# Patient Record
Sex: MALE | Race: WHITE | NOT HISPANIC OR LATINO | Employment: OTHER
[De-identification: names, ages, dates, MRNs, and addresses within clinical notes are randomized per-mention and may not be internally consistent; named-entity substitution may affect disease eponyms.]

---

## 2017-01-12 ENCOUNTER — RECORDS - HEALTHEAST (OUTPATIENT)
Dept: ADMINISTRATIVE | Facility: OTHER | Age: 67
End: 2017-01-12

## 2017-06-02 ENCOUNTER — RECORDS - HEALTHEAST (OUTPATIENT)
Dept: LAB | Facility: CLINIC | Age: 67
End: 2017-06-02

## 2017-06-02 LAB
CHOLEST SERPL-MCNC: 220 MG/DL
FASTING STATUS PATIENT QL REPORTED: ABNORMAL
HDLC SERPL-MCNC: 46 MG/DL
LDLC SERPL CALC-MCNC: 116 MG/DL
PSA SERPL-MCNC: 0.8 NG/ML (ref 0–4.5)
TRIGL SERPL-MCNC: 291 MG/DL

## 2017-06-14 ENCOUNTER — RECORDS - HEALTHEAST (OUTPATIENT)
Dept: ADMINISTRATIVE | Facility: OTHER | Age: 67
End: 2017-06-14

## 2018-01-09 ENCOUNTER — RECORDS - HEALTHEAST (OUTPATIENT)
Dept: LAB | Facility: CLINIC | Age: 68
End: 2018-01-09

## 2018-01-09 LAB
ALBUMIN SERPL-MCNC: 3.1 G/DL (ref 3.5–5)
ANION GAP SERPL CALCULATED.3IONS-SCNC: 10 MMOL/L (ref 5–18)
BUN SERPL-MCNC: 19 MG/DL (ref 8–22)
CALCIUM SERPL-MCNC: 8.7 MG/DL (ref 8.5–10.5)
CHLORIDE BLD-SCNC: 101 MMOL/L (ref 98–107)
CO2 SERPL-SCNC: 32 MMOL/L (ref 22–31)
CREAT SERPL-MCNC: 1.25 MG/DL (ref 0.7–1.3)
GFR SERPL CREATININE-BSD FRML MDRD: 58 ML/MIN/1.73M2
GLUCOSE BLD-MCNC: 135 MG/DL (ref 70–125)
PHOSPHATE SERPL-MCNC: 3.1 MG/DL (ref 2.5–4.5)
POTASSIUM BLD-SCNC: 4.1 MMOL/L (ref 3.5–5)
SODIUM SERPL-SCNC: 143 MMOL/L (ref 136–145)

## 2018-09-25 ENCOUNTER — RECORDS - HEALTHEAST (OUTPATIENT)
Dept: LAB | Facility: CLINIC | Age: 68
End: 2018-09-25

## 2018-09-25 LAB
ALBUMIN SERPL-MCNC: 3.9 G/DL (ref 3.5–5)
ALP SERPL-CCNC: 74 U/L (ref 45–120)
ALT SERPL W P-5'-P-CCNC: 10 U/L (ref 0–45)
ANION GAP SERPL CALCULATED.3IONS-SCNC: 9 MMOL/L (ref 5–18)
AST SERPL W P-5'-P-CCNC: 14 U/L (ref 0–40)
BILIRUB SERPL-MCNC: 0.4 MG/DL (ref 0–1)
BUN SERPL-MCNC: 15 MG/DL (ref 8–22)
CALCIUM SERPL-MCNC: 9.5 MG/DL (ref 8.5–10.5)
CHLORIDE BLD-SCNC: 102 MMOL/L (ref 98–107)
CHOLEST SERPL-MCNC: 210 MG/DL
CO2 SERPL-SCNC: 30 MMOL/L (ref 22–31)
CREAT SERPL-MCNC: 1.11 MG/DL (ref 0.7–1.3)
FASTING STATUS PATIENT QL REPORTED: ABNORMAL
GFR SERPL CREATININE-BSD FRML MDRD: >60 ML/MIN/1.73M2
GLUCOSE BLD-MCNC: 113 MG/DL (ref 70–125)
HDLC SERPL-MCNC: 47 MG/DL
LDLC SERPL CALC-MCNC: 140 MG/DL
POTASSIUM BLD-SCNC: 4.8 MMOL/L (ref 3.5–5)
PROT SERPL-MCNC: 7.2 G/DL (ref 6–8)
SODIUM SERPL-SCNC: 141 MMOL/L (ref 136–145)
TRIGL SERPL-MCNC: 115 MG/DL

## 2018-09-26 ENCOUNTER — RECORDS - HEALTHEAST (OUTPATIENT)
Dept: ADMINISTRATIVE | Facility: OTHER | Age: 68
End: 2018-09-26

## 2019-02-19 ENCOUNTER — RECORDS - HEALTHEAST (OUTPATIENT)
Dept: ADMINISTRATIVE | Facility: OTHER | Age: 69
End: 2019-02-19

## 2019-02-19 ENCOUNTER — RECORDS - HEALTHEAST (OUTPATIENT)
Dept: LAB | Facility: CLINIC | Age: 69
End: 2019-02-19

## 2019-02-19 LAB
ALBUMIN SERPL-MCNC: 3.7 G/DL (ref 3.5–5)
ANION GAP SERPL CALCULATED.3IONS-SCNC: 8 MMOL/L (ref 5–18)
BUN SERPL-MCNC: 14 MG/DL (ref 8–22)
CALCIUM SERPL-MCNC: 9.6 MG/DL (ref 8.5–10.5)
CHLORIDE BLD-SCNC: 103 MMOL/L (ref 98–107)
CO2 SERPL-SCNC: 31 MMOL/L (ref 22–31)
CREAT SERPL-MCNC: 1.3 MG/DL (ref 0.7–1.3)
GFR SERPL CREATININE-BSD FRML MDRD: 55 ML/MIN/1.73M2
GLUCOSE BLD-MCNC: 141 MG/DL (ref 70–125)
PHOSPHATE SERPL-MCNC: 3.5 MG/DL (ref 2.5–4.5)
POTASSIUM BLD-SCNC: 5 MMOL/L (ref 3.5–5)
SODIUM SERPL-SCNC: 142 MMOL/L (ref 136–145)

## 2019-09-10 ENCOUNTER — RECORDS - HEALTHEAST (OUTPATIENT)
Dept: LAB | Facility: CLINIC | Age: 69
End: 2019-09-10

## 2019-09-10 ENCOUNTER — RECORDS - HEALTHEAST (OUTPATIENT)
Dept: ADMINISTRATIVE | Facility: OTHER | Age: 69
End: 2019-09-10

## 2019-09-10 LAB
ALBUMIN SERPL-MCNC: 4.1 G/DL (ref 3.5–5)
ALP SERPL-CCNC: 78 U/L (ref 45–120)
ALT SERPL W P-5'-P-CCNC: 13 U/L (ref 0–45)
ANION GAP SERPL CALCULATED.3IONS-SCNC: 8 MMOL/L (ref 5–18)
AST SERPL W P-5'-P-CCNC: 17 U/L (ref 0–40)
BILIRUB SERPL-MCNC: 0.4 MG/DL (ref 0–1)
BUN SERPL-MCNC: 13 MG/DL (ref 8–22)
CALCIUM SERPL-MCNC: 9.5 MG/DL (ref 8.5–10.5)
CHLORIDE BLD-SCNC: 101 MMOL/L (ref 98–107)
CHOLEST SERPL-MCNC: 198 MG/DL
CO2 SERPL-SCNC: 32 MMOL/L (ref 22–31)
CREAT SERPL-MCNC: 1.14 MG/DL (ref 0.7–1.3)
FASTING STATUS PATIENT QL REPORTED: NORMAL
GFR SERPL CREATININE-BSD FRML MDRD: >60 ML/MIN/1.73M2
GLUCOSE BLD-MCNC: 94 MG/DL (ref 70–125)
HDLC SERPL-MCNC: 52 MG/DL
LDLC SERPL CALC-MCNC: 123 MG/DL
POTASSIUM BLD-SCNC: 4.8 MMOL/L (ref 3.5–5)
PROT SERPL-MCNC: 7.3 G/DL (ref 6–8)
SODIUM SERPL-SCNC: 141 MMOL/L (ref 136–145)
TRIGL SERPL-MCNC: 117 MG/DL

## 2020-01-03 ENCOUNTER — RECORDS - HEALTHEAST (OUTPATIENT)
Dept: ADMINISTRATIVE | Facility: OTHER | Age: 70
End: 2020-01-03

## 2020-01-07 ENCOUNTER — RECORDS - HEALTHEAST (OUTPATIENT)
Dept: ADMINISTRATIVE | Facility: OTHER | Age: 70
End: 2020-01-07

## 2020-01-08 ENCOUNTER — RECORDS - HEALTHEAST (OUTPATIENT)
Dept: ADMINISTRATIVE | Facility: OTHER | Age: 70
End: 2020-01-08

## 2020-01-10 ENCOUNTER — RECORDS - HEALTHEAST (OUTPATIENT)
Dept: ADMINISTRATIVE | Facility: OTHER | Age: 70
End: 2020-01-10

## 2020-01-10 ENCOUNTER — OFFICE VISIT - HEALTHEAST (OUTPATIENT)
Dept: PULMONOLOGY | Facility: OTHER | Age: 70
End: 2020-01-10

## 2020-01-10 ENCOUNTER — RECORDS - HEALTHEAST (OUTPATIENT)
Dept: RADIOLOGY | Facility: CLINIC | Age: 70
End: 2020-01-10

## 2020-01-10 DIAGNOSIS — J43.9 PULMONARY EMPHYSEMA, UNSPECIFIED EMPHYSEMA TYPE (H): ICD-10-CM

## 2020-01-10 DIAGNOSIS — J20.9 ACUTE BRONCHITIS, UNSPECIFIED ORGANISM: ICD-10-CM

## 2020-01-10 DIAGNOSIS — J44.1 COPD EXACERBATION (H): ICD-10-CM

## 2020-01-10 DIAGNOSIS — R91.1 LUNG NODULE: ICD-10-CM

## 2020-01-10 DIAGNOSIS — R06.09 DYSPNEA ON EXERTION: ICD-10-CM

## 2020-01-10 RX ORDER — TAMSULOSIN HYDROCHLORIDE 0.4 MG/1
0.4 CAPSULE ORAL DAILY
Status: SHIPPED | COMMUNITY
Start: 2020-01-02

## 2020-01-10 ASSESSMENT — MIFFLIN-ST. JEOR: SCORE: 1564.15

## 2020-01-14 ENCOUNTER — HOSPITAL ENCOUNTER (OUTPATIENT)
Dept: PET IMAGING | Facility: HOSPITAL | Age: 70
Discharge: HOME | End: 2020-01-14
Attending: INTERNAL MEDICINE

## 2020-01-14 DIAGNOSIS — R91.1 LUNG NODULE: ICD-10-CM

## 2020-01-14 LAB — GLUCOSE BLDC GLUCOMTR-MCNC: 152 MG/DL (ref 70–139)

## 2020-01-14 ASSESSMENT — MIFFLIN-ST. JEOR: SCORE: 1570.05

## 2020-01-15 ENCOUNTER — COMMUNICATION - HEALTHEAST (OUTPATIENT)
Dept: MULTI SPECIALTY CLINIC | Facility: CLINIC | Age: 70
End: 2020-01-15

## 2020-01-15 DIAGNOSIS — R91.1 LUNG NODULE: ICD-10-CM

## 2020-01-27 ENCOUNTER — RECORDS - HEALTHEAST (OUTPATIENT)
Dept: ADMINISTRATIVE | Facility: OTHER | Age: 70
End: 2020-01-27

## 2020-01-30 ENCOUNTER — OFFICE VISIT - HEALTHEAST (OUTPATIENT)
Dept: PULMONOLOGY | Facility: OTHER | Age: 70
End: 2020-01-30

## 2020-01-30 ENCOUNTER — RECORDS - HEALTHEAST (OUTPATIENT)
Dept: RADIOLOGY | Facility: CLINIC | Age: 70
End: 2020-01-30

## 2020-01-30 DIAGNOSIS — R91.1 LUNG NODULE: ICD-10-CM

## 2020-01-30 ASSESSMENT — MIFFLIN-ST. JEOR: SCORE: 1583.66

## 2020-02-05 ENCOUNTER — HOSPITAL ENCOUNTER (OUTPATIENT)
Dept: CT IMAGING | Facility: CLINIC | Age: 70
Discharge: HOME | End: 2020-02-05
Attending: THORACIC SURGERY (CARDIOTHORACIC VASCULAR SURGERY) | Admitting: RADIOLOGY

## 2020-02-05 ENCOUNTER — HOSPITAL ENCOUNTER (OUTPATIENT)
Dept: RADIOLOGY | Facility: CLINIC | Age: 70
Discharge: HOME | End: 2020-02-05
Attending: RADIOLOGY

## 2020-02-05 ENCOUNTER — HOSPITAL ENCOUNTER (OUTPATIENT)
Dept: CT IMAGING | Facility: CLINIC | Age: 70
Discharge: HOME | End: 2020-02-05
Attending: THORACIC SURGERY (CARDIOTHORACIC VASCULAR SURGERY)

## 2020-02-05 DIAGNOSIS — R91.1 LUNG NODULE: ICD-10-CM

## 2020-02-05 LAB
GLUCOSE BLDC GLUCOMTR-MCNC: 99 MG/DL (ref 70–139)
HGB BLD-MCNC: 14.1 G/DL (ref 14–18)
INR PPP: 1.04 (ref 0.9–1.1)
PLATELET # BLD AUTO: 197 THOU/UL (ref 140–440)

## 2020-02-05 ASSESSMENT — MIFFLIN-ST. JEOR: SCORE: 1580.54

## 2020-02-06 ENCOUNTER — COMMUNICATION - HEALTHEAST (OUTPATIENT)
Dept: SCHEDULING | Facility: CLINIC | Age: 70
End: 2020-02-06

## 2020-02-06 LAB
CAP COMMENT: ABNORMAL
LAB AP CHARGES (HE HISTORICAL CONVERSION): ABNORMAL
LAB AP INITIAL CYTO EVAL (HE HISTORICAL CONVERSION): ABNORMAL
LAB MED GENERAL PATH INTERP (HE HISTORICAL CONVERSION): ABNORMAL
PATH REPORT.COMMENTS IMP SPEC: ABNORMAL
PATH REPORT.COMMENTS IMP SPEC: ABNORMAL
PATH REPORT.FINAL DX SPEC: ABNORMAL
PATH REPORT.MICROSCOPIC SPEC OTHER STN: ABNORMAL
PATH REPORT.RELEVANT HX SPEC: ABNORMAL
SPECIMEN DESCRIPTION: ABNORMAL

## 2020-02-12 ENCOUNTER — COMMUNICATION - HEALTHEAST (OUTPATIENT)
Dept: PULMONOLOGY | Facility: OTHER | Age: 70
End: 2020-02-12

## 2020-02-13 ENCOUNTER — COMMUNICATION - HEALTHEAST (OUTPATIENT)
Dept: ONCOLOGY | Facility: CLINIC | Age: 70
End: 2020-02-13

## 2020-02-13 ENCOUNTER — AMBULATORY - HEALTHEAST (OUTPATIENT)
Dept: RADIATION ONCOLOGY | Facility: HOSPITAL | Age: 70
End: 2020-02-13

## 2020-02-13 DIAGNOSIS — C34.90 SMALL CELL LUNG CANCER (H): ICD-10-CM

## 2020-02-14 ENCOUNTER — NURSE TRIAGE (OUTPATIENT)
Dept: NURSING | Facility: CLINIC | Age: 70
End: 2020-02-14

## 2020-02-14 NOTE — TELEPHONE ENCOUNTER
Martin calls and says that he has been coughing up dark red blood for 1 week and 3 days. Pt. Says that he had a biopsy done and has been coughing this blood up since having the biopsy. Pt. Denies difficulty breathing. Pt. Says that he does have lung cancer.     Reason for Disposition    [1] Known COPD or other severe lung disease (i.e., bronchiectasis, cystic fibrosis, lung surgery) AND [2] worsening symptoms (i.e., increased sputum purulence or amount, increased breathing difficulty    Additional Information    Negative: Severe difficulty breathing (e.g., struggling for each breath, speaks in single words)    Negative: Bluish (or gray) lips or face now    Negative: [1] Difficulty breathing AND [2] exposure to flames, smoke, or fumes    Negative: [1] Stridor AND [2] difficulty breathing    Negative: Sounds like a life-threatening emergency to the triager    Negative: [1] Previous asthma attacks AND [2] this feels like asthma attack    Negative: Dry (non-productive) cough (i.e., no sputum or minimal clear sputum)    Negative: Chest pain  (Exception: MILD central chest pain, present only when coughing)    Negative: Difficulty breathing    Negative: Patient sounds very sick or weak to the triager    Negative: [1] Coughed up blood AND [2] > 1 tablespoon (15 ml) (Exception: blood-tinged sputum)    Negative: Fever > 103 F (39.4 C)    Negative: [1] Fever > 101 F (38.3 C) AND [2] age > 60    Negative: [1] Fever > 100.0 F (37.8 C) AND [2] bedridden (e.g., nursing home patient, CVA, chronic illness, recovering from surgery)    Negative: [1] Fever > 100.0 F (37.8 C) AND [2] diabetes mellitus or weak immune system (e.g., HIV positive, cancer chemo, splenectomy, chronic steroids)    Negative: Wheezing is present    Negative: SEVERE coughing spells (e.g., whooping sound after coughing, vomiting after coughing)    Negative: [1] Continuous (nonstop) coughing interferes with work or school AND [2] no improvement using cough  treatment per Care Advice    Negative: Coughing up tiffani-colored (reddish-brown) sputum    Negative: Fever present > 3 days (72 hours)    Negative: [1] Fever returns after gone for over 24 hours AND [2] symptoms worse or not improved    Negative: [1] Using nasal washes and pain medicine > 24 hours AND [2] sinus pain (around cheekbone or eye) persists    Negative: Earache    Protocols used: COUGH - ACUTE [id number]

## 2020-02-15 ENCOUNTER — COMMUNICATION - HEALTHEAST (OUTPATIENT)
Dept: SCHEDULING | Facility: CLINIC | Age: 70
End: 2020-02-15

## 2020-02-20 ENCOUNTER — HOSPITAL ENCOUNTER (OUTPATIENT)
Dept: MRI IMAGING | Facility: CLINIC | Age: 70
Discharge: HOME | End: 2020-02-20
Attending: RADIOLOGY

## 2020-02-20 DIAGNOSIS — C34.90 SMALL CELL LUNG CANCER (H): ICD-10-CM

## 2020-02-24 ENCOUNTER — AMBULATORY - HEALTHEAST (OUTPATIENT)
Dept: RADIATION ONCOLOGY | Facility: HOSPITAL | Age: 70
End: 2020-02-24

## 2020-02-24 ENCOUNTER — OFFICE VISIT - HEALTHEAST (OUTPATIENT)
Dept: RADIATION ONCOLOGY | Facility: HOSPITAL | Age: 70
End: 2020-02-24

## 2020-02-24 ENCOUNTER — AMBULATORY - HEALTHEAST (OUTPATIENT)
Dept: ONCOLOGY | Facility: HOSPITAL | Age: 70
End: 2020-02-24

## 2020-02-24 DIAGNOSIS — C34.90 SMALL CELL LUNG CANCER (H): ICD-10-CM

## 2020-02-24 RX ORDER — NICOTINE 21 MG/24HR
PATCH, TRANSDERMAL 24 HOURS TRANSDERMAL
Status: SHIPPED | COMMUNITY
Start: 2020-01-31 | End: 2021-08-05

## 2020-02-27 ENCOUNTER — AMBULATORY - HEALTHEAST (OUTPATIENT)
Dept: RADIATION ONCOLOGY | Facility: HOSPITAL | Age: 70
End: 2020-02-27

## 2020-03-02 ENCOUNTER — AMBULATORY - HEALTHEAST (OUTPATIENT)
Dept: RADIATION ONCOLOGY | Facility: HOSPITAL | Age: 70
End: 2020-03-02

## 2020-03-02 ENCOUNTER — OFFICE VISIT - HEALTHEAST (OUTPATIENT)
Dept: RADIATION ONCOLOGY | Facility: HOSPITAL | Age: 70
End: 2020-03-02

## 2020-03-02 DIAGNOSIS — C34.90 SMALL CELL LUNG CANCER (H): ICD-10-CM

## 2020-03-04 ENCOUNTER — AMBULATORY - HEALTHEAST (OUTPATIENT)
Dept: RADIATION ONCOLOGY | Facility: HOSPITAL | Age: 70
End: 2020-03-04

## 2020-03-06 ENCOUNTER — AMBULATORY - HEALTHEAST (OUTPATIENT)
Dept: RADIATION ONCOLOGY | Facility: HOSPITAL | Age: 70
End: 2020-03-06

## 2020-03-09 ENCOUNTER — AMBULATORY - HEALTHEAST (OUTPATIENT)
Dept: RADIATION ONCOLOGY | Facility: HOSPITAL | Age: 70
End: 2020-03-09

## 2020-03-12 ENCOUNTER — COMMUNICATION - HEALTHEAST (OUTPATIENT)
Dept: ONCOLOGY | Facility: CLINIC | Age: 70
End: 2020-03-12

## 2020-03-12 ENCOUNTER — AMBULATORY - HEALTHEAST (OUTPATIENT)
Dept: RADIATION ONCOLOGY | Facility: HOSPITAL | Age: 70
End: 2020-03-12

## 2020-03-12 DIAGNOSIS — C34.90 SMALL CELL LUNG CANCER (H): ICD-10-CM

## 2020-03-19 ENCOUNTER — COMMUNICATION - HEALTHEAST (OUTPATIENT)
Dept: RADIATION ONCOLOGY | Facility: HOSPITAL | Age: 70
End: 2020-03-19

## 2020-04-13 ENCOUNTER — RECORDS - HEALTHEAST (OUTPATIENT)
Dept: LAB | Facility: CLINIC | Age: 70
End: 2020-04-13

## 2020-04-13 ENCOUNTER — RECORDS - HEALTHEAST (OUTPATIENT)
Dept: ADMINISTRATIVE | Facility: OTHER | Age: 70
End: 2020-04-13

## 2020-04-13 LAB
ALBUMIN SERPL-MCNC: 3.9 G/DL (ref 3.5–5)
ANION GAP SERPL CALCULATED.3IONS-SCNC: 9 MMOL/L (ref 5–18)
BUN SERPL-MCNC: 13 MG/DL (ref 8–22)
CALCIUM SERPL-MCNC: 9 MG/DL (ref 8.5–10.5)
CHLORIDE BLD-SCNC: 102 MMOL/L (ref 98–107)
CO2 SERPL-SCNC: 30 MMOL/L (ref 22–31)
CREAT SERPL-MCNC: 1.22 MG/DL (ref 0.7–1.3)
CREAT UR-MCNC: 210.9 MG/DL
GFR SERPL CREATININE-BSD FRML MDRD: 59 ML/MIN/1.73M2
GLUCOSE BLD-MCNC: 201 MG/DL (ref 70–125)
MICROALBUMIN UR-MCNC: 3.14 MG/DL (ref 0–1.99)
MICROALBUMIN/CREAT UR: 14.9 MG/G
PHOSPHATE SERPL-MCNC: 2.3 MG/DL (ref 2.5–4.5)
POTASSIUM BLD-SCNC: 4.6 MMOL/L (ref 3.5–5)
SODIUM SERPL-SCNC: 141 MMOL/L (ref 136–145)

## 2020-05-11 ENCOUNTER — HOSPITAL ENCOUNTER (OUTPATIENT)
Dept: CT IMAGING | Facility: HOSPITAL | Age: 70
Setting detail: RADIATION/ONCOLOGY SERIES
Discharge: STILL ADMITTED OR ADMINISTRATIVE | End: 2020-05-11
Attending: RADIOLOGY

## 2020-05-11 DIAGNOSIS — C34.90 SMALL CELL LUNG CANCER (H): ICD-10-CM

## 2020-05-14 ENCOUNTER — OFFICE VISIT - HEALTHEAST (OUTPATIENT)
Dept: RADIATION ONCOLOGY | Facility: HOSPITAL | Age: 70
End: 2020-05-14

## 2020-05-14 DIAGNOSIS — C34.90 SMALL CELL LUNG CANCER (H): ICD-10-CM

## 2020-05-26 ENCOUNTER — RECORDS - HEALTHEAST (OUTPATIENT)
Dept: LAB | Facility: CLINIC | Age: 70
End: 2020-05-26

## 2020-05-26 ENCOUNTER — RECORDS - HEALTHEAST (OUTPATIENT)
Dept: ADMINISTRATIVE | Facility: OTHER | Age: 70
End: 2020-05-26

## 2020-05-26 LAB
ANION GAP SERPL CALCULATED.3IONS-SCNC: 9 MMOL/L (ref 5–18)
BUN SERPL-MCNC: 13 MG/DL (ref 8–22)
CALCIUM SERPL-MCNC: 9.3 MG/DL (ref 8.5–10.5)
CHLORIDE BLD-SCNC: 104 MMOL/L (ref 98–107)
CO2 SERPL-SCNC: 30 MMOL/L (ref 22–31)
CREAT SERPL-MCNC: 1.26 MG/DL (ref 0.7–1.3)
ERYTHROCYTE [DISTWIDTH] IN BLOOD BY AUTOMATED COUNT: 12.8 % (ref 11–14.5)
GFR SERPL CREATININE-BSD FRML MDRD: 57 ML/MIN/1.73M2
GLUCOSE BLD-MCNC: 113 MG/DL (ref 70–125)
HCT VFR BLD AUTO: 46.5 % (ref 40–54)
HGB BLD-MCNC: 14.8 G/DL (ref 14–18)
MCH RBC QN AUTO: 31.6 PG (ref 27–34)
MCHC RBC AUTO-ENTMCNC: 31.8 G/DL (ref 32–36)
MCV RBC AUTO: 99 FL (ref 80–100)
PLATELET # BLD AUTO: 173 THOU/UL (ref 140–440)
PMV BLD AUTO: 10.6 FL (ref 8.5–12.5)
POTASSIUM BLD-SCNC: 4.4 MMOL/L (ref 3.5–5)
RBC # BLD AUTO: 4.69 MILL/UL (ref 4.4–6.2)
SODIUM SERPL-SCNC: 143 MMOL/L (ref 136–145)
WBC: 7.9 THOU/UL (ref 4–11)

## 2020-09-02 ENCOUNTER — COMMUNICATION - HEALTHEAST (OUTPATIENT)
Dept: ADMINISTRATIVE | Facility: HOSPITAL | Age: 70
End: 2020-09-02

## 2020-09-17 ENCOUNTER — HOSPITAL ENCOUNTER (OUTPATIENT)
Dept: CT IMAGING | Facility: HOSPITAL | Age: 70
Setting detail: RADIATION/ONCOLOGY SERIES
Discharge: STILL ADMITTED OR ADMINISTRATIVE | End: 2020-09-17
Attending: RADIOLOGY

## 2020-09-17 DIAGNOSIS — C34.90 SMALL CELL LUNG CANCER (H): ICD-10-CM

## 2020-09-24 ENCOUNTER — OFFICE VISIT - HEALTHEAST (OUTPATIENT)
Dept: RADIATION ONCOLOGY | Facility: HOSPITAL | Age: 70
End: 2020-09-24

## 2020-09-24 DIAGNOSIS — C34.90 SMALL CELL LUNG CANCER (H): ICD-10-CM

## 2020-10-05 ENCOUNTER — RECORDS - HEALTHEAST (OUTPATIENT)
Dept: LAB | Facility: CLINIC | Age: 70
End: 2020-10-05

## 2020-10-05 LAB
ALBUMIN SERPL-MCNC: 4 G/DL (ref 3.5–5)
ALP SERPL-CCNC: 87 U/L (ref 45–120)
ALT SERPL W P-5'-P-CCNC: 16 U/L (ref 0–45)
ANION GAP SERPL CALCULATED.3IONS-SCNC: 8 MMOL/L (ref 5–18)
AST SERPL W P-5'-P-CCNC: 17 U/L (ref 0–40)
BILIRUB SERPL-MCNC: 0.3 MG/DL (ref 0–1)
BUN SERPL-MCNC: 14 MG/DL (ref 8–22)
CALCIUM SERPL-MCNC: 9.2 MG/DL (ref 8.5–10.5)
CHLORIDE BLD-SCNC: 102 MMOL/L (ref 98–107)
CHOLEST SERPL-MCNC: 193 MG/DL
CO2 SERPL-SCNC: 33 MMOL/L (ref 22–31)
CREAT SERPL-MCNC: 1.23 MG/DL (ref 0.7–1.3)
FASTING STATUS PATIENT QL REPORTED: ABNORMAL
GFR SERPL CREATININE-BSD FRML MDRD: 59 ML/MIN/1.73M2
GLUCOSE BLD-MCNC: 129 MG/DL (ref 70–125)
HDLC SERPL-MCNC: 51 MG/DL
LDLC SERPL CALC-MCNC: 103 MG/DL
POTASSIUM BLD-SCNC: 4.6 MMOL/L (ref 3.5–5)
PROT SERPL-MCNC: 7.4 G/DL (ref 6–8)
SODIUM SERPL-SCNC: 143 MMOL/L (ref 136–145)
TRIGL SERPL-MCNC: 197 MG/DL

## 2020-12-09 ENCOUNTER — RECORDS - HEALTHEAST (OUTPATIENT)
Dept: LAB | Facility: CLINIC | Age: 70
End: 2020-12-09

## 2020-12-09 LAB — TSH SERPL DL<=0.005 MIU/L-ACNC: 0.76 UIU/ML (ref 0.3–5)

## 2021-02-23 ENCOUNTER — HOSPITAL ENCOUNTER (OUTPATIENT)
Dept: CT IMAGING | Facility: HOSPITAL | Age: 71
Discharge: HOME | End: 2021-02-23
Attending: RADIOLOGY

## 2021-02-23 DIAGNOSIS — C34.90 SMALL CELL LUNG CANCER (H): ICD-10-CM

## 2021-02-25 ENCOUNTER — OFFICE VISIT - HEALTHEAST (OUTPATIENT)
Dept: RADIATION ONCOLOGY | Facility: HOSPITAL | Age: 71
End: 2021-02-25

## 2021-02-25 DIAGNOSIS — C34.90 SMALL CELL LUNG CANCER (H): ICD-10-CM

## 2021-02-25 RX ORDER — ZOLPIDEM TARTRATE 6.25 MG/1
6.25 TABLET, FILM COATED, EXTENDED RELEASE ORAL
Status: SHIPPED | COMMUNITY
Start: 2021-01-25 | End: 2021-08-05

## 2021-04-16 ENCOUNTER — RECORDS - HEALTHEAST (OUTPATIENT)
Dept: LAB | Facility: CLINIC | Age: 71
End: 2021-04-16

## 2021-04-16 LAB
CREAT UR-MCNC: 231.5 MG/DL
MICROALBUMIN UR-MCNC: 6.16 MG/DL (ref 0–1.99)
MICROALBUMIN/CREAT UR: 26.6 MG/G

## 2021-04-17 LAB
BACTERIA SPEC CULT: NO GROWTH
PSA SERPL-MCNC: 1.7 NG/ML (ref 0–4.5)

## 2021-05-27 ENCOUNTER — RECORDS - HEALTHEAST (OUTPATIENT)
Dept: ADMINISTRATIVE | Facility: CLINIC | Age: 71
End: 2021-05-27

## 2021-05-28 ENCOUNTER — RECORDS - HEALTHEAST (OUTPATIENT)
Dept: ADMINISTRATIVE | Facility: CLINIC | Age: 71
End: 2021-05-28

## 2021-05-30 ENCOUNTER — RECORDS - HEALTHEAST (OUTPATIENT)
Dept: ADMINISTRATIVE | Facility: CLINIC | Age: 71
End: 2021-05-30

## 2021-05-31 ENCOUNTER — RECORDS - HEALTHEAST (OUTPATIENT)
Dept: ADMINISTRATIVE | Facility: CLINIC | Age: 71
End: 2021-05-31

## 2021-06-04 VITALS
RESPIRATION RATE: 24 BRPM | WEIGHT: 187.7 LBS | OXYGEN SATURATION: 93 % | HEART RATE: 92 BPM | BODY MASS INDEX: 30.17 KG/M2 | SYSTOLIC BLOOD PRESSURE: 132 MMHG | HEIGHT: 66 IN | DIASTOLIC BLOOD PRESSURE: 78 MMHG

## 2021-06-04 VITALS
BODY MASS INDEX: 30.86 KG/M2 | HEART RATE: 91 BPM | SYSTOLIC BLOOD PRESSURE: 122 MMHG | DIASTOLIC BLOOD PRESSURE: 70 MMHG | WEIGHT: 192 LBS | OXYGEN SATURATION: 92 % | RESPIRATION RATE: 12 BRPM | HEIGHT: 66 IN

## 2021-06-04 VITALS
BODY MASS INDEX: 32.44 KG/M2 | WEIGHT: 201 LBS | HEART RATE: 84 BPM | DIASTOLIC BLOOD PRESSURE: 84 MMHG | SYSTOLIC BLOOD PRESSURE: 146 MMHG | OXYGEN SATURATION: 93 % | TEMPERATURE: 98.1 F

## 2021-06-04 VITALS
TEMPERATURE: 98.3 F | OXYGEN SATURATION: 93 % | WEIGHT: 202.5 LBS | SYSTOLIC BLOOD PRESSURE: 131 MMHG | HEART RATE: 88 BPM | DIASTOLIC BLOOD PRESSURE: 78 MMHG | BODY MASS INDEX: 32.68 KG/M2

## 2021-06-04 VITALS — HEIGHT: 66 IN | WEIGHT: 191.31 LBS | BODY MASS INDEX: 30.75 KG/M2

## 2021-06-04 VITALS — BODY MASS INDEX: 30.37 KG/M2 | HEIGHT: 66 IN | WEIGHT: 189 LBS

## 2021-06-04 VITALS
WEIGHT: 198.5 LBS | DIASTOLIC BLOOD PRESSURE: 94 MMHG | BODY MASS INDEX: 32.04 KG/M2 | SYSTOLIC BLOOD PRESSURE: 143 MMHG | OXYGEN SATURATION: 93 % | HEART RATE: 81 BPM

## 2021-06-05 ENCOUNTER — HEALTH MAINTENANCE LETTER (OUTPATIENT)
Age: 71
End: 2021-06-05

## 2021-06-05 VITALS
DIASTOLIC BLOOD PRESSURE: 98 MMHG | BODY MASS INDEX: 33.09 KG/M2 | HEART RATE: 90 BPM | OXYGEN SATURATION: 93 % | WEIGHT: 205 LBS | SYSTOLIC BLOOD PRESSURE: 147 MMHG

## 2021-06-05 VITALS
BODY MASS INDEX: 32.15 KG/M2 | WEIGHT: 199.2 LBS | HEART RATE: 92 BPM | OXYGEN SATURATION: 93 % | DIASTOLIC BLOOD PRESSURE: 90 MMHG | SYSTOLIC BLOOD PRESSURE: 171 MMHG

## 2021-06-05 NOTE — TELEPHONE ENCOUNTER
Pulmonary Note    Patient was informed about PET CT scan results.     PET CT scan 1/14/2020  IMPRESSION:   Findings suspicious for left lower lobe primary lung neoplasm without metastasis.    PLAN    1. Referral to thoracic surgery with PFTs results    Connor Brito  Pulmonary / Critical Care  1/15/2020   3:13 PM

## 2021-06-05 NOTE — OR NURSING
No pnemothorax per dr chatman, some blood noted in lungs and pt to be expected coughing up some blood, reported results to pt and pt wife

## 2021-06-05 NOTE — PRE-PROCEDURE
Procedure Name: lung biopsy  Date/Time: 2/5/2020 10:14 AM    Verbal consent obtained?: Yes  Written consent obtained?: Yes  Risks and benefits: Risks, benefits and alternatives were discussed  Discharge plan: Appropriate discharge home plan in place for patients who are going home after procedure   Consent given by: patient  Expected level of sedation: moderate  ASA Class: Class 2- mild systemic disease, no acute problems, no functional limitations  Mallampati: Grade 2- soft palate, base of uvula, tonsillar pillars, and portion of posterior pharyngeal wall visible  Patient states understanding of procedure being performed: Yes  Patient's understanding of procedure matches consent: Yes  Procedure consent matches procedure scheduled: Yes  Appropriately NPO: yes  Lungs: lungs clear with good breath sounds bilaterally  Heart: normal heart sounds and rate  History & Physical reviewed: History and physical reviewed and no updates needed  Statement of review: I have reviewed the lab findings, diagnostic data, medications, and the plan for sedation

## 2021-06-05 NOTE — PATIENT INSTRUCTIONS - HE
1. Prednisone 40 mg daily for 5 days  2. Azithromycin 1 tab daily for 5 days  3. Continue albuterol nebs every 4 hours as needed  4. PET CT Scan   5. PFTs   6. Smoking cessation counseling  7. Follow up in 6 weeks

## 2021-06-05 NOTE — PROCEDURES
VASCULAR AND INTERVENTIONAL RADIOLOGY PROCEDURE NOTE    Patient Name: Martin Tovar  Medical Record Number: [id number]  YOB: 1950    Date/Time: 2/5/2020 11:23 AM    Procedure: Left lung nodule biopsy/fiducial     Diagnosis: Malignancy    Medications: Versed 2mg IV and Fentanyl 100mcg IV    Contrast: None    Fluoroscopy Time: 0.2 min    EBL: None    Complications: None    Specimens Sent: Yes samples collected by path    Findings: Successful left lung nodule biopsy and fiducial placement, expected hemoptysis given hemorrhage on post scan, keep left side down, CXR in 2 hours, OK to eat now    Plan: Post procedure monitoring    Deb Rebolledo MD, VI  Vascular and Interventional Radiology  Vascular Medicine  Internal Medicine  Pager: [phone]  Clinic: [phone]

## 2021-06-05 NOTE — PROGRESS NOTES
PULMONARY OUTPATIENT CONSULT NOTE    Assessment:   1. COPD exacerbation  Systemic steroids.  PFTs to stage disease. Bronchodilators as needed  2. Acute bronchitis  Zpack   3. 13 mm LLL nodule  Comparing chest CT scans 1/7/2020 and 9/26/2018, LLL nodule has grown in size.   PET CT scan.   4. Left perihilar radiation fibrosis  5. Hx small cell lung cancer 2012  S/p chemo and radiation therapy.   6. Tobacco user  1 ppd for 50 years. Smoking cessation counseling      Plan:   1. Prednisone 40 mg daily for 5 days  2. Azithromycin 1 tab daily for 5 days  3. Continue albuterol nebs every 4 hours as needed  4. PET CT scan   5. PFTs   6. Smoking cessation counseling  7. Follow up in 6 weeks    Thank you for this consultation.     Connor Brito  Pulmonary / Critical Care  1/10/2020    CC:      Chief Complaint   Patient presents with     Consult       HPI:       Martin Tovar is an 69 y.o. male who presents for evaluation of lung nodule.   Patient has history of COPD, small cell lung cancer diagnosed on 2012 s/p radiation and chemotherapy, s/p prophylactic brain radiation, HTN, DM, tobacco user.   Patient was seen by PCP on 1/3/2020, reported increase productive cough, shortness of breath, treated for COPD exacerbation. An annual screening chest CT scan was done on 1/7/2020, study showed a 13 mm LLL nodule, which has grown when compared a CT scan done on 9/26/2018.     Patient finished systemic steroids. Reports persistent shortness of breath,chest tightness, wheezes, mild productive cough of greenish sputum. Denies fever, chills or night sweats. Albuterol nebs daily.   Denies chest pain. No PND, orthopnea or swelling of LEs. No postnasal drip or runny nose.   No acid reflux symptoms.   No sleeping problems.   Tobacco user 1 to 2 ppd for 50 years.   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     Past Medical History:   Diagnosis Date     Avascular necrosis of bones of both hips (H)      Lung Cancer  (Small cell)  S/p radiation and chemotherapy 2012    Left perihilar fibrosis , post radiation      Cholecystitis      Chronic renal failure      COPD, mild (H)      Diabetes mellitus (H)     states resolved after weight loss     Disc degeneration, lumbar      DJD (degenerative joint disease) of knee     left     Elevated PSA      LBP (low back pain)      Smoking hx      Medications:     Prior to Admission medications    Medication Sig Start Date End Date Taking? Authorizing Provider   albuterol (PROVENTIL HFA;VENTOLIN HFA) 90 mcg/actuation inhaler Inhale 2 puffs 4 (four) times a day as needed for wheezing.          Yes PROVIDER, HISTORICAL   albuterol (PROVENTIL) 2.5 mg /3 mL (0.083 %) nebulizer solution Take 3 mL by nebulization every 4 (four) hours as needed. 1/9/18  Yes PROVIDER, HISTORICAL   HYDROcodone-acetaminophen (NORCO) 7.5-325 mg per tablet Take 1 tablet by mouth every 6 (six) hours as needed. 1/6/16  Yes Sundar Meraz MD   morphine (MS CONTIN) 15 MG 12 hr tablet Take 15 mg by mouth every 8 (eight) hours.        1/28/16  Yes PROVIDER, HISTORICAL   tamsulosin (FLOMAX) 0.4 mg cap Take 0.4 mg by mouth daily. 1/2/20  Yes PROVIDER, HISTORICAL   azithromycin (ZITHROMAX) 250 MG tablet Take 1 tablet (250 mg total) by mouth daily for 5 days. Take 500 mg (2 x 250 mg tablets) on day 1 followed by 250 mg (1 tablet) on days 2-5. 1/10/20 1/15/20  Connor Peraza MD   predniSONE (DELTASONE) 20 MG tablet Take 40 mg by mouth daily. 1/10/20   Connor Peraza MD     Social History     Socioeconomic History     Marital status:      Spouse name: Not on file     Number of children: Not on file     Years of education: Not on file     Highest education level: Not on file   Occupational History     Not on file   Social Needs     Financial resource strain: Not on file     Food insecurity:     Worry: Not on file     Inability: Not on file     Transportation needs:     Medical: Not on file      "Non-medical: Not on file   Tobacco Use     Smoking status: Current Every Day Smoker     Packs/day: 0.50     Smokeless tobacco: Never Used   Substance and Sexual Activity     Alcohol use: No     Comment: rarely     Drug use: No     Sexual activity: Not on file   Lifestyle     Physical activity:     Days per week: Not on file     Minutes per session: Not on file     Stress: Not on file   Relationships     Social connections:     Talks on phone: Not on file     Gets together: Not on file     Attends Alevism service: Not on file     Active member of club or organization: Not on file     Attends meetings of clubs or organizations: Not on file     Relationship status: Not on file     Intimate partner violence:     Fear of current or ex partner: Not on file     Emotionally abused: Not on file     Physically abused: Not on file     Forced sexual activity: Not on file   Other Topics Concern     Not on file   Social History Narrative     Not on file     FAMILY Hx  - Father with throat cancer  - Mother with uterine cancer    Review of Systems  A 12 point comprehensive review of systems was negative except as noted.      Objective:       Vitals:    01/10/20 1256   BP: 132/78   Pulse: 92   Resp: 24   SpO2: 93%   Weight: 187 lb 11.2 oz (85.1 kg)   Height: 5' 6\" (1.676 m)     Gen: awake, alert, no distress  HEENT: pink conjunctiva, moist mucosa, Mallampati II/IV  Neck: no thyromegaly, masses or JVD  Lungs: wheezes both HT  CV: regular, no murmurs or gallops appreciated  Abdomen: soft, NT, BS wnl  Ext: no edema  Skin: no rash  Neuro: CN II-XII intact, non focal     Diagnostic tests:     Chest CT scan 1/7/2020  Comparison: Chest CT scan 9/26/2018  Increase size of LLL nodule, measures 13 mm, stable left perihilar opacity consistent with radiation fibrosis. Mild centrilobular emphysema. No pathologic lymphadenopathy.         "

## 2021-06-05 NOTE — TELEPHONE ENCOUNTER
Martin is calling and states that he had a biopsy done on lung yesterday.  Today Martin is coughing up some blood.  Denies fever and pain.  Denies troubles breathing.  Sputum today is light pink.  Martin denies large amounts of blood.  Martin was told this will happen after a biopsy.  FNA advised to monitor and if amount of blood increases to phone back and Martin agreed.      Reason for Disposition    Cough    Protocols used: COUGH - ACUTE [id number]

## 2021-06-05 NOTE — H&P
H&P documented within 30 days. I have performed and assessment and examined the patient, as necessary, to update the patient's current status that may have changed since the prior History and Physical.       Deb Rebolledo MD, [facility]  Vascular and Interventional Radiology  Vascular Medicine  Internal Medicine  Pager: [phone]  Clinic: [phone]

## 2021-06-06 NOTE — PROCEDURES
Procedures  SBRT Special Treatment Procedure Note  Date of Service: 2/24/2020    Diagnosis:  History of limited stage small cell lung cancer diagnosed initially in 2012.  The patient is status post chemoradiation therapy.  He was found to have a new diagnosis of limited stage small cell lung cancer, second primary/recurrence, clinical stage T1 N0 M0.     Medical Indication:  To escalate the radiotherapy dose to a curative dose (5000 cGy) using stereotactic body radiotherapy technique and to spare surrounding lung and normal tissues from excessive toxicity.  SBRT planning was completed today to prepare for lung cancer treatment.  SBRT planning is chosen to avoid the critical structures, which cannot be done adequately with conventional planning due to the dose constraints of the normal surrounding critical organs.  In addition, the treatment will be high dose per fraction with complete course to be done in 5 sessions. The patient previously had CT scan acquisition for planning and special treatment aids used include.  The patient was simulated in a supine position. After the contouring of the GTV, ITV (MIPS 90%) and MIPS Averages, a clinical tumor volume (CTV), expanded volume of planned treatment volume (PTV) was carried out on the treatment planning system.  Critical structures outlined included both right and left lung, brachial plexus, spinal cord, esophagus, and airway. Extra efforts were required to immobilize the patient using the custom designed stereotactic frame as well as planning.  Extra efforts during the planning process included contouring of critical structures, GTV, ITV, CTV, PTV and evaluating the DVH and coverage of the PTV.    The patient is going to have SBRT technique for his lung cancer. I have explained to the patient this is a very complicated process which will require an extensive amount of time for planning, delivering and monitoring the patient. The patient understands well and wished to  proceed.       Mary Snowden MD, PhD  Department of Radiation Oncology   [facility]  Tel: [phone]  Page: [phone]    [facility]  [address] [facility] [place] [address]     [address]   [place] [address]

## 2021-06-06 NOTE — TELEPHONE ENCOUNTER
Pt calling that he spoke with a triage RN yesterday, pt had a bx on 2/5/20 of lung. Six to eight teaspoons a day, dark red blood.  No shortness of breath.  Pt waiting for a call back from his clinic; pt states if he does not hear he will go to [facility].  Annalisa Gunderson RN, MA  [facility] RN Triage Nurse Advisor      Reason for Disposition    [1] Coughed up blood AND [2] > 1 tablespoon (15 ml) (Exception: blood-tinged sputum)    Protocols used: COUGHING UP BLOOD-A-AH

## 2021-06-06 NOTE — PROCEDURES
Procedures    Clinical Treatment Planning Note     The complex radiotherapy planning will be completed for his lung cancer. The patient had a planning CT earlier today for planning. The treatment aids were used for planning, including headrest and SBRT Board to help keep the same position during the daily radiation therapy. The therapy planning is necessary to reduce radiotherapy dose to the normal critical organs which are not possible with simple treatment. In addition, dose to the target and the critical structures requires three-dimensional analysis of the isodose distribution. The planning will be done to reduce dose to lungs, liver, esophagus, heart, great vessels, bronchial trees, nerves, spinal cord, bone and soft tissue.    I will contour the clinical tumor volume  CTV , with expanded volume of planning treatment volume  PTV  on the treatment planning system. The critical structures will be outlined, including lungs, liver, esophagus, heart, great vessels, bronchial trees, nerves, spinal cord, bone and soft tissue.    Treatment planning will be done on the computer treatment planning system. The multiple fields will be used to achieve optimal coverage of the target volume. Dose distribution to the above critical structures will be reviewed. Isodose distribution along with the X, Y, Z plan will also be reviewed. Custom blocking will be used to shield normal structures. The beam s eye views will be reviewed and the digital reconstructed image will be reviewed on the planning software. The patient will receive 5000 cGy in 5 treatments targeted to the lung tumors using 6 MV photons with SBRT/IGRT.    SPECIAL  MANAGEMENT: The patient is going to have SBRT technique for his lung cancer. I have explained to the patient this is a very complicated process which will require an extensive amount of time for planning, delivering and monitoring the patient. The patient understands well and wished to proceed      Mary  MD Sp, PhD  Department of Radiation Oncology   [facility]  Tel: [phone]  Page: [phone]    [facility]  [address]  [place] [address]     [address] [place] [address]

## 2021-06-06 NOTE — PROGRESS NOTES
RADIATION ONCOLOGY WEEKLY TREATMENT VISIT NOTE      Assessment / Impression       1. Small cell lung cancer (H)       Tolerating radiation therapy well.  All questions and concerns addressed.    Plan:     Continue radiation treatment as prescribed.    Subjective:      HPI: Martin Tovar is a 69 y.o. male with    1. Small cell lung cancer (H)         The following portions of the patient's history were reviewed and updated as appropriate: allergies, current medications, past family history, past medical history, past social history, past surgical history and problem list.    Assessment                  Body Site: Thoracic Site: lung  Stereotactic Radiosurgery: Yes  Stereotactic Radiosurgery date: 20  Today's Dose: 1000  Total Dose for Thoracic: 5000  Today's Fraction/Total Fraction Thoracic:   Voice Chances/Stridor/Larynx: 1: Mild or intermittent hoarseness  Pharynx and Esphogaus: 0: No change over baseline  Constipation: 0: None  Diarrhea W/O Colostomy: 0: None  Cough: 0: Absent  Hemoptysis: 0: None  Mucus Quantity: 0: None  Dyspnea: 2: Dyspnea on exertion                                              Sexuality Alteration                 Emotional Alteration Copin: Effective  Comfort Alteration KPS: 90% Can perform normal activity, minor signs of disease  Fatigue (ONS scale) : 4: Moderate Fatigue  Pain Location: back - chronic  Pain Intensity. Rate degree of pain ranging from 0 (no pain) to 10 (severe pain) : 5  Pain Description: Ache - Muscular type ache  Pain Intervention: 3: Opiods  Effectiveness of pain intervention: 2: Pain relieved 50%   Nutrition Alteration Anorexia: 0: None  Nausea: 0: None  Vomitin: None  Dyspepsia and/or Heartburn: 0: None  Pharynx and Esphogaus: 0: No change over baseline  Skin Alteration Skin Sensation: 0: No problem  Skin Reaction: 0: None  AUA Assessment                                  Accompanied by       Objective:     Exam: Examination reviewed no  significant changes.    Vitals:    03/02/20 1514   BP: 146/84   Pulse: 84   Temp: 98.1  F (36.7  C)   TempSrc: Oral   SpO2: 93%   Weight: 201 lb (91.2 kg)       Wt Readings from Last 8 Encounters:   03/02/20 201 lb (91.2 kg)   02/24/20 198 lb 8 oz (90 kg)   02/16/20 190 lb (86.2 kg)   02/15/20 190 lb (86.2 kg)   02/05/20 191 lb 5 oz (86.8 kg)   01/30/20 192 lb (87.1 kg)   01/14/20 189 lb (85.7 kg)   01/10/20 187 lb 11.2 oz (85.1 kg)       General: Alert and oriented, in no acute distress  Martin has no Erythema.  Aria chart and setup information reviewed    Mary Snowden MD

## 2021-06-06 NOTE — PROGRESS NOTES
SBRT/SRS Treatment Summary    Patient: Martin Tovar   MRN: [id number]  : 1950  Care Provider: Mary Snowden    Date of Service: 2020        Inocente Hale MD  [address] [place] [address] [address]  [place] [address]            Dear Dr. Hale:     Your patient Mr. Martin Tovar completed his radiation therapy on 2020. As you know Mr. Tovar is a 69 y.o. male with a history of limited stage small cell lung cancer diagnosed initially in .  The patient is status post chemoradiation therapy.  He was found to have a new diagnosis of limited stage small cell lung cancer, second primary/recurrence, clinical stage T1 N0 M0.  His case has been discussed at thoracic tumor conference and consensus recommendation is to consider SBRT.  He received radiation therapy in our clinic with a total dose of 5000 cGy in 5 treatments given from 3/2/120-3/12/2020.  He tolerated radiation therapy very well with minimal side effect.  Patient is scheduled to return to radiation oncology in 2 to 3 months for routine post therapy office follow-up and restaging CT chest.    Again, thank you very much for the referral and allowing me to participate in the care of this patient.  If you have any questions about this patient, please do not hesitate to call.    Sincerely,      Mary Snowden MD, PhD  Department of Radiation Oncology   [facility]  Tel: [phone]  Page: [phone]    [facility]  [address] [place] [address]     [address]  [place] [address]    CC:  Patient Care Team:  Kvng Ramirez MD as PCP - General (Family Medicine)  Mary Snowden MD as Physician (Radiation Oncology)  Elaine oBlton RN as Oncology Nurse Navigator (Hematology and Oncology)  Inocente Hale MD (Cardiovascular and Thoracic Surgery)

## 2021-06-06 NOTE — PROGRESS NOTES
I met with Bhaskar following his simulation today.  He was given a radiation tx schedule and he's happy to have these appointments arranged.  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  Bhaskar has my direct number and I invited calls anytime questions or needs arise.  I encouraged him to leave a message on my confidential VM if I'm away from my desk.  Bhaskar has no questions or needs today.  I plan to connect with him during the course of his tx.

## 2021-06-06 NOTE — TELEPHONE ENCOUNTER
Martin is calling for his Bx. Results. Message sent to Dr. Hale . Called Martin back and informed that a Md will call him today.

## 2021-06-06 NOTE — PROCEDURES
Procedures  SIMULATION NOTE:   DIAGNOSIS: History of limited stage small cell lung cancer diagnosed initially in 2012.  The patient is status post chemoradiation therapy.  He was found to have a new diagnosis of limited stage small cell lung cancer, second primary/recurrence, clinical stage T1 N0 M0.  His case has been discussed at thoracic tumor conference and consensus recommendation is to consider SBRT.    INDICATION: After discussion with patient about various treatment options, the patient elected to proceed with definitive radiation therapy using SBRT technique for his lung cancer. The patient is here for simulation.     CONSENT: The possible risks and side effects of radiation therapy have been discussed with the patient in detail and at a great length. The patient's questions are answered to patient's satisfaction. Written consent was obtained.     SIMULATION: Patient is in supine position with SBRT frame and VacLok to help keep the same position during the radiation therapy. Tentative isocenter is set in the center of thoracic region. We will acquire 4D scan to help us better locate target and design radiation therapy field. We are also going to use the respiratory gating technique to help us to better locate the movement of the tumor.     BLOCKS: Custom blocks will be drawn to minimize radiation to normal tissues and to protect normal organs including, but not limited to, spinal cord, brachial plexus, lungs, bronchial tree, esophagus, liver, heart/large vessels, spleen, stomach, small bowel, diaphragm, bone and soft tissue.     DOSAGE: I plan to give him radiation therapy at 1000 cGy each fraction to a total of 5000 cGy in 5 fractions over 2 weeks. I will consider using SBRT/IGRT technique to help us to better locate the target and to protect normal tissue.     Mary Snowden MD, PhD  Department of Radiation Oncology   [facility]  Tel: [phone]  Page: [phone]    [facility]  [address]  [place] [address]     [facility]  [address] [facility]   [place] [address]

## 2021-06-06 NOTE — TELEPHONE ENCOUNTER
Bhaskar completes SBRT today and he is asking  when he'll know the tx worked.  I explained there will still be changes occurring after the completion of his tx.  Typically Dr. Snowden likes to see his patients back about 2-3 months afterwards.  A CT will be scheduled just prior to his appt.  I told Bhaskar typically scans are not done earlier than that time frame unless he reports new symptoms and Dr. Snowden feels imaging is needed for further evaluation.  Bhaskar was disappointed to hear that he won't see results sooner than this.  His wife was scheduled for a hip replacement and canceled it because he was going through radiation tx.  She didn't want to reschedule until after he got his results.  Now that he knows it will be two months before his scan, he'll probably tell his wife to go ahead with her surgery.  I connected with Dr. Snowden today and the CT scan has been ordered.  I let Bhaskar know he can schedule his CT and f/u appt today if he wishes so he and his wife can plan accordingly.    I asked Bhaskar how tx has been going and he said it's going fine but he is feeling more tired.  I reassured him fatigue is common and that it will gradually lesson, typically over the course of about 4-6 weeks.  I also relayed he will be meeting with a nurse after his final tx to go over discharge instructions.  Bhaskar was appreciative of the information and he has no further questions or needs today.

## 2021-06-06 NOTE — PROGRESS NOTES
Pt here for their final radiation treatment. DC instructions given verbally and in writing, pt verbalized their understanding. Encouraged pt to make 2 month f/u apt on their way out today.

## 2021-06-06 NOTE — TELEPHONE ENCOUNTER
Referral to radiation oncology for SBRT to LLL small cell lung cancer received from Dr. Inocente Hale.  I called Martin, introduced myself and relayed the purpose of my call.  I relayed the recommendations for brain MRI and consult with Dr. Snowden following today's interdisciplinary thoracic conference.  Martin would like to schedule these appts as soon as possible.  We did a conference call with centralized scheduling and he has been scheduled for a brain MRI on 2/20 as his insurance requires 5 business days for authorization.  A consult with Dr. Snowden has been scheduled on Monday, Feb 24th 2020 at 1:00 pm, 12:30 pm RN appt.  Possible sim to follow.    Martin had a CT Chest at [facility] on 1/4/19, CT Chest at [facility] on 1/7/20, and PET/CT at [facility] on 1/14/20.  He had biopsy and fiducial placement on 2/5/20 at [facility] as ordered by Dr. Hale.  Martin had concurrent chemo/radiation for small cell lung cancer at [facility] in 2012.  He also had PCI.  He was under the care of Dr. Brody, medical oncologist, and he cannot recall the radiation oncologist who treated him.  I explained we will need to request records from [facility] prior to his appt with Dr. Snowden.  Martin will come to the radiation clinic at [facility] tomorrow at a 10 am to sign the MUMTAZ.  I told Martin I would have the MUMTAZ ready for him as well as the new patient packet, including the MRI appt reminder, appt letter for his consult with Dr. Snowden, the new patient intake, medication list, MD bio card and my card.  I shared what he can expect with his appt with Dr. Snowden.  Martin understands he may have a planning CT scan completed after his consult if he chooses to proceed with SBRT.  I provided my direct number and I invited calls for questions or needs.

## 2021-06-06 NOTE — CONSULTS
Consults   [facility] Radiation Oncology Consult Note     Patient: Martin Tovar  MRN: [id number]  Date of Service: 02/24/2020          Inocente Hale MD  [address] [address]       Dear Dr. Hale:    Thank you very much for referring this patient for consideration of radiotherapy. As you know Mr. Tovar is a 69 y.o. male with a history of limited stage small cell lung cancer diagnosed initially in 2012.  The patient is status post chemoradiation therapy.  He was found to have a new diagnosis of limited stage small cell lung cancer, second primary/recurrence, clinical stage T1 N0 M0.  His case has been discussed at thoracic tumor conference and consensus recommendation is to consider SBRT.    HISTORY OF PRESENT ILLNESS:   Mr. Tovar is a 69 y.o. male who is a retired  from [employer].  Patient was diagnosed with limited stage small cell lung cancer initially in 2012, stage T2 N0 M0.  He received concurrent chemoradiation therapy with cisplatin and etoposide and radiation therapy to a total dose of 4500 cGy in 30 treatments at 150 cGy twice daily.  His treatment was given at [facility].  Patient had a complete response and also received PCI with a total dose of 2500 cGy in 10 treatments.  Patient has been followed closely with no evidence of cancer recurrence.  He will presented with recent finding of left lower lobe nodule by routine screening exam for which he was seeking further evaluation.  PET CT scan on 1/14/2020 showed a 1.3 x 1.2 cm nodule with increased SUV max 3.3 consistent with malignancy.  There is no radiographic evidence of anel and systemic metastasis.  CT-guided needle biopsy on 2/5/2020 confirmed diagnosis of small cell carcinoma.  He had a staging MRI brain which also showed no evidence of brain metastasis.  Given the prior history of radiation therapy and location of his current cancer, the patient might require pneumonectomy.  His case has been discussed  at thoracic tumor conference and that the consensus recommendation is to consider SBRT.  He had a fiducial markers placement on 2/5/2020 and complicated with pulmonary bleeding and hemoptysis.  He is hemoptysis stopped 3 days ago and patient is here for evaluation and discussion of possible SBRT.    CHEMOTHERAPY HISTORY: Concurrent Chemotherapy: No    RADIATION THERAPY HISTORY: Prior Radiation: Yes    IMPLANTED CARDIAC DEVICE: none     Current Outpatient Medications   Medication Sig Dispense Refill     albuterol (PROVENTIL HFA;VENTOLIN HFA) 90 mcg/actuation inhaler Inhale 2 puffs 4 (four) times a day as needed for wheezing.              albuterol (PROVENTIL) 2.5 mg /3 mL (0.083 %) nebulizer solution Take 3 mL by nebulization every 4 (four) hours as needed.  1     HYDROcodone-acetaminophen (NORCO) 7.5-325 mg per tablet Take 1 tablet by mouth every 6 (six) hours as needed. 30 tablet 0     morphine (MS CONTIN) 15 MG 12 hr tablet Take 15 mg by mouth every 8 (eight) hours.              nicotine (NICODERM CQ) 21 mg/24 hr 1 PATCH       nicotine polacrilex (NICORETTE) 4 MG gum 1 GUM       tamsulosin (FLOMAX) 0.4 mg cap Take 0.4 mg by mouth daily.       No current facility-administered medications for this visit.      Past Medical History:   Diagnosis Date     Asthma      Avascular necrosis of bones of both hips (H)      Cancer (H)     Lung-radiation, chemo     Cholecystitis      Chronic renal failure      COPD, mild (H)      Diabetes mellitus (H)     states resolved after weight loss     Disc degeneration, lumbar      DJD (degenerative joint disease) of knee     left     Elevated PSA      LBP (low back pain)      Lung cancer (H)      Smoking hx      Past Surgical History:   Procedure Laterality Date     CERVICAL FUSION       COLOVESICULAR FISTULA REPAIR  2013     CT BIOPSY LUNG  2/5/2020     ERCP N/A 12/22/2015    Procedure: ENDOSCOPIC RETROGRADE CHOLANGIOPANCREATOGRAPHY;  Surgeon: Tang Elliott MD;  Location: [facility] Main OR;  Service:      HEMORRHOID SURGERY       INGUINAL HERNIA REPAIR       KNEE ARTHROSCOPY       LAPAROSCOPIC CHOLECYSTECTOMY N/A 12/16/2015    Procedure: CHOLECYSTECTOMY LAPAROSCOPIC;  Surgeon: Eugene Ro MD;  Location: [facility] Main OR;  Service:      LUMBAR FUSION  2006    & reconstruction     PICC  1/3/2016          TOTAL KNEE ARTHROPLASTY Left      Ciprofloxacin and Septra [sulfamethoxazole-trimethoprim]  History reviewed. No pertinent family history.  Social History     Socioeconomic History     Marital status:      Spouse name: Not on file     Number of children: Not on file     Years of education: Not on file     Highest education level: Not on file   Occupational History     Not on file   Social Needs     Financial resource strain: Not on file     Food insecurity:     Worry: Not on file     Inability: Not on file     Transportation needs:     Medical: Not on file     Non-medical: Not on file   Tobacco Use     Smoking status: Former Smoker     Packs/day: 0.50     Smokeless tobacco: Never Used   Substance and Sexual Activity     Alcohol use: Yes     Comment: 1-2/MONTH     Drug use: No     Sexual activity: Not on file   Lifestyle     Physical activity:     Days per week: Not on file     Minutes per session: Not on file     Stress: Not on file   Relationships     Social connections:     Talks on phone: Not on file     Gets together: Not on file     Attends Mos[employer] service: Not on file     Active member of club or organization: Not on file     Attends meetings of clubs or organizations: Not on file     Relationship status: Not on file     Intimate partner violence:     Fear of current or ex partner: Not on file     Emotionally abused: Not on file     Physically abused: Not on file     Forced sexual activity: Not on file   Other Topics Concern     Not on file   Social History Narrative     Not on file        Review of Systems:      General  General (WDL): Exceptions to WDL  Fatigue:  Yes - Recent (Less than 3 months)  Weight Loss: Yes - Recent (Greater than 3 months)  EENT  ENT (WDL): Exceptions to WDL  Tinnitus: Yes - Recent (Less than 3 months)  Respiratory   Respiratory (WDL): Exceptions to WDL  Dyspnea: Yes - Recent (Less than 3 months)  hemoptysis: Yes - Recent (Less than 3 months)  Cough: Yes - Recent (Less than 3 months)  Cardiovascular  Cardiovascular (WDL): All cardiovascular elements are within defined limits  Endocrine  Endocrine (WDL): Exceptions to WDL  Excessive Urination: Yes - Recent (Less than 3 months)  Gastrointestinal  Gastrointestinal (WDL): All gastrointestinal elements are within defined limits  Musculoskeletal  Musculoskeletal (WDL): Exceptions to WDL  Joint pain: Yes - Chronic (Greater than 3 months)  Back Pain: Yes - Chronic (Greater than 3 months)  Pain interfering with walking: Yes - Chronic (Greater than 3 months)  Integumentary                  Neurological  Neurological (WDL): Exceptions to WDL  Difficulty with memory: Yes - Chronic (Greater than 3 months)  Dominant Hand: Right  Psychological/Emotional   Psychological/Emotional (WDL): All psychological/emotional elements are within defined limits  Hematological/Lymphatic  Hematological/Lymphatic (WDL): All hematological/lymphatic elements are within defined limits  Dermatologic  Dermatologic (WDL): All dermatological elements are within defined limits  Genitourinary/Reproductive  Genitourinary/Reproductive (WDL): Exceptions to WDL  Urinary Frequency: Yes - Recent (Less than 3 months)  Urination more than 2 times a night: Yes - Recent (Less than 3 months)  Urinary Urgency: Yes - Recent (Less than 3 months)  Difficulty Initiating Urine Stream: Yes - Recent (Less than 3 months)  Sensation of incomplete emptying of bladder: Yes - Recent (Less than 3 months)  Reproductive     Pain              Currently in Pain: Yes  Pain Score (Initial OR Reassessment): 8  Pain Frequency: Constant/continuous  Location: lower back &  right leg  Pain Intervention(s): Home medication  Response to Interventions: 4-5/10  Accompanied by       Imaging: Reviewed    Pathology: Reviewed    ECOG Peformance Status  ECOG Performance Status: 1  Distress Assessment Score: 7    Objective:     PHYSICAL EXAMINATION:    BP (!) 143/94   Pulse 81   Wt 198 lb 8 oz (90 kg)   SpO2 93%   BMI 32.04 kg/m      Gen: Alert, in NAD  Eyes: PERRL, EOMI, sclera anicteric  HENT     Head: NC/AT     Ears: No external auricular lesions     Nose/sinus: No rhinorrhea or epistaxis     Oropharynx: MMM, no visible oral lesions  Neck: Supple, full ROM, no LAD  Pulm: No wheezing, stridor or respiratory distress  CV: Well-perfused, no cyanosis, no pedal edema  Abdominal: BS+, soft, nontender, nondistended, no hepatomegaly  Back: No step-offs or pain to palpation along the thoracolumbar spine  Rectal: Deferred  : Deferred  Musculoskeletal: Normal muscle bulk and tone  Skin: Normal color and turgor  Neurologic: A/Ox3, CN II-XII intact, normal gait and station  Psychiatric: Appropriate mood and affect    Intent of Therapy: Curative  Side effects that may occur during or within weeks after Radiation Therapy      Fatigue and general weakness     Darkening, irritation, itchiness, redness, dryness and peeling of the skin of the chest    Loss of chest and armpit hair    Painful swallowing limiting solid and liquid intake and causing dehydration    Nausea, vomiting and decrease in appetite    Side effects that may occur months or years after Radiation Therapy       Development of another tumor or cancer    Lung inflammation or fibrosis causing cough, fever, and shortness of breath     Fracture of ribs    Permanent narrowing or obstruction of the esophagus    Fluid compressing the lung (pleural effusion)    Coronary artery blockage causing angina pain or a heart attack    Thickening, telangiectasias (development of spider like blood vessels in the skin) and ulceration of the skin of the  chest    Poor healing after a trauma or surgery in the irradiated areas    Nerve damage resulting in loss of strength or sensation    The risks, benefits and alternatives to radiation therapy were outlined with the patient. All questions were answered and a consent was signed.     Impression     History of limited stage small cell lung cancer diagnosed initially in 2012.  The patient is status post chemoradiation therapy.  He was found to have a new diagnosis of limited stage small cell lung cancer, second primary/recurrence, clinical stage T1 N0 M0.  His case has been discussed at thoracic tumor conference and consensus recommendation is to consider SBRT.    Assessment & Plan:     I have personally reviewed his upcoming medical record today.  I have also reviewed his most recent radiology study including PET CT scan.  This is 69-year-old gentleman with a prior history of limited stage small cell lung cancer diagnosed 8 years ago.  The patient status post concurrent chemoradiation therapy there is no evidence of recurrence.  He presented with a recent history of left lower lobe nodule with biopsies confirmed small cell lung cancer.  Staging work-up indicating no evidence of lymph node and systemic metastasis.  The possible treatment options including surgery, systemic therapy, and radiation therapy has been discussed with the patient in detail and at the great lengths.  The possible risks and side effects of radiation therapy has also been explained to the patient.  Questions are answered to patient satisfaction.  His case has been discussed at thoracic tumor conference and the consensus recommendation is to consider SBRT.  I do believe the patient is a good candidate for SBRT given the prior history of chemoradiation therapy to the same region.  Patient is also informed a potential increased risk of radiation-induced normal tissue damage given the prior history of radiation therapy.  The pros and the cons of  different options has also been discussed with the patient.  After long discussion, the patient elected proceed with SBRT as his treatment choice for his lung cancer being aware of potential risks and side effect involved.  He is scheduled to return to radiation oncology later on today for simulation.  I plan to give him radiation therapy toward a total dose of 5000 cGy in 5 treatments using SBRT.    Again, thank you very much for the referral and allowing me to participate in the care of this patient.  If you have any questions or concerns about this consultation, please do not hesitate to call.  I spent approximately 45 minutes today with the patient and 80% time was used for counseling.      Sincerely,          Mary Snowden MD, PhD  Department of Radiation Oncology   [facility]  Tel: [phone]  Page: [phone]    [facility]  [address] [place] [address]     [address] [place] [address]    CC:  Patient Care Team:  Kvng Ramirez MD as PCP - General (Family Medicine)  Mary Snowden MD as Physician (Radiation Oncology)  Elaine Bolton RN as Oncology Nurse Navigator (Hematology and Oncology)  Inocente Hale MD (Cardiovascular and Thoracic Surgery)

## 2021-06-07 NOTE — TELEPHONE ENCOUNTER
Called pt for routine post-radiation call. He's doing well, just some mild fatigue. No other concerns or changes. I confirmed his f/u apt in May and encouraged him to call us if needed.

## 2021-06-08 NOTE — PROGRESS NOTES
[facility] Radiation Oncology Follow Up     Patient: Martin Tovar  MRN: [id number]  Date of Service: 05/14/2020       DISEASE TREATED:  History of limited stage small cell lung cancer diagnosed initially in 2012.  The patient is status post chemoradiation therapy.  He was found to have a new diagnosis of limited stage small cell lung cancer, second primary/recurrence, clinical stage T1 N0 M0.  His case has been discussed at thoracic tumor conference and consensus recommendation is to consider SBRT.      TYPE OF RADIATION THERAPY ADMINISTERED:  5000 cGy in 5 treatments given from 3/2/120-3/12/2020.     INTERVAL SINCE COMPLETION OF RADIATION THERAPY: 2 months.      SUBJECTIVE:  Mr. Tovar is a 69 y.o. male who is a retired  from [employer].  Patient was diagnosed with limited stage small cell lung cancer initially in 2012, stage T2 N0 M0.  He received concurrent chemoradiation therapy with cisplatin and etoposide and radiation therapy to a total dose of 4500 cGy in 30 treatments at 150 cGy twice daily.  His treatment was given at [facility].  Patient had a complete response and also received PCI with a total dose of 2500 cGy in 10 treatments.  Patient has been followed closely with no evidence of cancer recurrence.  He will presented with recent finding of left lower lobe nodule by routine screening exam for which he was seeking further evaluation.  PET CT scan on 1/14/2020 showed a 1.3 x 1.2 cm nodule with increased SUV max 3.3 consistent with malignancy.  There is no radiographic evidence of anel and systemic metastasis.  CT-guided needle biopsy on 2/5/2020 confirmed diagnosis of small cell carcinoma.  He had a staging MRI brain which also showed no evidence of brain metastasis.  Given the prior history of radiation therapy and location of his current cancer, the patient might require pneumonectomy.  His case has been discussed at thoracic tumor conference and that the consensus recommendation is to  consider SBRT. His case has been discussed at thoracic tumor conference and consensus recommendation is to consider SBRT.  He received radiation therapy in our clinic with a total dose of 5000 cGy in 5 treatments given from 3/2/120-3/12/2020.  He tolerated radiation therapy very well with minimal side effect.    Patient has been doing well since completion of the radiation therapy.  He denies any chest pain discomfort at the time of evaluation.  He is here for routine postradiation therapy office follow-up.    Medications were reviewed and are up to date on EPIC.    The following portions of the patient's history were reviewed and updated as appropriate: allergies, current medications, past family history, past medical history, past social history, past surgical history and problem list.    Review of Systems:      General  Constitutional (WDL): Exceptions to WDL  Fatigue: Fatigue relieved by rest  Hot flashes/Night Sweats: Mild symptoms, no intervention needed(occasionally)  EENT  Eye Disorder (WDL): All eye disorder elements are within defined limits(reading glasses)  Ear Disorder (WDL): All ear disorder elements are within defined limits  Respiratory   Respiratory (WDL): Exceptions to WDL  Cough: Mild symptoms, nonprescription intervention indicated(white phlegm)  Dyspnea: Shortness of breath with moderate exertion  Cardiovascular  Cardiovascular (WDL): All cardiovascular elements are within defined limits  Endocrine     Gastrointestinal  Gastrointestinal (WDL): All gastrointestinal elements are within defined limits  Musculoskeletal  Musculoskeletal and Connetive Tissue Disorders (WDL): Exceptions to WDL  Arthralgia: Mild pain(chronic back)  Bone Pain: Mild pain(back)  Integumentary               Integumentary (WDL): All integumentary elements are within defined limits  Neurological  Neurosensory (WDL): Exceptions to WDL  Dizziness: Mild unsteadiness or sensation of movement(quick position  changes)  Psychological/Emotional   Patient Coping: Open/discussion  Hematological/Lymphatic  Lymph (WDL): All lymph disorder elements are within defined limits  Dermatologic     Genitourinary/Reproductive  Genitourinary (WDL): Exceptions to WDL  Urinary Frequency: Present  Urinary Retention: Urinary, suprapubic or intermittent catheter placement not indicated, able to void with some residual  Reproductive     Pain              Currently in Pain: Yes  Pain Score (Initial OR Reassessment): 6  Pain Frequency: Constant/continuous  Location: low back  Pain Intervention(s): Home medication  Response to Interventions: some  Accompanied by  Accompanied by: Alone    Objective:      PHYSICAL EXAMINATION:    /78   Pulse 88   Temp 98.3  F (36.8  C) (Oral)   Wt 202 lb 8 oz (91.9 kg)   SpO2 93%   BMI 32.68 kg/m      Gen: Alert, in NAD  Eyes: PERRL, EOMI, sclera anicteric  HENT     Head: NC/AT     Ears: No external auricular lesions     Nose/sinus: No rhinorrhea or epistaxis     Oropharynx: MMM, no visible oral lesions  Neck: Supple, full ROM, no LAD  Pulm: No wheezing, stridor or respiratory distress  CV: Well-perfused, no cyanosis, no pedal edema  Abdominal: BS+, soft, nontender, nondistended, no hepatomegaly  Back: No step-offs or pain to palpation along the thoracolumbar spine  Rectal: Deferred  : Deferred  Musculoskeletal: Normal muscle bulk and tone  Skin: Normal color and turgor  Neurologic: A/Ox3, CN II-XII intact, normal gait and station  Psychiatric: Appropriate mood and affect     Imaging: Imaging results 30 days: Ct Chest Without Contrast    Result Date: 5/11/2020  EXAM: CT CHEST WO CONTRAST LOCATION: [facility] DATE/TIME: 5/11/2020 2:32 PM INDICATION:  Small cell lung cancer post SBRT in 2/2020. Follow up. COMPARISON: 02/15/2020. TECHNIQUE: CT chest without IV contrast. Multiplanar reformats were obtained. Dose reduction techniques were used. CONTRAST: None. FINDINGS: LUNGS AND PLEURA: A 35 mm x  11 mm opacity with a fiducial marker in the left lower lobe was previously 45 mm x 17 mm (series 3 image 73). Left paramediastinal soft tissue and architectural distortion is again seen. Previously described nodular and reticular interstitial opacities in the left lower lobe have resolved. A calcified left lower lobe nodule is unchanged. A tiny 3 mm opacity in the central aspect of the right upper lobe is newly identified and could be infectious or inflammatory in nature (series 3 image 56). There is centrilobular emphysema. There is mild diffuse bronchial wall thickening. MEDIASTINUM/AXILLAE: No lymphadenopathy. No thoracic aortic aneurysm. UPPER ABDOMEN: Cholecystectomy. Mild bile duct dilatation is likely related to prior cholecystectomy. MUSCULOSKELETAL: Cervical fusion hardware is present.     1.  The left lower lobe opacity is smaller in size. 2.  Left lower lobe opacities have resolved and may have been hemorrhage, infection, or pneumonitis. 3.  A tiny 3 mm right upper lobe opacity may be infectious or inflammatory in nature. Recommend attention on follow-up imaging. 4.  Diffuse bronchial wall thickening can be seen with bronchitis. 5.  Emphysema.        Impression     The patient is a 69-year-old gentleman with a prior history of limited stage small cell lung cancer diagnosed initially in 2012.  Was find to have a new limited stage small cell lung cancer involving the left lower lobe with clinical stage T1 N0 M0.  Patient received definitive SBRT and completed 2 months ago.  He has been doing well with no clinical evidence of recurrence.    Assessment & Plan:     1.  I have personally reviewed his restaging CT scan today.  There is no radiographic evidence of cancer recurrence.  The patient is therefore scheduled to return to radiation oncology in 4 months for another office follow-up and restaging CT chest.  The patient also had a mild symptoms of congestion and he is recommended to contact his primary  physician for evaluation and management recommendation.    2.  Continue follow-up with Dr. Hale, thoracic surgeon and Dr. Brito, pulmonology as planned.      Face to face time  15 minutes with > 75% spent on consultation, education and coordination of care.      Mary Snowden MD, PhD  Department of Radiation Oncology   [facility]  Tel: [phone]  Page: [phone]    [address] [address]     [address] [place] [address]    CC:  Patient Care Team:  Kvng Ramirez MD as PCP - General (Family Medicine)  Mary Snowden MD as Physician (Radiation Oncology)  Elaine Bolton RN as Oncology Nurse Navigator (Hematology and Oncology)  Inocente Hale MD (Cardiovascular and Thoracic Surgery)

## 2021-06-08 NOTE — PROGRESS NOTES
Patient here ambulatory for follow up s/p radiation for his lung cancer.  Patient had a CT scan done recently and is here for results.  Patient feels his energy is improving.  Seen by Dr. Snowden.  Plan RTC for follow up as directed by physician.

## 2021-06-11 NOTE — PROGRESS NOTES
[facility] Radiation Oncology Follow Up     Patient: Martin Tovar  MRN: [id number]  Date of Service: 09/24/2020       DISEASE TREATED:  History of limited stage small cell left lung cancer diagnosed initially in 2012.  The patient is status post chemoradiation therapy.  He was found to have a new diagnosis of limited stage small cell lung cancer, second primary/recurrence, clinical stage T1 N0 M0.  His case has been discussed at thoracic tumor conference and consensus recommendation is to consider SBRT.      TYPE OF RADIATION THERAPY ADMINISTERED:  5000 cGy in 5 treatments given from 3/2/120-3/12/2020.     INTERVAL SINCE COMPLETION OF RADIATION THERAPY: 6 months.      SUBJECTIVE:  Mr. Tovar is a 70 y.o. male who is a retired  from [employer].  Patient was diagnosed with limited stage small cell left lung cancer initially in 2012, stage T2 N0 M0.  He received concurrent chemoradiation therapy with cisplatin and etoposide and radiation therapy to a total dose of 4500 cGy in 30 treatments at 150 cGy twice daily.  His treatment was given at [facility].  Patient had a complete response and also received PCI with a total dose of 2500 cGy in 10 treatments.  Patient has been followed closely with no evidence of cancer recurrence.  He will presented with recent finding of left lower lobe nodule by routine screening exam for which he was seeking further evaluation.  PET CT scan on 1/14/2020 showed a 1.3 x 1.2 cm nodule with increased SUV max 3.3 consistent with malignancy.  There is no radiographic evidence of anel and systemic metastasis.  CT-guided needle biopsy on 2/5/2020 confirmed diagnosis of small cell carcinoma.  He had a staging MRI brain which also showed no evidence of brain metastasis.  Given the prior history of radiation therapy and location of his current cancer, the patient might require pneumonectomy.  His case has been discussed at thoracic tumor conference and that the consensus recommendation  is to consider SBRT. His case has been discussed at thoracic tumor conference and consensus recommendation is to consider SBRT.  He received radiation therapy in our clinic with a total dose of 5000 cGy in 5 treatments given from 3/2/120-3/12/2020.  He tolerated radiation therapy very well with minimal side effect.     Patient has been doing well since completion of the radiation therapy.  He denies any chest pain discomfort at the time of evaluation.  He is here for routine postradiation therapy office follow-up.    Medications were reviewed and are up to date on EPIC.    The following portions of the patient's history were reviewed and updated as appropriate: allergies, current medications, past family history, past medical history, past social history, past surgical history and problem list.    Review of Systems:      General  Constitutional (WDL): Exceptions to WDL  Fatigue: Fatigue relieved by rest  Hot flashes/Night Sweats: Mild symptoms, no intervention needed(occasionally)  EENT  Eye Disorder (WDL): All eye disorder elements are within defined limits  Ear Disorder (WDL): All ear disorder elements are within defined limits  Respiratory   Respiratory (WDL): Exceptions to WDL  Cough: Mild symptoms, nonprescription intervention indicated  Dyspnea: Shortness of breath with moderate exertion  Cardiovascular  Cardiovascular (WDL): Exceptions to WDL(BP elevated today, he'll check at home tonight)  Endocrine     Gastrointestinal  Gastrointestinal (WDL): All gastrointestinal elements are within defined limits  Musculoskeletal  Musculoskeletal and Connetive Tissue Disorders (WDL): Exceptions to WDL  Arthralgia: Mild pain(back pain, chronic)  Integumentary               Integumentary (WDL): All integumentary elements are within defined limits  Neurological  Neurosensory (WDL): All neurosensory elements are within defined limits  Psychological/Emotional   Patient Coping:  Accepting;Open/discussion  Hematological/Lymphatic  Lymph (WDL): All lymph disorder elements are within defined limits  Dermatologic     Genitourinary/Reproductive  Urinary Frequency: Present  Reproductive     Pain              Currently in Pain: Yes  Pain Score (Initial OR Reassessment): 5  Pain Frequency: Constant/continuous  Location: back  Pain Intervention(s): Home medication  Response to Interventions: meds help some  Accompanied by  Accompanied by: Alone    Objective:     PHYSICAL EXAMINATION:    /90   Pulse 92   Wt 199 lb 3.2 oz (90.4 kg) Comment: Pt will check BP at home tonight  SpO2 93%   BMI 32.15 kg/m      Gen: Alert, in NAD  Eyes: PERRL, EOMI, sclera anicteric  HENT     Head: NC/AT     Ears: No external auricular lesions     Nose/sinus: No rhinorrhea or epistaxis     Oropharynx: MMM, no visible oral lesions  Neck: Supple, full ROM, no LAD  Pulm: No wheezing, stridor or respiratory distress  CV: Well-perfused, no cyanosis, no pedal edema  Abdominal: BS+, soft, nontender, nondistended, no hepatomegaly  Back: No step-offs or pain to palpation along the thoracolumbar spine  Rectal: Deferred  : Deferred  Musculoskeletal: Normal muscle bulk and tone  Skin: Normal color and turgor  Neurologic: A/Ox3, CN II-XII intact, normal gait and station  Psychiatric: Appropriate mood and affect     Imaging: Imaging results 30 days: Ct Chest Without Contrast    Result Date: 9/17/2020  EXAM: CT CHEST WO CONTRAST LOCATION: [facility] DATE/TIME: 9/17/2020 12:34 PM INDICATION: lung cancer post SBRT in 3/2020 follow up COMPARISON: CT of the chest 05/11/2020 2/15/2020 and 1/10/2017 TECHNIQUE: CT chest without IV contrast. Multiplanar reformats were obtained. Dose reduction techniques were used. CONTRAST: None. FINDINGS: LUNGS AND PLEURA: [place] solid lesion in the posterior segment left lower lobe marked with fiducial is considerably decreased size from 02/15/2020 and slightly smaller compared to 05/11/2020  measuring 6 x 29 mm. Unchanged linear bands and architectural distortion in the medial left upper lobe and superior segment left lower lobe consistent with radiation induced lung fibrosis. No new airspace opacities. A 3 mm perihilar right upper lobe nodule described 05/11/2020 is now groundglass attenuation (series  4, image 121), formerly solid suggesting inflammation. Unchanged fairly diffuse emphysema. Benign calcified nodule in the posterior medial left lower lobe. There is a small amount of airway debris within multiple peripheral airways in both lungs. Central airways are symmetrically thickened. No pleural fluid or nodularity. MEDIASTINUM: Cardiac chambers are normal in size. No pericardial effusion. Normal caliber thoracic aorta. No enlarged mediastinal or hilar lymph nodes. Decompressed esophagus. UPPER ABDOMEN: Prior cholecystectomy. MUSCULOSKELETAL: Lower cervical ACDF. Diffuse thoracic spine degenerative osteophytes but no focal compression deformities. No aggressive or destructive bone lesions.     1.  Decreased size of a oblong solid lesion in the posterior left lower lobe consistent with treatment response. Surrounding groundglass attenuation in the left lower lobe has resolved consistent with lung inflammation. 2.  No new or enlarging lung nodules. 3.  Unchanged emphysema. 4.  Central airway wall thickening and scattered debris within peripheral airways.        Impression     The patient is a 70-year-old gentleman with a prior history of limited stage small cell lung cancer diagnosed initially in 2012.  He was find to have a new limited stage small cell lung cancer involving the left lower lobe with clinical stage T1 N0 M0.  Patient received definitive SBRT and completed 2 months ago.  He has been doing well with no clinical evidence of recurrence.    Assessment & Plan:     1.  I have personally reviewed his restaging CT scan today.  There is no radiographic evidence of cancer recurrence.  The  patient is therefore scheduled to return to radiation oncology in 5 months for another office follow-up and restaging CT chest.       2.  Continue follow-up with Dr. Hale, thoracic surgeon and Dr. Brito, pulmonology as planned.        Face to face time  25 minutes with > 75% spent on consultation, education and coordination of care.           Mary Snowden MD, PhD  Department of Radiation Oncology   [facility]  Tel: [phone]  Page: [phone]    [facility]  [address] [place] [address]    CC:  Patient Care Team:  Kvng Ramirez MD as PCP - General (Family Medicine)  Mary Snowden MD as Physician (Radiation Oncology)  Elaine Bolton, RN as Oncology Nurse Navigator (Hematology and Oncology)  Inocente Hale MD (Cardiovascular and Thoracic Surgery)

## 2021-06-11 NOTE — TELEPHONE ENCOUNTER
Called and left voicemail for Bhaskar to reschedule his visit with Dr. Snowden on 9/17/20. Dr Snowden will be out of the office on that day. Requested Bhaskar call TL @ [phone]    Maureen Parra    [facility] -- Radiation Oncology

## 2021-06-11 NOTE — PROGRESS NOTES
"Pt here ambulatory by himself for f/u with Dr. Snowden, Feeling okay, \"Same as always\" per pt. CT scan done 9/17 and in chart.   "

## 2021-06-15 NOTE — PROGRESS NOTES
Pt here ambulatory by himself for f/u with Dr. Snowden, feeling about the same, some shortness of breath and cough. Chronic back pain unchanged, worsening hand arthritis. No new concerns. See flowsheet for full assmt. F/u Per Dr. Snowden's orders.

## 2021-06-15 NOTE — PROGRESS NOTES
[facility] Radiation Oncology Follow Up     Patient: Martin Tovar  MRN: [id number]  Date of Service: 02/25/2021       DISEASE TREATED:  History of limited stage small cell left lung cancer diagnosed initially in 2012.  The patient is status post chemoradiation therapy.  He was found to have a new diagnosis of limited stage small cell lung cancer, second primary/recurrence, clinical stage T1 N0 M0.  His case has been discussed at thoracic tumor conference and consensus recommendation is to consider SBRT.      TYPE OF RADIATION THERAPY ADMINISTERED:  5000 cGy in 5 treatments given from 3/2/120-3/12/2020.     INTERVAL SINCE COMPLETION OF RADIATION THERAPY: 11 months.      SUBJECTIVE:  Mr. Tovar is a 70 y.o. male who is a retired  from [employer].  Patient was diagnosed with limited stage small cell left lung cancer initially in 2012, stage T2 N0 M0.  He received concurrent chemoradiation therapy with cisplatin and etoposide and radiation therapy to a total dose of 4500 cGy in 30 treatments at 150 cGy twice daily.  His treatment was given at [facility].  Patient had a complete response and also received PCI with a total dose of 2500 cGy in 10 treatments.  Patient has been followed closely with no evidence of cancer recurrence.  He will presented with recent finding of left lower lobe nodule by routine screening exam for which he was seeking further evaluation.  PET CT scan on 1/14/2020 showed a 1.3 x 1.2 cm nodule with increased SUV max 3.3 consistent with malignancy.  There is no radiographic evidence of anel and systemic metastasis.  CT-guided needle biopsy on 2/5/2020 confirmed diagnosis of small cell carcinoma.  He had a staging MRI brain which also showed no evidence of brain metastasis.  Given the prior history of radiation therapy and location of his current cancer, the patient might require pneumonectomy.  His case has been discussed at thoracic tumor conference and that the consensus recommendation  is to consider SBRT. His case has been discussed at thoracic tumor conference and consensus recommendation is to consider SBRT.  He received radiation therapy in our clinic with a total dose of 5000 cGy in 5 treatments given from 3/2/120-3/12/2020.  He tolerated radiation therapy very well with minimal side effect.     Patient has been doing well since completion of the radiation therapy.  He denies any chest pain discomfort at the time of evaluation.  He is here for routine postradiation therapy office follow-up.     Medications were reviewed and are up to date on EPIC.    The following portions of the patient's history were reviewed and updated as appropriate: allergies, current medications, past family history, past medical history, past social history, past surgical history and problem list.    Review of Systems:      General  Constitutional (WDL): Exceptions to WDL  Fatigue: Fatigue relieved by rest  Hot flashes/Night Sweats: Mild symptoms, no intervention needed(occasional night sweats)  EENT  Eye Disorder (WDL): All eye disorder elements are within defined limits  Ear Disorder (WDL): All ear disorder elements are within defined limits  Respiratory   Respiratory (WDL): Exceptions to WDL  Cough: Mild symptoms, nonprescription intervention indicated  Dyspnea: Shortness of breath with moderate exertion  Cardiovascular  Cardiovascular (WDL): All cardiovascular elements are within defined limits  Endocrine     Gastrointestinal  Gastrointestinal (WDL): All gastrointestinal elements are within defined limits  Musculoskeletal  Musculoskeletal and Connetive Tissue Disorders (WDL): Exceptions to WDL  Arthralgia: Mild pain  Bone Pain: Mild pain  Integumentary               Integumentary (WDL): All integumentary elements are within defined limits  Neurological  Neurosensory (WDL): Exceptions to WDL  Peripheral Sensory Neuropathy: Asymptomatic, loss of deep tendon reflexes or paresthesia(d/t back problems,  occasional)  Psychological/Emotional   Patient Coping: Accepting;Open/discussion  Hematological/Lymphatic  Lymph (WDL): All lymph disorder elements are within defined limits  Dermatologic     Genitourinary/Reproductive  Genitourinary (WDL): Exceptions to WDL  Urinary Frequency: Present  Urinary Retention: Urinary, suprapubic or intermittent catheter placement not indicated, able to void with some residual  Reproductive     Pain              Currently in Pain: Yes  Pain Frequency: Constant/continuous  Location: back, arthritis in hands  Pain Intervention(s): Home medication  Accompanied by  Accompanied by: Alone    Objective:     PHYSICAL EXAMINATION:    BP (!) 147/98   Pulse 90   Wt 205 lb (93 kg)   SpO2 93%   BMI 33.09 kg/m      Gen: Alert, in NAD  Neck: Supple, full ROM, no LAD  Pulm: No wheezing, stridor or respiratory distress  CV: Well-perfused, no cyanosis, no pedal edema  Abdominal: BS+, soft, nontender, nondistended, no hepatomegaly  Back: No step-offs or pain to palpation along the thoracolumbar spine  Rectal: Deferred  : Deferred  Musculoskeletal: Normal muscle bulk and tone  Skin: Normal color and turgor  Neurologic: A/Ox3, CN II-XII intact, normal gait and station  Psychiatric: Appropriate mood and affect     Imaging: Imaging results 30 days: Ct Chest Without Contrast    Result Date: 2/23/2021  EXAM: CT CHEST WO CONTRAST LOCATION: [facility] DATE/TIME: 2/23/2021 1:56 PM INDICATION: Lung cancer post SBRT in 3/2020 follow up, left lower lobe neoplasm. COMPARISON: 09/17/2020. TECHNIQUE: CT chest without IV contrast. Multiplanar reformats were obtained. Dose reduction techniques were used. CONTRAST: None. FINDINGS: LUNGS AND PLEURA: Stable left perihilar posttreatment change. Small elongated nodule in the left lower lobe along the fiducial marker shows no significant change in size 5 x 20 mm (series 4, image 211). Stable emphysema. No pleural effusion. MEDIASTINUM/AXILLAE: No  adenopathy. CORONARY ARTERY CALCIFICATION: Mild. UPPER ABDOMEN: Hepatic steatosis. Cholecystectomy. MUSCULOSKELETAL: No focal bony lesion. Surgical change cervical spine.     1.  Stable exam. No evidence of new or enlarging disease.        Impression     The patient is a 70-year-old gentleman with a prior history of limited stage small cell lung cancer diagnosed initially in 2012.  He was find to have a new limited stage small cell lung cancer involving the left lower lobe with clinical stage T1 N0 M0.  Patient received definitive SBRT and completed 11 months ago.  He has been doing well with no clinical evidence of recurrence.    Assessment & Plan:     1.  I have personally reviewed his restaging CT scan today and compare with previous CT chest and radiation therapy record.  There is no radiographic evidence of cancer recurrence.  The patient is therefore scheduled to return to radiation oncology in 6 months for another office follow-up and restaging CT chest.       2.  Continue follow-up with Dr. Hale, thoracic surgeon and Dr. Brito, pulmonology as planned.        Face to face time  25 minutes with > 75% spent on consultation, education and coordination of care.        Mary Snowden MD, PhD  Department of Radiation Oncology   [facility]  Tel: [phone]  Page: [phone]    [facility]  [address] [place] [address]     [address] [place] [address]    CC:  Patient Care Team:  Kvng Ramirez MD as PCP - General (Family Medicine)  Mary Snowden MD as Physician (Radiation Oncology)  Elaine Bolton RN as Oncology Nurse Navigator (Hematology and Oncology)  Inocente Hale MD (Cardiovascular and Thoracic Surgery)  Mary Snowden MD as Assigned Cancer Care Provider  Inocente Hale MD as Assigned Heart and Vascular Provider

## 2021-06-16 PROBLEM — J18.9 PNEUMONIA: Status: ACTIVE | Noted: 2019-01-04

## 2021-06-16 PROBLEM — C34.90 SMALL CELL LUNG CANCER (H): Status: ACTIVE | Noted: 2020-02-13

## 2021-06-16 PROBLEM — N40.0 BENIGN PROSTATIC HYPERPLASIA WITHOUT LOWER URINARY TRACT SYMPTOMS: Status: ACTIVE | Noted: 2019-01-05

## 2021-06-16 PROBLEM — R04.2 HEMOPTYSIS: Status: ACTIVE | Noted: 2020-02-16

## 2021-06-18 NOTE — PATIENT INSTRUCTIONS - HE
Patient Instructions by Margo Khan RN at 2/24/2020 12:30 PM     Author: Margo Khan RN Service: -- Author Type: Registered Nurse    Filed: 2/24/2020  1:18 PM Encounter Date: 2/24/2020 Status: Signed    : Margo Khan RN (Registered Nurse)       Patient Education     Stereotactic Body Radiotherapy (SBRT) for Cancer    Stereotactic body radiotherapy (SBRT) is a form of radiation therapy that is used to treat cancer. Your healthcare provider may suggest SBRT if your cancer cannot be treated with surgery or other methods. SBRT may be used to help cure or control the growth of your cancer. Or, it may be used to help reduce pain and other symptoms caused by your cancer. This sheet tells you more about SBRT and what to expect. If you have more questions about the treatment, talk with your healthcare provider.  How SBRT Works  SBRT uses strong  X-rays to destroy cancer cells. Imaging studies are used to get very clear pictures of the tumor. Then a machine called a linear accelerator (linac) is aimed at the tumor. It sends high doses of X-rays into the tumor. This can help kill cancer cells or slow their growth. It can also shrink the size of the tumor. Each radiation dose is precisely aimed so that normal tissue around the tumor receives little or no radiation. This helps reduce the risk of side effects.  Preparing for Your Treatment  SBRT is performed by a radiation therapy team. This team often includes a radiation healthcare provider, nurse, radiation therapist, physicist, and dosimetrist. Before your treatment begins, youll have one or more visits with your team to plan and prepare for your treatment. This may involve having other tests and procedures done. Prior to your first treatment session:    You may need to have fiducial markers (also called seeds) placed in or near the tumor site. The markers are tiny pieces of gold metal. They show up clearly on imaging studies and are used later to help  guide your radiation treatment. Your healthcare provider will explain this procedure to you in more detail if it is needed.    You may have scans or other imaging tests done. These are used to map out the exact sites in your body that will be treated.    You may have things made that will help hold your body in the same position for each treatment session. These can include molds, masks, rests, and cushions.  Having SBRT Treatments  With SBRT, one to five doses are given over the course of one to two weeks. You and your team will discuss the exact schedule for your treatment in advance. Each treatment session takes about 60 to 90 minutes. Heres what to expect during each session:    You change into a patient gown. The radiation therapist positions you on the treatment table. If positioning devices were made, they are used at this time.    The therapist leaves the room and turns on the machine from outside. He or she watches you on a TV monitor. You and the therapist can speak through an intercom.    X-ray or other scan images are used to make sure that the beams from the machine are positioned and lined up correctly with your body. The beams are then directed at the tumor. You will hear the machine, but you wont feel anything.    You can go home shortly after the treatment is done. Your healthcare provider or nurse will let you know if and when you need to return for your next session.  Possible Side Effects of SBRT  With any form of radiation therapy, healthy cells and tissue around the tumor can also be affected by the treatment. This can lead to side effects, such as fatigue and skin changes. Most side effects go away soon after treatment ends. But some side effects do not occur until months or even years after the treatment. The type of side effects you have and how severe they are will depend on the amount of radiation you get and the part of your body being treated. Your healthcare provider can tell you more  about what side effects to expect and how to manage them. If needed, your healthcare provider may give you medicines to treat some side effects. Your healthcare team can also teach you ways to help cope with the side effects.  Call your healthcare provider if you have any of the following:    Fever of 100.4  F (38  C) or higher, or as directed by your healthcare provider    Shortness of breath or trouble breathing    Tiredness that doesnt go away between treatments    Pain that doesnt go away, especially if its in the same place    A new or unusual lump, bump, or swelling    Dizziness or lightheadedness    Unusual rashes, bruises, or bleeding    Uncontrolled nausea and vomiting    Diarrhea that doesnt improve over time    Skin breakdown or severe pain due to skin irritation    Any new or concerning symptom  Follow-Up  Youll have one or more follow-up visits with your healthcare provider. These allow your healthcare provider to check your health and the progress of your treatment. If more tests or treatments are needed, your healthcare provider will discuss these with you.  Date Last Reviewed: 6/1/2018 2000-2019 [employer]. [address] [place] [address], [place] [address]. All rights reserved. This information is not intended as a substitute for professional medical care. Always follow your healthcare professional's instructions.

## 2021-06-20 NOTE — LETTER
Letter by Inocente Hale MD at      Author: Inocente Hale MD Service: -- Author Type: --    Filed:  Encounter Date: 1/30/2020 Status: (Other)         Kvng Ramirez MD  [address] [facility] [address]                                  January 30, 2020    Patient: Martin Tovar   MR Number: [id number]   YOB: 1950   Date of Visit: 1/30/2020     Dear Dr. Ashley MD:    Thank you for referring Martin Tovar to me for evaluation. Below are the relevant portions of my assessment and plan of care.    If you have questions, please do not hesitate to call me. I look forward to following Martin along with you.    Sincerely,        Inocente Hale MD            MD Geoffrey Alicea Rafael S, MD  1/30/2020 12:03 PM  Sign when Signing Visit  THORACIC SURGERY OFFICE NEW PATIENT VISIT      Dear Dr. Ramirez,    I saw Mr. Tovar at Dr. Dangelo request in consultation for the evaluation and treatment of a growing LEFT lower lobe nodule in the setting of previously treated small cell lung cancer of the left lung (2012).     HPI  Mr. Martin Tovar is a 69 y.o. year-old male who has a slowly growing nodule in the left lower lobe very suspicious for malignancy. It is in the same lung that was previously treated with chemo-radiotherapy for small cell lung cancer (2013). He recently had a COPD exacerbation treated as outpatient with prednisone and antibiotics, and he is still recovering. He continues to have a productive cough. He states he can walk long distances and can climb stairs.      Tests     CT chest 12/2012: initial small cell lung cancer presentation, tumor mass very close to PA in the fissure. No mediastinal lymphadenopathy.    CT 1/7/2020: interval growth of nodule in S9-10, 1.3 cm, perihilar fibrotic changes. Resolution of inflammatory changes of the right lung.      PET (1/14/2020): SUV 3.3    PFT (): pending (he is currently getting over a respiratory  tract infection)    PMH    Past Medical History:   Diagnosis Date   ? Asthma    ? Avascular necrosis of bones of both hips (H)    ? Cancer (H)     Lung-radiation, chemo   ? Cholecystitis    ? Chronic renal failure    ? COPD, mild (H)    ? Diabetes mellitus (H)     states resolved after weight loss   ? Disc degeneration, lumbar    ? DJD (degenerative joint disease) of knee     left   ? Elevated PSA    ? LBP (low back pain)    ? Smoking hx       Past Surgical History:   Procedure Laterality Date   ? CERVICAL FUSION     ? COLOVESICULAR FISTULA REPAIR     ? ERCP N/A 2015    Procedure: ENDOSCOPIC RETROGRADE CHOLANGIOPANCREATOGRAPHY;  Surgeon: Tang Elliott MD;  Location: [facility];  Service:    ? HEMORRHOID SURGERY     ? INGUINAL HERNIA REPAIR     ? KNEE ARTHROSCOPY     ? LAPAROSCOPIC CHOLECYSTECTOMY N/A 2015    Procedure: CHOLECYSTECTOMY LAPAROSCOPIC;  Surgeon: Eugene Ro MD;  Location: [facility];  Service:    ? LUMBAR FUSION  2006    & reconstruction   ? PICC  1/3/2016        ? TOTAL KNEE ARTHROPLASTY Left          ETOH rare  TOB 50 pack year, quit 2 weeks ago.    Physical examination  BMI 31  Noncontributory, he has a productive cough.    From a personal perspective, he is retired from [employer] and lives with his wife. His son is currently living with them.      IMPRESSION   1. Lung nodule        69 y.o. year-old male with a growing LEFT lower lobe nodule in the setting of previously treated small cell lung cancer of the left lung ().     PLAN  I spent a total of 45 minutes with Mr. Tovar, more than 50% of which were spent in counseling, coordination of care, and face-to-face time. I reviewed the plan as follows:    Surgical resection: this is technically extremely challenging and given the previous radiation of the hilum. In order to determine whether surgery is an option we need to do the followin) PFT when he has recovered from his current respiratory tract  infection  2) CT-guided biopsy and fiducial placement  3) Return to clinic with results  4) Discuss his case at Tumor Board the same day as the clinic visit    On the next visit we will decide on next steps. If surgery is still a consideration, then we will obtain a quantitative perfusion scan. I believe if the LEFT lung perfusion is low, the safest surgery might be a pneumonectomy.  An alternative might be to do stereotactic XRT and save a pneumonectomy for recurrence if necessary.    They had all their questions answered and were in agreement with the plan.  I appreciate the opportunity to participate in the care of your patient and will keep you updated.    Sincerely,

## 2021-06-20 NOTE — LETTER
Letter by Elaine Bolton RN at      Author: Elaine Bolton RN Service: -- Author Type: --    Filed:  Encounter Date: 2/13/2020 Status: (Other)       Dear Martin Tovar    Thank you for choosing [facility] for your care.  We are committed to providing you with the highest quality and compassionate healthcare services.  The following information pertains to your first appointment with our clinic.    Date/Time of appointment: Monday, February 24th 2020 at 12:30 pm.    Note: Please arrive by 12:30 pm.  This allows time to complete forms, possible labs and nursing assessment.     Name of your Physician: Mary Snowden MD    What to bring to your appointment:    Completed Patient History/Initial Nursing Assessment and Medication/Allergy List (these forms were sent to you).    Any paperwork or films from your physician that we have asked you to bring.    Your current insurance card(s).    Parking:    Please refer to the map included to direct you.  The [facility] is located at the [place] end of [facility] in [place], [place].      After turning onto [address] from [facility], take a right turn at the first stop sign.  We have designated parking on the left, identified as parking for Cancer Care patients (Lot D).     The Code to Enter Lot D is: 0201. This code changes monthly and will always coincide with the current month followed by 01. For example August will be 0801.  The month will continue to change but the 01 will remain constant.  If lot D is full please use Parking Lot A, directly across the street.    Please enter the Cancer Care Center on the north end of the hospitals.  You will see a sign on the building.         For Radiation Oncology appointments, please go straight through the double doors and check in.     Also please note appointments can last 1.5-2 hours.      We hope these instructions are helpful to you.  If you have any questions or concerns, please call us  at [phone].  It is our pleasure to assist you.    Warm Regards,  Elaine MARS Gerrits  Nurse Navigator  [phone]

## 2021-06-28 NOTE — PROGRESS NOTES
Progress Notes by Inocente Hale MD at 1/30/2020 11:00 AM     Author: Inocente Hale MD Service: -- Author Type: Physician    Filed: 1/30/2020 12:04 PM Encounter Date: 1/30/2020 Status: Signed    : Inocente Hale MD (Physician)       THORACIC SURGERY OFFICE NEW PATIENT VISIT      Dear Dr. Ramirez,    I saw Mr. Tovar at Dr. Dangelo request in consultation for the evaluation and treatment of a growing LEFT lower lobe nodule in the setting of previously treated small cell lung cancer of the left lung (2012).     HPI  Mr. Martin Tovar is a 69 y.o. year-old male who has a slowly growing nodule in the left lower lobe very suspicious for malignancy. It is in the same lung that was previously treated with chemo-radiotherapy for small cell lung cancer (2013). He recently had a COPD exacerbation treated as outpatient with prednisone and antibiotics, and he is still recovering. He continues to have a productive cough. He states he can walk long distances and can climb stairs.      Tests     CT chest 12/2012: initial small cell lung cancer presentation, tumor mass very close to PA in the fissure. No mediastinal lymphadenopathy.    CT 1/7/2020: interval growth of nodule in S9-10, 1.3 cm, perihilar fibrotic changes. Resolution of inflammatory changes of the right lung.      PET (1/14/2020): SUV 3.3    PFT (): pending (he is currently getting over a respiratory tract infection)    PMH    Past Medical History:   Diagnosis Date   ? Asthma    ? Avascular necrosis of bones of both hips (H)    ? Cancer (H)     Lung-radiation, chemo   ? Cholecystitis    ? Chronic renal failure    ? COPD, mild (H)    ? Diabetes mellitus (H)     states resolved after weight loss   ? Disc degeneration, lumbar    ? DJD (degenerative joint disease) of knee     left   ? Elevated PSA    ? LBP (low back pain)    ? Smoking hx       Past Surgical History:   Procedure Laterality Date   ? CERVICAL FUSION     ? COLOVESICULAR FISTULA REPAIR   2013   ? ERCP N/A 2015    Procedure: ENDOSCOPIC RETROGRADE CHOLANGIOPANCREATOGRAPHY;  Surgeon: Tang Elliott MD;  Location: [facility];  Service:    ? HEMORRHOID SURGERY     ? INGUINAL HERNIA REPAIR     ? KNEE ARTHROSCOPY     ? LAPAROSCOPIC CHOLECYSTECTOMY N/A 2015    Procedure: CHOLECYSTECTOMY LAPAROSCOPIC;  Surgeon: Eugene Ro MD;  Location: [facility];  Service:    ? LUMBAR FUSION  2006    & reconstruction   ? PICC  1/3/2016        ? TOTAL KNEE ARTHROPLASTY Left          ETOH rare  TOB 50 pack year, quit 2 weeks ago.    Physical examination  BMI 31  Noncontributory, he has a productive cough.    From a personal perspective, he is retired from [employer] and lives with his wife. His son is currently living with them.      IMPRESSION   1. Lung nodule        69 y.o. year-old male with a growing LEFT lower lobe nodule in the setting of previously treated small cell lung cancer of the left lung ().     PLAN  I spent a total of 45 minutes with Mr. Tovar, more than 50% of which were spent in counseling, coordination of care, and face-to-face time. I reviewed the plan as follows:    Surgical resection: this is technically extremely challenging and given the previous radiation of the hilum. In order to determine whether surgery is an option we need to do the followin) PFT when he has recovered from his current respiratory tract infection  2) CT-guided biopsy and fiducial placement  3) Return to clinic with results  4) Discuss his case at Tumor Board the same day as the clinic visit    On the next visit we will decide on next steps. If surgery is still a consideration, then we will obtain a quantitative perfusion scan. I believe if the LEFT lung perfusion is low, the safest surgery might be a pneumonectomy.  An alternative might be to do stereotactic XRT and save a pneumonectomy for recurrence if necessary.    They had all their questions answered and were in agreement with  the plan.  I appreciate the opportunity to participate in the care of your patient and will keep you updated.    Sincerely,

## 2021-08-02 ENCOUNTER — LAB REQUISITION (OUTPATIENT)
Dept: LAB | Facility: CLINIC | Age: 71
End: 2021-08-02
Payer: MEDICARE

## 2021-08-02 DIAGNOSIS — E11.9 TYPE 2 DIABETES MELLITUS WITHOUT COMPLICATIONS (H): ICD-10-CM

## 2021-08-02 DIAGNOSIS — E78.5 HYPERLIPIDEMIA, UNSPECIFIED: ICD-10-CM

## 2021-08-02 PROCEDURE — 80053 COMPREHEN METABOLIC PANEL: CPT | Mod: ORL | Performed by: PHYSICIAN ASSISTANT

## 2021-08-02 PROCEDURE — 80061 LIPID PANEL: CPT | Mod: ORL | Performed by: PHYSICIAN ASSISTANT

## 2021-08-03 LAB
ALBUMIN SERPL-MCNC: 4 G/DL (ref 3.5–5)
ALP SERPL-CCNC: 69 U/L (ref 45–120)
ALT SERPL W P-5'-P-CCNC: 11 U/L (ref 0–45)
ANION GAP SERPL CALCULATED.3IONS-SCNC: 11 MMOL/L (ref 5–18)
AST SERPL W P-5'-P-CCNC: 13 U/L (ref 0–40)
BILIRUB SERPL-MCNC: 0.4 MG/DL (ref 0–1)
BUN SERPL-MCNC: 15 MG/DL (ref 8–22)
CALCIUM SERPL-MCNC: 9.6 MG/DL (ref 8.5–10.5)
CHLORIDE BLD-SCNC: 103 MMOL/L (ref 98–107)
CHOLEST SERPL-MCNC: 178 MG/DL
CO2 SERPL-SCNC: 29 MMOL/L (ref 22–31)
CREAT SERPL-MCNC: 1.08 MG/DL (ref 0.7–1.3)
FASTING STATUS PATIENT QL REPORTED: NORMAL
GFR SERPL CREATININE-BSD FRML MDRD: 70 ML/MIN/1.73M2
GLUCOSE BLD-MCNC: 106 MG/DL (ref 70–125)
HDLC SERPL-MCNC: 47 MG/DL
LDLC SERPL CALC-MCNC: 105 MG/DL
POTASSIUM BLD-SCNC: 4.8 MMOL/L (ref 3.5–5)
PROT SERPL-MCNC: 7.2 G/DL (ref 6–8)
SODIUM SERPL-SCNC: 143 MMOL/L (ref 136–145)
TRIGL SERPL-MCNC: 129 MG/DL

## 2021-08-04 ENCOUNTER — APPOINTMENT (OUTPATIENT)
Dept: CT IMAGING | Facility: HOSPITAL | Age: 71
DRG: 282 | End: 2021-08-04
Attending: EMERGENCY MEDICINE
Payer: MEDICARE

## 2021-08-04 ENCOUNTER — APPOINTMENT (OUTPATIENT)
Dept: RADIOLOGY | Facility: HOSPITAL | Age: 71
DRG: 282 | End: 2021-08-04
Attending: EMERGENCY MEDICINE
Payer: MEDICARE

## 2021-08-04 ENCOUNTER — HOSPITAL ENCOUNTER (INPATIENT)
Facility: HOSPITAL | Age: 71
LOS: 2 days | Discharge: HOME | DRG: 282 | End: 2021-08-06
Attending: EMERGENCY MEDICINE | Admitting: HOSPITALIST
Payer: MEDICARE

## 2021-08-04 DIAGNOSIS — I21.4 NSTEMI (NON-ST ELEVATED MYOCARDIAL INFARCTION) (H): ICD-10-CM

## 2021-08-04 DIAGNOSIS — R79.89 ELEVATED TROPONIN: ICD-10-CM

## 2021-08-04 LAB
ANION GAP SERPL CALCULATED.3IONS-SCNC: 9 MMOL/L (ref 5–18)
ATRIAL RATE - MUSE: 63 BPM
BASOPHILS # BLD AUTO: 0 10E3/UL (ref 0–0.2)
BASOPHILS NFR BLD AUTO: 1 %
BNP SERPL-MCNC: 65 PG/ML (ref 0–65)
BUN SERPL-MCNC: 15 MG/DL (ref 8–22)
CALCIUM SERPL-MCNC: 9.1 MG/DL (ref 8.5–10.5)
CHLORIDE BLD-SCNC: 106 MMOL/L (ref 98–107)
CO2 SERPL-SCNC: 29 MMOL/L (ref 22–31)
CREAT SERPL-MCNC: 1.14 MG/DL (ref 0.7–1.3)
D DIMER PPP FEU-MCNC: 0.71 UG/ML FEU (ref 0–0.5)
DIASTOLIC BLOOD PRESSURE - MUSE: NORMAL MMHG
EOSINOPHIL # BLD AUTO: 0.2 10E3/UL (ref 0–0.7)
EOSINOPHIL NFR BLD AUTO: 2 %
ERYTHROCYTE [DISTWIDTH] IN BLOOD BY AUTOMATED COUNT: 12.9 % (ref 10–15)
GFR SERPL CREATININE-BSD FRML MDRD: 65 ML/MIN/1.73M2
GLUCOSE BLD-MCNC: 127 MG/DL (ref 70–125)
HCT VFR BLD AUTO: 46.6 % (ref 40–53)
HGB BLD-MCNC: 15.3 G/DL (ref 13.3–17.7)
IMM GRANULOCYTES # BLD: 0 10E3/UL
IMM GRANULOCYTES NFR BLD: 0 %
INTERPRETATION ECG - MUSE: NORMAL
LYMPHOCYTES # BLD AUTO: 1.4 10E3/UL (ref 0.8–5.3)
LYMPHOCYTES NFR BLD AUTO: 18 %
MCH RBC QN AUTO: 30.9 PG (ref 26.5–33)
MCHC RBC AUTO-ENTMCNC: 32.8 G/DL (ref 31.5–36.5)
MCV RBC AUTO: 94 FL (ref 78–100)
MONOCYTES # BLD AUTO: 1 10E3/UL (ref 0–1.3)
MONOCYTES NFR BLD AUTO: 13 %
NEUTROPHILS # BLD AUTO: 5.1 10E3/UL (ref 1.6–8.3)
NEUTROPHILS NFR BLD AUTO: 66 %
NRBC # BLD AUTO: 0 10E3/UL
NRBC BLD AUTO-RTO: 0 /100
P AXIS - MUSE: 67 DEGREES
PLATELET # BLD AUTO: 187 10E3/UL (ref 150–450)
POTASSIUM BLD-SCNC: 4.3 MMOL/L (ref 3.5–5)
PR INTERVAL - MUSE: 164 MS
QRS DURATION - MUSE: 98 MS
QT - MUSE: 408 MS
QTC - MUSE: 417 MS
R AXIS - MUSE: -78 DEGREES
RBC # BLD AUTO: 4.95 10E6/UL (ref 4.4–5.9)
SARS-COV-2 RNA RESP QL NAA+PROBE: NEGATIVE
SODIUM SERPL-SCNC: 144 MMOL/L (ref 136–145)
SYSTOLIC BLOOD PRESSURE - MUSE: NORMAL MMHG
T AXIS - MUSE: 64 DEGREES
TROPONIN I SERPL-MCNC: 0.05 NG/ML (ref 0–0.29)
TROPONIN I SERPL-MCNC: 1.12 NG/ML (ref 0–0.29)
VENTRICULAR RATE- MUSE: 63 BPM
WBC # BLD AUTO: 7.6 10E3/UL (ref 4–11)

## 2021-08-04 PROCEDURE — 96365 THER/PROPH/DIAG IV INF INIT: CPT

## 2021-08-04 PROCEDURE — 93005 ELECTROCARDIOGRAM TRACING: CPT | Performed by: EMERGENCY MEDICINE

## 2021-08-04 PROCEDURE — 93005 ELECTROCARDIOGRAM TRACING: CPT | Performed by: STUDENT IN AN ORGANIZED HEALTH CARE EDUCATION/TRAINING PROGRAM

## 2021-08-04 PROCEDURE — 99285 EMERGENCY DEPT VISIT HI MDM: CPT | Mod: 25

## 2021-08-04 PROCEDURE — 85379 FIBRIN DEGRADATION QUANT: CPT | Performed by: EMERGENCY MEDICINE

## 2021-08-04 PROCEDURE — C9803 HOPD COVID-19 SPEC COLLECT: HCPCS

## 2021-08-04 PROCEDURE — 250N000011 HC RX IP 250 OP 636: Performed by: STUDENT IN AN ORGANIZED HEALTH CARE EDUCATION/TRAINING PROGRAM

## 2021-08-04 PROCEDURE — 84484 ASSAY OF TROPONIN QUANT: CPT | Performed by: EMERGENCY MEDICINE

## 2021-08-04 PROCEDURE — 36415 COLL VENOUS BLD VENIPUNCTURE: CPT | Performed by: STUDENT IN AN ORGANIZED HEALTH CARE EDUCATION/TRAINING PROGRAM

## 2021-08-04 PROCEDURE — 85025 COMPLETE CBC W/AUTO DIFF WBC: CPT | Performed by: EMERGENCY MEDICINE

## 2021-08-04 PROCEDURE — 36415 COLL VENOUS BLD VENIPUNCTURE: CPT | Performed by: EMERGENCY MEDICINE

## 2021-08-04 PROCEDURE — 250N000013 HC RX MED GY IP 250 OP 250 PS 637: Performed by: EMERGENCY MEDICINE

## 2021-08-04 PROCEDURE — 96366 THER/PROPH/DIAG IV INF ADDON: CPT

## 2021-08-04 PROCEDURE — 84484 ASSAY OF TROPONIN QUANT: CPT | Performed by: STUDENT IN AN ORGANIZED HEALTH CARE EDUCATION/TRAINING PROGRAM

## 2021-08-04 PROCEDURE — 250N000013 HC RX MED GY IP 250 OP 250 PS 637: Performed by: STUDENT IN AN ORGANIZED HEALTH CARE EDUCATION/TRAINING PROGRAM

## 2021-08-04 PROCEDURE — 250N000011 HC RX IP 250 OP 636: Performed by: EMERGENCY MEDICINE

## 2021-08-04 PROCEDURE — 210N000001 HC R&B IMCU HEART CARE

## 2021-08-04 PROCEDURE — 80048 BASIC METABOLIC PNL TOTAL CA: CPT | Performed by: EMERGENCY MEDICINE

## 2021-08-04 PROCEDURE — 71275 CT ANGIOGRAPHY CHEST: CPT

## 2021-08-04 PROCEDURE — 87635 SARS-COV-2 COVID-19 AMP PRB: CPT | Performed by: EMERGENCY MEDICINE

## 2021-08-04 PROCEDURE — 71045 X-RAY EXAM CHEST 1 VIEW: CPT

## 2021-08-04 PROCEDURE — 96375 TX/PRO/DX INJ NEW DRUG ADDON: CPT

## 2021-08-04 PROCEDURE — 83880 ASSAY OF NATRIURETIC PEPTIDE: CPT | Performed by: EMERGENCY MEDICINE

## 2021-08-04 RX ORDER — MORPHINE SULFATE 15 MG/1
15 TABLET, FILM COATED, EXTENDED RELEASE ORAL EVERY 12 HOURS SCHEDULED
Status: DISCONTINUED | OUTPATIENT
Start: 2021-08-04 | End: 2021-08-04

## 2021-08-04 RX ORDER — HYDROCODONE BITARTRATE AND ACETAMINOPHEN 7.5; 325 MG/1; MG/1
1 TABLET ORAL EVERY 6 HOURS PRN
Status: DISCONTINUED | OUTPATIENT
Start: 2021-08-04 | End: 2021-08-06 | Stop reason: HOSPADM

## 2021-08-04 RX ORDER — ALBUTEROL SULFATE 90 UG/1
2 AEROSOL, METERED RESPIRATORY (INHALATION) 4 TIMES DAILY PRN
Status: DISCONTINUED | OUTPATIENT
Start: 2021-08-04 | End: 2021-08-06 | Stop reason: HOSPADM

## 2021-08-04 RX ORDER — ALBUTEROL SULFATE 90 UG/1
6 AEROSOL, METERED RESPIRATORY (INHALATION) ONCE
Status: COMPLETED | OUTPATIENT
Start: 2021-08-04 | End: 2021-08-04

## 2021-08-04 RX ORDER — MORPHINE SULFATE 15 MG/1
15 TABLET, FILM COATED, EXTENDED RELEASE ORAL EVERY 12 HOURS SCHEDULED
Status: DISCONTINUED | OUTPATIENT
Start: 2021-08-05 | End: 2021-08-05

## 2021-08-04 RX ORDER — TAMSULOSIN HYDROCHLORIDE 0.4 MG/1
0.4 CAPSULE ORAL DAILY
Status: DISCONTINUED | OUTPATIENT
Start: 2021-08-05 | End: 2021-08-06 | Stop reason: HOSPADM

## 2021-08-04 RX ORDER — ALBUTEROL SULFATE 0.83 MG/ML
2.5 SOLUTION RESPIRATORY (INHALATION) EVERY 4 HOURS PRN
Status: DISCONTINUED | OUTPATIENT
Start: 2021-08-04 | End: 2021-08-06 | Stop reason: HOSPADM

## 2021-08-04 RX ORDER — ASPIRIN 81 MG/1
324 TABLET, CHEWABLE ORAL ONCE
Status: COMPLETED | OUTPATIENT
Start: 2021-08-04 | End: 2021-08-04

## 2021-08-04 RX ORDER — HEPARIN SODIUM 10000 [USP'U]/100ML
0-5000 INJECTION, SOLUTION INTRAVENOUS CONTINUOUS
Status: DISCONTINUED | OUTPATIENT
Start: 2021-08-04 | End: 2021-08-05 | Stop reason: ALTCHOICE

## 2021-08-04 RX ORDER — NICOTINE 21 MG/24HR
1 PATCH, TRANSDERMAL 24 HOURS TRANSDERMAL DAILY
Status: DISCONTINUED | OUTPATIENT
Start: 2021-08-05 | End: 2021-08-06 | Stop reason: HOSPADM

## 2021-08-04 RX ORDER — HYDROCODONE BITARTRATE AND ACETAMINOPHEN 7.5; 325 MG/1; MG/1
1 TABLET ORAL EVERY 6 HOURS PRN
Status: DISCONTINUED | OUTPATIENT
Start: 2021-08-04 | End: 2021-08-04

## 2021-08-04 RX ORDER — METHYLPREDNISOLONE SODIUM SUCCINATE 125 MG/2ML
125 INJECTION, POWDER, LYOPHILIZED, FOR SOLUTION INTRAMUSCULAR; INTRAVENOUS ONCE
Status: COMPLETED | OUTPATIENT
Start: 2021-08-04 | End: 2021-08-04

## 2021-08-04 RX ORDER — IOPAMIDOL 755 MG/ML
100 INJECTION, SOLUTION INTRAVASCULAR ONCE
Status: COMPLETED | OUTPATIENT
Start: 2021-08-04 | End: 2021-08-04

## 2021-08-04 RX ADMIN — HYDROCODONE BITARTRATE AND ACETAMINOPHEN 1 TABLET: 7.5; 325 TABLET ORAL at 21:48

## 2021-08-04 RX ADMIN — IOPAMIDOL 100 ML: 755 INJECTION, SOLUTION INTRAVENOUS at 17:35

## 2021-08-04 RX ADMIN — METHYLPREDNISOLONE SODIUM SUCCINATE 125 MG: 125 INJECTION, POWDER, FOR SOLUTION INTRAMUSCULAR; INTRAVENOUS at 16:46

## 2021-08-04 RX ADMIN — ALBUTEROL SULFATE 6 PUFF: 90 AEROSOL, METERED RESPIRATORY (INHALATION) at 16:43

## 2021-08-04 RX ADMIN — MORPHINE SULFATE 15 MG: 15 TABLET, EXTENDED RELEASE ORAL at 21:49

## 2021-08-04 RX ADMIN — HEPARIN SODIUM 1000 UNITS/HR: 10000 INJECTION, SOLUTION INTRAVENOUS at 21:21

## 2021-08-04 RX ADMIN — ASPIRIN 324 MG: 81 TABLET, CHEWABLE ORAL at 16:41

## 2021-08-04 ASSESSMENT — MIFFLIN-ST. JEOR: SCORE: 1542.37

## 2021-08-04 NOTE — ED PROVIDER NOTES
EMERGENCY DEPARTMENT SIGN OUT NOTE        ED COURSE AND MEDICAL DECISION MAKING  Patient was signed out to me by Dr. Raven Becker at 5:00 PM.  8:59 PM Checked on the patient and updated on results.  9:08 PM Spoke with Dr. Ramirez, hospitalist.  11:27 PM Spoke with blessing Steinist. No cardiac tele beds available. Will board patient in ED over night.    In brief, Martin Tovar is a 71 year old male who initially presented for evaluation of some shortness of breath and central chest tightness.  Initial EKG and troponin are nonischemic.  D-dimer was borderline elevated, thus CTA chest is pending.  We will also need delta troponin given his medical history.  Was given albuterol HFA and Solu-Medrol for possible COPD flare as a cause of his symptoms.    At time of sign out, disposition was pending CTA, delta troponin and further observation in the ED    CTA returned, no pulmonary embolism shown.  However, delta troponin returned elevated at 1.12 (up from 0.05 about 3 hours prior)    Discussed findings at bedside with patient, discussed that this likely represents a small heart attack or NSTEMI.  On auscultation still has some trace wheezing, though states he is actually feeling improved.  Still has some mild chest discomfort, though still does not use the word pain to describe his symptoms.  Patient started on heparin drip.  No cardiac telemetry beds available anywhere in the city, will board in the ED overnight continue heparin drip.      Critical Care     Performed by: Regis Ford MD  Authorized by: Regis Ford MD                   Total critical care time: 30 minutes  Critical care was necessary to treat or prevent imminent or life-threatening deterioration of the following conditions: NSTEMI  Critical care was time spent personally by me on the following activities: development of treatment plan with patient or surrogate, discussions with consultants, examination of patient, evaluation of  patient's response to treatment, obtaining history from patient or surrogate, ordering and performing treatments and interventions, ordering and review of laboratory studies, ordering and review of radiographic studies, re-evaluation of patient's condition and monitoring for potential decompensation.  Critical care time was exclusive of separately billable procedures and treating other patients.      FINAL IMPRESSION    1. NSTEMI (non-ST elevated myocardial infarction) (H)    2. Elevated troponin        RADIOLOGY    CT Chest Pulmonary Embolism w Contrast   Final Result   IMPRESSION:   1.  No evidence for pulmonary emboli.   2.  Stable post radiation treatment changes left hilum.   3.  No acute pulmonary disease.      XR Chest Port 1 View   Final Result   IMPRESSION:       Cardiac silhouette is normal in size. Mediastinal borders are well-defined.      Metallic fiducial projects in the left lung just inferior to the left hilum. Surrounding architectural distortion/vertical band of opacity consistent with radiation fibrosis, unchanged. No new nodular or airspace opacities are present. The diaphragm    remains flattened consistent with air trapping. No pleural fluid or pneumothorax.                 Regis Ford MD  08/05/21 0105

## 2021-08-04 NOTE — ED PROVIDER NOTES
EMERGENCY DEPARTMENT ENCOUNTER      NAME: Martin Tovar  AGE: 71 year old male  YOB: 1950  MRN: [id number]  EVALUATION DATE & TIME: 8/4/2021  3:41 PM    PCP: Kvng Ramirez    ED PROVIDER: Raven Becker MD    Chief Complaint   Patient presents with     Shortness of Breath     Chest Pain         FINAL IMPRESSION:  1. SOB (shortness of breath)          ED COURSE & MEDICAL DECISION MAKING:    Pertinent Labs & Imaging studies reviewed. (See chart for details)  71 year old male with history of COPD, lung cancer in remission, HLD, DM who presents to the Emergency Department for evaluation of shortness of breath starting this afternoon.  Wheezing on exam but patient believes this is his baseline.  Differential includes viral syndrome, COPD exacerbation, pneumothorax, ACS, symptomatic anemia, arrhythmia, PE, pneumonia, COVID-19.    Patient placed on monitor, IV established and blood obtained.  Twelve-lead EKG shows sinus rhythm with left axis deviation and pulmonary disease pattern unchanged from previous.  Patient given aspirin, 6 puffs of albuterol MDI and 125 mg Solu-Medrol.  Portable chest x-ray postop changes in the left lung unchanged from baseline.  CBC, BMP,, D-dimer obtained.  D-dimer minimally elevated 0.71.  BNP, troponin, Covid swab, and CT a PE study ordered and pending at time of dictation.  Patient signed out to Dr. Ford awaiting results of same.     3:46 PM I met with the patient for the initial interview and physical examination. Discussed plan for treatment and workup in the ED.    4:22 PM Check on bloodwork  ED Course as of Aug 04 1701   Wed Aug 04, 2021   1700 CTA PE study ordered     D-Dimer Quantitative(!): 0.71         At the conclusion of the encounter I discussed the results of all of the tests and the disposition. The questions were answered. The patient or family acknowledged understanding and was agreeable with the care plan.    PPE: Provider wore gloves, N95 mask, eye  protection, surgical cap, and paper mask.       MEDICATIONS GIVEN IN THE EMERGENCY:  Medications   methylPREDNISolone sodium succinate (solu-MEDROL) injection 125 mg (125 mg Intravenous Given 21 164)   albuterol (PROAIR HFA/PROVENTIL HFA/VENTOLIN HFA) 108 (90 Base) MCG/ACT inhaler 6 puff (6 puffs Inhalation Given 21 1643)   aspirin (ASA) chewable tablet 324 mg (324 mg Oral Given 21)       NEW PRESCRIPTIONS STARTED AT TODAY'S ER VISIT  New Prescriptions    No medications on file          =================================================================    HPI    Patient information was obtained from: the patient    Use of Intrepreter: N/A      Martin Tovar is a 71 year old male with pertinent medical history of COPD, chronic renal failure, small cell lung cancer, s/p laparoscopic cholecystectomy , and s/p lumbar fusion  who presents for evaluation of shortness of breath and chest pain.    The patient states that he felt fine this morning until about four hours ago, when he had a sudden onset of shortness of breath. He describes his chest as being tight, but vehemently denies chest pain. Prior to coming to the ED, he took several puffs of his albuterol inhaler which has helped a little. The patient is still wheezing, but he states that this is his baseline.     The patient has no personal history of heart disease, blood clots or bleeding disorders, but his father  due to heart disease.    REVIEW OF SYSTEMS  Constitutional:  Denies fever, chills, weight loss or weakness  HENT:  Denies sore throat, ear pain, congestion  Respiratory: Positive for cough, shortness of breath, and wheezing  Cardiovascular:  No CP, palpitations Positive for chest tightness  GI:  Denies abdominal pain, nausea, vomiting, diarrhea  Musculoskeletal:  Denies any new muscle/joint pain, swelling or loss of function.  All other systems negative unless noted in HPI.      PAST MEDICAL HISTORY:  Past Medical History:    Diagnosis Date     Asthma      Avascular necrosis of bones of both hips (H)      Cancer (H)     Lung-radiation, chemo     Cholecystitis      Chronic renal failure      COPD, mild (H)      Diabetes mellitus (H)     states resolved after weight loss     Disc degeneration, lumbar      DJD (degenerative joint disease) of knee     left     Elevated PSA      LBP (low back pain)      Lung cancer (H)      Smoking hx        PAST SURGICAL HISTORY:  Past Surgical History:   Procedure Laterality Date     ARTHROSCOPY KNEE       CERVICAL FUSION       CT BIOPSY LUNG  2/5/2020     ENDOSCOPIC RETROGRADE CHOLANGIOPANCREATOGRAM N/A 12/22/2015    Procedure: ENDOSCOPIC RETROGRADE CHOLANGIOPANCREATOGRAPHY;  Surgeon: Tang Elliott MD;  Location: [facility] OR;  Service:      HEMORRHOID SURGERY       INGUINAL HERNIA REPAIR       IR LUMBAR EPIDURAL STEROID INJECTION  4/29/2003     IR LUMBAR EPIDURAL STEROID INJECTION  6/27/2007     LAPAROSCOPIC CHOLECYSTECTOMY N/A 12/16/2015    Procedure: CHOLECYSTECTOMY LAPAROSCOPIC;  Surgeon: Eugene Ro MD;  Location: [facility] OR;  Service:      LUMBAR FUSION  2006    & reconstruction     OTHER SURGICAL HISTORY  2013    COLOVESICULAR FISTULA REPAIR     PICC  1/3/2016          RELEASE TRIGGER FINGER  07/2020     TOTAL KNEE ARTHROPLASTY Left        CURRENT MEDICATIONS:    Prior to Admission Medications   Prescriptions Last Dose Informant Patient Reported? Taking?   HYDROcodone-acetaminophen (NORCO) 7.5-325 mg per tablet   No No   Sig: [HYDROCODONE-ACETAMINOPHEN (NORCO) 7.5-325 MG PER TABLET] Take 1 tablet by mouth every 6 (six) hours as needed.   albuterol (PROVENTIL HFA;VENTOLIN HFA) 90 mcg/actuation inhaler   Yes No   Sig: [ALBUTEROL (PROVENTIL HFA;VENTOLIN HFA) 90 MCG/ACTUATION INHALER] Inhale 2 puffs 4 (four) times a day as needed for wheezing.          albuterol (PROVENTIL) 2.5 mg /3 mL (0.083 %) nebulizer solution   Yes No   Sig: [ALBUTEROL (PROVENTIL) 2.5 MG /3 ML (0.083  "%) NEBULIZER SOLUTION] Take 3 mL by nebulization every 4 (four) hours as needed.   ipratropium (ATROVENT) 0.02 % nebulizer solution   Yes No   Sig: [IPRATROPIUM (ATROVENT) 0.02 % NEBULIZER SOLUTION] USE 1 VIAL VIA NEBULIZER FOUR TIMES DAILY   morphine (MS CONTIN) 15 MG 12 hr tablet   Yes No   Sig: [MORPHINE (MS CONTIN) 15 MG 12 HR TABLET] Take 15 mg by mouth every 8 (eight) hours. Just takes as needed   nicotine (NICODERM CQ) 21 mg/24 hr   Yes No   Sig: [NICOTINE (NICODERM CQ) 21 MG/24 HR] 1 PATCH   tamsulosin (FLOMAX) 0.4 mg cap   Yes No   Sig: [TAMSULOSIN (FLOMAX) 0.4 MG CAP] Take 0.4 mg by mouth daily.   zolpidem (AMBIEN CR) 6.25 MG CR tablet   Yes No   Sig: [ZOLPIDEM (AMBIEN CR) 6.25 MG CR TABLET] Take 6.25 mg by mouth at bedtime as needed.      Facility-Administered Medications: None       ALLERGIES:  Allergies   Allergen Reactions     Ciprofloxacin Swelling and Rash     Throat swelling     Septra [Sulfamethoxazole W/Trimethoprim] Rash     swelling       FAMILY HISTORY:  No family history on file.    SOCIAL HISTORY:  Social History     Tobacco Use     Smoking status: Current Every Day Smoker     Packs/day: 0.50     Smokeless tobacco: Never Used     Tobacco comment: 1/4 ppd as of 2/25/21   Substance Use Topics     Alcohol use: Not Currently     Drug use: No        VITALS:  Patient Vitals for the past 24 hrs:   BP Temp Temp src Pulse Resp SpO2 Height Weight   08/04/21 1700 (!) 181/102 -- -- 63 -- 95 % -- --   08/04/21 1645 -- -- -- 70 -- 96 % -- --   08/04/21 1630 -- -- -- 60 -- 95 % -- --   08/04/21 1615 (!) 184/90 -- -- 61 -- 95 % -- --   08/04/21 1454 (!) 175/100 97.8  F (36.6  C) Temporal 68 18 95 % 1.676 m (5' 6\") 83.5 kg (184 lb)       PHYSICAL EXAM    General Appearance: Well-appearing, well-nourished, no acute distress Voice raspy   Head:  Normocephalic  Eyes: conjunctiva/corneas clear, EOM's intact  ENT:   membranes are moist without pallor  Neck:  Supple, symmetrical, trachea midline, no " adenopathy  Chest:  No tenderness or deformity, no crepitus  Cardio:  Regular rate and rhythm, no murmur/gallop/rub, 2+ pulses symmetric in all extremities  Pulm:  No respiratory distress, expiratory wheezing in all lung fields  Back:   No CVA tenderness, normal ROM  Abdomen:  Soft, non-tender, non distended,no rebound or guarding.  Extremities:  Extremities normal, atraumatic, no cyanosis, full ROM and motor tone intact, bilateral pulses intact upper and lower No peripheral edema  Skin:  Skin warm, dry, no rashes  Neuro:  Alert and oriented ×3, moving all extremities, no gross sensory defects     RADIOLOGY/LABS:  Reviewed all pertinent imaging. Please see official radiology report. All pertinent labs reviewed and interpreted.    Results for orders placed or performed during the hospital encounter of 08/04/21   XR Chest Port 1 View    Impression    IMPRESSION:     Cardiac silhouette is normal in size. Mediastinal borders are well-defined.    Metallic fiducial projects in the left lung just inferior to the left hilum. Surrounding architectural distortion/vertical band of opacity consistent with radiation fibrosis, unchanged. No new nodular or airspace opacities are present. The diaphragm   remains flattened consistent with air trapping. No pleural fluid or pneumothorax.       Basic metabolic panel   Result Value Ref Range    Sodium 144 136 - 145 mmol/L    Potassium 4.3 3.5 - 5.0 mmol/L    Chloride 106 98 - 107 mmol/L    Carbon Dioxide (CO2) 29 22 - 31 mmol/L    Anion Gap 9 5 - 18 mmol/L    Urea Nitrogen 15 8 - 22 mg/dL    Creatinine 1.14 0.70 - 1.30 mg/dL    Calcium 9.1 8.5 - 10.5 mg/dL    Glucose 127 (H) 70 - 125 mg/dL    GFR Estimate 65 >60 mL/min/1.73m2   D dimer quantitative   Result Value Ref Range    D-Dimer Quantitative 0.71 (H) 0.00 - 0.50 ug/mL FEU   CBC with platelets and differential   Result Value Ref Range    WBC Count 7.6 4.0 - 11.0 10e3/uL    RBC Count 4.95 4.40 - 5.90 10e6/uL    Hemoglobin 15.3 13.3 -  17.7 g/dL    Hematocrit 46.6 40.0 - 53.0 %    MCV 94 78 - 100 fL    MCH 30.9 26.5 - 33.0 pg    MCHC 32.8 31.5 - 36.5 g/dL    RDW 12.9 10.0 - 15.0 %    Platelet Count 187 150 - 450 10e3/uL    % Neutrophils 66 %    % Lymphocytes 18 %    % Monocytes 13 %    % Eosinophils 2 %    % Basophils 1 %    % Immature Granulocytes 0 %    NRBCs per 100 WBC 0 <1 /100    Absolute Neutrophils 5.1 1.6 - 8.3 10e3/uL    Absolute Lymphocytes 1.4 0.8 - 5.3 10e3/uL    Absolute Monocytes 1.0 0.0 - 1.3 10e3/uL    Absolute Eosinophils 0.2 0.0 - 0.7 10e3/uL    Absolute Basophils 0.0 0.0 - 0.2 10e3/uL    Absolute Immature Granulocytes 0.0 <=0.0 10e3/uL    Absolute NRBCs 0.0 10e3/uL   ECG 12-LEAD WITH MUSE (LHE)   Result Value Ref Range    Systolic Blood Pressure  mmHg    Diastolic Blood Pressure  mmHg    Ventricular Rate 63 BPM    Atrial Rate 63 BPM    PA Interval 164 ms    QRS Duration 98 ms     ms    QTc 417 ms    P Axis 67 degrees    R AXIS -78 degrees    T Axis 64 degrees    Interpretation ECG        Poor data quality, interpretation may be adversely affected  Sinus rhythm  Left axis deviation  Pulmonary disease pattern  Abnormal ECG  When compared with ECG of 16-JAN-2016 15:57,  No significant change was found  Confirmed by SEE ED PROVIDER NOTE FOR, ECG INTERPRETATION ([id number]),  RUDOLPH CEJA ([id number]) on 8/4/2021 3:26:35 PM         EKG:  Performed at: 1506    Impression: sinus rhythm, left axis deviation, pulmonary disease pattern, abnormal ECG    Rate: 63 bpm  Rhythm: sinus rhythm  Axis: 67     -78     64  PA Interval: 164 ms  QRS Interval: 98 ms  QTc Interval: 417 ms  Comparison: No significant change found from ECG performed on 1/16/2016  I have independently reviewed and interpreted the EKG(s) documented above.    The creation of this record is based on the scribe s observations of the work being performed by Raven Becker MD and the provider s statements to them. It was created on his behalf by Hollie Marin, a trained  medical scribe. This document has been checked and approved by the attending provider.    Raven Becker MD  Emergency Medicine  [facility] EMERGENCY DEPARTMENT  [address] [place] [address] [id number] [phone]  Dept: [phone]     Raven Becker MD  08/04/21 7423

## 2021-08-04 NOTE — ED TRIAGE NOTES
"Patient reports increased shortness of breath and tightness in chest(\"not pain\") that started 1.5 hours ago.  Took 4 puffs rescue inhaler enroute to hospital, cigarette smoker.  "

## 2021-08-05 LAB
HBA1C MFR BLD: 6.1 %
TROPONIN I SERPL-MCNC: 10.73 NG/ML (ref 0–0.29)
UFH PPP CHRO-ACNC: 0.32 IU/ML
UFH PPP CHRO-ACNC: 0.35 IU/ML

## 2021-08-05 PROCEDURE — B2111ZZ FLUOROSCOPY OF MULTIPLE CORONARY ARTERIES USING LOW OSMOLAR CONTRAST: ICD-10-PCS | Performed by: INTERNAL MEDICINE

## 2021-08-05 PROCEDURE — C1760 CLOSURE DEV, VASC: HCPCS | Performed by: INTERNAL MEDICINE

## 2021-08-05 PROCEDURE — 84484 ASSAY OF TROPONIN QUANT: CPT | Performed by: STUDENT IN AN ORGANIZED HEALTH CARE EDUCATION/TRAINING PROGRAM

## 2021-08-05 PROCEDURE — 85520 HEPARIN ASSAY: CPT | Performed by: HOSPITALIST

## 2021-08-05 PROCEDURE — B2151ZZ FLUOROSCOPY OF LEFT HEART USING LOW OSMOLAR CONTRAST: ICD-10-PCS | Performed by: INTERNAL MEDICINE

## 2021-08-05 PROCEDURE — 93460 R&L HRT ART/VENTRICLE ANGIO: CPT | Performed by: INTERNAL MEDICINE

## 2021-08-05 PROCEDURE — 250N000011 HC RX IP 250 OP 636: Performed by: STUDENT IN AN ORGANIZED HEALTH CARE EDUCATION/TRAINING PROGRAM

## 2021-08-05 PROCEDURE — 99152 MOD SED SAME PHYS/QHP 5/>YRS: CPT | Performed by: INTERNAL MEDICINE

## 2021-08-05 PROCEDURE — 83036 HEMOGLOBIN GLYCOSYLATED A1C: CPT | Performed by: HOSPITALIST

## 2021-08-05 PROCEDURE — 96361 HYDRATE IV INFUSION ADD-ON: CPT

## 2021-08-05 PROCEDURE — 36415 COLL VENOUS BLD VENIPUNCTURE: CPT | Performed by: HOSPITALIST

## 2021-08-05 PROCEDURE — 250N000009 HC RX 250: Performed by: STUDENT IN AN ORGANIZED HEALTH CARE EDUCATION/TRAINING PROGRAM

## 2021-08-05 PROCEDURE — 36415 COLL VENOUS BLD VENIPUNCTURE: CPT | Performed by: STUDENT IN AN ORGANIZED HEALTH CARE EDUCATION/TRAINING PROGRAM

## 2021-08-05 PROCEDURE — 250N000011 HC RX IP 250 OP 636: Performed by: HOSPITALIST

## 2021-08-05 PROCEDURE — 96375 TX/PRO/DX INJ NEW DRUG ADDON: CPT

## 2021-08-05 PROCEDURE — 94640 AIRWAY INHALATION TREATMENT: CPT

## 2021-08-05 PROCEDURE — 94640 AIRWAY INHALATION TREATMENT: CPT | Mod: 76

## 2021-08-05 PROCEDURE — 99223 1ST HOSP IP/OBS HIGH 75: CPT | Mod: AI | Performed by: HOSPITALIST

## 2021-08-05 PROCEDURE — 250N000013 HC RX MED GY IP 250 OP 250 PS 637: Performed by: HOSPITALIST

## 2021-08-05 PROCEDURE — 258N000003 HC RX IP 258 OP 636: Performed by: HOSPITALIST

## 2021-08-05 PROCEDURE — 93458 L HRT ARTERY/VENTRICLE ANGIO: CPT | Performed by: INTERNAL MEDICINE

## 2021-08-05 PROCEDURE — 999N000157 HC STATISTIC RCP TIME EA 10 MIN

## 2021-08-05 PROCEDURE — 94664 DEMO&/EVAL PT USE INHALER: CPT

## 2021-08-05 PROCEDURE — 250N000011 HC RX IP 250 OP 636: Performed by: INTERNAL MEDICINE

## 2021-08-05 PROCEDURE — 250N000009 HC RX 250: Performed by: INTERNAL MEDICINE

## 2021-08-05 PROCEDURE — 272N000001 HC OR GENERAL SUPPLY STERILE: Performed by: INTERNAL MEDICINE

## 2021-08-05 PROCEDURE — 96374 THER/PROPH/DIAG INJ IV PUSH: CPT

## 2021-08-05 PROCEDURE — 999N000156 HC STATISTIC RCP CONSULT EA 30 MIN

## 2021-08-05 PROCEDURE — 93005 ELECTROCARDIOGRAM TRACING: CPT | Performed by: STUDENT IN AN ORGANIZED HEALTH CARE EDUCATION/TRAINING PROGRAM

## 2021-08-05 PROCEDURE — 255N000002 HC RX 255 OP 636: Performed by: INTERNAL MEDICINE

## 2021-08-05 PROCEDURE — 250N000009 HC RX 250: Performed by: HOSPITALIST

## 2021-08-05 PROCEDURE — 93458 L HRT ARTERY/VENTRICLE ANGIO: CPT | Mod: 26 | Performed by: INTERNAL MEDICINE

## 2021-08-05 PROCEDURE — 210N000001 HC R&B IMCU HEART CARE

## 2021-08-05 PROCEDURE — 85520 HEPARIN ASSAY: CPT | Performed by: STUDENT IN AN ORGANIZED HEALTH CARE EDUCATION/TRAINING PROGRAM

## 2021-08-05 PROCEDURE — 250N000013 HC RX MED GY IP 250 OP 250 PS 637: Performed by: STUDENT IN AN ORGANIZED HEALTH CARE EDUCATION/TRAINING PROGRAM

## 2021-08-05 PROCEDURE — 99223 1ST HOSP IP/OBS HIGH 75: CPT | Performed by: INTERNAL MEDICINE

## 2021-08-05 RX ORDER — LISINOPRIL 5 MG/1
10 TABLET ORAL DAILY
Status: DISCONTINUED | OUTPATIENT
Start: 2021-08-05 | End: 2021-08-06 | Stop reason: HOSPADM

## 2021-08-05 RX ORDER — GUAIFENESIN 600 MG/1
600 TABLET, EXTENDED RELEASE ORAL 2 TIMES DAILY
Status: DISCONTINUED | OUTPATIENT
Start: 2021-08-05 | End: 2021-08-06 | Stop reason: HOSPADM

## 2021-08-05 RX ORDER — ACETAMINOPHEN 325 MG/1
650 TABLET ORAL EVERY 6 HOURS PRN
Status: DISCONTINUED | OUTPATIENT
Start: 2021-08-05 | End: 2021-08-06 | Stop reason: HOSPADM

## 2021-08-05 RX ORDER — PROCHLORPERAZINE 25 MG
12.5 SUPPOSITORY, RECTAL RECTAL EVERY 12 HOURS PRN
Status: DISCONTINUED | OUTPATIENT
Start: 2021-08-05 | End: 2021-08-06 | Stop reason: HOSPADM

## 2021-08-05 RX ORDER — MORPHINE SULFATE 4 MG/ML
4 INJECTION, SOLUTION INTRAMUSCULAR; INTRAVENOUS
Status: DISCONTINUED | OUTPATIENT
Start: 2021-08-05 | End: 2021-08-05

## 2021-08-05 RX ORDER — MORPHINE SULFATE 15 MG/1
15 TABLET, FILM COATED, EXTENDED RELEASE ORAL EVERY 12 HOURS SCHEDULED
Status: DISCONTINUED | OUTPATIENT
Start: 2021-08-05 | End: 2021-08-06 | Stop reason: HOSPADM

## 2021-08-05 RX ORDER — FENTANYL CITRATE 50 UG/ML
INJECTION, SOLUTION INTRAMUSCULAR; INTRAVENOUS
Status: DISCONTINUED | OUTPATIENT
Start: 2021-08-05 | End: 2021-08-05 | Stop reason: HOSPADM

## 2021-08-05 RX ORDER — IODIXANOL 320 MG/ML
INJECTION, SOLUTION INTRAVASCULAR
Status: DISCONTINUED | OUTPATIENT
Start: 2021-08-05 | End: 2021-08-05 | Stop reason: HOSPADM

## 2021-08-05 RX ORDER — ATORVASTATIN CALCIUM 40 MG/1
40 TABLET, FILM COATED ORAL DAILY
Status: DISCONTINUED | OUTPATIENT
Start: 2021-08-05 | End: 2021-08-06 | Stop reason: HOSPADM

## 2021-08-05 RX ORDER — ASPIRIN 81 MG/1
81 TABLET, CHEWABLE ORAL DAILY
Status: DISCONTINUED | OUTPATIENT
Start: 2021-08-06 | End: 2021-08-06 | Stop reason: HOSPADM

## 2021-08-05 RX ORDER — NALOXONE HYDROCHLORIDE 0.4 MG/ML
0.4 INJECTION, SOLUTION INTRAMUSCULAR; INTRAVENOUS; SUBCUTANEOUS
Status: DISCONTINUED | OUTPATIENT
Start: 2021-08-05 | End: 2021-08-06 | Stop reason: HOSPADM

## 2021-08-05 RX ORDER — ATROPINE SULFATE 0.1 MG/ML
0.5 INJECTION INTRAVENOUS ONCE
Status: COMPLETED | OUTPATIENT
Start: 2021-08-05 | End: 2021-08-05

## 2021-08-05 RX ORDER — AMOXICILLIN 250 MG
1 CAPSULE ORAL 2 TIMES DAILY PRN
Status: DISCONTINUED | OUTPATIENT
Start: 2021-08-05 | End: 2021-08-06 | Stop reason: HOSPADM

## 2021-08-05 RX ORDER — ONDANSETRON 2 MG/ML
4 INJECTION INTRAMUSCULAR; INTRAVENOUS EVERY 6 HOURS PRN
Status: DISCONTINUED | OUTPATIENT
Start: 2021-08-05 | End: 2021-08-06 | Stop reason: HOSPADM

## 2021-08-05 RX ORDER — NALOXONE HYDROCHLORIDE 0.4 MG/ML
0.2 INJECTION, SOLUTION INTRAMUSCULAR; INTRAVENOUS; SUBCUTANEOUS
Status: DISCONTINUED | OUTPATIENT
Start: 2021-08-05 | End: 2021-08-06 | Stop reason: HOSPADM

## 2021-08-05 RX ORDER — IPRATROPIUM BROMIDE AND ALBUTEROL SULFATE 2.5; .5 MG/3ML; MG/3ML
3 SOLUTION RESPIRATORY (INHALATION)
Status: DISCONTINUED | OUTPATIENT
Start: 2021-08-05 | End: 2021-08-06 | Stop reason: HOSPADM

## 2021-08-05 RX ORDER — ONDANSETRON 4 MG/1
4 TABLET, ORALLY DISINTEGRATING ORAL EVERY 6 HOURS PRN
Status: DISCONTINUED | OUTPATIENT
Start: 2021-08-05 | End: 2021-08-06 | Stop reason: HOSPADM

## 2021-08-05 RX ORDER — PROCHLORPERAZINE MALEATE 5 MG
5 TABLET ORAL EVERY 6 HOURS PRN
Status: DISCONTINUED | OUTPATIENT
Start: 2021-08-05 | End: 2021-08-06 | Stop reason: HOSPADM

## 2021-08-05 RX ORDER — ACETAMINOPHEN 650 MG/1
650 SUPPOSITORY RECTAL EVERY 6 HOURS PRN
Status: DISCONTINUED | OUTPATIENT
Start: 2021-08-05 | End: 2021-08-06 | Stop reason: HOSPADM

## 2021-08-05 RX ORDER — NITROGLYCERIN 0.4 MG/1
0.4 TABLET SUBLINGUAL EVERY 5 MIN PRN
Status: DISCONTINUED | OUTPATIENT
Start: 2021-08-05 | End: 2021-08-06 | Stop reason: HOSPADM

## 2021-08-05 RX ORDER — AMOXICILLIN 250 MG
2 CAPSULE ORAL 2 TIMES DAILY PRN
Status: DISCONTINUED | OUTPATIENT
Start: 2021-08-05 | End: 2021-08-06 | Stop reason: HOSPADM

## 2021-08-05 RX ORDER — SODIUM CHLORIDE 9 MG/ML
INJECTION, SOLUTION INTRAVENOUS CONTINUOUS
Status: DISCONTINUED | OUTPATIENT
Start: 2021-08-05 | End: 2021-08-06 | Stop reason: ALTCHOICE

## 2021-08-05 RX ORDER — MORPHINE SULFATE 2 MG/ML
1 INJECTION, SOLUTION INTRAMUSCULAR; INTRAVENOUS EVERY 4 HOURS PRN
Status: DISCONTINUED | OUTPATIENT
Start: 2021-08-05 | End: 2021-08-06 | Stop reason: ALTCHOICE

## 2021-08-05 RX ORDER — OXYBUTYNIN CHLORIDE 10 MG/1
10 TABLET, EXTENDED RELEASE ORAL DAILY
COMMUNITY
End: 2021-08-19

## 2021-08-05 RX ORDER — IPRATROPIUM BROMIDE AND ALBUTEROL SULFATE 2.5; .5 MG/3ML; MG/3ML
3 SOLUTION RESPIRATORY (INHALATION)
Status: DISCONTINUED | OUTPATIENT
Start: 2021-08-05 | End: 2021-08-05

## 2021-08-05 RX ORDER — LIDOCAINE 40 MG/G
CREAM TOPICAL
Status: DISCONTINUED | OUTPATIENT
Start: 2021-08-05 | End: 2021-08-06 | Stop reason: HOSPADM

## 2021-08-05 RX ADMIN — IPRATROPIUM BROMIDE AND ALBUTEROL SULFATE 3 ML: .5; 3 SOLUTION RESPIRATORY (INHALATION) at 17:35

## 2021-08-05 RX ADMIN — IPRATROPIUM BROMIDE AND ALBUTEROL SULFATE 3 ML: .5; 3 SOLUTION RESPIRATORY (INHALATION) at 12:23

## 2021-08-05 RX ADMIN — ATROPINE SULFATE 0.5 MG: 0.1 INJECTION PARENTERAL at 02:05

## 2021-08-05 RX ADMIN — GUAIFENESIN 600 MG: 600 TABLET, EXTENDED RELEASE ORAL at 20:17

## 2021-08-05 RX ADMIN — HYDROCODONE BITARTRATE AND ACETAMINOPHEN 1 TABLET: 7.5; 325 TABLET ORAL at 20:17

## 2021-08-05 RX ADMIN — IPRATROPIUM BROMIDE 0.5 MG: 0.5 SOLUTION RESPIRATORY (INHALATION) at 00:57

## 2021-08-05 RX ADMIN — HYDROCODONE BITARTRATE AND ACETAMINOPHEN 1 TABLET: 7.5; 325 TABLET ORAL at 06:29

## 2021-08-05 RX ADMIN — MORPHINE SULFATE 15 MG: 15 TABLET, EXTENDED RELEASE ORAL at 06:29

## 2021-08-05 RX ADMIN — HYDROCODONE BITARTRATE AND ACETAMINOPHEN 1 TABLET: 7.5; 325 TABLET ORAL at 14:07

## 2021-08-05 RX ADMIN — SODIUM CHLORIDE: 9 INJECTION, SOLUTION INTRAVENOUS at 10:38

## 2021-08-05 RX ADMIN — MORPHINE SULFATE 15 MG: 15 TABLET, EXTENDED RELEASE ORAL at 21:02

## 2021-08-05 RX ADMIN — LISINOPRIL 10 MG: 5 TABLET ORAL at 10:06

## 2021-08-05 RX ADMIN — TAMSULOSIN HYDROCHLORIDE 0.4 MG: 0.4 CAPSULE ORAL at 10:27

## 2021-08-05 RX ADMIN — ALBUTEROL SULFATE 2.5 MG: 2.5 SOLUTION RESPIRATORY (INHALATION) at 00:57

## 2021-08-05 RX ADMIN — ATORVASTATIN CALCIUM 40 MG: 40 TABLET, FILM COATED ORAL at 10:08

## 2021-08-05 RX ADMIN — MORPHINE SULFATE 4 MG: 4 INJECTION, SOLUTION INTRAMUSCULAR; INTRAVENOUS at 10:29

## 2021-08-05 RX ADMIN — NITROGLYCERIN 15 MG: 20 OINTMENT TOPICAL at 03:24

## 2021-08-05 RX ADMIN — IPRATROPIUM BROMIDE AND ALBUTEROL SULFATE 3 ML: .5; 3 SOLUTION RESPIRATORY (INHALATION) at 08:55

## 2021-08-05 NOTE — PROGRESS NOTES
Patient admitted after midnight with NSTEMI. Cardiology paged awaiting for team to see, likely needs PCI awaiting decision. On IV heparin drip.   Patient boarding in ED

## 2021-08-05 NOTE — PHARMACY-ADMISSION MEDICATION HISTORY
Pharmacy Note - Admission Medication History     ______________________________________________________________________    Prior To Admission (PTA) med list completed and updated in EMR.       PTA Med List   Medication Sig Note Last Dose     albuterol (PROVENTIL HFA;VENTOLIN HFA) 90 mcg/actuation inhaler [ALBUTEROL (PROVENTIL HFA;VENTOLIN HFA) 90 MCG/ACTUATION INHALER] Inhale 2 puffs 4 (four) times a day as needed for wheezing.        8/5/2021: Patient reports frequent use 8/4/2021     benzocaine-menthol (CEPACOL) 15-3.6 MG lozenge Place 1 lozenge inside cheek every hour as needed (cough or sore throat)  Past Week     diclofenac (VOLTAREN) 1 % topical gel Apply topically 4 times daily as needed for moderate pain (hand joints)  Past Week     HYDROcodone-acetaminophen (NORCO) 7.5-325 mg per tablet [HYDROCODONE-ACETAMINOPHEN (NORCO) 7.5-325 MG PER TABLET] Take 1 tablet by mouth every 6 (six) hours as needed. 8/5/2021: Typically takes 3x/day with MS Contin 8/5/2021 at in ED     ipratropium (ATROVENT) 0.02 % nebulizer solution Take 0.5 mg by nebulization every 8 hours   8/4/2021     morphine (MS CONTIN) 15 MG 12 hr tablet Take 15 mg by mouth every 6 hours  8/5/2021: Patient taking PRN. Typically takes 3x/day with MS Contin 8/5/2021 at in ED     oxybutynin ER (DITROPAN-XL) 10 MG 24 hr tablet Take 10 mg by mouth daily  8/4/2021 at am     tamsulosin (FLOMAX) 0.4 mg cap [TAMSULOSIN (FLOMAX) 0.4 MG CAP] Take 0.4 mg by mouth daily.  8/4/2021 at am       Information source(s): Patient and CareEverywhere/SureScripts  Method of interview communication: in-person    Summary of Changes to PTA Med List  New: oxybutynin XL  Discontinued: nicotine patch  Changed: none    Patient was asked about OTC/herbal products specifically.  PTA med list reflects this.    In the past week, patient estimated taking medication this percent of the time:  greater than 90%.    Allergies were reviewed, assessed, and updated with the patient.       Patient did not bring any medications to the hospital and can't retrieve from home. No multi-dose medications are available for use during hospital stay.     The information provided in this note is only as accurate as the sources available at the time of the update(s).    Thank you for the opportunity to participate in the care of this patient.    Kelly Victoria [facility]  8/5/2021 7:41 AM

## 2021-08-05 NOTE — PRE-PROCEDURE
GENERAL PRE-PROCEDURE:   Procedure:  Coronary angiogram with possible PCI  Date/Time:  8/5/2021 10:24 AM    Written consent obtained?: Yes    Risks and benefits: Risks, benefits and alternatives were discussed    Consent given by:  Patient  Patient states understanding of procedure being performed: Yes    Patient's understanding of procedure matches consent: Yes    Procedure consent matches procedure scheduled: Yes    Expected level of sedation:  Moderate  Appropriately NPO:  Yes  Mallampati  :  Grade 2- soft palate, base of uvula, tonsillar pillars, and portion of posterior pharyngeal wall visible  Lungs:  Lungs clear with good breath sounds bilaterally  Heart:  Normal heart sounds and rate  History & Physical reviewed:  History and physical reviewed and no updates needed  Statement of review:  I have reviewed the lab findings, diagnostic data, medications, and the plan for sedation

## 2021-08-05 NOTE — ED NOTES
Dr. Ramirez called. He wants a STAT EKG for bradycardia and pt's complaint of shortness of breath. Pt then can have a neb.

## 2021-08-05 NOTE — PROGRESS NOTES
Pt currently on RA, resting comfortably.  Breath sounds are diminished with expiratory wheezes. Pt currently on QID nebulizer. Pt states he takes his nebulizer at home BID and as needed throughout the day.  Will leave treatment QID and reassess if needed.

## 2021-08-05 NOTE — CONSULTS
CARDIOLOGY CONSULT NOTE         Assessment:   1.  Elevated troponin-most likely non-ST segment elevation MI involving posterior circulation since electrically silent.  Will check echocardiogram, agree with aspirin and heparin, arrange for coronary angiography and possible intervention today.  2. NSTEMI (non-ST elevated myocardial infarction) (H)-as above coronary angiography today and possible intervention.     Plan:   1.  Echocardiogram.  2.  Arrange for coronary angiography and possible intervention today.  3.  Aspirin and heparin.  4.  Can follow with me as primary cardiologist.     Current History:   [facility] [place] has been requested by Dr. Adelita Noel to evaluate Martin Tovar  who is a 71 year old year old white male for chest discomfort and positive troponin.  Patient was in his usual state of health helping his family move and been physically active over the last 2 days.  While at a  the day of presentation, he developed a tightness in his chest with associated shortness of breath without any radiation diaphoresis or dizziness.  It did not go away and he had his son accompany him while he drove himself to the emergency department.  While in the emergency department he continued of chest discomfort and troponin is now 10 and cardiology consultation was requested.  He has had no prior chest discomfort or shortness of breath, syncope or dizziness.    Past Medical History:     Past Medical History:   Diagnosis Date     Asthma      Avascular necrosis of bones of both hips (H)      Cancer (H)-non-small cell lung CA     Lung-radiation, chemo     Cholecystitis-status post biliary stents      Chronic renal failure      COPD, mild (H)      Diabetes mellitus (H)     states resolved after weight loss     Disc degeneration, lumbar      DJD (degenerative joint disease) of knee     left     Elevated PSA      Past history is negative for tuberculosis, myocardial infarction,  rheumatic fever,  hypertension, cerebrovascular accident, chronic kidney disease, peptic ulcer disease, chronic obstructive pulmonary disease, or thyroid disorder  and no lipid disorder.     Past Surgical History:     Past Surgical History:   Procedure Laterality Date     ARTHROSCOPY KNEE       CERVICAL FUSION       CT BIOPSY LUNG  2/5/2020     ENDOSCOPIC RETROGRADE CHOLANGIOPANCREATOGRAM N/A 12/22/2015    Procedure: ENDOSCOPIC RETROGRADE CHOLANGIOPANCREATOGRAPHY;  Surgeon: Tang Elliott MD;  Location: [facility];  Service:      HEMORRHOID SURGERY       INGUINAL HERNIA REPAIR       IR LUMBAR EPIDURAL STEROID INJECTION  4/29/2003     IR LUMBAR EPIDURAL STEROID INJECTION  6/27/2007     LAPAROSCOPIC CHOLECYSTECTOMY N/A 12/16/2015    Procedure: CHOLECYSTECTOMY LAPAROSCOPIC;  Surgeon: Eugene Ro MD;  Location: [facility];  Service:      LUMBAR FUSION  2006    & reconstruction     OTHER SURGICAL HISTORY  2013    COLOVESICULAR FISTULA REPAIR     PICC  1/3/2016          RELEASE TRIGGER FINGER  07/2020     TOTAL KNEE ARTHROPLASTY Left      Family History:   Reviewed, and positive for MI in his father at the age of 39.    Social History:   Reviewed, and he  reports that he has been smoking. He has been smoking about 0.50 packs per day, up to a pack a day for about 60 years. He has never used smokeless tobacco. He reports previous alcohol use. He reports that he does not use drugs.  He is retired from a desk job for mechanical design, lives at home independently with his wife, and primary physician is RAFAEL Ruiz.    Meds:       [START ON 8/6/2021] aspirin  81 mg Oral Daily     atorvastatin  40 mg Oral Daily     guaiFENesin  600 mg Oral BID     ipratropium - albuterol 0.5 mg/2.5 mg/3 mL  3 mL Nebulization 4x daily     lisinopril  10 mg Oral Daily     morphine  15 mg Oral Q12H LUCIUS     nicotine  1 patch Transdermal Daily     nicotine   Transdermal Q8H     nitroGLYcerin  1 inch Transdermal Q6H     tamsulosin  0.4  "mg Oral Daily     Allergies:   Ciprofloxacin and Septra [sulfamethoxazole w/trimethoprim]    Review of Systems:   A 12 point comprehensive review of systems was  as follows:   General: Positive for fatigue, negative weight loss or weight gain  Constitutional: negative for anorexia, chills, fevers, malaise, night sweats   Eyes: negative for cataracts, color blindness, contacts/glasses, glaucoma, icterus, irritation, redness and visual disturbance  Ears, nose, mouth, throat, and face: negative for ear drainage, earaches, epistaxis, facial trauma, hearing loss, hoarseness, nasal congestion, snoring, sore mouth, sore throat, tinnitus and voice change  Respiratory: Positive dyspnea on exertion, negative hemoptysis, sputum production, pleurisy, stridor, wheezing, PND, orthopnea  Cardiovascular: Positive for chest pain, chest pressure/discomfort, negative claudication, dyspnea, exertional chest pressure/discomfort, fatigue, irregular heart beat, lower extremity edema, near-syncope,  palpitations,  syncope   Gastrointestinal: negative for abdominal pain, change in bowel haibits, constipation, diarrhea, dyspepsia, dysphagia, jaundice, melena, nausea, odynophagia, reflux symptoms and vomiting  Genitourinary: negative for decreased stream  Hematologic/lymphatic: negative for bleeding  Musculoskeletal: negative for arthralgias, back pain, bone pain, muscle weakness, myalgias, neck pain and stiff joints  Neurological: negative for syncope, presyncope, coordination problems, dizziness, gait problems, headaches, memory problems, paresthesia, seizures, speech problems, tremors, vertigo and weakness    Objective:     Physical Exam:  BP (!) 160/82   Pulse 53   Temp 97.8  F (36.6  C)   Resp 18   Ht 1.676 m (5' 6\")   Wt 83.5 kg (184 lb)   SpO2 98%   BMI 29.70 kg/m       Head:    Normocephalic, without obvious abnormality, atraumatic   Eyes:    PERRL, conjunctiva/corneas clear, EOM's intact,           Nose:   Nares normal, septum " midline, mucosa normal, no drainage  or sinus tenderness   Throat:   Lips, mucosa, and tongue normal; teeth and gums normal   Neck:   Supple, symmetrical, trachea midline, no adenopathy;        thyroid:  No enlargement/tenderness/nodules; no carotid    bruit or JVD   Back:     Symmetric, no curvature, ROM normal, no CVA tenderness   Lungs:     Clear to auscultation bilaterally, respirations unlabored   Chest wall:    No tenderness or deformity   Heart:    Regular rate and rhythm, S1 and S2 normal, no murmur, rub   or gallop   Abdomen:     Soft, non-tender, bowel sounds active all four quadrants,     no masses, no organomegaly   Extremities:   Extremities normal, atraumatic, no cyanosis or edema   Pulses:   2+ and symmetric all extremities   Skin:   Skin color, texture, turgor normal, no rashes or lesions   Neurologic:   CNII-XII intact. Normal strength, sensation and reflexes       throughout     Cardiographics and Imaging  Radiology Results: Chest x-ray shows   Cardiac silhouette is normal in size. Mediastinal borders are well-defined.   Metallic fiducial projects in the left lung just inferior to the left hilum. Surrounding architectural distortion/vertical band of opacity consistent with radiation fibrosis, unchanged. No new nodular or airspace opacities are present. The diaphragm remains flattened consistent with air trapping. No pleural fluid or pneumothorax.    EKG Results: personally reviewed sinus rhythm, nonspecific ST-T wave changes.    Lab Review   Pertinent Labs  Lab Results: personally reviewed       Lab Results   Component Value Date    TROPONINI 10.73 (HH) 08/05/2021       Lab Results   Component Value Date    BUN 15 08/04/2021     08/04/2021    CO2 29 08/04/2021       Lab Results   Component Value Date    WBC 7.6 08/04/2021    HGB 15.3 08/04/2021    HCT 46.6 08/04/2021    MCV 94 08/04/2021     08/04/2021       Lab Results   Component Value Date    BNP 65 08/04/2021

## 2021-08-05 NOTE — ED NOTES
Noted pt's HR goes to 60's-90's while awake being stimulated with speaking or standing to urinate--MD aware

## 2021-08-05 NOTE — H&P
[facility]    History and Physical - Hospitalist Service       Date of Admission:  8/4/2021    Assessment & Plan      Martin Tovar is a 71 year old male admitted on 8/4/2021. He has history of small cell lung cancer status post chemo and radiation in 2016, COPD, tobacco abuse, chronic pain, BPH, anxiety, type 2 diabetes, essential hypertension, dyslipidemia.  He came to the emergency department for evaluation of chest tightness and shortness of breath.    1.  Non-STEMI  Continue IV heparin drip  Aspirin, statins  Nitroglycerin as needed  No beta-blocker secondary to bradycardia  Cardiology consult    2.  Sinus bradycardia  Noted with heart rate into the 30s  Atropine as needed    3.  COPD  Duo nebs  Mucinex    4.  Essential hypertension  No PTA medications  Start lisinopril 10 mg daily    5.  Chronic back pain  Continue MS Contin and as needed Norco    6.  BPH  On tamsulosin    7.  Type 2 diabetes mellitus, without long-term insulin  Currently not on medications  Check hemoglobin A1c         Diet: NPO for Medical/Clinical Reasons Except for: Meds    DVT Prophylaxis: Pneumatic Compression Devices  Escalera Catheter: Not present  Central Lines: None  Code Status:  Full code    Clinically Significant Risk Factors Present on Admission                   Disposition Plan   Expected discharge: 2 days recommended to prior living arrangement once ACS work-up completed.     The patient's care was discussed with the Patient.    Josesito Ramirez MD  [facility]  Securely message with the Vocera Web Console (learn more here)  Text page via [employer] Paging/Directory      ______________________________________________________________________    Chief Complaint   Shortness of breath, chest tightness    History is obtained from the patient, electronic health record and emergency department physician    History of Present Illness   Martin Tovar is a 71 year old male who came to the  emergency department for evaluation of shortness of breath and chest tightness.  He felt a little lightheaded yesterday while working in the garage.  He has noted increased whitish phlegm production and has a chronic cough.  Prior to ED evaluation he experienced increasing shortness of breath and a central chest tightness.  He denies pain radiation, diaphoresis or nausea.  He smokes about a pack of cigarettes a day.  He was recently trying CBD Gummies for his chronic back pain but did not feel any benefit.  His father had a heart attack in his 30s.  He felt some improvement after treated with IV Solu-Medrol and albuterol inhalers in the ED.  However, work-up show elevated D-dimer and a CTA was done.  Serial troponin came back possibly consistent with non-STEMI so heparin drip was started.  While boarding in the ED he was noted bradycardic into the 30s with associated shortness of breath.  He received albuterol nebulizer and is feeling slightly better.    Review of Systems    The 10 point Review of Systems is negative other than noted in the HPI or here.     Past Medical History    I have reviewed this patient's medical history and updated it with pertinent information if needed.   Past Medical History:   Diagnosis Date     Asthma      Avascular necrosis of bones of both hips (H)      Cancer (H)     Lung-radiation, chemo     Cholecystitis      Chronic renal failure      COPD, mild (H)      Diabetes mellitus (H)     states resolved after weight loss     Disc degeneration, lumbar      DJD (degenerative joint disease) of knee     left     Elevated PSA      LBP (low back pain)      Lung cancer (H)      Smoking hx        Past Surgical History   I have reviewed this patient's surgical history and updated it with pertinent information if needed.  Past Surgical History:   Procedure Laterality Date     ARTHROSCOPY KNEE       CERVICAL FUSION       CT BIOPSY LUNG  2/5/2020     ENDOSCOPIC RETROGRADE CHOLANGIOPANCREATOGRAM N/A  12/22/2015    Procedure: ENDOSCOPIC RETROGRADE CHOLANGIOPANCREATOGRAPHY;  Surgeon: Tang Elliott MD;  Location: [facility] OR;  Service:      HEMORRHOID SURGERY       INGUINAL HERNIA REPAIR       IR LUMBAR EPIDURAL STEROID INJECTION  4/29/2003     IR LUMBAR EPIDURAL STEROID INJECTION  6/27/2007     LAPAROSCOPIC CHOLECYSTECTOMY N/A 12/16/2015    Procedure: CHOLECYSTECTOMY LAPAROSCOPIC;  Surgeon: Eugene Ro MD;  Location: [facility] OR;  Service:      LUMBAR FUSION  2006    & reconstruction     OTHER SURGICAL HISTORY  2013    COLOVESICULAR FISTULA REPAIR     PICC  1/3/2016          RELEASE TRIGGER FINGER  07/2020     TOTAL KNEE ARTHROPLASTY Left        Social History   I have reviewed this patient's social history and updated it with pertinent information if needed.  Social History     Tobacco Use     Smoking status: Current Every Day Smoker     Packs/day: 0.50     Smokeless tobacco: Never Used     Tobacco comment: 1/4 ppd as of 2/25/21   Substance Use Topics     Alcohol use: Not Currently     Drug use: No       Family History     Father had a heart attack in his 30s    Prior to Admission Medications   Prior to Admission Medications   Prescriptions Last Dose Informant Patient Reported? Taking?   HYDROcodone-acetaminophen (NORCO) 7.5-325 mg per tablet   No No   Sig: [HYDROCODONE-ACETAMINOPHEN (NORCO) 7.5-325 MG PER TABLET] Take 1 tablet by mouth every 6 (six) hours as needed.   albuterol (PROVENTIL HFA;VENTOLIN HFA) 90 mcg/actuation inhaler   Yes No   Sig: [ALBUTEROL (PROVENTIL HFA;VENTOLIN HFA) 90 MCG/ACTUATION INHALER] Inhale 2 puffs 4 (four) times a day as needed for wheezing.          albuterol (PROVENTIL) 2.5 mg /3 mL (0.083 %) nebulizer solution   Yes No   Sig: [ALBUTEROL (PROVENTIL) 2.5 MG /3 ML (0.083 %) NEBULIZER SOLUTION] Take 3 mL by nebulization every 4 (four) hours as needed.   ipratropium (ATROVENT) 0.02 % nebulizer solution   Yes No   Sig: [IPRATROPIUM (ATROVENT) 0.02 % NEBULIZER  SOLUTION] USE 1 VIAL VIA NEBULIZER FOUR TIMES DAILY   morphine (MS CONTIN) 15 MG 12 hr tablet   Yes No   Sig: [MORPHINE (MS CONTIN) 15 MG 12 HR TABLET] Take 15 mg by mouth every 8 (eight) hours. Just takes as needed   nicotine (NICODERM CQ) 21 mg/24 hr   Yes No   Sig: [NICOTINE (NICODERM CQ) 21 MG/24 HR] 1 PATCH   tamsulosin (FLOMAX) 0.4 mg cap   Yes No   Sig: [TAMSULOSIN (FLOMAX) 0.4 MG CAP] Take 0.4 mg by mouth daily.   zolpidem (AMBIEN CR) 6.25 MG CR tablet   Yes No   Sig: [ZOLPIDEM (AMBIEN CR) 6.25 MG CR TABLET] Take 6.25 mg by mouth at bedtime as needed.      Facility-Administered Medications: None     Allergies   Allergies   Allergen Reactions     Ciprofloxacin Swelling and Rash     Throat swelling     Septra [Sulfamethoxazole W/Trimethoprim] Rash     swelling       Physical Exam   Vital Signs: Temp: 97.8  F (36.6  C) Temp src: Temporal BP: (!) 152/77 Pulse: (!) 43   Resp: 22 SpO2: 93 % O2 Device: None (Room air)    Weight: 184 lbs 0 oz    General: In no acute distress  HEENT: Normocephalic, traumatic, moist mucosa  Neck: Nontender, no JVD, no carotid bruits  Heart: Regular rate and rhythm  Lungs: Bilateral air entry  Abdomen: Soft, nontender  Extremities: Nontender, no edema  Skin: Warm, dry  Neuro: Nonfocal  Psychiatric: Normal affect    Data   Data reviewed today: I reviewed all medications, new labs and imaging results over the last 24 hours. I personally reviewed the EKG tracing showing Sinus rhythm at 63 bpm, low voltage and nonspecific inferior ST waves are new when compared to 2016..    Recent Labs   Lab 08/04/21  1630 08/02/21  1647   WBC 7.6  --    HGB 15.3  --    MCV 94  --      --     143   POTASSIUM 4.3 4.8   CHLORIDE 106 103   CO2 29 29   BUN 15 15   CR 1.14 1.08   ANIONGAP 9 11   LEANNA 9.1 9.6   * 106   ALBUMIN  --  4.0   PROTTOTAL  --  7.2   BILITOTAL  --  0.4   ALKPHOS  --  69   ALT  --  11   AST  --  13     Recent Results (from the past 24 hour(s))   XR Chest Port 1 View     Narrative    EXAM: XR CHEST PORT 1 VIEW  LOCATION: [facility]  DATE/TIME: 8/4/2021 3:48 PM    INDICATION: Shortness of breath  COMPARISON: CT of the chest without contrast 02/23/2021      Impression    IMPRESSION:     Cardiac silhouette is normal in size. Mediastinal borders are well-defined.    Metallic fiducial projects in the left lung just inferior to the left hilum. Surrounding architectural distortion/vertical band of opacity consistent with radiation fibrosis, unchanged. No new nodular or airspace opacities are present. The diaphragm   remains flattened consistent with air trapping. No pleural fluid or pneumothorax.       CT Chest Pulmonary Embolism w Contrast    Narrative    EXAM: CT CHEST PULMONARY EMBOLISM W CONTRAST  LOCATION: [facility]  DATE/TIME: 8/4/2021 5:30 PM    INDICATION: PE suspected, low/intermediate prob, positive D-dimer. History of left lung cancer and radiation treatment.  COMPARISON: CT chest exam 02/23/2021  TECHNIQUE: CT chest pulmonary angiogram during arterial phase injection of IV contrast. Multiplanar reformats and MIP reconstructions were performed. Dose reduction techniques were used.   CONTRAST: isovue 370 100ml    FINDINGS:  ANGIOGRAM CHEST: Pulmonary arteries are normal caliber and negative for pulmonary emboli. Thoracic aorta is negative for dissection. No CT evidence of right heart strain.    LUNGS AND PLEURA: Moderate changes of centrilobular emphysema. Dependent atelectasis both lower lobes. Fibrosis/post radiation treatment changes left perihilar region are unchanged. Fiducial marker left lower lobe adjacent to an 8 mm nodule as seen   previously. No new nodules or masses. No acute infiltrates or pleural effusions.    MEDIASTINUM/AXILLAE: Normal.    CORONARY ARTERY CALCIFICATION: Minimal    UPPER ABDOMEN: Cholecystectomy. Small bilateral renal cysts.    MUSCULOSKELETAL: Degenerative hypertrophic changes thoracolumbar spine.  Atherosclerotic disease abdominal aorta.      Impression    IMPRESSION:  1.  No evidence for pulmonary emboli.  2.  Stable post radiation treatment changes left hilum.  3.  No acute pulmonary disease.

## 2021-08-05 NOTE — TREATMENT PLAN
08/05/21 1738   RCAT Assessment   Reason for Assessment COPD   Pulmonary Status 3   Surgical Status 0   Chest X-ray 1   Respiratory Pattern 0   Mental Status 0   Breath Sounds 3   Cough Effectiveness 0   Level of Activity 1   O2 Required for SpO2>=92% 0   Acuity Level (points) 8   Acuity Level  4   Re-eval Interval Guideline Every 3 days   Re-evaluation Date 08/08/21     RCAT Treatment Plan    Patient Score: 8  Patient Acuity: 4    Clinical Indication for Therapy: home regimen    Therapy Ordered: Duoneb BID per home regimen.    Assessment Summary: COPD, BS coarse, SaO2 92%, strong non-productive cough.  Home regimen twice a day.    Progress note  Duoneb x 1, HR 51-65, RR 16 - 16, SaO2 92% - 97%    Malaika Hurt, RT  8/5/2021

## 2021-08-05 NOTE — PLAN OF CARE
Problem: Chest Pain  Goal: Resolution of Chest Pain Symptoms  Outcome: Improving     Problem: Dysrhythmia (Acute Coronary Syndrome)  Goal: Normalized Cardiac Rhythm  Outcome: Improving     Problem: Adult Inpatient Plan of Care  Goal: Plan of Care Review  Outcome: Improving     Tele- Sinus Bradycardia, HR in the 50's. BP- 130-150's. SpO2 92-94% Room Air. Right femoral angio site appears dry intact, no bleeding/hematoma noted. CMS intact. PRN Norco for c/o back pain 8/10 with decrease of pain.

## 2021-08-06 ENCOUNTER — APPOINTMENT (OUTPATIENT)
Dept: CARDIOLOGY | Facility: HOSPITAL | Age: 71
DRG: 282 | End: 2021-08-06
Attending: INTERNAL MEDICINE
Payer: MEDICARE

## 2021-08-06 ENCOUNTER — APPOINTMENT (OUTPATIENT)
Dept: OCCUPATIONAL THERAPY | Facility: HOSPITAL | Age: 71
DRG: 282 | End: 2021-08-06
Attending: HOSPITALIST
Payer: MEDICARE

## 2021-08-06 VITALS
WEIGHT: 184.3 LBS | RESPIRATION RATE: 16 BRPM | BODY MASS INDEX: 29.62 KG/M2 | DIASTOLIC BLOOD PRESSURE: 75 MMHG | HEIGHT: 66 IN | OXYGEN SATURATION: 94 % | HEART RATE: 65 BPM | SYSTOLIC BLOOD PRESSURE: 128 MMHG | TEMPERATURE: 99 F

## 2021-08-06 LAB — LVEF ECHO: NORMAL

## 2021-08-06 PROCEDURE — 94664 DEMO&/EVAL PT USE INHALER: CPT

## 2021-08-06 PROCEDURE — 97535 SELF CARE MNGMENT TRAINING: CPT | Mod: GO

## 2021-08-06 PROCEDURE — 250N000013 HC RX MED GY IP 250 OP 250 PS 637: Performed by: HOSPITALIST

## 2021-08-06 PROCEDURE — 97110 THERAPEUTIC EXERCISES: CPT | Mod: GO

## 2021-08-06 PROCEDURE — 99239 HOSP IP/OBS DSCHRG MGMT >30: CPT | Performed by: INTERNAL MEDICINE

## 2021-08-06 PROCEDURE — 93306 TTE W/DOPPLER COMPLETE: CPT | Mod: 26 | Performed by: INTERNAL MEDICINE

## 2021-08-06 PROCEDURE — 97165 OT EVAL LOW COMPLEX 30 MIN: CPT | Mod: GO

## 2021-08-06 PROCEDURE — 250N000009 HC RX 250: Performed by: EMERGENCY MEDICINE

## 2021-08-06 PROCEDURE — 99233 SBSQ HOSP IP/OBS HIGH 50: CPT | Performed by: INTERNAL MEDICINE

## 2021-08-06 PROCEDURE — 250N000013 HC RX MED GY IP 250 OP 250 PS 637: Performed by: STUDENT IN AN ORGANIZED HEALTH CARE EDUCATION/TRAINING PROGRAM

## 2021-08-06 PROCEDURE — 93306 TTE W/DOPPLER COMPLETE: CPT

## 2021-08-06 RX ORDER — ASPIRIN 81 MG/1
81 TABLET, CHEWABLE ORAL DAILY
Qty: 30 TABLET | Refills: 0 | Status: SHIPPED | OUTPATIENT
Start: 2021-08-07 | End: 2021-09-06

## 2021-08-06 RX ORDER — ATORVASTATIN CALCIUM 40 MG/1
40 TABLET, FILM COATED ORAL DAILY
Qty: 30 TABLET | Refills: 0 | Status: SHIPPED | OUTPATIENT
Start: 2021-08-06 | End: 2021-08-19

## 2021-08-06 RX ORDER — LISINOPRIL 10 MG/1
10 TABLET ORAL DAILY
Qty: 30 TABLET | Refills: 0 | Status: SHIPPED | OUTPATIENT
Start: 2021-08-07 | End: 2021-08-19

## 2021-08-06 RX ADMIN — IPRATROPIUM BROMIDE AND ALBUTEROL SULFATE 3 ML: .5; 3 SOLUTION RESPIRATORY (INHALATION) at 07:50

## 2021-08-06 RX ADMIN — LISINOPRIL 10 MG: 5 TABLET ORAL at 09:03

## 2021-08-06 RX ADMIN — GUAIFENESIN 600 MG: 600 TABLET, EXTENDED RELEASE ORAL at 10:56

## 2021-08-06 RX ADMIN — MORPHINE SULFATE 15 MG: 15 TABLET, EXTENDED RELEASE ORAL at 09:03

## 2021-08-06 RX ADMIN — ATORVASTATIN CALCIUM 40 MG: 40 TABLET, FILM COATED ORAL at 10:57

## 2021-08-06 RX ADMIN — HYDROCODONE BITARTRATE AND ACETAMINOPHEN 1 TABLET: 7.5; 325 TABLET ORAL at 09:07

## 2021-08-06 RX ADMIN — ASPIRIN 81 MG: 81 TABLET, CHEWABLE ORAL at 09:04

## 2021-08-06 RX ADMIN — TAMSULOSIN HYDROCHLORIDE 0.4 MG: 0.4 CAPSULE ORAL at 10:57

## 2021-08-06 ASSESSMENT — MIFFLIN-ST. JEOR: SCORE: 1543.73

## 2021-08-06 NOTE — DISCHARGE SUMMARY
[facility] MEDICINE  DISCHARGE SUMMARY     Primary Care Physician: April Franco  Admission Date: 8/4/2021   Discharge Provider: Andra Lewis MD Discharge Date: 8/6/2021   Diet:   Active Diet and Nourishment Order   Procedures     Diet       Code Status: Prior   Activity: DCACTIVITY: Activity as tolerated        Condition at Discharge: Stable     REASON FOR PRESENTATION(See Admission Note for Details)   Chest Pain     PRINCIPAL & ACTIVE DISCHARGE DIAGNOSES     Active Problems:    Chronic pain    COPD (chronic obstructive pulmonary disease) (H)    Benign prostatic hyperplasia without lower urinary tract symptoms    Diabetes mellitus without complication (H)    Essential hypertension    Hyperlipidemia    Elevated troponin    NSTEMI (non-ST elevated myocardial infarction) (H)      PENDING LABS     Unresulted Labs Ordered in the Past 30 Days of this Admission     No orders found from 7/5/2021 to 8/5/2021.            PROCEDURES ( this hospitalization only)      Procedure(s):  CV CORONARY ANGIOGRAM  Left Ventriculogram    RECOMMENDATIONS TO OUTPATIENT PROVIDER FOR F/U VISIT     Follow-up Appointments     Follow-up and recommended labs and tests       Follow up with primary care provider, April Franco, within 7 days to   evaluate medication change, to evaluate treatment change, and for hospital   follow- up.      Follow up with primary care provider, April Franco, within Cardiology per   their recommendations. They will contact you to make appointments , for   hospital follow- up.                 DISPOSITION     Home    SUMMARY OF HOSPITAL COURSE:      Martin Tovar is a 71 year old male admitted on 8/4/2021. He has history of small cell lung cancer status post chemo and radiation in 2016, COPD, tobacco abuse, chronic pain, BPH, anxiety, type 2 diabetes, essential hypertension, dyslipidemia.  He came to the emergency department for evaluation of chest tightness and shortness  of breath.     1.  Non-STEMI  - IV heparin drip stopped after angiogram   - Aspirin, statin, lisinopril   - No beta-blocker secondary to bradycardia  - Cardiology consult Follow-up interventional nurse practitioners as well as myself 1 to 2 weeks in 2 to 3 months respectively     2.  Sinus bradycardia  Noted with heart rate into the 30s  - no interventions      3.  COPD not in exacerbation   Duo nebs  Mucinex     4.  Essential hypertension  No PTA medications  Start lisinopril 10 mg daily     5.  Chronic back pain  Continue MS Contin and as needed Norco     6.  BPH  On tamsulosin     7.  Type 2 diabetes mellitus, without long-term insulin  Currently not on medications  - diet manage    Discharge Medications with Med changes:     Discharge Medication List as of 8/6/2021 12:55 PM      START taking these medications    Details   aspirin (ASA) 81 MG chewable tablet Take 1 tablet (81 mg) by mouth daily, Disp-30 tablet, R-0, E-Prescribe      atorvastatin (LIPITOR) 40 MG tablet Take 1 tablet (40 mg) by mouth daily, Disp-30 tablet, R-0, E-Prescribe      lisinopril (ZESTRIL) 10 MG tablet Take 1 tablet (10 mg) by mouth daily, Disp-30 tablet, R-0, E-Prescribe         CONTINUE these medications which have NOT CHANGED    Details   albuterol (PROVENTIL HFA;VENTOLIN HFA) 90 mcg/actuation inhaler [ALBUTEROL (PROVENTIL HFA;VENTOLIN HFA) 90 MCG/ACTUATION INHALER] Inhale 2 puffs 4 (four) times a day as needed for wheezing.       , Historical      benzocaine-menthol (CEPACOL) 15-3.6 MG lozenge Place 1 lozenge inside cheek every hour as needed (cough or sore throat), Historical      diclofenac (VOLTAREN) 1 % topical gel Apply topically 4 times daily as needed for moderate pain (hand joints), Historical      HYDROcodone-acetaminophen (NORCO) 7.5-325 mg per tablet [HYDROCODONE-ACETAMINOPHEN (NORCO) 7.5-325 MG PER TABLET] Take 1 tablet by mouth every 6 (six) hours as needed., Disp-30 tablet, R-0, Print      ipratropium (ATROVENT) 0.02 %  nebulizer solution Take 0.5 mg by nebulization every 8 hours , Historical      morphine (MS CONTIN) 15 MG 12 hr tablet Take 15 mg by mouth every 6 hours , Historical      oxybutynin ER (DITROPAN-XL) 10 MG 24 hr tablet Take 10 mg by mouth daily, Historical      tamsulosin (FLOMAX) 0.4 mg cap [TAMSULOSIN (FLOMAX) 0.4 MG CAP] Take 0.4 mg by mouth daily., Historical         STOP taking these medications       albuterol (PROVENTIL) 2.5 mg /3 mL (0.083 %) nebulizer solution Comments:   Reason for Stopping:         nicotine (NICODERM CQ) 21 mg/24 hr Comments:   Reason for Stopping:                     Rationale for medication changes:      See above        Consults       PHARMACY IP CONSULT  PHARMACY IP CONSULT  CARDIOLOGY IP CONSULT  CARDIAC REHAB IP CONSULT  SMOKING CESSATION PROGRAM IP CONSULT    Immunizations given this encounter     Most Recent Immunizations   Administered Date(s) Administered     COVID-19,PF,Moderna 03/26/2021     Influenza (High Dose) 3 valent vaccine 01/07/2019     Influenza (IIV3) PF 11/13/2013     Influenza Vaccine IM > 6 months Valent IIV4 12/24/2015     Influenza Vaccine, 6+MO IM (QUADRIVALENT W/PRESERVATIVES) 09/11/2017     Pneumo Conj 13-V (2010&after) 02/16/2015     Pneumococcal 23 valent 09/25/2018     TD (ADULT, 7+) 10/17/2017     Tdap (Adacel,Boostrix) 07/17/2009           Anticoagulation Information      Recent INR results: No results for input(s): INR in the last 168 hours.  Warfarin doses (if applicable) or name of other anticoagulant: none      SIGNIFICANT IMAGING FINDINGS     Results for orders placed or performed during the hospital encounter of 08/04/21   XR Chest Port 1 View    Impression    IMPRESSION:     Cardiac silhouette is normal in size. Mediastinal borders are well-defined.    Metallic fiducial projects in the left lung just inferior to the left hilum. Surrounding architectural distortion/vertical band of opacity consistent with radiation fibrosis, unchanged. No new  nodular or airspace opacities are present. The diaphragm   remains flattened consistent with air trapping. No pleural fluid or pneumothorax.       CT Chest Pulmonary Embolism w Contrast    Impression    IMPRESSION:  1.  No evidence for pulmonary emboli.  2.  Stable post radiation treatment changes left hilum.  3.  No acute pulmonary disease.   Echocardiogram Complete   Result Value Ref Range    LVEF  55-60%        SIGNIFICANT LABORATORY FINDINGS     Most Recent 3 CBC's:Recent Labs   Lab Test 08/04/21  1630 05/26/20  1623 02/16/20  0935   WBC 7.6 7.9 7.9   HGB 15.3 14.8 12.7*   MCV 94 99 93    173 176     Most Recent 3 BMP's:Recent Labs   Lab Test 08/04/21  1630 08/02/21  1647 10/05/20  1649    143 143   POTASSIUM 4.3 4.8 4.6   CHLORIDE 106 103 102   CO2 29 29 33*   BUN 15 15 14   CR 1.14 1.08 1.23   ANIONGAP 9 11 8   LEANNA 9.1 9.6 9.2   * 106 129*     Most Recent 3 Troponin's:No lab results found.        Discharge Orders        Reason for your hospital stay    Chest Pain     Follow-up and recommended labs and tests     Follow up with primary care provider, April Franco, within 7 days to evaluate medication change, to evaluate treatment change, and for hospital follow- up.      Follow up with primary care provider, April Franco, within Cardiology per their recommendations. They will contact you to make appointments , for hospital follow- up.     Activity    Your activity upon discharge: activity as tolerated     Diet    Follow this diet upon discharge: Orders Placed This Encounter      Combination Diet Regular Diet Adult       Examination   Physical Exam   Temp:  [99  F (37.2  C)] 99  F (37.2  C)  Pulse:  [65] 65  Resp:  [16] 16  BP: (128)/(75) 128/75  SpO2:  [94 %] 94 %  Wt Readings from Last 1 Encounters:   08/06/21 83.6 kg (184 lb 4.8 oz)       General Appearance: NAD pleasant comfortable   Respiratory: CTA no W/R/R  Cardiovascular: RRRR  GI: ND+BS NT         Please see EMR for more detailed  significant labs, imaging, consultant notes etc.    I, Andra Lewis MD, personally saw the patient today and spent greater than 30 minutes discharging this patient.    Andra Lewis MD  [facility]    CC:April Franco

## 2021-08-06 NOTE — PROGRESS NOTES
CARDIOLOGY PROGRESS NOTE      Assessment/Plan:  1.  Elevated troponin-most likely non-ST segment elevation MI involving posterior circulation since electrically silent and obtuse marginal artery disease.  Stable, no postinfarct angina, sent home on prevention.  2. NSTEMI (non-ST elevated myocardial infarction) (H)-as above.  3.  Coronary artery disease-angiography yesterday showed normal left main, ostial LAD 35% stenosis, mid LAD 35% stenosis with proximal circumflex 25% stenosis, and 10% right coronary artery disease.  Medical therapy.  4.  Chronic pain-stable at this point time.  5.  COPD (chronic obstructive pulmonary disease) (H)-stable and no recurrence.  6.  Benign prostatic hyperplasia without lower urinary tract symptoms--no significant symptoms.  7.  Diabetes mellitus without complication (H)-so noted with hemoglobin A1c of 6.1.  8.  Essential hypertension-under good control with current medications.  9.  Hyperlipidemia- with a cholesterol 178.  I agree with atorvastatin and recheck lipids in about 2 to 3 months.  10.  Non-small cell lung cancer-defer to primary and hematology/oncology.    Plan:  1.  No further inpatient cardiac work-up, will discharge patient home.    Discharge Plannin.  Home on aspirin, atorvastatin, lisinopril.  2.  Follow-up interventional nurse practitioners as well as myself 1 to 2 weeks in 2 to 3 months respectively.     LOS: 2 days     Subjective:  Interval History:    71-year-old white gentleman being seen on third day of hospitalization.  Currently doing well without any chest discomfort, palpitations, shortness of breath, PND, orthopnea or peripheral edema.  Doing well with cardiac rehab today.    Medications    aspirin  81 mg Oral Daily     atorvastatin  40 mg Oral Daily     guaiFENesin  600 mg Oral BID     ipratropium - albuterol 0.5 mg/2.5 mg/3 mL  3 mL Nebulization 2 times daily     lisinopril  10 mg Oral Daily     morphine  15 mg Oral Q12H [facility]     nicotine   "1 patch Transdermal Daily     nicotine   Transdermal Q8H     sodium chloride (PF)  3 mL Intracatheter Q8H     tamsulosin  0.4 mg Oral Daily     Objective:   Vital signs in last 24 hours:  Temp:  [98  F (36.7  C)-99  F (37.2  C)] 99  F (37.2  C)  Pulse:  [50-93] 65  Resp:  [16-18] 16  BP: (125-153)/(60-79) 128/75  SpO2:  [92 %-96 %] 94 %      Physical Exam:  /75 (BP Location: Left arm)   Pulse 65   Temp 99  F (37.2  C) (Oral)   Resp 16   Ht 1.676 m (5' 6\")   Wt 83.6 kg (184 lb 4.8 oz)   SpO2 94%   BMI 29.75 kg/m      General Appearance:    Alert, cooperative, no distress, appears stated age   Head:    Normocephalic, without obvious abnormality, atraumatic   Throat:   Lips, mucosa, and tongue normal; teeth and gums normal   Neck:   Supple, symmetrical, trachea midline, no adenopathy;        thyroid:  No enlargement/tenderness/nodules; no carotid    bruit or JVD   Back:     Symmetric, no curvature, ROM normal, no CVA tenderness   Lungs:     Clear to auscultation bilaterally, respirations unlabored   Chest wall:    No tenderness or deformity   Heart:    Regular rate and rhythm, S1 and S2 normal, no murmur, rub   or gallop   Abdomen:     Soft, non-tender, bowel sounds active all four quadrants,     no masses, no organomegaly   Extremities:   Normal, atraumatic, no cyanosis or edema   Pulses:   2+ and symmetric all extremities   Skin:   Skin color, texture, turgor normal, no rashes or lesions     Cardiographics:      ECG: Personally reviewed by myself shows sinus rhythm, leftward axis, low voltage, no acute changes.    Echocardiogram:   Normal-sized left ventricle, normal ejection fraction 55 to 60%.  No wall motion normalities  Normal right size and function ventricle.  No significant valvular abnormality.      Lab Results:   Recent Labs   Lab 08/04/21  1630   WBC 7.6   HGB 15.3   HCT 46.6        Recent Labs   Lab 08/04/21  1630      CO2 29   BUN 15   .       Lab Results   Component Value Date "    TROPONINI 10.73 () 08/05/2021

## 2021-08-06 NOTE — PROGRESS NOTES
Occupational Therapy      08/06/21 0830   Quick Adds   Type of Visit Initial Occupational Therapy Evaluation   Living Environment   People in home spouse;child(nestor), adult   Current Living Arrangements house   Home Accessibility stairs to enter home   Number of Stairs, Main Entrance 2  (split entry)   Stair Railings, Main Entrance railings on both sides of stairs   Living Environment Comments Patient independent with ADLs, walks independently.   Self-Care   Activity/Exercise/Self-Care Comment Walk in shower   Disability/Function   Walking or Climbing Stairs Difficulty no   Dressing/Bathing Difficulty no   Toileting issues no   Doing Errands Independently Difficulty (such as shopping) no   General Information   Onset of Illness/Injury or Date of Surgery 08/04/21   Referring Physician Josesito Ramirez MD   Patient/Family Therapy Goal Statement (OT) To return home    Existing Precautions/Restrictions no known precautions/restrictions   General Observations and Info Pt presents supine, no complaints.    Cognitive Status Examination   Orientation Status orientation to person, place and time   Pain Assessment   Patient Currently in Pain No   Integumentary/Edema   Integumentary/Edema   (Does not report pain. )   Bed Mobility   Bed Mobility supine-sit   Supine-Sit [place] (Bed Mobility) independent   Transfers   Transfers No deficits identified   Balance   Balance Assessment no deficits were identified   Activities of Daily Living   BADL Assessment lower body dressing  (PLOF pt recieved assist d/t back pain - OT provided IRMA ARaghu )   Clinical Impression   Criteria for Skilled Therapeutic Interventions Met (OT) yes   OT Diagnosis Decreased endurance for ADL    OT Problem List-Impairments impacting ADL problems related to;activity tolerance impaired   Assessment of Occupational Performance 1-3 Performance Deficits   Identified Performance Deficits Decreased endurance for ADL    Planned Therapy Interventions (OT) ADL  retraining;IADL retraining;balance training;ROM;strengthening;risk factor education;progressive activity/exercise;home program guidelines   Clinical Decision Making Complexity (OT) low complexity   Therapy Frequency (OT) 1x eval and treat   Predicted Duration of Therapy 1 day   Risk & Benefits of therapy have been explained evaluation/treatment results reviewed   OT Discharge Planning    OT Discharge Recommendation (DC Rec) Home with assist   Total Evaluation Time (Minutes)   Total Evaluation Time (Minutes) 10

## 2021-08-06 NOTE — PLAN OF CARE
Problem: Chest Pain  Goal: Resolution of Chest Pain Symptoms  Outcome: Improving     Problem: Adjustment to Illness (Acute Coronary Syndrome)  Goal: Optimal Adaptation to Illness  Outcome: Improving     Problem: Dysrhythmia (Acute Coronary Syndrome)  Goal: Normalized Cardiac Rhythm  Outcome: Improving     Problem: Hemodynamic Instability (Acute Coronary Syndrome)  Goal: Effective Cardiac Pump Function  Outcome: Adequate for Discharge    Pt is for discharge. Denies chest pain. Pt is independent in room & is ready for discharge. AVS explained.

## 2021-08-06 NOTE — PLAN OF CARE
Occupational Therapy Discharge Summary    Reason for therapy discharge:    Discharged to home.    Progress towards therapy goal(s). See goals on Care Plan in [facility] electronic health record for goal details.  Goals met    Therapy recommendation(s):    No further therapy is recommended.

## 2021-08-09 ENCOUNTER — PATIENT OUTREACH (OUTPATIENT)
Dept: CARE COORDINATION | Facility: CLINIC | Age: 71
End: 2021-08-09

## 2021-08-09 ENCOUNTER — TELEPHONE (OUTPATIENT)
Dept: CARDIOLOGY | Facility: CLINIC | Age: 71
End: 2021-08-09

## 2021-08-09 DIAGNOSIS — I21.4 NSTEMI (NON-ST ELEVATED MYOCARDIAL INFARCTION) (H): Primary | ICD-10-CM

## 2021-08-09 DIAGNOSIS — Z71.89 OTHER SPECIFIED COUNSELING: ICD-10-CM

## 2021-08-09 NOTE — PROGRESS NOTES
Clinic Care Coordination Contact    Pt was hospitalized due NSTEMI. RN at [facility] has followed up with pt and scheduled follow up with PCP.   Pt is out of scope for insurance. SW CC will not outreach.     BUDDY Ortiz for BUDDY Herbert/JM Care Coordination Supervisor  [facility] - Care Coordination   8/9/2021 4:45 PM  [phone]

## 2021-08-09 NOTE — TELEPHONE ENCOUNTER
----- Message from Maicol Cervantes MD sent at 2021 12:39 PM CDT -----  Patient being discharged today, can you arrange follow-up with structural nurse practitioner in 2 weeks, me 2 to 4 months, but definitely needs cardiac rehab phase 2 as outpatient.LF      Per 2021 Progress note with [facility]:    Discharge Plannin.  Home on aspirin, atorvastatin, lisinopril.  2.  Follow-up interventional nurse practitioners as well as myself 1 to 2 weeks in 2 to 3 months respectively.      LOS: 2 days      Subjective:  Interval History:    71-year-old white gentleman being seen on third day of hospitalization.  Currently doing well without any chest discomfort, palpitations, shortness of breath, PND, orthopnea or peripheral edema.  Doing well with cardiac rehab today.         Msg sent to schedulers to please arrange for appts and then order placed for cardiac rehab. [place] [place]

## 2021-08-12 LAB
ATRIAL RATE - MUSE: 74 BPM
DIASTOLIC BLOOD PRESSURE - MUSE: NORMAL MMHG
INTERPRETATION ECG - MUSE: NORMAL
P AXIS - MUSE: 63 DEGREES
PR INTERVAL - MUSE: 174 MS
QRS DURATION - MUSE: 96 MS
QT - MUSE: 390 MS
QTC - MUSE: 432 MS
R AXIS - MUSE: -84 DEGREES
SYSTOLIC BLOOD PRESSURE - MUSE: NORMAL MMHG
T AXIS - MUSE: 88 DEGREES
VENTRICULAR RATE- MUSE: 74 BPM

## 2021-08-18 PROBLEM — R04.2 HEMOPTYSIS: Status: RESOLVED | Noted: 2020-02-16 | Resolved: 2021-08-18

## 2021-08-18 PROBLEM — I25.10 MILD CORONARY ARTERY DISEASE: Status: ACTIVE | Noted: 2021-08-18

## 2021-08-18 PROBLEM — J18.9 PNEUMONIA: Status: RESOLVED | Noted: 2019-01-04 | Resolved: 2021-08-18

## 2021-08-18 NOTE — PROGRESS NOTES
Assessment/Recommendations   Assessment:    1. Coronary artery disease: Recent hospitalization with chest tightness associated with shortness of breath.  Elevated troponin  with most likely non-STEMI.  Coronary angiogram showed mild nonobstructive coronary artery disease except noted mild wall motion abnormality consistent with mild or resolving acute stress cardiomyopathy.    Patient denies chest pain or chest tightness.  Cardiac rehab has been scheduled on 9/7/2021    Reviewed most recent BMP, Hgb, platelet- stable.    2.  Dyslipidemia with LDL goal <70/Obesity with a BMI of: Martin Tovar is on high intensity statin therapy with statin 40 mg daily. Most recent LDL is 105.  Most recent AST/ALT are stable.  Tolerating well.    3.  Orthostatic hypotension: His blood pressure is 100/60 in sitting position and 88/60 in standing position. Currently on lisinopril 10 mg daily started in hospital.  Patient has been experiencing lightheadedness and gait imbalance since he was started on lisinopril.    4.  Diabetes: Most recent A1C is 6.1 on 8/5/2021.      5.  History of small cell lung cancer: Patient follows up with hematology/oncology    6.  Tobacco abuse: He continues to smoke.  He signed up for smoking cessation program with [facility].  He had side effect to Chantix.  He has tried Wellbutrin in the past and willing to retry it again.  We discussed about the correlation between tobacco abuse, cardiovascular disease, and lung disease including cancer.    Plan/Recommendation:  1.  Continue aspirin 81 mg daily.  Monitor for bleeding    2.Risk factor modification and lifestyle management topics were discussed including managing comorbidities, weight loss, heart healthy diet, exercise, smoking cessation and stress reduction.  Cardiac rehab as scheduled    3. We discussed a diet low in saturated fat, weight loss, and exercise along with medication for better control of cholesterol.  Highly encouraged  to participate in nutrition class in cardiac rehab.  Fasting lipid check in 2 and half weeks with CMP level. Instruction given to fast for 8 to 12 hours prior to lab work.  Patient prefers walk-in to the main lab.  He will have it done during his cardiac rehab appointment.    4.  Decrease lisinopril from 10 mg to 5 mg daily.  Instructed to call if no improvement in his lightheadedness or dizziness of blood pressure continues to stay less than 100/60.    5.We discussed the importance of tightly controlled blood sugars to minimize risk of worsening coronary artery disease. Defer to PCP for diabetes managment.    Follow up with Dr. Cervantes on 10/18/21     History of Present Illness/Subjective    Mr. Martin Tovar is a 71 year old male with a past medical history of COPD, chronic kidney disease, small cell lung cancer, hypertension, hyperlipidemia, and recent diagnosis of mild nonobstructive coronary artery disease who is seen at [facility] for post coronary angiogram follow up.    Patient was recently hospitalized with chest tightness with elevated troponin likely secondary to non-STEMI.  Coronary angiogram showed mild nonobstructive coronary artery disease.  Coronary angiogram also showed mild wall motion abnormality with findings consistent with mild or resolving acute stress cardiomyopathy.  Echocardiogram showed preserved LVEF with no significant valve abnormalities noted.     Today, Bhaskar reports no further chest pain or tightness since recent hospitalization. He further denies fatigue, lightheadedness, shortness of breath, dyspnea on exertion, orthopnea, PND, palpitations, chest pain, abdominal fullness/bloating and lower extremity edema.  He has some chronic aches and pain for which he has been taking morphine and Norco.  He has chronic cramps in his arms and legs.  He states he had his magnesium level checked at primary's office recently and was normal.  Lab results not  "available for review.  He does not report any bleeding complications.  He plans to participate in cardiac rehab.    Coronary Angiogram done on 8/5/21- reviewed:  Conclusion    Left ventricular filling pressures are normal.    Prox Cx to Mid Cx lesion is 25% stenosed.    Ost LAD to Prox LAD lesion is 35% stenosed.    Mid LAD lesion is 35% stenosed.    Mild wall motion changes, mild hypokinesis of mid inferior wall, slight minimal suggestion hypokinesis of mid anterior wall. Findings can be consistent with mild or resolving acute stress cardiomyopathy         Recommendations General Recommendations:  - Patient given specific instructions regarding care of arteriotomy site, activity restrictions, signs and symptoms of cardiac or vascular complications and to seek immediate medical evaluation should they occur.   - Arterial sheath removed from the femoral artery with closure device.   - Femoral angiogram identifies arterial sheath placement is suitable for closure device.     Abnormal Study:   - No significant obtrusive coronary lesions were identified.   - Segmented wall motion abnormality is consistent with Takotsubo stress cardiomyopathy.     ECHO done on 8/5/21-Reviewed:   Interpretation Summary     The left ventricle is normal in size with normal left ventricular wall  thickness.  Left ventricular function is normal.The ejection fraction is 55-60%.  No regional wall motion abnormalities noted.  Normal right ventricle size and systolic function.  With limited visualization, no significant valve disease is identified.   Compared to the prior study of 1/5/19, there has been no significant change.     Physical Examination Review of Systems   /60 (BP Location: Left arm, Patient Position: Sitting, Cuff Size: Adult Large)   Pulse 94   Resp 18   Ht 1.676 m (5' 6\")   Wt 83 kg (183 lb)   BMI 29.54 kg/m    Body mass index is 29.54 kg/m .  Wt Readings from Last 3 Encounters:   08/19/21 83 kg (183 lb)   08/06/21 83.6 " kg (184 lb 4.8 oz)   02/25/21 93 kg (205 lb)     General Appearance:   no distress, normal body habitus   ENT/Mouth: membranes moist, no oral lesions or bleeding gums.      EYES:  no scleral icterus, normal conjunctivae   Neck: no carotid bruits or thyromegaly   Chest/Lungs:   lungs are clear to auscultation, no rales or wheezing, equal chest wall expansion except diminished in bilateral bases   Cardiovascular:    Heart rate regular. Normal first and second heart sounds with no murmurs, rubs, or gallops; the carotid, radial and posterior tibial pulses are intact, Jugular venous pressure flat, no edema bilaterally    Abdomen:  no organomegaly, masses, bruits, or tenderness; bowel sounds are present   Extremities  Puncture Site: no cyanosis or clubbing  Right femoral site is intact.  Radial pulses and Pedal pulses intact and symmetrical.  CMS intact.   Skin: no xanthelasma, warm.    Neurologic: normal  bilateral, no tremors     Psychiatric: alert and oriented x3, calm                 Negative unless noted in HPI                                   Medical History  Surgical History Family History Social History   Past Medical History:   Diagnosis Date     Asthma      Avascular necrosis of bones of both hips (H)      Cancer (H)     Lung-radiation, chemo     Cholecystitis      Chronic renal failure      COPD, mild (H)      Diabetes mellitus (H)     states resolved after weight loss     Disc degeneration, lumbar      DJD (degenerative joint disease) of knee     left     Elevated PSA      Hemoptysis 2/16/2020     LBP (low back pain)      Lung cancer (H)      Pneumonia 1/4/2019     Smoking hx     Past Surgical History:   Procedure Laterality Date     ARTHROSCOPY KNEE       CERVICAL FUSION       CT BIOPSY LUNG  2/5/2020     CV CORONARY ANGIOGRAM N/A 8/5/2021    Procedure: CV CORONARY ANGIOGRAM;  Surgeon: Fabio Espinosa MD;  Location: [facility] CATH LAB CV     CV LEFT VENTRICULOGRAM N/A 8/5/2021    Procedure: Left  Ventriculogram;  Surgeon: Fabio Espinosa MD;  Location: [facility] CATH LAB CV     ENDOSCOPIC RETROGRADE CHOLANGIOPANCREATOGRAM N/A 12/22/2015    Procedure: ENDOSCOPIC RETROGRADE CHOLANGIOPANCREATOGRAPHY;  Surgeon: Tang Elliott MD;  Location: [facility];  Service:      HEMORRHOID SURGERY       INGUINAL HERNIA REPAIR       IR LUMBAR EPIDURAL STEROID INJECTION  4/29/2003     IR LUMBAR EPIDURAL STEROID INJECTION  6/27/2007     LAPAROSCOPIC CHOLECYSTECTOMY N/A 12/16/2015    Procedure: CHOLECYSTECTOMY LAPAROSCOPIC;  Surgeon: Eugene Ro MD;  Location: [facility];  Service:      LUMBAR FUSION  2006    & reconstruction     OTHER SURGICAL HISTORY  2013    COLOVESICULAR FISTULA REPAIR     PICC  1/3/2016          RELEASE TRIGGER FINGER  07/2020     TOTAL KNEE ARTHROPLASTY Left     No family history on file. Social History     Socioeconomic History     Marital status:      Spouse name: Not on file     Number of children: Not on file     Years of education: Not on file     Highest education level: Not on file   Occupational History     Not on file   Tobacco Use     Smoking status: Current Every Day Smoker     Packs/day: 0.50     Smokeless tobacco: Never Used     Tobacco comment: 1/4 ppd as of 2/25/21   Substance and Sexual Activity     Alcohol use: Not Currently     Drug use: No     Sexual activity: Not on file   Other Topics Concern     Not on file   Social History Narrative     Not on file     Social Determinants of Health     Financial Resource Strain:      Difficulty of Paying Living Expenses:    Food Insecurity:      Worried About Running Out of Food in the Last Year:      Ran Out of Food in the Last Year:    Transportation Needs:      Lack of Transportation (Medical):      Lack of Transportation (Non-Medical):    Physical Activity:      Days of Exercise per Week:      Minutes of Exercise per Session:    Stress:      Feeling of Stress :    Social Connections:      Frequency of  Communication with Friends and Family:      Frequency of Social Gatherings with Friends and Family:      Attends Muslim Services:      Active Member of Clubs or Organizations:      Attends Club or Organization Meetings:      Marital Status:    Intimate Partner Violence:      Fear of Current or Ex-Partner:      Emotionally Abused:      Physically Abused:      Sexually Abused:           Medications  Allergies   Current Outpatient Medications   Medication Sig Dispense Refill     albuterol (PROVENTIL HFA;VENTOLIN HFA) 90 mcg/actuation inhaler [ALBUTEROL (PROVENTIL HFA;VENTOLIN HFA) 90 MCG/ACTUATION INHALER] Inhale 2 puffs 4 (four) times a day as needed for wheezing.              aspirin (ASA) 81 MG chewable tablet Take 1 tablet (81 mg) by mouth daily 30 tablet 0     atorvastatin (LIPITOR) 40 MG tablet Take 1 tablet (40 mg) by mouth daily 90 tablet 3     diclofenac (VOLTAREN) 1 % topical gel Apply topically 4 times daily as needed for moderate pain (hand joints)       HYDROcodone-acetaminophen (NORCO) 7.5-325 mg per tablet [HYDROCODONE-ACETAMINOPHEN (NORCO) 7.5-325 MG PER TABLET] Take 1 tablet by mouth every 6 (six) hours as needed. 30 tablet 0     ipratropium (ATROVENT) 0.02 % nebulizer solution Take 0.5 mg by nebulization every 8 hours        lisinopril (ZESTRIL) 10 MG tablet Take 0.5 tablets (5 mg) by mouth daily 30 tablet 0     morphine (MS CONTIN) 15 MG 12 hr tablet Take 15 mg by mouth every 6 hours        tamsulosin (FLOMAX) 0.4 mg cap [TAMSULOSIN (FLOMAX) 0.4 MG CAP] Take 0.4 mg by mouth daily.      Allergies   Allergen Reactions     Ciprofloxacin Swelling and Rash     Throat swelling     Septra [Sulfamethoxazole W/Trimethoprim] Rash     swelling         Lab Results    Chemistry/lipid CBC Cardiac Enzymes/BNP/TSH/INR   Lab Results   Component Value Date    CHOL 178 08/02/2021    HDL 47 08/02/2021    TRIG 129 08/02/2021    BUN 15 08/04/2021     08/04/2021    CO2 29 08/04/2021    Lab Results   Component  Value Date    WBC 7.6 08/04/2021    HGB 15.3 08/04/2021    HCT 46.6 08/04/2021    MCV 94 08/04/2021     08/04/2021    Lab Results   Component Value Date    TROPONINI 10.73 (HH) 08/05/2021    BNP 65 08/04/2021    TSH 0.76 12/09/2020    INR 1.04 02/05/2020      40  minutes spent on the date of encounter doing chart review, review of outside records, review of test results, interpretation with above tests, patient visit and documentation.        This note has been dictated using voice recognition software. Any grammatical, typographical, or context distortions are unintentional and inherent to the software

## 2021-08-19 ENCOUNTER — OFFICE VISIT (OUTPATIENT)
Dept: CARDIOLOGY | Facility: CLINIC | Age: 71
End: 2021-08-19
Payer: MEDICARE

## 2021-08-19 VITALS
RESPIRATION RATE: 18 BRPM | SYSTOLIC BLOOD PRESSURE: 100 MMHG | HEIGHT: 66 IN | WEIGHT: 183 LBS | HEART RATE: 94 BPM | DIASTOLIC BLOOD PRESSURE: 60 MMHG | BODY MASS INDEX: 29.41 KG/M2

## 2021-08-19 DIAGNOSIS — R79.89 ELEVATED TROPONIN: ICD-10-CM

## 2021-08-19 DIAGNOSIS — I95.1 ORTHOSTATIC HYPOTENSION: ICD-10-CM

## 2021-08-19 DIAGNOSIS — E78.00 PURE HYPERCHOLESTEROLEMIA: ICD-10-CM

## 2021-08-19 DIAGNOSIS — I25.10 MILD CORONARY ARTERY DISEASE: Primary | ICD-10-CM

## 2021-08-19 DIAGNOSIS — C34.90 SMALL CELL LUNG CANCER (H): Primary | ICD-10-CM

## 2021-08-19 DIAGNOSIS — I21.4 NSTEMI (NON-ST ELEVATED MYOCARDIAL INFARCTION) (H): ICD-10-CM

## 2021-08-19 DIAGNOSIS — E11.9 DIABETES MELLITUS WITHOUT COMPLICATION (H): ICD-10-CM

## 2021-08-19 PROCEDURE — 99215 OFFICE O/P EST HI 40 MIN: CPT | Performed by: NURSE PRACTITIONER

## 2021-08-19 RX ORDER — LISINOPRIL 10 MG/1
5 TABLET ORAL DAILY
Qty: 30 TABLET | Refills: 0
Start: 2021-08-19 | End: 2021-08-20

## 2021-08-19 RX ORDER — ATORVASTATIN CALCIUM 40 MG/1
40 TABLET, FILM COATED ORAL DAILY
Qty: 90 TABLET | Refills: 3 | Status: SHIPPED | OUTPATIENT
Start: 2021-08-19 | End: 2022-08-22

## 2021-08-19 ASSESSMENT — MIFFLIN-ST. JEOR: SCORE: 1537.83

## 2021-08-19 NOTE — LETTER
8/19/2021    RAFAEL Funk  [facility] [address]  [facility] [address]    RE: Martin Tovar       Dear Colleague,    I had the pleasure of seeing Martin Tovar in the [facility].            Assessment/Recommendations   Assessment:    1. Coronary artery disease: Recent hospitalization with chest tightness associated with shortness of breath.  Elevated troponin  with most likely non-STEMI.  Coronary angiogram showed mild nonobstructive coronary artery disease except noted mild wall motion abnormality consistent with mild or resolving acute stress cardiomyopathy.    Patient denies chest pain or chest tightness.  Cardiac rehab has been scheduled on 9/7/2021    Reviewed most recent BMP, Hgb, platelet- stable.    2.  Dyslipidemia with LDL goal <70/Obesity with a BMI of: Martin MELLO alie is on high intensity statin therapy with statin 40 mg daily. Most recent LDL is 105.  Most recent AST/ALT are stable.  Tolerating well.    3.  Orthostatic hypotension: His blood pressure is 100/60 in sitting position and 88/60 in standing position. Currently on lisinopril 10 mg daily started in hospital.  Patient has been experiencing lightheadedness and gait imbalance since he was started on lisinopril.    4.  Diabetes: Most recent A1C is 6.1 on 8/5/2021.      5.  History of small cell lung cancer: Patient follows up with hematology/oncology    6.  Tobacco abuse: He continues to smoke.  He signed up for smoking cessation program with [facility].  He had side effect to Chantix.  He has tried Wellbutrin in the past and willing to retry it again.  We discussed about the correlation between tobacco abuse, cardiovascular disease, and lung disease including cancer.    Plan/Recommendation:  1.  Continue aspirin 81 mg daily.  Monitor for bleeding    2.Risk factor modification and lifestyle management topics were discussed including managing  comorbidities, weight loss, heart healthy diet, exercise, smoking cessation and stress reduction.  Cardiac rehab as scheduled    3. We discussed a diet low in saturated fat, weight loss, and exercise along with medication for better control of cholesterol.  Highly encouraged to participate in nutrition class in cardiac rehab.  Fasting lipid check in 2 and half weeks with CMP level. Instruction given to fast for 8 to 12 hours prior to lab work.  Patient prefers walk-in to the main lab.  He will have it done during his cardiac rehab appointment.    4.  Decrease lisinopril from 10 mg to 5 mg daily.  Instructed to call if no improvement in his lightheadedness or dizziness of blood pressure continues to stay less than 100/60.    5.We discussed the importance of tightly controlled blood sugars to minimize risk of worsening coronary artery disease. Defer to PCP for diabetes managment.    Follow up with Dr. Cervantes on 10/18/21     History of Present Illness/Subjective    Mr. Martin Tovar is a 71 year old male with a past medical history of COPD, chronic kidney disease, small cell lung cancer, hypertension, hyperlipidemia, and recent diagnosis of mild nonobstructive coronary artery disease who is seen at [facility] for post coronary angiogram follow up.    Patient was recently hospitalized with chest tightness with elevated troponin likely secondary to non-STEMI.  Coronary angiogram showed mild nonobstructive coronary artery disease.  Coronary angiogram also showed mild wall motion abnormality with findings consistent with mild or resolving acute stress cardiomyopathy.  Echocardiogram showed preserved LVEF with no significant valve abnormalities noted.     Today, Bhaskar reports no further chest pain or tightness since recent hospitalization. He further denies fatigue, lightheadedness, shortness of breath, dyspnea on exertion, orthopnea, PND, palpitations, chest pain, abdominal  fullness/bloating and lower extremity edema.  He has some chronic aches and pain for which he has been taking morphine and Norco.  He has chronic cramps in his arms and legs.  He states he had his magnesium level checked at primary's office recently and was normal.  Lab results not available for review.  He does not report any bleeding complications.  He plans to participate in cardiac rehab.    Coronary Angiogram done on 8/5/21- reviewed:  Conclusion    Left ventricular filling pressures are normal.    Prox Cx to Mid Cx lesion is 25% stenosed.    Ost LAD to Prox LAD lesion is 35% stenosed.    Mid LAD lesion is 35% stenosed.    Mild wall motion changes, mild hypokinesis of mid inferior wall, slight minimal suggestion hypokinesis of mid anterior wall. Findings can be consistent with mild or resolving acute stress cardiomyopathy         Recommendations General Recommendations:  - Patient given specific instructions regarding care of arteriotomy site, activity restrictions, signs and symptoms of cardiac or vascular complications and to seek immediate medical evaluation should they occur.   - Arterial sheath removed from the femoral artery with closure device.   - Femoral angiogram identifies arterial sheath placement is suitable for closure device.     Abnormal Study:   - No significant obtrusive coronary lesions were identified.   - Segmented wall motion abnormality is consistent with Takotsubo stress cardiomyopathy.     ECHO done on 8/5/21-Reviewed:   Interpretation Summary     The left ventricle is normal in size with normal left ventricular wall  thickness.  Left ventricular function is normal.The ejection fraction is 55-60%.  No regional wall motion abnormalities noted.  Normal right ventricle size and systolic function.  With limited visualization, no significant valve disease is identified.   Compared to the prior study of 1/5/19, there has been no significant change.     Physical Examination Review of Systems  "  /60 (BP Location: Left arm, Patient Position: Sitting, Cuff Size: Adult Large)   Pulse 94   Resp 18   Ht 1.676 m (5' 6\")   Wt 83 kg (183 lb)   BMI 29.54 kg/m    Body mass index is 29.54 kg/m .  Wt Readings from Last 3 Encounters:   08/19/21 83 kg (183 lb)   08/06/21 83.6 kg (184 lb 4.8 oz)   02/25/21 93 kg (205 lb)     General Appearance:   no distress, normal body habitus   ENT/Mouth: membranes moist, no oral lesions or bleeding gums.      EYES:  no scleral icterus, normal conjunctivae   Neck: no carotid bruits or thyromegaly   Chest/Lungs:   lungs are clear to auscultation, no rales or wheezing, equal chest wall expansion except diminished in bilateral bases   Cardiovascular:    Heart rate regular. Normal first and second heart sounds with no murmurs, rubs, or gallops; the carotid, radial and posterior tibial pulses are intact, Jugular venous pressure flat, no edema bilaterally    Abdomen:  no organomegaly, masses, bruits, or tenderness; bowel sounds are present   Extremities  Puncture Site: no cyanosis or clubbing  Right femoral site is intact.  Radial pulses and Pedal pulses intact and symmetrical.  CMS intact.   Skin: no xanthelasma, warm.    Neurologic: normal  bilateral, no tremors     Psychiatric: alert and oriented x3, calm                 Negative unless noted in HPI                                   Medical History  Surgical History Family History Social History   Past Medical History:   Diagnosis Date     Asthma      Avascular necrosis of bones of both hips (H)      Cancer (H)     Lung-radiation, chemo     Cholecystitis      Chronic renal failure      COPD, mild (H)      Diabetes mellitus (H)     states resolved after weight loss     Disc degeneration, lumbar      DJD (degenerative joint disease) of knee     left     Elevated PSA      Hemoptysis 2/16/2020     LBP (low back pain)      Lung cancer (H)      Pneumonia 1/4/2019     Smoking hx     Past Surgical History:   Procedure Laterality " Date     ARTHROSCOPY KNEE       CERVICAL FUSION       CT BIOPSY LUNG  2/5/2020     CV CORONARY ANGIOGRAM N/A 8/5/2021    Procedure: CV CORONARY ANGIOGRAM;  Surgeon: aFbio Espinosa MD;  Location: [facility] CV     CV LEFT VENTRICULOGRAM N/A 8/5/2021    Procedure: Left Ventriculogram;  Surgeon: Fabio Espinosa MD;  Location: [facility] CV     ENDOSCOPIC RETROGRADE CHOLANGIOPANCREATOGRAM N/A 12/22/2015    Procedure: ENDOSCOPIC RETROGRADE CHOLANGIOPANCREATOGRAPHY;  Surgeon: Tang Elliott MD;  Location: [facility] Main OR;  Service:      HEMORRHOID SURGERY       INGUINAL HERNIA REPAIR       IR LUMBAR EPIDURAL STEROID INJECTION  4/29/2003     IR LUMBAR EPIDURAL STEROID INJECTION  6/27/2007     LAPAROSCOPIC CHOLECYSTECTOMY N/A 12/16/2015    Procedure: CHOLECYSTECTOMY LAPAROSCOPIC;  Surgeon: Eugene Ro MD;  Location: [facility];  Service:      LUMBAR FUSION  2006    & reconstruction     OTHER SURGICAL HISTORY  2013    COLOVESICULAR FISTULA REPAIR     PICC  1/3/2016          RELEASE TRIGGER FINGER  07/2020     TOTAL KNEE ARTHROPLASTY Left     No family history on file. Social History     Socioeconomic History     Marital status:      Spouse name: Not on file     Number of children: Not on file     Years of education: Not on file     Highest education level: Not on file   Occupational History     Not on file   Tobacco Use     Smoking status: Current Every Day Smoker     Packs/day: 0.50     Smokeless tobacco: Never Used     Tobacco comment: 1/4 ppd as of 2/25/21   Substance and Sexual Activity     Alcohol use: Not Currently     Drug use: No     Sexual activity: Not on file   Other Topics Concern     Not on file   Social History Narrative     Not on file     Social Determinants of Health     Financial Resource Strain:      Difficulty of Paying Living Expenses:    Food Insecurity:      Worried About Running Out of Food in the Last Year:      Ran Out of Food in the Last Year:     Transportation Needs:      Lack of Transportation (Medical):      Lack of Transportation (Non-Medical):    Physical Activity:      Days of Exercise per Week:      Minutes of Exercise per Session:    Stress:      Feeling of Stress :    Social Connections:      Frequency of Communication with Friends and Family:      Frequency of Social Gatherings with Friends and Family:      Attends Hinduism Services:      Active Member of Clubs or Organizations:      Attends Club or Organization Meetings:      Marital Status:    Intimate Partner Violence:      Fear of Current or Ex-Partner:      Emotionally Abused:      Physically Abused:      Sexually Abused:           Medications  Allergies   Current Outpatient Medications   Medication Sig Dispense Refill     albuterol (PROVENTIL HFA;VENTOLIN HFA) 90 mcg/actuation inhaler [ALBUTEROL (PROVENTIL HFA;VENTOLIN HFA) 90 MCG/ACTUATION INHALER] Inhale 2 puffs 4 (four) times a day as needed for wheezing.              aspirin (ASA) 81 MG chewable tablet Take 1 tablet (81 mg) by mouth daily 30 tablet 0     atorvastatin (LIPITOR) 40 MG tablet Take 1 tablet (40 mg) by mouth daily 90 tablet 3     diclofenac (VOLTAREN) 1 % topical gel Apply topically 4 times daily as needed for moderate pain (hand joints)       HYDROcodone-acetaminophen (NORCO) 7.5-325 mg per tablet [HYDROCODONE-ACETAMINOPHEN (NORCO) 7.5-325 MG PER TABLET] Take 1 tablet by mouth every 6 (six) hours as needed. 30 tablet 0     ipratropium (ATROVENT) 0.02 % nebulizer solution Take 0.5 mg by nebulization every 8 hours        lisinopril (ZESTRIL) 10 MG tablet Take 0.5 tablets (5 mg) by mouth daily 30 tablet 0     morphine (MS CONTIN) 15 MG 12 hr tablet Take 15 mg by mouth every 6 hours        tamsulosin (FLOMAX) 0.4 mg cap [TAMSULOSIN (FLOMAX) 0.4 MG CAP] Take 0.4 mg by mouth daily.      Allergies   Allergen Reactions     Ciprofloxacin Swelling and Rash     Throat swelling     Septra [Sulfamethoxazole W/Trimethoprim] Rash      swelling         Lab Results    Chemistry/lipid CBC Cardiac Enzymes/BNP/TSH/INR   Lab Results   Component Value Date    CHOL 178 08/02/2021    HDL 47 08/02/2021    TRIG 129 08/02/2021    BUN 15 08/04/2021     08/04/2021    CO2 29 08/04/2021    Lab Results   Component Value Date    WBC 7.6 08/04/2021    HGB 15.3 08/04/2021    HCT 46.6 08/04/2021    MCV 94 08/04/2021     08/04/2021    Lab Results   Component Value Date    TROPONINI 10.73 (HH) 08/05/2021    BNP 65 08/04/2021    TSH 0.76 12/09/2020    INR 1.04 02/05/2020      40  minutes spent on the date of encounter doing chart review, review of outside records, review of test results, interpretation with above tests, patient visit and documentation.        This note has been dictated using voice recognition software. Any grammatical, typographical, or context distortions are unintentional and inherent to the software          Thank you for allowing me to participate in the care of your patient.      Sincerely,     DAVID Loera [facility]  cc:   No referring provider defined for this encounter.

## 2021-08-19 NOTE — PATIENT INSTRUCTIONS
Martin Tovar,    It was a pleasure to see you today at the [facility].     My recommendations after this visit include:    - Decrease Lisinopril from 10 mg to 5 mg daily.    - Please call if you don't feel better with your lightheaded or dizziness with BP staying <100/60.    - Please seek medical attention if you develop recurrent chest tightness/pressure/pain or shortness of breath or similar symptoms you experienced prior to recent cardiac event    - Please get your lipid level test and (CMP- kidney and liver function test) in around September 6th or 2 and half weeks.  Make sure to fast for 8-12 hours prior to lab work. Okay to have plain water, black coffee or tea without creamer and sugar    - Cardiac rehab as scheduled on 9/7    - Follow up with Dr. Cervantes as scheduled on 10/18    - Please call [facility] [place] on [phone], if you have any questions or concerns    Kristan Recinos CNP    Medication     o Take all your medications as prescribed  o Do not stop any medications without talking with a healthcare provider    Exercise      o Physical activity is important for overall health  o Set a goal of 150 minutes of exercise each week  o For example, 30 minutes of exercise 5 days each week.    o These 30 minutes can be broken into shorter periods of 15 minutes twice daily or 10 minutes three times daily  o Start any exercise program slowly and work towards the goal of 150 minutes each week  o For example, you may start with 10 minutes and plan to add a few minutes each week as you get stronger   o Examples of exercise include walking, swimming, or biking  o Remember to stretch and stay hydrated with exercise    Diet     o A heart healthy diet includes:  o A variety of fruits and vegetables  o Whole grains  o Low-fat dairy (fat-free, 1% fat, and low-fat)  o Lean meats and poultry without skin   o Fish (eat fish 2 times each week)  o Nuts  o Limit saturated fat to about 13 grams each day  (based on a 2000 calorie diet)  o Limit red meat  o Limit sugars (sweets and sugary beverages)  o Limit your portion sizes  o Do not add salt to your food when cooking or at the table  o Limit alcohol intake (no more than 1 drink each day for women or 2 drinks each day for men)    Weight Loss     o Work on losing weight with diet and exercise  o You BMI (body mass index) should be between 18.5-24.9  o This is a calculation of your weight and height  o Please ask your healthcare provider for your BMI    Manage Other Chronic Health Conditions     o Control cholesterol  o Eat a diet low in saturated fat  o Exercise   o Take a statin medication as prescribed  o Manage blood pressure  o Eat a diet low in sodium  o Exercise  o Reduce stress  o Lose weight   o Take blood pressure medications as prescribed  o Control blood sugars if diabetic  o Monitor sugars and carbohydrates in your diet  o Lose weight   o Take diabetes medications as prescribed  o Follow-up with your primary care provider to make sure your blood sugars are well controlled    Stress Reduction     o Find time each day to relax  o Reading, listening to music, yoga, meditation, exercise, spending time with friends and family, volunteering   o Get 6-8 hours of sleep each night    Smoking Cessation     o Smoking causes numerous health problems including coronary artery disease  o It is never too late to quit  o Set realistic goals for quitting  o Decrease the number of cigarettes used each week  o Use nicotine gum or patches to help you quit    Information from the American Heart Association.  Please visit their website at www.heart.org    Patient Education     Eating Heart-Healthy Foods  Eating has a big impact on your heart health. In fact, eating healthier can improve several of your heart risks at once. For instance, it helps you manage weight, cholesterol, and blood pressure. Here are ideas to help you make heart-healthy changes without giving up all the  foods and flavors you love.   Getting started    Talk with your healthcare provider about eating plans, such as the DASH or Mediterranean diet. You may also be referred to a dietitian.    Change a few things at a time. Give yourself time to get used to a few eating changes before adding more.    Work to create a tasty, healthy eating plan that you can stick to for the rest of your life.    Goals for healthy eating  Below are some tips to improve your eating habits:     Limit saturated fats and trans fats. Saturated fats raise your levels of cholesterol, so keep these fats to a minimum. They are found in foods such as fatty meats, whole milk, cheese, and palm and coconut oils. Avoid trans fats because they lower good cholesterol as well as raise bad cholesterol. Trans fats are most often found in processed foods, such as pastries, cookies, pies, muffins, fried foods, stick margarines, and shortening.    Reduce how much sodium (salt) you have. Eating too much salt may increase your blood pressure. Limit your sodium intake to 2,300 milligrams (mg) per day (the amount in 1 teaspoon of salt), or less if your healthcare provider recommends it. Dining out less often and eating fewer processed foods are two great ways to decrease the amount of salt you consume. At home, flavor your foods with other spices and herbs instead of salt.    Managing calories. A calorie is a unit of energy. Your body burns calories for fuel, but if you eat more calories than your body burns, the extras are stored as fat. Your healthcare provider can help you create a diet plan to manage your calories. This will likely include eating healthier foods and getting regular exercise. To help you track your progress, keep a diary to record what you eat and how often you exercise.  Choose the right foods  Aim to make these foods staples of your diet. If you have diabetes, you may have different recommendations than what is listed here:     Fruits and  vegetables provide plenty of nutrients without a lot of calories. At meals, fill half your plate with these foods. Choose between fresh, frozen, canned, or dried without added sauces, salt, or sugars. Split the other half of your plate between whole grains and lean protein.    Whole grains are high in fiber and rich in vitamins and nutrients. Good choices include whole wheat bread, pasta, oats, and brown rice. Make at least half of your grains whole grains.    Lean proteins give you nutrition with less fat. Good choices include fish, skinless chicken and turkey, and beans. Draining the fat from cooked ground meat is another way to reduce the amount of fat you eat.    Low-fat and nonfat dairy provide nutrients without a lot of fat. Try low-fat or nonfat milk, cheese, or yogurt.    Healthy fats can be good for you in small amounts. These are unsaturated fats, such as olive oil, nuts, and fish. Try to have at least 2 servings per week of fatty fish, such as salmon, sardines, mackerel, rainbow trout, and albacore tuna. These contain omega-3 fatty acids, which are good for your heart. Flaxseed and walnuts are other sources of heart-healthy fats.  More on heart-healthy eating  Read food labels  Healthy eating starts at the grocery store. Be sure to pay attention to food labels on packaged foods. Look for products that are high in fiber and protein, and low in saturated fat, added sugars, and sodium. Avoid products that contain trans fat. And pay close attention to serving size. For instance, if you plan to eat two servings, double all the numbers on the label.   Prepare food right  A key part of healthy cooking is cutting down on added fat, sugar and salt. Look on the internet for lower-fat, lower-sodium recipes without a lot of added sugars. Also try these tips:     Remove fat from meat and skin from poultry before cooking.    Skim fat from the surface of soups and sauces.    Broil, roast, boil, bake, steam, grill, or  microwave food without added fats.    Choose ingredients that spice up your food without adding calories, fat, sugar, or sodium. Try these items: horseradish, hot sauce, lemon, mustard, nonfat salad dressings, and vinegar. Small amounts of olive oil-based vinaigrettes are OK, too. For salt-free herbs and spices, try basil, cilantro, cinnamon, cumin, paprika, pepper, and rosemary.  [employer] last reviewed this educational content on 7/1/2020 2000-2021 [employer]. All rights reserved. This information is not intended as a substitute for professional medical care. Always follow your healthcare professional's instructions.

## 2021-08-20 ENCOUNTER — TELEPHONE (OUTPATIENT)
Dept: CARDIOLOGY | Facility: CLINIC | Age: 71
End: 2021-08-20

## 2021-08-20 DIAGNOSIS — I21.4 NSTEMI (NON-ST ELEVATED MYOCARDIAL INFARCTION) (H): ICD-10-CM

## 2021-08-20 RX ORDER — LISINOPRIL 5 MG/1
5 TABLET ORAL DAILY
Qty: 90 TABLET | Refills: 3 | Status: SHIPPED | OUTPATIENT
Start: 2021-08-20 | End: 2021-08-23

## 2021-08-20 NOTE — TELEPHONE ENCOUNTER
----- Message from Nurakeegan LESLIE Sandy sent at 8/20/2021 11:53 AM CDT -----  Regarding: RX  Patient has already contacted their pharmacy. The medication or refill issue is below:    Primary Cardiologist: chris  Medication: Lisinopril  Issue / Concern: pt is stating he is supposed to take half the pill, but its so tinny and its hard to cut it in half. Pt is wondering if there could be a prescription for 5 mg instead of 10 mg. Please advise    [place] Pharm[place]  Best Phone Number for Patient: [phone]    Additional Info:       Per 8/19/2021 visit with CY:    4.  Decrease lisinopril from 10 mg to 5 mg daily.  Instructed to call if no improvement in his lightheadedness or dizziness of blood pressure continues to stay less than 100/60.      Called patient back to address concerns and he states that the Lisinopril 10 mg tablet is too tiny to cut in half. 5 mg tablets sent to [facility]'s off [address]- 90 day supply. -[facility]

## 2021-08-23 ENCOUNTER — TELEPHONE (OUTPATIENT)
Dept: CARDIOLOGY | Facility: CLINIC | Age: 71
End: 2021-08-23

## 2021-08-23 DIAGNOSIS — I21.4 NSTEMI (NON-ST ELEVATED MYOCARDIAL INFARCTION) (H): ICD-10-CM

## 2021-08-23 RX ORDER — LISINOPRIL 2.5 MG/1
2.5 TABLET ORAL DAILY
Qty: 30 TABLET | Refills: 1
Start: 2021-08-23 | End: 2023-06-12

## 2021-08-23 NOTE — TELEPHONE ENCOUNTER
----- Message from Renee Benton sent at 8/23/2021  8:24 AM CDT -----  Regarding: MITCH  General phone call:    Caller: PETRA  Primary cardiologist: MITCH  Detailed reason for call: hE IS STILL FEELING LIGHTHEADED AFTER CHANGING HIS MED   Best phone number: [phone]  Best time to contact: ANY  Ok to leave a detailed message? YES  Device? NO    Additional Info:     Called Petra to address his concerns. His Lisinopril was decreased to 5 mg last week. He still reports significant lightheadedness when changing position. His blood pressure was stable this morning at 119/60 but he did not get orthostatic blood pressures. He says that they recently moved so he is trying to help unpack and get organized and feeling this way is pretty debilitating. Will update CY. -[facility] Mitch,  Petra still feels pretty lightheaded with position change despite lowering lisinopril to 5 mg. Recommendations?  Thanks,  Mal

## 2021-08-23 NOTE — TELEPHONE ENCOUNTER
Kristan Recinos, Rupali Aguirre CNP, RN  Caller: Unspecified (Today, 10:00 AM)  Hi Reinier,   I reviewed his BP with recent hospitalization and oncology office.   Looks like his BP tends to run on high side when he was not on Lisinopril.   I would recommend continuing on 2.5 mg daily for now and follow up with PCP in 1-2 weeks for further BP management.   Thank you!   Kristan         Called patient and relayed this information. He verbalized understanding and will try to split the tablet in half. He was instructed to follow up with PMD to have BP check in 1-2 weeks. He verbalized understanding and will call writer back with any questions or concerns. -[facility]

## 2021-08-23 NOTE — TELEPHONE ENCOUNTER
==View-only below this line===  ----- Message -----  From: Kristan Recinos APRN CNP  Sent: 8/23/2021  10:52 AM CDT  To: SOUMYA Delgado,  Please have him decrease Lisinopril to 2.5 mg daily. He might need new prescription.  Make sure to take Lisinopril in Am and tamsulosin in PM (if he is still taking it) to avoid potential drug interaction. If he takes tamsulosin in AM, then take Lisinopril at bed time.  Slow position change  Increase fluid intake to 64 ounces per day  Call us back if no improvement.  Thank you!  CY        ==Called patient back to update on CY recommendations. He verbalized understanding but just picked up the 5 mg tablets and already the 10 mg tablets were impossible to split. He does not want to split the 5 mg tablets. He is frustrated with even having to take it and wonders if he needs to. He is frustrated with his ability to assist with unpacking process. Will Di[place] back with CY as the patient wonders if he can hold it. He reports that he was taking it in the AM and Flomax in the evening. He will drink plenty of water. -geneva Rushing,  See above. Bhaskar just picked up all the 5 mg tablets from last week. He feels he will be unable to split it. He wonders if he can hold it. Thoughts?  Reinier

## 2021-08-24 ENCOUNTER — HOSPITAL ENCOUNTER (OUTPATIENT)
Dept: CT IMAGING | Facility: HOSPITAL | Age: 71
Discharge: HOME | End: 2021-08-24
Attending: RADIOLOGY | Admitting: RADIOLOGY
Payer: MEDICARE

## 2021-08-24 DIAGNOSIS — C34.90 SMALL CELL LUNG CANCER (H): ICD-10-CM

## 2021-08-24 PROCEDURE — 71250 CT THORAX DX C-: CPT | Mod: MG

## 2021-08-26 ENCOUNTER — OFFICE VISIT (OUTPATIENT)
Dept: RADIATION ONCOLOGY | Facility: HOSPITAL | Age: 71
End: 2021-08-26
Attending: RADIOLOGY
Payer: MEDICARE

## 2021-08-26 VITALS
HEART RATE: 74 BPM | DIASTOLIC BLOOD PRESSURE: 74 MMHG | OXYGEN SATURATION: 95 % | WEIGHT: 184 LBS | BODY MASS INDEX: 29.7 KG/M2 | RESPIRATION RATE: 16 BRPM | SYSTOLIC BLOOD PRESSURE: 117 MMHG

## 2021-08-26 DIAGNOSIS — C34.90 SMALL CELL LUNG CANCER (H): Primary | ICD-10-CM

## 2021-08-26 PROCEDURE — G0463 HOSPITAL OUTPT CLINIC VISIT: HCPCS

## 2021-08-26 PROCEDURE — 99214 OFFICE O/P EST MOD 30 MIN: CPT | Performed by: RADIOLOGY

## 2021-08-26 RX ORDER — OXYBUTYNIN CHLORIDE 10 MG/1
10 TABLET, EXTENDED RELEASE ORAL DAILY
COMMUNITY
Start: 2021-08-19

## 2021-08-26 NOTE — PROGRESS NOTES
Pt ambulatory to radiation clinic for follow up. CT done 8/24. Further recommendations and orders per provider.

## 2021-08-26 NOTE — PROGRESS NOTES
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  The patient is status post chemoradiation therapy.  He was found to have a new diagnosis of limited stage small cell lung cancer, second primary/recurrence, clinical stage T1 N0 M0.  His case has been discussed at thoracic tumor conference and consensus recommendation is to consider SBRT.      TYPE OF RADIATION THERAPY ADMINISTERED:  5000 cGy in 5 treatments given from 3/2/120-3/12/2020.     INTERVAL SINCE COMPLETION OF RADIATION THERAPY: 1 year and 5 months.      SUBJECTIVE:  Mr. Tovar is a 71 y.o. male who is a retired  from [employer].  Patient was diagnosed with limited stage small cell left lung cancer initially in 2012, stage T2 N0 M0.  He received concurrent chemoradiation therapy with cisplatin and etoposide and radiation therapy to a total dose of 4500 cGy in 30 treatments at 150 cGy twice daily.  His treatment was given at [facility].  Patient had a complete response and also received PCI with a total dose of 2500 cGy in 10 treatments.  Patient has been followed closely with no evidence of cancer recurrence.  He will presented with recent finding of left lower lobe nodule by routine screening exam for which he was seeking further evaluation.  PET CT scan on 1/14/2020 showed a 1.3 x 1.2 cm nodule with increased SUV max 3.3 consistent with malignancy.  There is no radiographic evidence of anel and systemic metastasis.  CT-guided needle biopsy on 2/5/2020 confirmed diagnosis of small cell carcinoma.  He had a staging MRI brain which also showed no evidence of brain metastasis.  Given the prior history of radiation therapy and location of his current cancer, the patient might require pneumonectomy.  His case has been discussed at thoracic tumor conference and that the  consensus recommendation is to consider SBRT. His case has been discussed at thoracic tumor conference and consensus recommendation is to consider SBRT.  He received radiation therapy in our clinic with a total dose of 5000 cGy in 5 treatments given from 3/2/120-3/12/2020.  He tolerated radiation therapy very well with minimal side effect.     Patient has been doing well since completion of the radiation therapy.  He denies any chest pain discomfort at the time of evaluation.  He is here for routine postradiation therapy office follow-up.    Medications were reviewed and are up to date on EPIC.    The following portions of the patient's history were reviewed and updated as appropriate: allergies, current medications, past family history, past medical history, past social history, past surgical history and problem list.    Review of Systems:      General  Constitutional  Constitutional (WDL): Exceptions to WDL  Fatigue: Fatigue relieved by rest  EENT  Eye Disorders  Eye Disorder (WDL): Assessment not pertinent to visit  Ear Disorders  Ear Disorder (WDL): Assessment not pertinent to visit  Respiratory  Respiratory  Respiratory (WDL): Exceptions to WDL  Cough: Mild symptoms OR nonprescription intervention indicated  Dyspnea: Shortness of breath with moderate exertion  Cardiovascular  Cardiovascular  Cardiovascular (WDL): All cardiovascular elements are within defined limits  Gastrointestinal  Gastrointestinal  Gastrointestinal (WDL): All gastrointestinal elements are within defined limits  Musculoskeletal  Musculoskeletal and Connective Tissue Disorders  Musculoskeletal & Connective (WDL): All musculoskeletal & connective elements are within defined limits  Integumentary  Integumentary  Integumentary (WDL): All integumentary elements are within defined limits  Neurological  Neurosensory  Neurosensory (WDL): All neurosensory elements are within defined limits  Genitourinary/Reproductive  Genitourinary  Genitourinary  (WDL): Exceptions to WDL  Urinary Frequency: Present  Lymphatic  Lymph System Disorders  Lymph (WDL): All lymph elements are within defined limits  Pain  Pain Score: No Pain (0)  AUA Assessment                                                              Accompanied by  Accompanied By: self only    Objective:     PHYSICAL EXAMINATION:    /74 (BP Location: Left arm, Patient Position: Sitting)   Pulse 74   Resp 16   Wt 83.5 kg (184 lb)   SpO2 95%   BMI 29.70 kg/m      Gen: Alert, in NAD  Eyes: PERRL, EOMI, sclera anicteric  Neck: Supple, full ROM, no LAD  Pulm: No wheezing, stridor or respiratory distress  CV: Well-perfused, no cyanosis, no pedal edema  Abdominal: BS+, soft, nontender, nondistended, no hepatomegaly  Back: No step-offs or pain to palpation along the thoracolumbar spine  Rectal: Deferred  : Deferred  Musculoskeletal: Normal muscle bulk and tone  Skin: Normal color and turgor  Neurologic: A/Ox3, CN II-XII intact, normal gait and station  Psychiatric: Appropriate mood and affect     Imaging: Imaging results 30 days:     CT Chest w/o Contrast    Result Date: 8/24/2021  EXAM: CT CHEST W/O CONTRAST LOCATION: [facility] DATE/TIME: 8/24/2021 12:47 PM INDICATION: Lung cancer, post SBRT in 2/2020 follow up. COMPARISON: 08/04/2021. Others. TECHNIQUE: CT chest without IV contrast. Multiplanar reformats were obtained. Dose reduction techniques were used. CONTRAST: None. FINDINGS: LUNGS AND PLEURA: Fiducial marker left lower lobe. Stable left perihilar post treatment change with moderate architectural distortion. Stable emphysema. MEDIASTINUM/AXILLAE: No adenopathy. No pericardial effusion. CORONARY ARTERY CALCIFICATION: Small amount. UPPER ABDOMEN: Cholecystectomy with tiny focus of pneumobilia. MUSCULOSKELETAL: ACDF. No focal bony lesion.     IMPRESSION: 1.  Stable post treatment changes. 2.  No evidence of new or metastatic disease.        Impression     The patient is a  71-year-old gentleman with a prior history of limited stage small cell lung cancer diagnosed initially in 2012.  He was find to have a new limited stage small cell lung cancer involving the left lower lobe with clinical stage T1 N0 M0.  Patient received definitive SBRT and completed 1 year and 5 months ago.  He has been doing well with no clinical evidence of recurrence.    Assessment & Plan:     1.  I have personally reviewed his restaging CT scan today and compare with previous CT chest and radiation therapy record.  There is no radiographic evidence of cancer recurrence.  The patient have not restaging work-up for his lung cancer.  We will schedule the patient to have CT abdomen pelvics and MRI brain in the next few weeks with a follow-up 1 week later.  I will also plan to have repeat CT chest in 6 months.     2.  Continue follow-up with Dr. Hale, thoracic surgeon and Dr. Brito, pulmonology as planned.        Face to face time  30 minutes with > 75% spent on consultation, education and coordination of care.        Mary Snowden MD, PhD  Department of Radiation Oncology   [facility]  Tel: [phone]  Page: [phone]    [facility]  [address] [place] [address]     [place] [id number] [facility]   [place] [address]    CC:  Patient Care Team:  April Franco PA as PCP - General  Mary Snowden MD as MD (Hematology & Oncology)  Mary Snowden MD as Assigned Cancer Care Provider  Inocente Hale MD as Assigned Heart and Vascular Provider

## 2021-08-26 NOTE — LETTER
8/26/2021         RE: Martin Tovar  [address]  [facility] [address]        Dear Colleague,    Thank you for referring your patient, Martin Tovar, to the [facility] RADIATION ONCOLOGY [place]. Please see a copy of my visit note below.    Pt ambulatory to radiation clinic for follow up. CT done 8/24. Further recommendations and orders per provider.    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  The patient is status post chemoradiation therapy.  He was found to have a new diagnosis of limited stage small cell lung cancer, second primary/recurrence, clinical stage T1 N0 M0.  His case has been discussed at thoracic tumor conference and consensus recommendation is to consider SBRT.      TYPE OF RADIATION THERAPY ADMINISTERED:  5000 cGy in 5 treatments given from 3/2/120-3/12/2020.     INTERVAL SINCE COMPLETION OF RADIATION THERAPY: 1 year and 5 months.      SUBJECTIVE:  Mr. Tovar is a 71 y.o. male who is a retired  from [employer].  Patient was diagnosed with limited stage small cell left lung cancer initially in 2012, stage T2 N0 M0.  He received concurrent chemoradiation therapy with cisplatin and etoposide and radiation therapy to a total dose of 4500 cGy in 30 treatments at 150 cGy twice daily.  His treatment was given at [facility].  Patient had a complete response and also received PCI with a total dose of 2500 cGy in 10 treatments.  Patient has been followed closely with no evidence of cancer recurrence.  He will presented with recent finding of left lower lobe nodule by routine screening exam for which he was seeking further evaluation.  PET CT scan on 1/14/2020 showed a 1.3 x 1.2 cm nodule with increased SUV max 3.3 consistent with malignancy.  There is no radiographic evidence of anel and  systemic metastasis.  CT-guided needle biopsy on 2/5/2020 confirmed diagnosis of small cell carcinoma.  He had a staging MRI brain which also showed no evidence of brain metastasis.  Given the prior history of radiation therapy and location of his current cancer, the patient might require pneumonectomy.  His case has been discussed at thoracic tumor conference and that the consensus recommendation is to consider SBRT. His case has been discussed at thoracic tumor conference and consensus recommendation is to consider SBRT.  He received radiation therapy in our clinic with a total dose of 5000 cGy in 5 treatments given from 3/2/120-3/12/2020.  He tolerated radiation therapy very well with minimal side effect.     Patient has been doing well since completion of the radiation therapy.  He denies any chest pain discomfort at the time of evaluation.  He is here for routine postradiation therapy office follow-up.    Medications were reviewed and are up to date on EPIC.    The following portions of the patient's history were reviewed and updated as appropriate: allergies, current medications, past family history, past medical history, past social history, past surgical history and problem list.    Review of Systems:      General  Constitutional  Constitutional (WDL): Exceptions to WDL  Fatigue: Fatigue relieved by rest  EENT  Eye Disorders  Eye Disorder (WDL): Assessment not pertinent to visit  Ear Disorders  Ear Disorder (WDL): Assessment not pertinent to visit  Respiratory  Respiratory  Respiratory (WDL): Exceptions to WDL  Cough: Mild symptoms OR nonprescription intervention indicated  Dyspnea: Shortness of breath with moderate exertion  Cardiovascular  Cardiovascular  Cardiovascular (WDL): All cardiovascular elements are within defined limits  Gastrointestinal  Gastrointestinal  Gastrointestinal (WDL): All gastrointestinal elements are within defined limits  Musculoskeletal  Musculoskeletal and Connective Tissue  Disorders  Musculoskeletal & Connective (WDL): All musculoskeletal & connective elements are within defined limits  Integumentary  Integumentary  Integumentary (WDL): All integumentary elements are within defined limits  Neurological  Neurosensory  Neurosensory (WDL): All neurosensory elements are within defined limits  Genitourinary/Reproductive  Genitourinary  Genitourinary (WDL): Exceptions to WDL  Urinary Frequency: Present  Lymphatic  Lymph System Disorders  Lymph (WDL): All lymph elements are within defined limits  Pain  Pain Score: No Pain (0)  AUA Assessment                                                              Accompanied by  Accompanied By: self only    Objective:     PHYSICAL EXAMINATION:    /74 (BP Location: Left arm, Patient Position: Sitting)   Pulse 74   Resp 16   Wt 83.5 kg (184 lb)   SpO2 95%   BMI 29.70 kg/m      Gen: Alert, in NAD  Eyes: PERRL, EOMI, sclera anicteric  Neck: Supple, full ROM, no LAD  Pulm: No wheezing, stridor or respiratory distress  CV: Well-perfused, no cyanosis, no pedal edema  Abdominal: BS+, soft, nontender, nondistended, no hepatomegaly  Back: No step-offs or pain to palpation along the thoracolumbar spine  Rectal: Deferred  : Deferred  Musculoskeletal: Normal muscle bulk and tone  Skin: Normal color and turgor  Neurologic: A/Ox3, CN II-XII intact, normal gait and station  Psychiatric: Appropriate mood and affect     Imaging: Imaging results 30 days:     CT Chest w/o Contrast    Result Date: 8/24/2021  EXAM: CT CHEST W/O CONTRAST LOCATION: [facility] DATE/TIME: 8/24/2021 12:47 PM INDICATION: Lung cancer, post SBRT in 2/2020 follow up. COMPARISON: 08/04/2021. Others. TECHNIQUE: CT chest without IV contrast. Multiplanar reformats were obtained. Dose reduction techniques were used. CONTRAST: None. FINDINGS: LUNGS AND PLEURA: Fiducial marker left lower lobe. Stable left perihilar post treatment change with moderate architectural  distortion. Stable emphysema. MEDIASTINUM/AXILLAE: No adenopathy. No pericardial effusion. CORONARY ARTERY CALCIFICATION: Small amount. UPPER ABDOMEN: Cholecystectomy with tiny focus of pneumobilia. MUSCULOSKELETAL: ACDF. No focal bony lesion.     IMPRESSION: 1.  Stable post treatment changes. 2.  No evidence of new or metastatic disease.        Impression     The patient is a 71-year-old gentleman with a prior history of limited stage small cell lung cancer diagnosed initially in 2012.  He was find to have a new limited stage small cell lung cancer involving the left lower lobe with clinical stage T1 N0 M0.  Patient received definitive SBRT and completed 1 year and 5 months ago.  He has been doing well with no clinical evidence of recurrence.    Assessment & Plan:     1.  I have personally reviewed his restaging CT scan today and compare with previous CT chest and radiation therapy record.  There is no radiographic evidence of cancer recurrence.  The patient have not restaging work-up for his lung cancer.  We will schedule the patient to have CT abdomen pelvics and MRI brain in the next few weeks with a follow-up 1 week later.  I will also plan to have repeat CT chest in 6 months.     2.  Continue follow-up with Dr. Hale, thoracic surgeon and Dr. Brito, pulmonology as planned.        Face to face time  30 minutes with > 75% spent on consultation, education and coordination of care.        Mary Snowden MD, PhD  Department of Radiation Oncology   [facility]  Tel: [phone]  Page: [phone]    [facility]  [address] [place] [address]     [facility]   [address] [facility]   [place] [address]    CC:  Patient Care Team:  April Franco PA as PCP - General  Mary Snowden MD as MD (Hematology & Oncology)  Mray Snowden MD as Assigned Cancer Care Provider  Inocente Hale MD as Assigned Heart and Vascular Provider        Again, thank you for allowing me to participate in the care  of your patient.        Sincerely,        Mary Snowden MD

## 2021-09-07 ENCOUNTER — HOSPITAL ENCOUNTER (OUTPATIENT)
Dept: CARDIAC REHAB | Facility: HOSPITAL | Age: 71
End: 2021-09-07
Attending: INTERNAL MEDICINE
Payer: MEDICARE

## 2021-09-07 DIAGNOSIS — I21.4 NSTEMI (NON-ST ELEVATED MYOCARDIAL INFARCTION) (H): ICD-10-CM

## 2021-09-07 PROCEDURE — 93798 PHYS/QHP OP CAR RHAB W/ECG: CPT

## 2021-09-07 PROCEDURE — 93797 PHYS/QHP OP CAR RHAB WO ECG: CPT | Mod: XU

## 2021-09-08 ENCOUNTER — TELEPHONE (OUTPATIENT)
Dept: CARDIOLOGY | Facility: CLINIC | Age: 71
End: 2021-09-08

## 2021-09-08 NOTE — TELEPHONE ENCOUNTER
----- Message from Imani Boyce RN sent at 9/8/2021 11:03 AM CDT -----    ----- Message -----  From: Oriana Tobin  Sent: 9/8/2021  10:56 AM CDT  To: Mary Ann Lira RN    General phone call:  PATIENT HAS HIGH BP MEDICATION, LISINOPRIL AND GOING DOWN IN DOSAGE, AND NOW STILL EXPERIENCING DIZZINESS.  HE WANTS TO LOOK INTO CHANGING MEDICATIONS  PLEASE LEANNA    Caller: PATIENT  Primary cardiologist: MITCH LOPEZ  Detailed reason for call: SEE ABOVE  New or active symptoms? YES, SEE ABOVE  Best phone number: [phone]  Best time to contact: ANY TIME  Ok to leave a detailedmessage? YES  Device? NO    Additional Info:         Called patient back to address his concerns. He reports that despite decreasing Lisinopril to 2.5 mg once a day, he still experiences significant lightheadedness with position change. He takes this in the morning and then takes his Flomax in the evening. He finds it difficult to do tasks around the house due to this. He reports that his blood pressure is usually between 116-120 systolic. He wonders if he can be on a different medication that may not cause this undesirable side effect. Will route to ; he was updated that she is out of the office today but will address upon her return. -geneva Rushing,  See above. Bhaskar still feeling lightheaded with any position change despite lowering Lisinopril to 2.5 mg daily. He confirmed he takes this in the AM and Flomax in the PM. He wonders about a different medication he could take in place of this. Recommendations?  Thanks,  Mal

## 2021-09-09 ENCOUNTER — LAB (OUTPATIENT)
Dept: LAB | Facility: HOSPITAL | Age: 71
End: 2021-09-09
Payer: MEDICARE

## 2021-09-09 DIAGNOSIS — I21.4 NSTEMI (NON-ST ELEVATED MYOCARDIAL INFARCTION) (H): ICD-10-CM

## 2021-09-09 LAB
ALBUMIN SERPL-MCNC: 3.5 G/DL (ref 3.5–5)
ALP SERPL-CCNC: 77 U/L (ref 45–120)
ALT SERPL W P-5'-P-CCNC: <9 U/L (ref 0–45)
ANION GAP SERPL CALCULATED.3IONS-SCNC: 9 MMOL/L (ref 5–18)
AST SERPL W P-5'-P-CCNC: 12 U/L (ref 0–40)
BILIRUB SERPL-MCNC: 0.6 MG/DL (ref 0–1)
BUN SERPL-MCNC: 14 MG/DL (ref 8–22)
CALCIUM SERPL-MCNC: 9.2 MG/DL (ref 8.5–10.5)
CHLORIDE BLD-SCNC: 106 MMOL/L (ref 98–107)
CHOLEST SERPL-MCNC: 106 MG/DL
CO2 SERPL-SCNC: 29 MMOL/L (ref 22–31)
CREAT SERPL-MCNC: 1.08 MG/DL (ref 0.7–1.3)
FASTING STATUS PATIENT QL REPORTED: YES
GFR SERPL CREATININE-BSD FRML MDRD: 70 ML/MIN/1.73M2
GLUCOSE BLD-MCNC: 157 MG/DL (ref 70–125)
HDLC SERPL-MCNC: 38 MG/DL
LDLC SERPL CALC-MCNC: 44 MG/DL
POTASSIUM BLD-SCNC: 4.3 MMOL/L (ref 3.5–5)
PROT SERPL-MCNC: 6.9 G/DL (ref 6–8)
SODIUM SERPL-SCNC: 144 MMOL/L (ref 136–145)
TRIGL SERPL-MCNC: 119 MG/DL

## 2021-09-09 PROCEDURE — 80061 LIPID PANEL: CPT

## 2021-09-09 PROCEDURE — 36415 COLL VENOUS BLD VENIPUNCTURE: CPT

## 2021-09-09 PROCEDURE — 80053 COMPREHEN METABOLIC PANEL: CPT

## 2021-09-10 NOTE — TELEPHONE ENCOUNTER
From: Kristan Recinos APRN CNP  Sent: 9/10/2021   8:33 AM CDT  To: Rupali Acevedo RN, Maicol Cervantes MD    He was discharged on 10 mg daily. BP was in 100's systolic in sitting and 80's standing when I saw him. I cut it down to 5 mg and then to 2.5 mg daily and still have dizziness. He is not on diuretic.    Mal,  Have him hold Lisinopril a week and see if dizziness resolves.Monitor BP and call us if BP persistently stays >135/90.     Thank you!  CY      Called Bhaskar and updated on response from CY. He verbalized understanding and will hold Lisinopril to see if his symptoms improve.  We will touch base next Friday for status update. -[facility]

## 2021-09-14 ENCOUNTER — TELEPHONE (OUTPATIENT)
Dept: CARDIOLOGY | Facility: CLINIC | Age: 71
End: 2021-09-14

## 2021-09-14 NOTE — TELEPHONE ENCOUNTER
----- Message from Danae Sandy sent at 9/14/2021 10:58 AM CDT -----  Regarding: chris pt  General phone call:    Caller: alex  Primary cardiologist: chris  Detailed reason for call: pt wanted to let chris know that for pt blood work (lipid) pt was not informed pt has to fast. Pt got the lab work done but the results might not be accurate due to not fasting.    Best phone number: [phone]  Best time to contact: any  Ok to leave a detailedmessage? yes  Device? no    Additional Info:       Called patient and reassured him that lipids looked good and we look the most at LDL cholesterol, which would not be affected by him eating prior to the panel. He verbalized understanding and had no further questions or concerns. -[facility]

## 2021-09-15 ENCOUNTER — HOSPITAL ENCOUNTER (OUTPATIENT)
Dept: CARDIAC REHAB | Facility: HOSPITAL | Age: 71
End: 2021-09-15
Attending: INTERNAL MEDICINE
Payer: MEDICARE

## 2021-09-15 PROCEDURE — 93798 PHYS/QHP OP CAR RHAB W/ECG: CPT

## 2021-09-17 ENCOUNTER — HOSPITAL ENCOUNTER (OUTPATIENT)
Dept: CARDIAC REHAB | Facility: HOSPITAL | Age: 71
End: 2021-09-17
Attending: INTERNAL MEDICINE
Payer: MEDICARE

## 2021-09-17 PROCEDURE — 93798 PHYS/QHP OP CAR RHAB W/ECG: CPT

## 2021-09-20 ENCOUNTER — HOSPITAL ENCOUNTER (OUTPATIENT)
Dept: CARDIAC REHAB | Facility: HOSPITAL | Age: 71
End: 2021-09-20
Attending: INTERNAL MEDICINE
Payer: MEDICARE

## 2021-09-20 PROCEDURE — 93798 PHYS/QHP OP CAR RHAB W/ECG: CPT

## 2021-09-21 ENCOUNTER — HOSPITAL ENCOUNTER (OUTPATIENT)
Dept: MRI IMAGING | Facility: HOSPITAL | Age: 71
End: 2021-09-21
Attending: RADIOLOGY
Payer: MEDICARE

## 2021-09-21 ENCOUNTER — HOSPITAL ENCOUNTER (OUTPATIENT)
Dept: CT IMAGING | Facility: HOSPITAL | Age: 71
End: 2021-09-21
Attending: RADIOLOGY
Payer: MEDICARE

## 2021-09-21 DIAGNOSIS — C34.90 SMALL CELL LUNG CANCER (H): ICD-10-CM

## 2021-09-21 PROCEDURE — 250N000011 HC RX IP 250 OP 636: Performed by: RADIOLOGY

## 2021-09-21 PROCEDURE — 70553 MRI BRAIN STEM W/O & W/DYE: CPT | Mod: ME

## 2021-09-21 PROCEDURE — 74177 CT ABD & PELVIS W/CONTRAST: CPT | Mod: MG

## 2021-09-21 PROCEDURE — A9585 GADOBUTROL INJECTION: HCPCS | Performed by: RADIOLOGY

## 2021-09-21 PROCEDURE — 255N000002 HC RX 255 OP 636: Performed by: RADIOLOGY

## 2021-09-21 RX ORDER — IOPAMIDOL 755 MG/ML
100 INJECTION, SOLUTION INTRAVASCULAR ONCE
Status: COMPLETED | OUTPATIENT
Start: 2021-09-21 | End: 2021-09-21

## 2021-09-21 RX ORDER — GADOBUTROL 604.72 MG/ML
8 INJECTION INTRAVENOUS ONCE
Status: COMPLETED | OUTPATIENT
Start: 2021-09-21 | End: 2021-09-21

## 2021-09-21 RX ADMIN — GADOBUTROL 8 ML: 604.72 INJECTION INTRAVENOUS at 12:29

## 2021-09-21 RX ADMIN — IOPAMIDOL 100 ML: 755 INJECTION, SOLUTION INTRAVENOUS at 11:46

## 2021-09-22 ENCOUNTER — HOSPITAL ENCOUNTER (OUTPATIENT)
Dept: CARDIAC REHAB | Facility: HOSPITAL | Age: 71
End: 2021-09-22
Attending: INTERNAL MEDICINE
Payer: MEDICARE

## 2021-09-22 PROCEDURE — 93798 PHYS/QHP OP CAR RHAB W/ECG: CPT

## 2021-09-23 ENCOUNTER — LAB REQUISITION (OUTPATIENT)
Dept: LAB | Facility: CLINIC | Age: 71
End: 2021-09-23
Payer: MEDICARE

## 2021-09-23 DIAGNOSIS — E78.5 HYPERLIPIDEMIA, UNSPECIFIED: ICD-10-CM

## 2021-09-23 DIAGNOSIS — I25.2 OLD MYOCARDIAL INFARCTION: ICD-10-CM

## 2021-09-23 LAB
ALBUMIN SERPL-MCNC: 3.8 G/DL (ref 3.5–5)
ALP SERPL-CCNC: 96 U/L (ref 45–120)
ALT SERPL W P-5'-P-CCNC: 19 U/L (ref 0–45)
ANION GAP SERPL CALCULATED.3IONS-SCNC: 11 MMOL/L (ref 5–18)
AST SERPL W P-5'-P-CCNC: 8 U/L (ref 0–40)
BILIRUB SERPL-MCNC: 0.4 MG/DL (ref 0–1)
BUN SERPL-MCNC: 15 MG/DL (ref 8–22)
CALCIUM SERPL-MCNC: 9 MG/DL (ref 8.5–10.5)
CHLORIDE BLD-SCNC: 105 MMOL/L (ref 98–107)
CHOLEST SERPL-MCNC: 105 MG/DL
CO2 SERPL-SCNC: 26 MMOL/L (ref 22–31)
CREAT SERPL-MCNC: 0.99 MG/DL (ref 0.7–1.3)
GFR SERPL CREATININE-BSD FRML MDRD: 77 ML/MIN/1.73M2
GLUCOSE BLD-MCNC: 113 MG/DL (ref 70–125)
HDLC SERPL-MCNC: 39 MG/DL
LDLC SERPL CALC-MCNC: 37 MG/DL
POTASSIUM BLD-SCNC: 4.7 MMOL/L (ref 3.5–5)
PROT SERPL-MCNC: 6.6 G/DL (ref 6–8)
SODIUM SERPL-SCNC: 142 MMOL/L (ref 136–145)
TRIGL SERPL-MCNC: 145 MG/DL

## 2021-09-23 PROCEDURE — 80053 COMPREHEN METABOLIC PANEL: CPT | Mod: ORL | Performed by: PHYSICIAN ASSISTANT

## 2021-09-23 PROCEDURE — 36415 COLL VENOUS BLD VENIPUNCTURE: CPT | Mod: ORL | Performed by: PHYSICIAN ASSISTANT

## 2021-09-23 PROCEDURE — 80061 LIPID PANEL: CPT | Mod: ORL | Performed by: PHYSICIAN ASSISTANT

## 2021-09-24 ENCOUNTER — OFFICE VISIT (OUTPATIENT)
Dept: RADIATION ONCOLOGY | Facility: HOSPITAL | Age: 71
End: 2021-09-24
Attending: RADIOLOGY
Payer: MEDICARE

## 2021-09-24 VITALS
TEMPERATURE: 98.3 F | RESPIRATION RATE: 16 BRPM | BODY MASS INDEX: 28.73 KG/M2 | DIASTOLIC BLOOD PRESSURE: 82 MMHG | OXYGEN SATURATION: 97 % | HEART RATE: 76 BPM | SYSTOLIC BLOOD PRESSURE: 157 MMHG | WEIGHT: 178 LBS

## 2021-09-24 DIAGNOSIS — C34.90 SMALL CELL CARCINOMA OF LUNG (H): Primary | ICD-10-CM

## 2021-09-24 PROCEDURE — 99213 OFFICE O/P EST LOW 20 MIN: CPT | Performed by: RADIOLOGY

## 2021-09-24 PROCEDURE — G0463 HOSPITAL OUTPT CLINIC VISIT: HCPCS

## 2021-09-24 ASSESSMENT — PAIN SCALES - GENERAL: PAINLEVEL: NO PAIN (0)

## 2021-09-24 NOTE — LETTER
9/24/2021         RE: Martin Tovar  [address]  [facility] [address]        Dear Colleague,    Thank you for referring your patient, Martin Tovar, to the [facility] RADIATION ONCOLOGY [place]. Please see a copy of my visit note below.    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  The patient is status post chemoradiation therapy.  He was found to have a new diagnosis of limited stage small cell lung cancer, second primary/recurrence, clinical stage T1 N0 M0.  His case has been discussed at thoracic tumor conference and consensus recommendation is to consider SBRT.      TYPE OF RADIATION THERAPY ADMINISTERED:  5000 cGy in 5 treatments given from 3/2/120-3/12/2020.     INTERVAL SINCE COMPLETION OF RADIATION THERAPY: 1 year and 6 months.      SUBJECTIVE:  Mr. Tovar is a 71 y.o. male who is a retired  from [employer].  Patient was diagnosed with limited stage small cell left lung cancer initially in 2012, stage T2 N0 M0.  He received concurrent chemoradiation therapy with cisplatin and etoposide and radiation therapy to a total dose of 4500 cGy in 30 treatments at 150 cGy twice daily.  His treatment was given at [facility].  Patient had a complete response and also received PCI with a total dose of 2500 cGy in 10 treatments.  Patient has been followed closely with no evidence of cancer recurrence.  He will presented with recent finding of left lower lobe nodule by routine screening exam for which he was seeking further evaluation.  PET CT scan on 1/14/2020 showed a 1.3 x 1.2 cm nodule with increased SUV max 3.3 consistent with malignancy.  There is no radiographic evidence of anel and systemic metastasis.  CT-guided needle biopsy on 2/5/2020 confirmed diagnosis of small cell carcinoma.  He had a  staging MRI brain which also showed no evidence of brain metastasis.  Given the prior history of radiation therapy and location of his current cancer, the patient might require pneumonectomy.  His case has been discussed at thoracic tumor conference and that the consensus recommendation is to consider SBRT. His case has been discussed at thoracic tumor conference and consensus recommendation is to consider SBRT.  He received radiation therapy in our clinic with a total dose of 5000 cGy in 5 treatments given from 3/2/120-3/12/2020.  He tolerated radiation therapy very well with minimal side effect.     Patient has been doing well since completion of the radiation therapy.  He denies any chest pain discomfort at the time of evaluation.  He is here for routine postradiation therapy office follow-up.    Medications were reviewed and are up to date on EPIC.    The following portions of the patient's history were reviewed and updated as appropriate: allergies, current medications, past family history, past medical history, past social history, past surgical history and problem list.    Review of Systems:      General  Constitutional  Constitutional (WDL): Exceptions to WDL  Fatigue: Absent or within normal limits  Fever: Absent or within normal limits  Chills: Absent or within normal limits  Weight Gain: Absent or within normal limits  Weight Loss: Absent or within normal limits  Hot Flashes: Absent or within normal limits  EENT  Eye Disorders  Eye Disorder (WDL): All eye disorder elements are within defined limits  Ear Disorders  Ear Disorder (WDL): All ear disorder elements are within defined limits  Respiratory  Respiratory  Respiratory (WDL): Exceptions to WDL  Cough: Absent or within normal limits  Dyspnea: Shortness of breath with moderate exertion  Cardiovascular  Cardiovascular  Cardiovascular (WDL): Exceptions to WDL (MI in Aug)  Palpitations: Absent or within normal limits  Edema: No  Phlebitis: Absent or within  normal limits  Superficial Thrombophlebitis: Absent or within normal limits  Chest Pain - Cardiac: Absent or within normal limits  Gastrointestinal  Gastrointestinal  Gastrointestinal (WDL): Exceptions to WDL  Anorexia: Loss of appetite without alteration in eating habits  Nausea: Absent or within normal limits  Vomiting: Absent or within normal limits  Dehydration: Absent or within normal limits  Dysgeusia: Altered taste but no change in diet  Dysphagia: Symptomatic, able to eat regular diet  Mucositis Oral: Absent or within normal limits  Esophagitis: Absent or within normal limits  Constipation: Absent or within normal limits  Diarrhea: Absent or within normal limits  Pharyngitis: Absent or within normal limits  Dry Mouth: Absent or within normal limits  Musculoskeletal  Musculoskeletal and Connective Tissue Disorders  Musculoskeletal & Connective (WDL): Exceptions to WDL  Arthralgia: Mild pain (neck and low back)  Bone Pain: Absent or within normal limits  Generalized Muscle Weakness: Absent or within normal limits  Myalgia: Absent or within normal limits  Integumentary  Integumentary  Integumentary (WDL): All integumentary elements are within defined limits  Neurological  Neurosensory  Neurosensory (WDL): Exceptions to WDL  Peripheral Motor Neuropathy: Absent or within normal limits  Ataxia: Asymptomatic OR clinical or diagnostic observations only OR intervention not indicated  Peripheral Sensory Neuropathy: Absent or within normal limits  Confusion: Absent or within normal limits  Dizziness: Mild unsteadiness or sensation of movement (orthostatic with b/p meds)  Syncope: Absent or within normal limits  Dysesthesia: Absent or within normal limits  Genitourinary/Reproductive  Genitourinary  Genitourinary (WDL): Exceptions to WDL  Urinary Frequency: Present  Urinary Retention: Urinary, suprapubic or intermittent catheter placement not indicated OR able to void with some residual (hesitency)  Urinary Tract Pain:  Absent or within normal limits  Lymphatic  Lymph System Disorders  Lymph (WDL): All lymph elements are within defined limits  Pain  Pain Score: No Pain (0)  AUA Assessment                                                              Accompanied by  Accompanied By: self only    Objective:     PHYSICAL EXAMINATION:    BP (!) 157/82   Pulse 76   Temp 98.3  F (36.8  C) (Oral)   Resp 16   Wt 80.7 kg (178 lb)   SpO2 97%   BMI 28.73 kg/m      Gen: Alert, in NAD  Eyes: PERRL, EOMI, sclera anicteric  Pulm: No wheezing, stridor or respiratory distress  CV: Well-perfused, no cyanosis, no pedal edema  Abdominal: BS+, soft, nontender, nondistended, no hepatomegaly  Back: No step-offs or pain to palpation along the thoracolumbar spine  Rectal: Deferred  : Deferred  Musculoskeletal: Normal muscle bulk and tone  Skin: Normal color and turgor  Neurologic: A/Ox3, CN II-XII intact, normal gait and station  Psychiatric: Appropriate mood and affect     Imaging: Imaging results 6 weeks:MR Brain w/o & w Contrast    Result Date: 9/21/2021  EXAM: MR BRAIN W/O and W CONTRAST LOCATION: [facility] DATE/TIME: 9/21/2021 12:02 PM INDICATION: Small cell lung cancer, staging COMPARISON: Head MRI 2/20/2020 CONTRAST: 8ml gadavist TECHNIQUE: Multiplanar multisequence head MRI without and with intravenous contrast including dynamic susceptibility contrast perfusion imaging. FINDINGS: INTRACRANIAL CONTENTS: No acute or subacute infarct. No mass, acute hemorrhage, or extra-axial fluid collections. No focal elevation of relative cerebral blood volume on perfusion imaging. Patchy nonspecific T2/FLAIR hyperintensities within the cerebral white matter most consistent with mild to moderate chronic microvascular ischemic change. Mild generalized cerebral atrophy. No hydrocephalus. Mild cerebellar atrophy. SELLA: No abnormality accounting for technique. OSSEOUS STRUCTURES/SOFT TISSUES: Normal marrow signal. The major  intracranial vascular flow voids are maintained. ORBITS: No abnormality accounting for technique. SINUSES/MASTOIDS: No paranasal sinus mucosal disease. No middle ear or mastoid effusion.     IMPRESSION: 1.  No evidence of metastatic disease. 2.  Mild to moderate presumed chronic small vessel ischemic change with mild generalized volume loss. 3.  No interval change.    CT Abdomen Pelvis w Contrast    Result Date: 9/21/2021  EXAM: CT ABDOMEN PELVIS W CONTRAST LOCATION: [facility] DATE/TIME: 9/21/2021 11:27 AM INDICATION:  Small cell lung cancer (H). COMPARISON: 08/24/2021 CT chest, 01/14/2020 PET/CT and CTs AP 2/1/2016 and 1/18/2016. TECHNIQUE: CT scan of the abdomen and pelvis was performed following injection of IV contrast. Multiplanar reformats were obtained. Dose reduction techniques were used. CONTRAST: Isovue 370, 100 mL. FINDINGS: LOWER CHEST: Visualized lungs are clear. No pleural effusion. Heart size normal with no pericardial effusion. HEPATOBILIARY: Mild diffuse hepatic steatosis. Liver is otherwise normal. Stable mild to moderate bile duct dilatation with no radiodense stone or mass consistent with reservoir effect from prior cholecystectomy. PANCREAS: Normal. SPLEEN: Spleen size normal. ADRENAL GLANDS: Minimal nodularity of the adrenal gland stable since 2016 consistent with minimal benign nodular adrenal hyperplasia. KIDNEY/BLADDER: Small benign cyst in each kidney. No follow-up necessary. Kidneys, ureters and bladder are otherwise normal. BOWEL: Sigmoid colon anastomosis. Normal appendix. Colonic diverticulosis. Bowel is otherwise normal with no obstruction or inflammatory change. LYMPH NODES: No lymphadenopathy. VASCULATURE: Moderate calcified atheromatous plaque throughout the normal caliber abdominal aorta, iliofemoral arteries and at the origin of the renal and mesenteric arteries. PELVIC ORGANS: Prostate enlargement. Pelvis otherwise unremarkable. MUSCULOSKELETAL:  Spondylotic change lumbar spine and chronic ankylosis of the sacroiliac joints with bilateral danny and pedicle screw fusion of L5-S1. Visualized bones otherwise unremarkable. No bone lesions.     IMPRESSION: 1.  No evidence of metastatic disease in the abdomen or pelvis. 2.  Incidental findings as detailed above.     CT Chest w/o Contrast    Result Date: 8/24/2021  EXAM: CT CHEST W/O CONTRAST LOCATION: [facility] DATE/TIME: 8/24/2021 12:47 PM INDICATION: Lung cancer, post SBRT in 2/2020 follow up. COMPARISON: 08/04/2021. Others. TECHNIQUE: CT chest without IV contrast. Multiplanar reformats were obtained. Dose reduction techniques were used. CONTRAST: None. FINDINGS: LUNGS AND PLEURA: Fiducial marker left lower lobe. Stable left perihilar post treatment change with moderate architectural distortion. Stable emphysema. MEDIASTINUM/AXILLAE: No adenopathy. No pericardial effusion. CORONARY ARTERY CALCIFICATION: Small amount. UPPER ABDOMEN: Cholecystectomy with tiny focus of pneumobilia. MUSCULOSKELETAL: ACDF. No focal bony lesion.     IMPRESSION: 1.  Stable post treatment changes. 2.  No evidence of new or metastatic disease.        Impression     The patient is a 71-year-old gentleman with a prior history of limited stage small cell lung cancer diagnosed initially in 2012.  He was find to have a new limited stage small cell lung cancer involving the left lower lobe with clinical stage T1 N0 M0.  Patient received definitive SBRT and completed 1 year and 6 months ago.  He has been doing well with no clinical evidence of recurrence.    Assessment & Plan:      1.  I have personally reviewed his restaging CT and MRI scan today and compare with previous CT chest and radiation therapy record.  There is no radiographic evidence of cancer recurrence.    Patient is scheduled to return to radiation oncology in 6 months for CT chest abdomen pelvics and MRI brain.    2.  Continue follow-up with Dr. Hale,  thoracic surgeon and Dr. Brito, pulmonology as planned.        Face to face time  20 minutes with > 75% spent on consultation, education and coordination of care.      Mary Snowden MD, PhD  Department of Radiation Oncology   [facility]  Tel: [phone]  Page: [phone]    [facility]  [address] [place] [address]     [facility]   [address]  [place] [address]    CC:  Patient Care Team:  April Franco PA as PCP - General  Mary Snowden MD as MD (Hematology & Oncology)  Mary Snowden MD as Assigned Cancer Care Provider  Kristan Recinos APRN CNP as Assigned Heart and Vascular Provider        Again, thank you for allowing me to participate in the care of your patient.        Sincerely,        Mary Snowden MD

## 2021-09-24 NOTE — PROGRESS NOTES
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  The patient is status post chemoradiation therapy.  He was found to have a new diagnosis of limited stage small cell lung cancer, second primary/recurrence, clinical stage T1 N0 M0.  His case has been discussed at thoracic tumor conference and consensus recommendation is to consider SBRT.      TYPE OF RADIATION THERAPY ADMINISTERED:  5000 cGy in 5 treatments given from 3/2/120-3/12/2020.     INTERVAL SINCE COMPLETION OF RADIATION THERAPY: 1 year and 6 months.      SUBJECTIVE:  Mr. Tovar is a 71 y.o. male who is a retired  from [employer].  Patient was diagnosed with limited stage small cell left lung cancer initially in 2012, stage T2 N0 M0.  He received concurrent chemoradiation therapy with cisplatin and etoposide and radiation therapy to a total dose of 4500 cGy in 30 treatments at 150 cGy twice daily.  His treatment was given at [facility].  Patient had a complete response and also received PCI with a total dose of 2500 cGy in 10 treatments.  Patient has been followed closely with no evidence of cancer recurrence.  He will presented with recent finding of left lower lobe nodule by routine screening exam for which he was seeking further evaluation.  PET CT scan on 1/14/2020 showed a 1.3 x 1.2 cm nodule with increased SUV max 3.3 consistent with malignancy.  There is no radiographic evidence of anel and systemic metastasis.  CT-guided needle biopsy on 2/5/2020 confirmed diagnosis of small cell carcinoma.  He had a staging MRI brain which also showed no evidence of brain metastasis.  Given the prior history of radiation therapy and location of his current cancer, the patient might require pneumonectomy.  His case has been discussed at thoracic tumor conference and that the  consensus recommendation is to consider SBRT. His case has been discussed at thoracic tumor conference and consensus recommendation is to consider SBRT.  He received radiation therapy in our clinic with a total dose of 5000 cGy in 5 treatments given from 3/2/120-3/12/2020.  He tolerated radiation therapy very well with minimal side effect.     Patient has been doing well since completion of the radiation therapy.  He denies any chest pain discomfort at the time of evaluation.  He is here for routine postradiation therapy office follow-up.    Medications were reviewed and are up to date on EPIC.    The following portions of the patient's history were reviewed and updated as appropriate: allergies, current medications, past family history, past medical history, past social history, past surgical history and problem list.    Review of Systems:      General  Constitutional  Constitutional (WDL): Exceptions to WDL  Fatigue: Absent or within normal limits  Fever: Absent or within normal limits  Chills: Absent or within normal limits  Weight Gain: Absent or within normal limits  Weight Loss: Absent or within normal limits  Hot Flashes: Absent or within normal limits  EENT  Eye Disorders  Eye Disorder (WDL): All eye disorder elements are within defined limits  Ear Disorders  Ear Disorder (WDL): All ear disorder elements are within defined limits  Respiratory  Respiratory  Respiratory (WDL): Exceptions to WDL  Cough: Absent or within normal limits  Dyspnea: Shortness of breath with moderate exertion  Cardiovascular  Cardiovascular  Cardiovascular (WDL): Exceptions to WDL (MI in Aug)  Palpitations: Absent or within normal limits  Edema: No  Phlebitis: Absent or within normal limits  Superficial Thrombophlebitis: Absent or within normal limits  Chest Pain - Cardiac: Absent or within normal limits  Gastrointestinal  Gastrointestinal  Gastrointestinal (WDL): Exceptions to WDL  Anorexia: Loss of appetite without alteration in  eating habits  Nausea: Absent or within normal limits  Vomiting: Absent or within normal limits  Dehydration: Absent or within normal limits  Dysgeusia: Altered taste but no change in diet  Dysphagia: Symptomatic, able to eat regular diet  Mucositis Oral: Absent or within normal limits  Esophagitis: Absent or within normal limits  Constipation: Absent or within normal limits  Diarrhea: Absent or within normal limits  Pharyngitis: Absent or within normal limits  Dry Mouth: Absent or within normal limits  Musculoskeletal  Musculoskeletal and Connective Tissue Disorders  Musculoskeletal & Connective (WDL): Exceptions to WDL  Arthralgia: Mild pain (neck and low back)  Bone Pain: Absent or within normal limits  Generalized Muscle Weakness: Absent or within normal limits  Myalgia: Absent or within normal limits  Integumentary  Integumentary  Integumentary (WDL): All integumentary elements are within defined limits  Neurological  Neurosensory  Neurosensory (WDL): Exceptions to WDL  Peripheral Motor Neuropathy: Absent or within normal limits  Ataxia: Asymptomatic OR clinical or diagnostic observations only OR intervention not indicated  Peripheral Sensory Neuropathy: Absent or within normal limits  Confusion: Absent or within normal limits  Dizziness: Mild unsteadiness or sensation of movement (orthostatic with b/p meds)  Syncope: Absent or within normal limits  Dysesthesia: Absent or within normal limits  Genitourinary/Reproductive  Genitourinary  Genitourinary (WDL): Exceptions to WDL  Urinary Frequency: Present  Urinary Retention: Urinary, suprapubic or intermittent catheter placement not indicated OR able to void with some residual (hesitency)  Urinary Tract Pain: Absent or within normal limits  Lymphatic  Lymph System Disorders  Lymph (WDL): All lymph elements are within defined limits  Pain  Pain Score: No Pain (0)  AUA Assessment                                                              Accompanied by  Accompanied  By: self only    Objective:     PHYSICAL EXAMINATION:    BP (!) 157/82   Pulse 76   Temp 98.3  F (36.8  C) (Oral)   Resp 16   Wt 80.7 kg (178 lb)   SpO2 97%   BMI 28.73 kg/m      Gen: Alert, in NAD  Eyes: PERRL, EOMI, sclera anicteric  Pulm: No wheezing, stridor or respiratory distress  CV: Well-perfused, no cyanosis, no pedal edema  Abdominal: BS+, soft, nontender, nondistended, no hepatomegaly  Back: No step-offs or pain to palpation along the thoracolumbar spine  Rectal: Deferred  : Deferred  Musculoskeletal: Normal muscle bulk and tone  Skin: Normal color and turgor  Neurologic: A/Ox3, CN II-XII intact, normal gait and station  Psychiatric: Appropriate mood and affect     Imaging: Imaging results 6 weeks:MR Brain w/o & w Contrast    Result Date: 9/21/2021  EXAM: MR BRAIN W/O and W CONTRAST LOCATION: [facility] [place] DATE/TIME: 9/21/2021 12:02 PM INDICATION: Small cell lung cancer, staging COMPARISON: Head MRI 2/20/2020 CONTRAST: 8ml gadavist TECHNIQUE: Multiplanar multisequence head MRI without and with intravenous contrast including dynamic susceptibility contrast perfusion imaging. FINDINGS: INTRACRANIAL CONTENTS: No acute or subacute infarct. No mass, acute hemorrhage, or extra-axial fluid collections. No focal elevation of relative cerebral blood volume on perfusion imaging. Patchy nonspecific T2/FLAIR hyperintensities within the cerebral white matter most consistent with mild to moderate chronic microvascular ischemic change. Mild generalized cerebral atrophy. No hydrocephalus. Mild cerebellar atrophy. SELLA: No abnormality accounting for technique. OSSEOUS STRUCTURES/SOFT TISSUES: Normal marrow signal. The major intracranial vascular flow voids are maintained. ORBITS: No abnormality accounting for technique. SINUSES/MASTOIDS: No paranasal sinus mucosal disease. No middle ear or mastoid effusion.     IMPRESSION: 1.  No evidence of metastatic disease. 2.  Mild to moderate presumed  chronic small vessel ischemic change with mild generalized volume loss. 3.  No interval change.    CT Abdomen Pelvis w Contrast    Result Date: 9/21/2021  EXAM: CT ABDOMEN PELVIS W CONTRAST LOCATION: [facility] DATE/TIME: 9/21/2021 11:27 AM INDICATION:  Small cell lung cancer (H). COMPARISON: 08/24/2021 CT chest, 01/14/2020 PET/CT and CTs AP 2/1/2016 and 1/18/2016. TECHNIQUE: CT scan of the abdomen and pelvis was performed following injection of IV contrast. Multiplanar reformats were obtained. Dose reduction techniques were used. CONTRAST: Isovue 370, 100 mL. FINDINGS: LOWER CHEST: Visualized lungs are clear. No pleural effusion. Heart size normal with no pericardial effusion. HEPATOBILIARY: Mild diffuse hepatic steatosis. Liver is otherwise normal. Stable mild to moderate bile duct dilatation with no radiodense stone or mass consistent with reservoir effect from prior cholecystectomy. PANCREAS: Normal. SPLEEN: Spleen size normal. ADRENAL GLANDS: Minimal nodularity of the adrenal gland stable since 2016 consistent with minimal benign nodular adrenal hyperplasia. KIDNEY/BLADDER: Small benign cyst in each kidney. No follow-up necessary. Kidneys, ureters and bladder are otherwise normal. BOWEL: Sigmoid colon anastomosis. Normal appendix. Colonic diverticulosis. Bowel is otherwise normal with no obstruction or inflammatory change. LYMPH NODES: No lymphadenopathy. VASCULATURE: Moderate calcified atheromatous plaque throughout the normal caliber abdominal aorta, iliofemoral arteries and at the origin of the renal and mesenteric arteries. PELVIC ORGANS: Prostate enlargement. Pelvis otherwise unremarkable. MUSCULOSKELETAL: Spondylotic change lumbar spine and chronic ankylosis of the sacroiliac joints with bilateral danny and pedicle screw fusion of L5-S1. Visualized bones otherwise unremarkable. No bone lesions.     IMPRESSION: 1.  No evidence of metastatic disease in the abdomen or pelvis. 2.   Incidental findings as detailed above.     CT Chest w/o Contrast    Result Date: 8/24/2021  EXAM: CT CHEST W/O CONTRAST LOCATION: [facility] DATE/TIME: 8/24/2021 12:47 PM INDICATION: Lung cancer, post SBRT in 2/2020 follow up. COMPARISON: 08/04/2021. Others. TECHNIQUE: CT chest without IV contrast. Multiplanar reformats were obtained. Dose reduction techniques were used. CONTRAST: None. FINDINGS: LUNGS AND PLEURA: Fiducial marker left lower lobe. Stable left perihilar post treatment change with moderate architectural distortion. Stable emphysema. MEDIASTINUM/AXILLAE: No adenopathy. No pericardial effusion. CORONARY ARTERY CALCIFICATION: Small amount. UPPER ABDOMEN: Cholecystectomy with tiny focus of pneumobilia. MUSCULOSKELETAL: ACDF. No focal bony lesion.     IMPRESSION: 1.  Stable post treatment changes. 2.  No evidence of new or metastatic disease.        Impression     The patient is a 71-year-old gentleman with a prior history of limited stage small cell lung cancer diagnosed initially in 2012.  He was find to have a new limited stage small cell lung cancer involving the left lower lobe with clinical stage T1 N0 M0.  Patient received definitive SBRT and completed 1 year and 6 months ago.  He has been doing well with no clinical evidence of recurrence.    Assessment & Plan:      1.  I have personally reviewed his restaging CT and MRI scan today and compare with previous CT chest and radiation therapy record.  There is no radiographic evidence of cancer recurrence.    Patient is scheduled to return to radiation oncology in 6 months for CT chest abdomen pelvics and MRI brain.    2.  Continue follow-up with Dr. Hale, thoracic surgeon and Dr. Brito, pulmonology as planned.        Face to face time  20 minutes with > 75% spent on consultation, education and coordination of care.      Mary Snowden MD, PhD  Department of Radiation Oncology   [facility]  Tel: [phone]  Page:  [phone]    [facility]  [address]  [place] [address]     [facility]   [address] [place] [address]    CC:  Patient Care Team:  April Franco PA as PCP - General  Mary Snowden MD as MD (Hematology & Oncology)  Mary Snowden MD as Assigned Cancer Care Provider  Kristan Recinos APRN CNP as Assigned Heart and Vascular Provider

## 2021-09-25 ENCOUNTER — HEALTH MAINTENANCE LETTER (OUTPATIENT)
Age: 71
End: 2021-09-25

## 2021-09-27 ENCOUNTER — HOSPITAL ENCOUNTER (OUTPATIENT)
Dept: CARDIAC REHAB | Facility: HOSPITAL | Age: 71
End: 2021-09-27
Attending: INTERNAL MEDICINE
Payer: MEDICARE

## 2021-09-27 PROCEDURE — 93798 PHYS/QHP OP CAR RHAB W/ECG: CPT

## 2021-09-29 ENCOUNTER — HOSPITAL ENCOUNTER (OUTPATIENT)
Dept: CARDIAC REHAB | Facility: HOSPITAL | Age: 71
End: 2021-09-29
Attending: INTERNAL MEDICINE
Payer: MEDICARE

## 2021-09-29 PROCEDURE — 93798 PHYS/QHP OP CAR RHAB W/ECG: CPT

## 2021-09-29 NOTE — TELEPHONE ENCOUNTER
Left message for patient to check in to see how he is doing off lisinopril. -[facility] [place]

## 2021-09-30 NOTE — TELEPHONE ENCOUNTER
Pt called back and reports that he has been feeling well off lisinopril. He states his BP is in the 115-120 range and his dizziness and resolved somewhat. He also learned that he had some fluid in his ears and his primary care doctor has prescribed him drops to help with this. He will follow up as scheduled. -[facility] [place]

## 2021-10-01 ENCOUNTER — HOSPITAL ENCOUNTER (OUTPATIENT)
Dept: CARDIAC REHAB | Facility: HOSPITAL | Age: 71
End: 2021-10-01
Attending: INTERNAL MEDICINE
Payer: MEDICARE

## 2021-10-01 PROCEDURE — 93798 PHYS/QHP OP CAR RHAB W/ECG: CPT

## 2021-10-04 ENCOUNTER — HOSPITAL ENCOUNTER (OUTPATIENT)
Dept: CARDIAC REHAB | Facility: HOSPITAL | Age: 71
End: 2021-10-04
Attending: INTERNAL MEDICINE
Payer: MEDICARE

## 2021-10-04 PROCEDURE — 93798 PHYS/QHP OP CAR RHAB W/ECG: CPT

## 2021-10-08 ENCOUNTER — HOSPITAL ENCOUNTER (OUTPATIENT)
Dept: CARDIAC REHAB | Facility: HOSPITAL | Age: 71
End: 2021-10-08
Attending: INTERNAL MEDICINE
Payer: MEDICARE

## 2021-10-08 PROCEDURE — 93798 PHYS/QHP OP CAR RHAB W/ECG: CPT

## 2021-10-11 ENCOUNTER — HOSPITAL ENCOUNTER (OUTPATIENT)
Dept: CARDIAC REHAB | Facility: HOSPITAL | Age: 71
End: 2021-10-11
Attending: INTERNAL MEDICINE
Payer: MEDICARE

## 2021-10-11 PROCEDURE — 93798 PHYS/QHP OP CAR RHAB W/ECG: CPT

## 2021-10-13 ENCOUNTER — HOSPITAL ENCOUNTER (OUTPATIENT)
Dept: CARDIAC REHAB | Facility: HOSPITAL | Age: 71
End: 2021-10-13
Attending: INTERNAL MEDICINE
Payer: MEDICARE

## 2021-10-13 PROCEDURE — 93798 PHYS/QHP OP CAR RHAB W/ECG: CPT

## 2021-10-15 ENCOUNTER — HOSPITAL ENCOUNTER (OUTPATIENT)
Dept: CARDIAC REHAB | Facility: HOSPITAL | Age: 71
End: 2021-10-15
Attending: INTERNAL MEDICINE
Payer: MEDICARE

## 2021-10-15 PROCEDURE — 93798 PHYS/QHP OP CAR RHAB W/ECG: CPT

## 2021-10-18 ENCOUNTER — OFFICE VISIT (OUTPATIENT)
Dept: CARDIOLOGY | Facility: CLINIC | Age: 71
End: 2021-10-18
Payer: MEDICARE

## 2021-10-18 VITALS
BODY MASS INDEX: 27.94 KG/M2 | HEART RATE: 69 BPM | HEIGHT: 67 IN | WEIGHT: 178 LBS | RESPIRATION RATE: 14 BRPM | SYSTOLIC BLOOD PRESSURE: 150 MMHG | DIASTOLIC BLOOD PRESSURE: 72 MMHG

## 2021-10-18 DIAGNOSIS — C34.90 SMALL CELL LUNG CANCER (H): ICD-10-CM

## 2021-10-18 DIAGNOSIS — E78.00 PURE HYPERCHOLESTEROLEMIA: ICD-10-CM

## 2021-10-18 DIAGNOSIS — E11.9 DIABETES MELLITUS WITHOUT COMPLICATION (H): ICD-10-CM

## 2021-10-18 DIAGNOSIS — I10 ESSENTIAL HYPERTENSION: ICD-10-CM

## 2021-10-18 DIAGNOSIS — I25.10 MILD CORONARY ARTERY DISEASE: ICD-10-CM

## 2021-10-18 DIAGNOSIS — J41.1 MUCOPURULENT CHRONIC BRONCHITIS (H): ICD-10-CM

## 2021-10-18 DIAGNOSIS — I21.4 NSTEMI (NON-ST ELEVATED MYOCARDIAL INFARCTION) (H): Primary | ICD-10-CM

## 2021-10-18 PROCEDURE — 99214 OFFICE O/P EST MOD 30 MIN: CPT | Performed by: INTERNAL MEDICINE

## 2021-10-18 ASSESSMENT — MIFFLIN-ST. JEOR: SCORE: 1531.03

## 2021-10-18 NOTE — LETTER
10/18/2021    RAFAEL Funk  [facility] [address]  [facility] [address]    RE: Martin Tovar       Dear Colleague,    I had the pleasure of seeing Martin Tovar in the [facility].        [facility]  Heart Care Clinic Follow-up Note    Assessment & Plan        (I21.4) NSTEMI (non-ST elevated myocardial infarction) (H)  (primary encounter diagnosis)  Comment: non-ST segment elevation MI involving posterior circulation since electrically silent and obtuse marginal artery disease.  Stable, no postinfarct angina, sent home on prevention.    (I25.10) Mild coronary artery disease  Comment: angiography showed normal left main, ostial LAD 35% stenosis, mid LAD 35% stenosis with proximal circumflex 25% stenosis, and 10% right coronary artery disease.  Medical therapy.    (E78.00) Pure hypercholesterolemia  Comment: Total cholesterol of 105 with an LDL of 37 which is excellent.    (I10) Essential hypertension  Comment: For the most part under good control but he is having occasional systolics of 150.  Intolerant of lisinopril 2.5 mg in the morning.  And suggest that he monitor blood pressures and if running higher take lisinopril in the evening when he goes to sleep.    (E11.9) Diabetes mellitus without complication (H)  Comment: So noted with hemoglobin A1c 6.1.    (J41.1) Mucopurulent chronic bronchitis (H)  Comment: Actually COPD and he needs to discontinue smoking.    (C34.90) Small cell lung cancer (H)  Comment: So noted status post radiation therapy and having surveillance CT scans every 6 months.  No recurrence.    Plan  1.  Patient desires to stop cardiac rehab, quiet and positioned him to drive down for this all the time.  2.  Monitor blood pressures, if systolics running high in the 140s 150s restart lisinopril 2.5 mg at bedtime.  3.  Stop smoking.  4.  Follow-up me in 6 months or sooner if needed.    Subjective  CC:  "71-year-old white gentleman being seen in post hospital discharge follow-up today.  Still living independently with his wife, they now live at least 40 miles away, it is a hardship to come to cardiac rehab.  He tells me he does have a cough, does have some shortness breath and activity but this is due to smoking which he continues.  No significant chest discomfort, palpitations, PND, orthopnea or peripheral edema.  Did have some lightheadedness when he took lisinopril 2.5 mg tablets.    Medications  Current Outpatient Medications   Medication Sig Dispense Refill     albuterol (PROVENTIL HFA;VENTOLIN HFA) 90 mcg/actuation inhaler [ALBUTEROL (PROVENTIL HFA;VENTOLIN HFA) 90 MCG/ACTUATION INHALER] Inhale 2 puffs 4 (four) times a day as needed for wheezing.              atorvastatin (LIPITOR) 40 MG tablet Take 1 tablet (40 mg) by mouth daily 90 tablet 3     diclofenac (VOLTAREN) 1 % topical gel Apply topically 4 times daily as needed for moderate pain (hand joints)       HYDROcodone-acetaminophen (NORCO) 7.5-325 mg per tablet [HYDROCODONE-ACETAMINOPHEN (NORCO) 7.5-325 MG PER TABLET] Take 1 tablet by mouth every 6 (six) hours as needed. 30 tablet 0     ipratropium (ATROVENT) 0.02 % nebulizer solution Take 0.5 mg by nebulization every 8 hours        morphine (MS CONTIN) 15 MG 12 hr tablet Take 15 mg by mouth every 6 hours        oxybutynin ER (DITROPAN-XL) 10 MG 24 hr tablet TAKE 1 TABLET BY MOUTH EVERY DAY IN THE MORNING       tamsulosin (FLOMAX) 0.4 mg cap [TAMSULOSIN (FLOMAX) 0.4 MG CAP] Take 0.4 mg by mouth daily.       lisinopril (ZESTRIL) 2.5 MG tablet Take 1 tablet (2.5 mg) by mouth daily (Patient not taking: Reported on 10/18/2021) 30 tablet 1       Objective  BP (!) 150/72 (BP Location: Left arm, Patient Position: Sitting, Cuff Size: Adult Regular)   Pulse 69   Resp 14   Ht 1.702 m (5' 7\")   Wt 80.7 kg (178 lb)   BMI 27.88 kg/m      General Appearance:    Alert, cooperative, no distress, appears stated age "   Head:    Normocephalic, without obvious abnormality, atraumatic   Throat:   Lips, mucosa, and tongue normal; teeth and gums normal   Neck:   Supple, symmetrical, trachea midline, no adenopathy;        thyroid:  No enlargement/tenderness/nodules; no carotid    bruit or JVD   Back:     Symmetric, no curvature, ROM normal, no CVA tenderness   Lungs:     Clear to auscultation bilaterally, respirations unlabored   Chest wall:    No tenderness or deformity   Heart:    Regular rate and rhythm, S1 and S2 normal, no murmur, rub   or gallop   Abdomen:     Soft, non-tender, bowel sounds active all four quadrants,     no masses, no organomegaly   Extremities:   Normal, atraumatic, no cyanosis or edema   Pulses:   2+ and symmetric all extremities   Skin:   Skin color, texture, turgor normal, no rashes or lesions     Results    Lab Results personally reviewed   Lab Results   Component Value Date    CHOL 105 09/23/2021    CHOL 106 09/09/2021     Lab Results   Component Value Date    HDL 39 (L) 09/23/2021    HDL 38 (L) 09/09/2021     No components found for: LDLCALC  Lab Results   Component Value Date    TRIG 145 09/23/2021    TRIG 119 09/09/2021     Lab Results   Component Value Date    WBC 7.6 08/04/2021    HGB 15.3 08/04/2021    HCT 46.6 08/04/2021     08/04/2021     Lab Results   Component Value Date    BUN 15 09/23/2021     09/23/2021    CO2 26 09/23/2021                 Thank you for allowing me to participate in the care of your patient.      Sincerely,     JOCELYNN PORTER MD     [facility]  cc:   No referring provider defined for this encounter.

## 2021-10-18 NOTE — PATIENT INSTRUCTIONS
Mr Martin Tovar,  I enjoyed visiting with you again today.  I am glad to hear you are doing well.  Per our conversation try the LISINOPRIL 2.5 mg at night time if blood pressures run high.  I will plan on seeing you 6 months.  Maicol Cervantes

## 2021-10-18 NOTE — PROGRESS NOTES
[facility]  Heart Care Clinic Follow-up Note    Assessment & Plan        (I21.4) NSTEMI (non-ST elevated myocardial infarction) (H)  (primary encounter diagnosis)  Comment: non-ST segment elevation MI involving posterior circulation since electrically silent and obtuse marginal artery disease.  Stable, no postinfarct angina, sent home on prevention.    (I25.10) Mild coronary artery disease  Comment: angiography showed normal left main, ostial LAD 35% stenosis, mid LAD 35% stenosis with proximal circumflex 25% stenosis, and 10% right coronary artery disease.  Medical therapy.    (E78.00) Pure hypercholesterolemia  Comment: Total cholesterol of 105 with an LDL of 37 which is excellent.    (I10) Essential hypertension  Comment: For the most part under good control but he is having occasional systolics of 150.  Intolerant of lisinopril 2.5 mg in the morning.  And suggest that he monitor blood pressures and if running higher take lisinopril in the evening when he goes to sleep.    (E11.9) Diabetes mellitus without complication (H)  Comment: So noted with hemoglobin A1c 6.1.    (J41.1) Mucopurulent chronic bronchitis (H)  Comment: Actually COPD and he needs to discontinue smoking.    (C34.90) Small cell lung cancer (H)  Comment: So noted status post radiation therapy and having surveillance CT scans every 6 months.  No recurrence.    Plan  1.  Patient desires to stop cardiac rehab, quiet and positioned him to drive down for this all the time.  2.  Monitor blood pressures, if systolics running high in the 140s 150s restart lisinopril 2.5 mg at bedtime.  3.  Stop smoking.  4.  Follow-up me in 6 months or sooner if needed.    Subjective  CC: 71-year-old white gentleman being seen in post hospital discharge follow-up today.  Still living independently with his wife, they now live at least 40 miles away, it is a hardship to come to cardiac rehab.  He tells me he does have a cough, does have some shortness breath and  "activity but this is due to smoking which he continues.  No significant chest discomfort, palpitations, PND, orthopnea or peripheral edema.  Did have some lightheadedness when he took lisinopril 2.5 mg tablets.    Medications  Current Outpatient Medications   Medication Sig Dispense Refill     albuterol (PROVENTIL HFA;VENTOLIN HFA) 90 mcg/actuation inhaler [ALBUTEROL (PROVENTIL HFA;VENTOLIN HFA) 90 MCG/ACTUATION INHALER] Inhale 2 puffs 4 (four) times a day as needed for wheezing.              atorvastatin (LIPITOR) 40 MG tablet Take 1 tablet (40 mg) by mouth daily 90 tablet 3     diclofenac (VOLTAREN) 1 % topical gel Apply topically 4 times daily as needed for moderate pain (hand joints)       HYDROcodone-acetaminophen (NORCO) 7.5-325 mg per tablet [HYDROCODONE-ACETAMINOPHEN (NORCO) 7.5-325 MG PER TABLET] Take 1 tablet by mouth every 6 (six) hours as needed. 30 tablet 0     ipratropium (ATROVENT) 0.02 % nebulizer solution Take 0.5 mg by nebulization every 8 hours        morphine (MS CONTIN) 15 MG 12 hr tablet Take 15 mg by mouth every 6 hours        oxybutynin ER (DITROPAN-XL) 10 MG 24 hr tablet TAKE 1 TABLET BY MOUTH EVERY DAY IN THE MORNING       tamsulosin (FLOMAX) 0.4 mg cap [TAMSULOSIN (FLOMAX) 0.4 MG CAP] Take 0.4 mg by mouth daily.       lisinopril (ZESTRIL) 2.5 MG tablet Take 1 tablet (2.5 mg) by mouth daily (Patient not taking: Reported on 10/18/2021) 30 tablet 1       Objective  BP (!) 150/72 (BP Location: Left arm, Patient Position: Sitting, Cuff Size: Adult Regular)   Pulse 69   Resp 14   Ht 1.702 m (5' 7\")   Wt 80.7 kg (178 lb)   BMI 27.88 kg/m      General Appearance:    Alert, cooperative, no distress, appears stated age   Head:    Normocephalic, without obvious abnormality, atraumatic   Throat:   Lips, mucosa, and tongue normal; teeth and gums normal   Neck:   Supple, symmetrical, trachea midline, no adenopathy;        thyroid:  No enlargement/tenderness/nodules; no carotid    bruit or JVD "   Back:     Symmetric, no curvature, ROM normal, no CVA tenderness   Lungs:     Clear to auscultation bilaterally, respirations unlabored   Chest wall:    No tenderness or deformity   Heart:    Regular rate and rhythm, S1 and S2 normal, no murmur, rub   or gallop   Abdomen:     Soft, non-tender, bowel sounds active all four quadrants,     no masses, no organomegaly   Extremities:   Normal, atraumatic, no cyanosis or edema   Pulses:   2+ and symmetric all extremities   Skin:   Skin color, texture, turgor normal, no rashes or lesions     Results    Lab Results personally reviewed   Lab Results   Component Value Date    CHOL 105 09/23/2021    CHOL 106 09/09/2021     Lab Results   Component Value Date    HDL 39 (L) 09/23/2021    HDL 38 (L) 09/09/2021     No components found for: LDLCALC  Lab Results   Component Value Date    TRIG 145 09/23/2021    TRIG 119 09/09/2021     Lab Results   Component Value Date    WBC 7.6 08/04/2021    HGB 15.3 08/04/2021    HCT 46.6 08/04/2021     08/04/2021     Lab Results   Component Value Date    BUN 15 09/23/2021     09/23/2021    CO2 26 09/23/2021

## 2021-11-22 ENCOUNTER — MEDICAL CORRESPONDENCE (OUTPATIENT)
Dept: ADMINISTRATIVE | Facility: CLINIC | Age: 71
End: 2021-11-22
Payer: MEDICARE

## 2021-12-06 ENCOUNTER — TELEPHONE (OUTPATIENT)
Dept: CARDIOLOGY | Facility: CLINIC | Age: 71
End: 2021-12-06
Payer: MEDICARE

## 2021-12-06 NOTE — TELEPHONE ENCOUNTER
ACC Quality Measurement  Hypertension        Last three blood pressure readings:  BP Readings from Last 3 Encounters:   10/18/21 (!) 150/72   09/24/21 (!) 157/82   08/26/21 117/74         Follow-up Action:   Patient's blood pressure during Rehab certification on 11/22/21 was 134/70 and within normal limits according to MNCM.

## 2022-01-27 ENCOUNTER — LAB REQUISITION (OUTPATIENT)
Dept: LAB | Facility: CLINIC | Age: 72
End: 2022-01-27
Payer: MEDICARE

## 2022-01-27 DIAGNOSIS — Z03.818 ENCOUNTER FOR OBSERVATION FOR SUSPECTED EXPOSURE TO OTHER BIOLOGICAL AGENTS RULED OUT: ICD-10-CM

## 2022-01-27 PROCEDURE — U0005 INFEC AGEN DETEC AMPLI PROBE: HCPCS | Mod: ORL | Performed by: FAMILY MEDICINE

## 2022-01-28 LAB — SARS-COV-2 RNA RESP QL NAA+PROBE: NEGATIVE

## 2022-02-01 ENCOUNTER — LAB REQUISITION (OUTPATIENT)
Dept: LAB | Facility: CLINIC | Age: 72
End: 2022-02-01
Payer: MEDICARE

## 2022-02-01 DIAGNOSIS — Z03.818 ENCOUNTER FOR OBSERVATION FOR SUSPECTED EXPOSURE TO OTHER BIOLOGICAL AGENTS RULED OUT: ICD-10-CM

## 2022-02-01 PROCEDURE — U0005 INFEC AGEN DETEC AMPLI PROBE: HCPCS | Mod: ORL | Performed by: PHYSICIAN ASSISTANT

## 2022-02-02 LAB — SARS-COV-2 RNA RESP QL NAA+PROBE: NEGATIVE

## 2022-02-18 ENCOUNTER — LAB REQUISITION (OUTPATIENT)
Dept: LAB | Facility: CLINIC | Age: 72
End: 2022-02-18
Payer: MEDICARE

## 2022-02-18 ENCOUNTER — TRANSFERRED RECORDS (OUTPATIENT)
Dept: HEALTH INFORMATION MANAGEMENT | Facility: CLINIC | Age: 72
End: 2022-02-18

## 2022-02-18 DIAGNOSIS — E11.9 TYPE 2 DIABETES MELLITUS WITHOUT COMPLICATIONS (H): ICD-10-CM

## 2022-02-18 LAB
ANION GAP SERPL CALCULATED.3IONS-SCNC: 8 MMOL/L (ref 5–18)
BUN SERPL-MCNC: 12 MG/DL (ref 8–22)
CALCIUM SERPL-MCNC: 9.1 MG/DL (ref 8.5–10.5)
CHLORIDE BLD-SCNC: 105 MMOL/L (ref 98–107)
CO2 SERPL-SCNC: 30 MMOL/L (ref 22–31)
CREAT SERPL-MCNC: 1.03 MG/DL (ref 0.7–1.3)
GFR SERPL CREATININE-BSD FRML MDRD: 79 ML/MIN/1.73M2
GLUCOSE BLD-MCNC: 136 MG/DL (ref 70–125)
POTASSIUM BLD-SCNC: 4.1 MMOL/L (ref 3.5–5)
SODIUM SERPL-SCNC: 143 MMOL/L (ref 136–145)

## 2022-02-18 PROCEDURE — 80048 BASIC METABOLIC PNL TOTAL CA: CPT | Mod: ORL | Performed by: PHYSICIAN ASSISTANT

## 2022-03-06 ENCOUNTER — HEALTH MAINTENANCE LETTER (OUTPATIENT)
Age: 72
End: 2022-03-06

## 2022-03-24 ENCOUNTER — HOSPITAL ENCOUNTER (OUTPATIENT)
Dept: CT IMAGING | Facility: HOSPITAL | Age: 72
Discharge: HOME | End: 2022-03-24
Attending: RADIOLOGY
Payer: MEDICARE

## 2022-03-24 ENCOUNTER — HOSPITAL ENCOUNTER (OUTPATIENT)
Dept: MRI IMAGING | Facility: HOSPITAL | Age: 72
Discharge: HOME | End: 2022-03-24
Attending: RADIOLOGY
Payer: MEDICARE

## 2022-03-24 DIAGNOSIS — C34.90 SMALL CELL CARCINOMA OF LUNG (H): ICD-10-CM

## 2022-03-24 LAB
CREAT BLD-MCNC: 0.9 MG/DL (ref 0.7–1.3)
GFR SERPL CREATININE-BSD FRML MDRD: >60 ML/MIN/1.73M2

## 2022-03-24 PROCEDURE — 70553 MRI BRAIN STEM W/O & W/DYE: CPT | Mod: ME

## 2022-03-24 PROCEDURE — 74177 CT ABD & PELVIS W/CONTRAST: CPT | Mod: MG

## 2022-03-24 PROCEDURE — 250N000011 HC RX IP 250 OP 636: Performed by: RADIOLOGY

## 2022-03-24 PROCEDURE — 255N000002 HC RX 255 OP 636: Performed by: RADIOLOGY

## 2022-03-24 PROCEDURE — 82565 ASSAY OF CREATININE: CPT

## 2022-03-24 PROCEDURE — A9585 GADOBUTROL INJECTION: HCPCS | Performed by: RADIOLOGY

## 2022-03-24 RX ORDER — GADOBUTROL 604.72 MG/ML
8 INJECTION INTRAVENOUS ONCE
Status: COMPLETED | OUTPATIENT
Start: 2022-03-24 | End: 2022-03-24

## 2022-03-24 RX ORDER — IOPAMIDOL 755 MG/ML
100 INJECTION, SOLUTION INTRAVASCULAR ONCE
Status: COMPLETED | OUTPATIENT
Start: 2022-03-24 | End: 2022-03-24

## 2022-03-24 RX ADMIN — GADOBUTROL 8 ML: 604.72 INJECTION INTRAVENOUS at 15:41

## 2022-03-24 RX ADMIN — IOPAMIDOL 100 ML: 755 INJECTION, SOLUTION INTRAVENOUS at 14:28

## 2022-03-31 ENCOUNTER — OFFICE VISIT (OUTPATIENT)
Dept: RADIATION ONCOLOGY | Facility: HOSPITAL | Age: 72
End: 2022-03-31
Attending: RADIOLOGY
Payer: MEDICARE

## 2022-03-31 VITALS
BODY MASS INDEX: 26.94 KG/M2 | HEART RATE: 85 BPM | RESPIRATION RATE: 18 BRPM | SYSTOLIC BLOOD PRESSURE: 139 MMHG | OXYGEN SATURATION: 95 % | DIASTOLIC BLOOD PRESSURE: 87 MMHG | WEIGHT: 172 LBS

## 2022-03-31 DIAGNOSIS — C34.90 SMALL CELL LUNG CANCER (H): Primary | ICD-10-CM

## 2022-03-31 PROCEDURE — G0463 HOSPITAL OUTPT CLINIC VISIT: HCPCS

## 2022-03-31 PROCEDURE — 99214 OFFICE O/P EST MOD 30 MIN: CPT | Performed by: RADIOLOGY

## 2022-03-31 NOTE — PROGRESS NOTES
[facility] Radiation Oncology Follow Up     Patient: Martin Tovar  MRN: [id number]  Date of Service: 03/31/2022       DISEASE TREATED:  History of limited stage small cell left lung cancer diagnosed initially in 2012.  The patient is status post chemoradiation therapy.  He was found to have a new diagnosis of limited stage small cell lung cancer, second primary/recurrence, clinical stage T1 N0 M0.  His case has been discussed at thoracic tumor conference and consensus recommendation is to consider SBRT.      TYPE OF RADIATION THERAPY ADMINISTERED:  5000 cGy in 5 treatments given from 3/2/120-3/12/2020.     INTERVAL SINCE COMPLETION OF RADIATION THERAPY: 2 years and 1 month.      SUBJECTIVE:  Mr. Tovar is a 71 y.o. male who is a retired  from [employer].  Patient was diagnosed with limited stage small cell left lung cancer initially in 2012, stage T2 N0 M0.  He received concurrent chemoradiation therapy with cisplatin and etoposide and radiation therapy to a total dose of 4500 cGy in 30 treatments at 150 cGy twice daily.  His treatment was given at [facility].  Patient had a complete response and also received PCI with a total dose of 2500 cGy in 10 treatments.  Patient has been followed closely with no evidence of cancer recurrence.  He will presented with recent finding of left lower lobe nodule by routine screening exam for which he was seeking further evaluation.  PET CT scan on 1/14/2020 showed a 1.3 x 1.2 cm nodule with increased SUV max 3.3 consistent with malignancy.  There is no radiographic evidence of anel and systemic metastasis.  CT-guided needle biopsy on 2/5/2020 confirmed diagnosis of small cell carcinoma.  He had a staging MRI brain which also showed no evidence of brain metastasis.  Given the prior history of radiation therapy and location of his current cancer, the patient might require pneumonectomy.  His case has been discussed at thoracic tumor conference and that the  consensus recommendation is to consider SBRT. His case has been discussed at thoracic tumor conference and consensus recommendation is to consider SBRT.  He received radiation therapy in our clinic with a total dose of 5000 cGy in 5 treatments given from 3/2/120-3/12/2020.  He tolerated radiation therapy very well with minimal side effect.     Patient has been doing well since completion of the radiation therapy.  He denies any chest pain discomfort at the time of evaluation.  He is here for routine postradiation therapy office follow-up.    Medications were reviewed and are up to date on EPIC.    The following portions of the patient's history were reviewed and updated as appropriate: allergies, current medications, past family history, past medical history, past social history, past surgical history and problem list.    Review of Systems:      General  Constitutional  Constitutional (WDL): Exceptions to WDL  Weight Loss: 5 to less than 10% from baseline OR intervention not indicated (unintentional)  EENT  Eye Disorders  Eye Disorder (WDL): Assessment not pertinent to visit  Ear Disorders  Ear Disorder (WDL): Assessment not pertinent to visit  Respiratory  Respiratory  Respiratory (WDL): Exceptions to WDL (smoking 1ppd cigarettes)  Cough: Mild symptoms OR nonprescription intervention indicated  Dyspnea: Shortness of breath with moderate exertion  Cardiovascular  Cardiovascular  Cardiovascular (WDL): All cardiovascular elements are within defined limits  Gastrointestinal  Gastrointestinal  Gastrointestinal (WDL): Exceptions to WDL  Anorexia: Loss of appetite without alteration in eating habits  Musculoskeletal  Musculoskeletal and Connective Tissue Disorders  Musculoskeletal & Connective (WDL): Exceptions to WDL  Arthralgia: Mild pain  Integumentary  Integumentary  Integumentary (WDL): All integumentary elements are within defined limits  Neurological  Neurosensory  Neurosensory (WDL): All neurosensory elements are  within defined limits  Genitourinary/Reproductive  Genitourinary  Genitourinary (WDL): Assessment not pertinent to visit  Lymphatic  Lymph System Disorders  Lymph (WDL): All lymph elements are within defined limits  Pain     AUA Assessment                                                              Accompanied by  Accompanied By: self only    Objective:     PHYSICAL EXAMINATION:    /87   Pulse 85   Resp 18   Wt 78 kg (172 lb)   SpO2 95%   BMI 26.94 kg/m      Gen: Alert, in NAD  Eyes: PERRL, EOMI, sclera anicteric  Pulm: No wheezing, stridor or respiratory distress  CV: Well-perfused, no cyanosis, no pedal edema  Back: No step-offs or pain to palpation along the thoracolumbar spine  Rectal: Deferred  : Deferred  Musculoskeletal: Normal muscle bulk and tone  Skin: Normal color and turgor  Neurologic: A/Ox3, CN II-XII intact, normal gait and station  Psychiatric: Appropriate mood and affect     Imaging: Imaging results 30 days: CT Chest/Abdomen/Pelvis w Contrast    Result Date: 3/24/2022  EXAM: CT CHEST/ABDOMEN/PELVIS W CONTRAST LOCATION: [facility] DATE/TIME: 3/24/2022 2:03 PM INDICATION: Small cell lung cancer, monitor. COMPARISON: CT chest, 08/24/2021. CT abdomen and pelvis, 09/21/2021. TECHNIQUE: CT scan of the chest, abdomen, and pelvis was performed following injection of IV contrast. Multiplanar reformats were obtained. Dose reduction techniques were used. CONTRAST: IsoVue 370 100mL FINDINGS: LUNGS AND PLEURA: Fiducial marker involving the left lower lobe is again identified with some surrounding fibrotic changes/scarring extending into the left hilar region. This is stable. Benign calcified granulomas again noted within the left lower lobe. MEDIASTINUM/AXILLAE: Heart size is normal. Thoracic aorta is normal in caliber. No lymphadenopathy. CORONARY ARTERY CALCIFICATION: Mild. HEPATOBILIARY: Cholecystectomy. PANCREAS: Normal. SPLEEN: Normal. ADRENAL GLANDS: Normal.  KIDNEYS/BLADDER: There are bilateral benign-appearing renal cysts. BOWEL: There is no bowel obstruction or abnormal bowel wall thickening. A moderate amount of stool is present throughout the colon. Bowel anastomosis involves the distal sigmoid colon region. LYMPH NODES: Normal. VASCULATURE: Unremarkable. PELVIC ORGANS: Normal. MUSCULOSKELETAL: No suspicious osseous lesions. Fusion changes involve the lumbar spine lumbosacral spine.     IMPRESSION: 1.  No evidence for metastatic disease involving the chest, abdomen, or pelvis. 2.  Stable posttreatment changes within the left lung.    MR Brain w/o & w Contrast    Result Date: 3/25/2022  EXAM: MR BRAIN W/O and W CONTRAST LOCATION: [facility] DATE/TIME: 3/24/2022 2:54 PM INDICATION: Brain metastases, monitor COMPARISON: 09/21/2021. CONTRAST: 8mL Gadavist TECHNIQUE: Multiplanar multisequence head MRI without and with intravenous contrast including dynamic susceptibility contrast perfusion imaging. FINDINGS: INTRACRANIAL CONTENTS: No abnormal restricted diffusion to suggest acute infarct. Scattered foci of signal abnormality within the cerebral hemispheric white matter which are nonspecific, though most commonly ascribed to chronic small vessel ischemic disease. The ventricles and sulci are prominent consistent with moderate brain parenchymal volume loss. No evidence of acute intracranial hemorrhage, mass effect, or extra-axial collection. No abnormal brain parenchymal or leptomeningeal enhancement. No cerebellar tonsillar ectopia. No elevated relative cerebral blood volume on perfusion imaging. SELLA: No abnormality accounting for technique. OSSEOUS STRUCTURES/SOFT TISSUES: The visualized skull base and calvarium are unremarkable. Expected signal voids within the distal vertebral, basilar, and bilateral internal carotid arteries. ORBITS: The globes are unremarkable. SINUSES/MASTOIDS: The partially imaged paranasal sinuses and mastoid air cells  are unremarkable.     IMPRESSION: 1.  No evidence of acute intracranial hemorrhage, mass effect, or infarction. 2.  No evidence of intracranial metastases. 3.  Moderate nonspecific white matter changes. 4.  Moderate brain parenchymal volume loss.       Impression     The patient is a 71-year-old gentleman with a prior history of limited stage small cell lung cancer diagnosed initially in 2012.  He was find to have a new limited stage small cell lung cancer involving the left lower lobe with clinical stage T1 N0 M0.  Patient received definitive SBRT and completed 2 years and 1 month ago.  He has been doing well with no clinical evidence of recurrence.    Assessment & Plan:     1.  I have personally reviewed his restaging CT and MRI scan today and compare with previous CT chest and radiation therapy record.  There is no radiographic evidence of cancer recurrence.    Patient is scheduled to return to radiation oncology in 6 months for CT chest and MRI brain.     2.  Continue follow-up with Dr. Hale, thoracic surgeon and Dr. Brito, pulmonology as planned.        Face to face time  30 minutes with > 75% spent on consultation, education and coordination of care.          Mary Snowden MD, PhD  Department of Radiation Oncology   [facility]  Tel: [phone]  Page: [phone]    [facility]  [address] [place] [address]     [place] [id number] [facility]   [place] [address]    CC:  Patient Care Team:  April Franco PA as PCP - General  Mary Snowden MD as MD (Hematology & Oncology)  Mary Snowden MD as Assigned Cancer Care Provider  Maicol Cervantes MD as Assigned Heart and Vascular Provider

## 2022-03-31 NOTE — PROGRESS NOTES
"Oncology Rooming Note    March 31, 2022 2:48 PM   Martin Tovar is a 71 year old male who presents for:    Chief Complaint   Patient presents with     Oncology Clinic Visit     Rad Onc f/u     Initial Vitals: /87   Pulse 85   Resp 18   Wt 78 kg (172 lb)   SpO2 95%   BMI 26.94 kg/m   Estimated body mass index is 26.94 kg/m  as calculated from the following:    Height as of 10/18/21: 1.702 m (5' 7\").    Weight as of this encounter: 78 kg (172 lb). Body surface area is 1.92 meters squared.  Data Unavailable Comment: Data Unavailable   No LMP for male patient.  Allergies reviewed: Yes  Medications reviewed: Yes    Medications: Medication refills not needed today.  Pharmacy name entered into [employer]:    [id number] - [place], [place] - [address] AT [facility] [place] [id number] - [place], [place] - [address] AT [address]    Clinical concerns: No changes in symptoms per pt, see flowsheet.      Margo Khan RN              "

## 2022-03-31 NOTE — LETTER
"    3/31/2022         RE: Martin Tovar  [address] [place] [place] [address]        Dear Colleague,    Thank you for referring your patient, Martin Tovar, to the [facility] RADIATION [facility]COL[facility]Y [facility]. Please see a copy of my visit note below.    Oncology Rooming Note    March 31, 2022 2:48 PM   Martin Toavr is a 71 year old male who presents for:    Chief Complaint   Patient presents with     Oncology Clinic Visit     Rad Onc f/u     Initial Vitals: /87   Pulse 85   Resp 18   Wt 78 kg (172 lb)   SpO2 95%   BMI 26.94 kg/m   Estimated body mass index is 26.94 kg/m  as calculated from the following:    Height as of 10/18/21: 1.702 m (5' 7\").    Weight as of this encounter: 78 kg (172 lb). Body surface area is 1.92 meters squared.  Data Unavailable Comment: Data Unavailable   No LMP for male patient.  Allergies reviewed: Yes  Medications reviewed: Yes    Medications: Medication refills not needed today.  Pharmacy name entered into Socr[employer]:    [id number] - [place], [place] - [address] AT [id number] - [facility] [address] [place] [address] AVE AT [address]    Clinical concerns: No changes in symptoms per pt, see flowsheet.      Margo Khan RN                [facility] Radiation Oncology Follow Up     Patient: Martin Tovar  MRN: [id number]  Date of Service: 03/31/2022       DISEASE TREATED:  History of limited stage small cell left lung cancer diagnosed initially in 2012.  The patient is status post chemoradiation therapy.  He was found to have a new diagnosis of limited stage small cell lung cancer, second primary/recurrence, clinical stage T1 N0 M0.  His case has been discussed at thoracic tumor conference and consensus recommendation is to consider SBRT.      TYPE OF RADIATION THERAPY ADMINISTERED:  5000 cGy in 5 treatments given from 3/2/120-3/12/2020.     INTERVAL SINCE COMPLETION OF " RADIATION THERAPY: 2 years and 1 month.      SUBJECTIVE:  Mr. Tovar is a 71 y.o. male who is a retired  from [employer].  Patient was diagnosed with limited stage small cell left lung cancer initially in 2012, stage T2 N0 M0.  He received concurrent chemoradiation therapy with cisplatin and etoposide and radiation therapy to a total dose of 4500 cGy in 30 treatments at 150 cGy twice daily.  His treatment was given at [facility].  Patient had a complete response and also received PCI with a total dose of 2500 cGy in 10 treatments.  Patient has been followed closely with no evidence of cancer recurrence.  He will presented with recent finding of left lower lobe nodule by routine screening exam for which he was seeking further evaluation.  PET CT scan on 1/14/2020 showed a 1.3 x 1.2 cm nodule with increased SUV max 3.3 consistent with malignancy.  There is no radiographic evidence of anel and systemic metastasis.  CT-guided needle biopsy on 2/5/2020 confirmed diagnosis of small cell carcinoma.  He had a staging MRI brain which also showed no evidence of brain metastasis.  Given the prior history of radiation therapy and location of his current cancer, the patient might require pneumonectomy.  His case has been discussed at thoracic tumor conference and that the consensus recommendation is to consider SBRT. His case has been discussed at thoracic tumor conference and consensus recommendation is to consider SBRT.  He received radiation therapy in our clinic with a total dose of 5000 cGy in 5 treatments given from 3/2/120-3/12/2020.  He tolerated radiation therapy very well with minimal side effect.     Patient has been doing well since completion of the radiation therapy.  He denies any chest pain discomfort at the time of evaluation.  He is here for routine postradiation therapy office follow-up.    Medications were reviewed and are up to date on EPIC.    The following portions of the patient's history were  reviewed and updated as appropriate: allergies, current medications, past family history, past medical history, past social history, past surgical history and problem list.    Review of Systems:      General  Constitutional  Constitutional (WDL): Exceptions to WDL  Weight Loss: 5 to less than 10% from baseline OR intervention not indicated (unintentional)  EENT  Eye Disorders  Eye Disorder (WDL): Assessment not pertinent to visit  Ear Disorders  Ear Disorder (WDL): Assessment not pertinent to visit  Respiratory  Respiratory  Respiratory (WDL): Exceptions to WDL (smoking 1ppd cigarettes)  Cough: Mild symptoms OR nonprescription intervention indicated  Dyspnea: Shortness of breath with moderate exertion  Cardiovascular  Cardiovascular  Cardiovascular (WDL): All cardiovascular elements are within defined limits  Gastrointestinal  Gastrointestinal  Gastrointestinal (WDL): Exceptions to WDL  Anorexia: Loss of appetite without alteration in eating habits  Musculoskeletal  Musculoskeletal and Connective Tissue Disorders  Musculoskeletal & Connective (WDL): Exceptions to WDL  Arthralgia: Mild pain  Integumentary  Integumentary  Integumentary (WDL): All integumentary elements are within defined limits  Neurological  Neurosensory  Neurosensory (WDL): All neurosensory elements are within defined limits  Genitourinary/Reproductive  Genitourinary  Genitourinary (WDL): Assessment not pertinent to visit  Lymphatic  Lymph System Disorders  Lymph (WDL): All lymph elements are within defined limits  Pain     AUA Assessment                                                              Accompanied by  Accompanied By: self only    Objective:     PHYSICAL EXAMINATION:    /87   Pulse 85   Resp 18   Wt 78 kg (172 lb)   SpO2 95%   BMI 26.94 kg/m      Gen: Alert, in NAD  Eyes: PERRL, EOMI, sclera anicteric  Pulm: No wheezing, stridor or respiratory distress  CV: Well-perfused, no cyanosis, no pedal edema  Back: No step-offs or pain  to palpation along the thoracolumbar spine  Rectal: Deferred  : Deferred  Musculoskeletal: Normal muscle bulk and tone  Skin: Normal color and turgor  Neurologic: A/Ox3, CN II-XII intact, normal gait and station  Psychiatric: Appropriate mood and affect     Imaging: Imaging results 30 days: CT Chest/Abdomen/Pelvis w Contrast    Result Date: 3/24/2022  EXAM: CT CHEST/ABDOMEN/PELVIS W CONTRAST LOCATION: [facility] DATE/TIME: 3/24/2022 2:03 PM INDICATION: Small cell lung cancer, monitor. COMPARISON: CT chest, 08/24/2021. CT abdomen and pelvis, 09/21/2021. TECHNIQUE: CT scan of the chest, abdomen, and pelvis was performed following injection of IV contrast. Multiplanar reformats were obtained. Dose reduction techniques were used. CONTRAST: IsoVue 370 100mL FINDINGS: LUNGS AND PLEURA: Fiducial marker involving the left lower lobe is again identified with some surrounding fibrotic changes/scarring extending into the left hilar region. This is stable. Benign calcified granulomas again noted within the left lower lobe. MEDIASTINUM/AXILLAE: Heart size is normal. Thoracic aorta is normal in caliber. No lymphadenopathy. CORONARY ARTERY CALCIFICATION: Mild. HEPATOBILIARY: Cholecystectomy. PANCREAS: Normal. SPLEEN: Normal. ADRENAL GLANDS: Normal. KIDNEYS/BLADDER: There are bilateral benign-appearing renal cysts. BOWEL: There is no bowel obstruction or abnormal bowel wall thickening. A moderate amount of stool is present throughout the colon. Bowel anastomosis involves the distal sigmoid colon region. LYMPH NODES: Normal. VASCULATURE: Unremarkable. PELVIC ORGANS: Normal. MUSCULOSKELETAL: No suspicious osseous lesions. Fusion changes involve the lumbar spine lumbosacral spine.     IMPRESSION: 1.  No evidence for metastatic disease involving the chest, abdomen, or pelvis. 2.  Stable posttreatment changes within the left lung.    MR Brain w/o & w Contrast    Result Date: 3/25/2022  EXAM: MR BRAIN W/O and W  CONTRAST LOCATION: [facility] DATE/TIME: 3/24/2022 2:54 PM INDICATION: Brain metastases, monitor COMPARISON: 09/21/2021. CONTRAST: 8mL Gadavist TECHNIQUE: Multiplanar multisequence head MRI without and with intravenous contrast including dynamic susceptibility contrast perfusion imaging. FINDINGS: INTRACRANIAL CONTENTS: No abnormal restricted diffusion to suggest acute infarct. Scattered foci of signal abnormality within the cerebral hemispheric white matter which are nonspecific, though most commonly ascribed to chronic small vessel ischemic disease. The ventricles and sulci are prominent consistent with moderate brain parenchymal volume loss. No evidence of acute intracranial hemorrhage, mass effect, or extra-axial collection. No abnormal brain parenchymal or leptomeningeal enhancement. No cerebellar tonsillar ectopia. No elevated relative cerebral blood volume on perfusion imaging. SELLA: No abnormality accounting for technique. OSSEOUS STRUCTURES/SOFT TISSUES: The visualized skull base and calvarium are unremarkable. Expected signal voids within the distal vertebral, basilar, and bilateral internal carotid arteries. ORBITS: The globes are unremarkable. SINUSES/MASTOIDS: The partially imaged paranasal sinuses and mastoid air cells are unremarkable.     IMPRESSION: 1.  No evidence of acute intracranial hemorrhage, mass effect, or infarction. 2.  No evidence of intracranial metastases. 3.  Moderate nonspecific white matter changes. 4.  Moderate brain parenchymal volume loss.       Impression     The patient is a 71-year-old gentleman with a prior history of limited stage small cell lung cancer diagnosed initially in 2012.  He was find to have a new limited stage small cell lung cancer involving the left lower lobe with clinical stage T1 N0 M0.  Patient received definitive SBRT and completed 2 years and 1 month ago.  He has been doing well with no clinical evidence of  recurrence.    Assessment & Plan:     1.  I have personally reviewed his restaging CT and MRI scan today and compare with previous CT chest and radiation therapy record.  There is no radiographic evidence of cancer recurrence.    Patient is scheduled to return to radiation oncology in 6 months for CT chest and MRI brain.     2.  Continue follow-up with Dr. Hale, thoracic surgeon and Dr. Brito, pulmonology as planned.        Face to face time  30 minutes with > 75% spent on consultation, education and coordination of care.          Mary Snowden MD, PhD  Department of Radiation Oncology   [facility]  Tel: [phone]  Page: [phone]    [facility]  [address] [place] [address]     [place] [id number] [facility] [place] [address]    CC:  Patient Care Team:  April Franco PA as PCP - General  Mary Snowden MD as MD (Hematology & Oncology)  Mary Snowden MD as Assigned Cancer Care Provider  Maicol Cervantes MD as Assigned Heart and Vascular Provider        Again, thank you for allowing me to participate in the care of your patient.        Sincerely,        Mary Snowden MD

## 2022-04-15 ENCOUNTER — LAB REQUISITION (OUTPATIENT)
Dept: LAB | Facility: CLINIC | Age: 72
End: 2022-04-15
Payer: MEDICARE

## 2022-04-15 DIAGNOSIS — R10.811 RIGHT UPPER QUADRANT ABDOMINAL TENDERNESS: ICD-10-CM

## 2022-04-15 LAB
ALBUMIN SERPL-MCNC: 3.2 G/DL (ref 3.5–5)
ALP SERPL-CCNC: 77 U/L (ref 45–120)
ALT SERPL W P-5'-P-CCNC: 49 U/L (ref 0–45)
ANION GAP SERPL CALCULATED.3IONS-SCNC: 11 MMOL/L (ref 5–18)
AST SERPL W P-5'-P-CCNC: 44 U/L (ref 0–40)
BILIRUB SERPL-MCNC: 0.7 MG/DL (ref 0–1)
BUN SERPL-MCNC: 14 MG/DL (ref 8–22)
CALCIUM SERPL-MCNC: 8.9 MG/DL (ref 8.5–10.5)
CHLORIDE BLD-SCNC: 101 MMOL/L (ref 98–107)
CO2 SERPL-SCNC: 29 MMOL/L (ref 22–31)
CREAT SERPL-MCNC: 0.87 MG/DL (ref 0.7–1.3)
GFR SERPL CREATININE-BSD FRML MDRD: >90 ML/MIN/1.73M2
GLUCOSE BLD-MCNC: 99 MG/DL (ref 70–125)
POTASSIUM BLD-SCNC: 4.6 MMOL/L (ref 3.5–5)
PROT SERPL-MCNC: 6.6 G/DL (ref 6–8)
SODIUM SERPL-SCNC: 141 MMOL/L (ref 136–145)

## 2022-04-15 PROCEDURE — 80053 COMPREHEN METABOLIC PANEL: CPT | Mod: ORL | Performed by: PHYSICIAN ASSISTANT

## 2022-05-06 ENCOUNTER — OFFICE VISIT (OUTPATIENT)
Dept: CARDIOLOGY | Facility: CLINIC | Age: 72
End: 2022-05-06
Attending: INTERNAL MEDICINE
Payer: MEDICARE

## 2022-05-06 VITALS
SYSTOLIC BLOOD PRESSURE: 118 MMHG | BODY MASS INDEX: 26.15 KG/M2 | RESPIRATION RATE: 16 BRPM | HEART RATE: 84 BPM | WEIGHT: 166.6 LBS | DIASTOLIC BLOOD PRESSURE: 62 MMHG | HEIGHT: 67 IN

## 2022-05-06 DIAGNOSIS — I10 ESSENTIAL HYPERTENSION: ICD-10-CM

## 2022-05-06 DIAGNOSIS — I25.10 MILD CORONARY ARTERY DISEASE: Primary | ICD-10-CM

## 2022-05-06 DIAGNOSIS — E78.00 PURE HYPERCHOLESTEROLEMIA: ICD-10-CM

## 2022-05-06 DIAGNOSIS — C34.90 SMALL CELL LUNG CANCER (H): ICD-10-CM

## 2022-05-06 DIAGNOSIS — J41.1 MUCOPURULENT CHRONIC BRONCHITIS (H): ICD-10-CM

## 2022-05-06 DIAGNOSIS — E11.9 DIABETES MELLITUS WITHOUT COMPLICATION (H): ICD-10-CM

## 2022-05-06 PROCEDURE — 99214 OFFICE O/P EST MOD 30 MIN: CPT | Performed by: INTERNAL MEDICINE

## 2022-05-06 RX ORDER — ASPIRIN 81 MG/1
81 TABLET, CHEWABLE ORAL DAILY
COMMUNITY
End: 2024-07-12

## 2022-05-06 NOTE — LETTER
5/6/2022    RAFAEL Funk  [facility] [address]  [facility] [address]    RE: Martin Tovar       Dear Colleague,     I had the pleasure of seeing Martin Tovar in the [facility].      [facility]  Heart Care Clinic Follow-up Note    Assessment & Plan        (I25.10) Mild coronary artery disease  (primary encounter diagnosis)  Comment: Angiography due to borderline troponin elevation showed normal left main, ostial LAD 35% stenosis, mid LAD 35% stenosis with proximal circumflex 25% stenosis, and 10% right coronary artery disease. Medical therapy.  No symptoms and work on prevention.    (I10) Essential hypertension  Comment: Under good control on lisinopril which she is compliant with and continue this.    (E78.00) Pure hypercholesterolemia  Comment: Total cholesterol excellent at 105 with an LDL of 37 and continue current statin.    (E11.9) Diabetes mellitus without complication (H)  Comment: Hemoglobin A1c 6.1.  He is not watching a diet and I have implored him to take dietary precautions.    (C34.90) Small cell lung cancer (H)  Comment: In the past, with his weight loss he had CT scan showing no metastatic disease.    (J41.1) Mucopurulent chronic bronchitis (H)  Comment: COPD with recent exacerbation and doing well.    Plan  1.  Continue current meds.  2.  Follow-up with me in 2 years or sooner if needed.  3.  Stop smoking.  4.  Try to follow diabetic diet.    Subjective  CC: 71-year-old white gentleman here for follow-up today.  He lives independently with his wife, is retired, is working on refurbishing cars.  He still smokes up to a pack a day.  He states he is losing weight and not sure why.  He denies any chest pains, palpitations, significant shortness of breath, PND, orthopnea, syncope, dizziness or peripheral edema.  He states he is eating a lot of sweets to try to gain weight.  He is not checking his sugars.    Medications  Current Outpatient  "Medications   Medication Sig Dispense Refill     albuterol (PROVENTIL HFA;VENTOLIN HFA) 90 mcg/actuation inhaler [ALBUTEROL (PROVENTIL HFA;VENTOLIN HFA) 90 MCG/ACTUATION INHALER] Inhale 2 puffs 4 (four) times a day as needed for wheezing.              aspirin (ASA) 81 MG chewable tablet Take 81 mg by mouth daily       atorvastatin (LIPITOR) 40 MG tablet Take 1 tablet (40 mg) by mouth daily 90 tablet 3     diclofenac (VOLTAREN) 1 % topical gel Apply topically 4 times daily as needed for moderate pain (hand joints)       HYDROcodone-acetaminophen (NORCO) 7.5-325 mg per tablet [HYDROCODONE-ACETAMINOPHEN (NORCO) 7.5-325 MG PER TABLET] Take 1 tablet by mouth every 6 (six) hours as needed. 30 tablet 0     ipratropium (ATROVENT) 0.02 % nebulizer solution Take 0.5 mg by nebulization every 8 hours        lisinopril (ZESTRIL) 2.5 MG tablet Take 1 tablet (2.5 mg) by mouth daily 30 tablet 1     morphine (MS CONTIN) 15 MG 12 hr tablet Take 15 mg by mouth every 6 hours        oxybutynin ER (DITROPAN-XL) 10 MG 24 hr tablet TAKE 1 TABLET BY MOUTH EVERY DAY IN THE MORNING       tamsulosin (FLOMAX) 0.4 mg cap [TAMSULOSIN (FLOMAX) 0.4 MG CAP] Take 0.4 mg by mouth daily.         Objective  /62 (BP Location: Left arm, Patient Position: Sitting, Cuff Size: Adult Regular)   Pulse 84   Resp 16   Ht 1.702 m (5' 7\")   Wt 75.6 kg (166 lb 9.6 oz)   BMI 26.09 kg/m      General Appearance:    Alert, cooperative, no distress, appears stated age   Head:    Normocephalic, without obvious abnormality, atraumatic   Throat:   Lips, mucosa, and tongue normal; teeth and gums normal   Neck:   Supple, symmetrical, trachea midline, no adenopathy;        thyroid:  No enlargement/tenderness/nodules; no carotid    bruit or JVD   Back:     Symmetric, no curvature, ROM normal, no CVA tenderness   Lungs:     Clear to auscultation bilaterally, respirations unlabored   Chest wall:    No tenderness or deformity   Heart:    Regular rate and rhythm, S1 and " S2 normal, no murmur, rub   or gallop   Abdomen:     Soft, non-tender, bowel sounds active all four quadrants,     no masses, no organomegaly   Extremities:   Normal, atraumatic, no cyanosis or edema   Pulses:   2+ and symmetric all extremities   Skin:   Skin color, texture, turgor normal, no rashes or lesions     Results    Lab Results personally reviewed   Lab Results   Component Value Date    CHOL 105 09/23/2021    CHOL 106 09/09/2021     Lab Results   Component Value Date    HDL 39 (L) 09/23/2021    HDL 38 (L) 09/09/2021     No components found for: LDLCALC  Lab Results   Component Value Date    TRIG 145 09/23/2021    TRIG 119 09/09/2021     Lab Results   Component Value Date    WBC 7.6 08/04/2021    HGB 15.3 08/04/2021    HCT 46.6 08/04/2021     08/04/2021     Lab Results   Component Value Date    BUN 14 04/15/2022     04/15/2022    CO2 29 04/15/2022               Thank you for allowing me to participate in the care of your patient.      Sincerely,     JOCELYNN CERVANTES MD     [facility]  cc:   Jocelynn Cervantes MD  [address] [place] [address]

## 2022-05-06 NOTE — PROGRESS NOTES
[facility]  Heart Care Clinic Follow-up Note    Assessment & Plan        (I25.10) Mild coronary artery disease  (primary encounter diagnosis)  Comment: Angiography due to borderline troponin elevation showed normal left main, ostial LAD 35% stenosis, mid LAD 35% stenosis with proximal circumflex 25% stenosis, and 10% right coronary artery disease. Medical therapy.  No symptoms and work on prevention.    (I10) Essential hypertension  Comment: Under good control on lisinopril which she is compliant with and continue this.    (E78.00) Pure hypercholesterolemia  Comment: Total cholesterol excellent at 105 with an LDL of 37 and continue current statin.    (E11.9) Diabetes mellitus without complication (H)  Comment: Hemoglobin A1c 6.1.  He is not watching a diet and I have implored him to take dietary precautions.    (C34.90) Small cell lung cancer (H)  Comment: In the past, with his weight loss he had CT scan showing no metastatic disease.    (J41.1) Mucopurulent chronic bronchitis (H)  Comment: COPD with recent exacerbation and doing well.    Plan  1.  Continue current meds.  2.  Follow-up with me in 2 years or sooner if needed.  3.  Stop smoking.  4.  Try to follow diabetic diet.    Subjective  CC: 71-year-old white gentleman here for follow-up today.  He lives independently with his wife, is retired, is working on refurbishing cars.  He still smokes up to a pack a day.  He states he is losing weight and not sure why.  He denies any chest pains, palpitations, significant shortness of breath, PND, orthopnea, syncope, dizziness or peripheral edema.  He states he is eating a lot of sweets to try to gain weight.  He is not checking his sugars.    Medications  Current Outpatient Medications   Medication Sig Dispense Refill     albuterol (PROVENTIL HFA;VENTOLIN HFA) 90 mcg/actuation inhaler [ALBUTEROL (PROVENTIL HFA;VENTOLIN HFA) 90 MCG/ACTUATION INHALER] Inhale 2 puffs 4 (four) times a day as needed for wheezing.  "             aspirin (ASA) 81 MG chewable tablet Take 81 mg by mouth daily       atorvastatin (LIPITOR) 40 MG tablet Take 1 tablet (40 mg) by mouth daily 90 tablet 3     diclofenac (VOLTAREN) 1 % topical gel Apply topically 4 times daily as needed for moderate pain (hand joints)       HYDROcodone-acetaminophen (NORCO) 7.5-325 mg per tablet [HYDROCODONE-ACETAMINOPHEN (NORCO) 7.5-325 MG PER TABLET] Take 1 tablet by mouth every 6 (six) hours as needed. 30 tablet 0     ipratropium (ATROVENT) 0.02 % nebulizer solution Take 0.5 mg by nebulization every 8 hours        lisinopril (ZESTRIL) 2.5 MG tablet Take 1 tablet (2.5 mg) by mouth daily 30 tablet 1     morphine (MS CONTIN) 15 MG 12 hr tablet Take 15 mg by mouth every 6 hours        oxybutynin ER (DITROPAN-XL) 10 MG 24 hr tablet TAKE 1 TABLET BY MOUTH EVERY DAY IN THE MORNING       tamsulosin (FLOMAX) 0.4 mg cap [TAMSULOSIN (FLOMAX) 0.4 MG CAP] Take 0.4 mg by mouth daily.         Objective  /62 (BP Location: Left arm, Patient Position: Sitting, Cuff Size: Adult Regular)   Pulse 84   Resp 16   Ht 1.702 m (5' 7\")   Wt 75.6 kg (166 lb 9.6 oz)   BMI 26.09 kg/m      General Appearance:    Alert, cooperative, no distress, appears stated age   Head:    Normocephalic, without obvious abnormality, atraumatic   Throat:   Lips, mucosa, and tongue normal; teeth and gums normal   Neck:   Supple, symmetrical, trachea midline, no adenopathy;        thyroid:  No enlargement/tenderness/nodules; no carotid    bruit or JVD   Back:     Symmetric, no curvature, ROM normal, no CVA tenderness   Lungs:     Clear to auscultation bilaterally, respirations unlabored   Chest wall:    No tenderness or deformity   Heart:    Regular rate and rhythm, S1 and S2 normal, no murmur, rub   or gallop   Abdomen:     Soft, non-tender, bowel sounds active all four quadrants,     no masses, no organomegaly   Extremities:   Normal, atraumatic, no cyanosis or edema   Pulses:   2+ and symmetric all " extremities   Skin:   Skin color, texture, turgor normal, no rashes or lesions     Results    Lab Results personally reviewed   Lab Results   Component Value Date    CHOL 105 09/23/2021    CHOL 106 09/09/2021     Lab Results   Component Value Date    HDL 39 (L) 09/23/2021    HDL 38 (L) 09/09/2021     No components found for: LDLCALC  Lab Results   Component Value Date    TRIG 145 09/23/2021    TRIG 119 09/09/2021     Lab Results   Component Value Date    WBC 7.6 08/04/2021    HGB 15.3 08/04/2021    HCT 46.6 08/04/2021     08/04/2021     Lab Results   Component Value Date    BUN 14 04/15/2022     04/15/2022    CO2 29 04/15/2022

## 2022-05-06 NOTE — PATIENT INSTRUCTIONS
Mr Martin Tovar,  I enjoyed visiting with you again today.  I am glad to hear you are doing well.  Per our conversation keep up the same heart meds.  If you are eating sweets and high fat foods you should check your sugars.  I will plan on seeing you 1-2 years.  Maicol Cervantes

## 2022-06-26 ENCOUNTER — HEALTH MAINTENANCE LETTER (OUTPATIENT)
Age: 72
End: 2022-06-26

## 2022-08-11 ENCOUNTER — LAB REQUISITION (OUTPATIENT)
Dept: LAB | Facility: CLINIC | Age: 72
End: 2022-08-11
Payer: MEDICARE

## 2022-08-11 DIAGNOSIS — E78.5 HYPERLIPIDEMIA, UNSPECIFIED: ICD-10-CM

## 2022-08-11 DIAGNOSIS — E11.9 TYPE 2 DIABETES MELLITUS WITHOUT COMPLICATIONS (H): ICD-10-CM

## 2022-08-11 LAB
ANION GAP SERPL CALCULATED.3IONS-SCNC: 13 MMOL/L (ref 7–15)
BUN SERPL-MCNC: 10.9 MG/DL (ref 8–23)
CALCIUM SERPL-MCNC: 8.7 MG/DL (ref 8.8–10.2)
CHLORIDE SERPL-SCNC: 102 MMOL/L (ref 98–107)
CHOLEST SERPL-MCNC: 109 MG/DL
CREAT SERPL-MCNC: 0.83 MG/DL (ref 0.67–1.17)
DEPRECATED HCO3 PLAS-SCNC: 27 MMOL/L (ref 22–29)
GFR SERPL CREATININE-BSD FRML MDRD: >90 ML/MIN/1.73M2
GLUCOSE SERPL-MCNC: 110 MG/DL (ref 70–99)
HDLC SERPL-MCNC: 48 MG/DL
LDLC SERPL CALC-MCNC: 46 MG/DL
NONHDLC SERPL-MCNC: 61 MG/DL
POTASSIUM SERPL-SCNC: 4.5 MMOL/L (ref 3.4–5.3)
SODIUM SERPL-SCNC: 142 MMOL/L (ref 136–145)
TRIGL SERPL-MCNC: 77 MG/DL

## 2022-08-11 PROCEDURE — 80061 LIPID PANEL: CPT | Mod: ORL | Performed by: PHYSICIAN ASSISTANT

## 2022-08-11 PROCEDURE — 80048 BASIC METABOLIC PNL TOTAL CA: CPT | Mod: ORL | Performed by: PHYSICIAN ASSISTANT

## 2022-08-20 DIAGNOSIS — I21.4 NSTEMI (NON-ST ELEVATED MYOCARDIAL INFARCTION) (H): ICD-10-CM

## 2022-08-22 RX ORDER — ATORVASTATIN CALCIUM 40 MG/1
TABLET, FILM COATED ORAL
Qty: 90 TABLET | Refills: 3 | Status: SHIPPED | OUTPATIENT
Start: 2022-08-22 | End: 2023-03-30

## 2022-09-13 ENCOUNTER — HOSPITAL ENCOUNTER (OUTPATIENT)
Dept: CT IMAGING | Facility: HOSPITAL | Age: 72
Discharge: HOME | End: 2022-09-13
Attending: RADIOLOGY
Payer: MEDICARE

## 2022-09-13 ENCOUNTER — HOSPITAL ENCOUNTER (OUTPATIENT)
Dept: MRI IMAGING | Facility: HOSPITAL | Age: 72
Discharge: HOME | End: 2022-09-13
Attending: RADIOLOGY
Payer: MEDICARE

## 2022-09-13 ENCOUNTER — HOSPITAL ENCOUNTER (EMERGENCY)
Facility: CLINIC | Age: 72
Discharge: HOME | End: 2022-09-14
Attending: EMERGENCY MEDICINE | Admitting: EMERGENCY MEDICINE
Payer: MEDICARE

## 2022-09-13 ENCOUNTER — APPOINTMENT (OUTPATIENT)
Dept: CT IMAGING | Facility: CLINIC | Age: 72
End: 2022-09-13
Attending: EMERGENCY MEDICINE
Payer: MEDICARE

## 2022-09-13 DIAGNOSIS — C34.90 SMALL CELL LUNG CANCER (H): ICD-10-CM

## 2022-09-13 DIAGNOSIS — K66.8 PNEUMOPERITONEUM OF UNKNOWN ETIOLOGY: ICD-10-CM

## 2022-09-13 LAB
ALBUMIN SERPL BCG-MCNC: 3.7 G/DL (ref 3.5–5.2)
ALP SERPL-CCNC: 72 U/L (ref 40–129)
ALT SERPL W P-5'-P-CCNC: 26 U/L (ref 10–50)
ANION GAP SERPL CALCULATED.3IONS-SCNC: 9 MMOL/L (ref 7–15)
AST SERPL W P-5'-P-CCNC: 22 U/L (ref 10–50)
BASOPHILS # BLD AUTO: 0 10E3/UL (ref 0–0.2)
BASOPHILS NFR BLD AUTO: 0 %
BILIRUB SERPL-MCNC: 0.4 MG/DL
BUN SERPL-MCNC: 12.9 MG/DL (ref 8–23)
CALCIUM SERPL-MCNC: 8.9 MG/DL (ref 8.8–10.2)
CHLORIDE SERPL-SCNC: 99 MMOL/L (ref 98–107)
CREAT SERPL-MCNC: 0.87 MG/DL (ref 0.67–1.17)
DEPRECATED HCO3 PLAS-SCNC: 31 MMOL/L (ref 22–29)
EOSINOPHIL # BLD AUTO: 0.2 10E3/UL (ref 0–0.7)
EOSINOPHIL NFR BLD AUTO: 3 %
ERYTHROCYTE [DISTWIDTH] IN BLOOD BY AUTOMATED COUNT: 13.2 % (ref 10–15)
GFR SERPL CREATININE-BSD FRML MDRD: >90 ML/MIN/1.73M2
GLUCOSE SERPL-MCNC: 106 MG/DL (ref 70–99)
HCT VFR BLD AUTO: 42.9 % (ref 40–53)
HGB BLD-MCNC: 14.1 G/DL (ref 13.3–17.7)
HOLD SPECIMEN: NORMAL
HOLD SPECIMEN: NORMAL
IMM GRANULOCYTES # BLD: 0 10E3/UL
IMM GRANULOCYTES NFR BLD: 0 %
LYMPHOCYTES # BLD AUTO: 1.5 10E3/UL (ref 0.8–5.3)
LYMPHOCYTES NFR BLD AUTO: 23 %
MCH RBC QN AUTO: 31.1 PG (ref 26.5–33)
MCHC RBC AUTO-ENTMCNC: 32.9 G/DL (ref 31.5–36.5)
MCV RBC AUTO: 95 FL (ref 78–100)
MONOCYTES # BLD AUTO: 0.8 10E3/UL (ref 0–1.3)
MONOCYTES NFR BLD AUTO: 12 %
NEUTROPHILS # BLD AUTO: 4.2 10E3/UL (ref 1.6–8.3)
NEUTROPHILS NFR BLD AUTO: 62 %
NRBC # BLD AUTO: 0 10E3/UL
NRBC BLD AUTO-RTO: 0 /100
PLATELET # BLD AUTO: 173 10E3/UL (ref 150–450)
POTASSIUM SERPL-SCNC: 4.7 MMOL/L (ref 3.4–5.3)
PROT SERPL-MCNC: 7.1 G/DL (ref 6.4–8.3)
RADIOLOGIST FLAGS: ABNORMAL
RBC # BLD AUTO: 4.54 10E6/UL (ref 4.4–5.9)
SARS-COV-2 RNA RESP QL NAA+PROBE: NEGATIVE
SODIUM SERPL-SCNC: 139 MMOL/L (ref 136–145)
WBC # BLD AUTO: 6.8 10E3/UL (ref 4–11)

## 2022-09-13 PROCEDURE — 258N000003 HC RX IP 258 OP 636: Performed by: EMERGENCY MEDICINE

## 2022-09-13 PROCEDURE — 250N000011 HC RX IP 250 OP 636: Performed by: EMERGENCY MEDICINE

## 2022-09-13 PROCEDURE — C9803 HOPD COVID-19 SPEC COLLECT: HCPCS | Performed by: EMERGENCY MEDICINE

## 2022-09-13 PROCEDURE — 70553 MRI BRAIN STEM W/O & W/DYE: CPT | Mod: ME

## 2022-09-13 PROCEDURE — 87635 SARS-COV-2 COVID-19 AMP PRB: CPT | Performed by: EMERGENCY MEDICINE

## 2022-09-13 PROCEDURE — 99285 EMERGENCY DEPT VISIT HI MDM: CPT | Mod: CS | Performed by: EMERGENCY MEDICINE

## 2022-09-13 PROCEDURE — 80053 COMPREHEN METABOLIC PANEL: CPT | Performed by: EMERGENCY MEDICINE

## 2022-09-13 PROCEDURE — 99285 EMERGENCY DEPT VISIT HI MDM: CPT | Mod: CS,25 | Performed by: EMERGENCY MEDICINE

## 2022-09-13 PROCEDURE — 96365 THER/PROPH/DIAG IV INF INIT: CPT | Mod: 59 | Performed by: EMERGENCY MEDICINE

## 2022-09-13 PROCEDURE — 99204 OFFICE O/P NEW MOD 45 MIN: CPT | Performed by: SURGERY

## 2022-09-13 PROCEDURE — 255N000002 HC RX 255 OP 636: Performed by: RADIOLOGY

## 2022-09-13 PROCEDURE — A9585 GADOBUTROL INJECTION: HCPCS | Performed by: RADIOLOGY

## 2022-09-13 PROCEDURE — 96366 THER/PROPH/DIAG IV INF ADDON: CPT | Performed by: EMERGENCY MEDICINE

## 2022-09-13 PROCEDURE — 85004 AUTOMATED DIFF WBC COUNT: CPT | Performed by: EMERGENCY MEDICINE

## 2022-09-13 PROCEDURE — 71250 CT THORAX DX C-: CPT | Mod: MG

## 2022-09-13 PROCEDURE — 82040 ASSAY OF SERUM ALBUMIN: CPT | Performed by: EMERGENCY MEDICINE

## 2022-09-13 PROCEDURE — 74177 CT ABD & PELVIS W/CONTRAST: CPT | Mod: MA

## 2022-09-13 PROCEDURE — 36415 COLL VENOUS BLD VENIPUNCTURE: CPT | Performed by: EMERGENCY MEDICINE

## 2022-09-13 PROCEDURE — 250N000009 HC RX 250: Performed by: EMERGENCY MEDICINE

## 2022-09-13 RX ORDER — MORPHINE SULFATE 15 MG/1
15 TABLET, FILM COATED, EXTENDED RELEASE ORAL EVERY 6 HOURS
Status: DISCONTINUED | OUTPATIENT
Start: 2022-09-14 | End: 2022-09-14 | Stop reason: HOSPADM

## 2022-09-13 RX ORDER — POLYETHYLENE GLYCOL 400 AND PROPYLENE GLYCOL 4; 3 MG/ML; MG/ML
1 SOLUTION/ DROPS OPHTHALMIC 4 TIMES DAILY
COMMUNITY

## 2022-09-13 RX ORDER — HYDROCODONE BITARTRATE AND ACETAMINOPHEN 7.5; 325 MG/15ML; MG/15ML
15 SOLUTION ORAL EVERY 6 HOURS PRN
Status: DISCONTINUED | OUTPATIENT
Start: 2022-09-13 | End: 2022-09-14 | Stop reason: HOSPADM

## 2022-09-13 RX ORDER — IOPAMIDOL 755 MG/ML
70 INJECTION, SOLUTION INTRAVASCULAR ONCE
Status: COMPLETED | OUTPATIENT
Start: 2022-09-13 | End: 2022-09-13

## 2022-09-13 RX ORDER — GADOBUTROL 604.72 MG/ML
0.1 INJECTION INTRAVENOUS ONCE
Status: COMPLETED | OUTPATIENT
Start: 2022-09-13 | End: 2022-09-13

## 2022-09-13 RX ADMIN — GADOBUTROL 7 ML: 604.72 INJECTION INTRAVENOUS at 14:02

## 2022-09-13 RX ADMIN — DEXTROSE AND SODIUM CHLORIDE: 5; 900 INJECTION, SOLUTION INTRAVENOUS at 22:08

## 2022-09-13 RX ADMIN — SODIUM CHLORIDE 60 ML: 9 INJECTION, SOLUTION INTRAVENOUS at 20:06

## 2022-09-13 RX ADMIN — IOPAMIDOL 70 ML: 755 INJECTION, SOLUTION INTRAVENOUS at 20:05

## 2022-09-13 ASSESSMENT — ACTIVITIES OF DAILY LIVING (ADL)
ADLS_ACUITY_SCORE: 35

## 2022-09-13 NOTE — ED PROVIDER NOTES
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  He tried contacting his primary provider was unable to reach them and was advised to come to the emergency department.  Patient reports feeling fatigued and having dyspnea for the past 2 weeks.  No fevers or chills.  No abdominal pain.  No nausea, vomiting or diarrhea.  Reports has been well constipated recently and stools have been large and firm.  He did take MiraLAX last night but has been passing stool daily.  No abdominal distention.  No recent surgical procedures or colonoscopy.    The patient's PMHx, Surgical Hx, Allergies, and Medications were all reviewed with the patient.    Allergies:  Allergies   Allergen Reactions     Ciprofloxacin Swelling and Rash     Throat swelling     Septra [Sulfamethoxazole W/Trimethoprim] Swelling and Rash       Problem List:    Patient Active Problem List    Diagnosis Date Noted     Mild coronary artery disease 08/18/2021     Priority: Medium     Per coronary angiogram on 8/5/2021       Elevated troponin 08/04/2021     Priority: Medium     NSTEMI (non-ST elevated myocardial infarction) (H) 08/04/2021     Priority: Medium     Small cell lung cancer (H) 02/13/2020     Priority: Medium     Anxiety      Priority: Medium     Acute hypoxemic respiratory failure (H)      Priority: Medium     Benign prostatic hyperplasia without lower urinary tract symptoms 01/05/2019     Priority: Medium     COPD exacerbation (H)      Priority: Medium     Intra-abdominal fluid collection 12/31/2015     Priority: Medium     Chronic renal failure      Priority: Medium     Bile leak, postoperative (lap  anna 12/16, s/p ercp with stent 12/22/15) 12/23/2015     Priority: Medium     Chronic pain 12/21/2015     Priority: Medium     COPD (chronic obstructive pulmonary disease) (H) 12/21/2015     Priority: Medium     Acute kidney injury (H) 12/21/2015     Priority: Medium     Status post laparoscopic-assisted sigmoidectomy 11/11/2013     Priority: Medium     Degeneration of lumbar or lumbosacral intervertebral disc 12/14/2012     Priority: Medium     Diabetes mellitus without complication (H) 12/14/2012     Priority: Medium     Essential hypertension 12/14/2012     Priority: Medium     Hyperlipidemia 12/14/2012     Priority: Medium        Past Medical History:    Past Medical History:   Diagnosis Date     Asthma      Avascular necrosis of bones of both hips (H)      Cancer (H)      Cholecystitis      Chronic renal failure      COPD, mild (H)      Diabetes mellitus (H)      Disc degeneration, lumbar      DJD (degenerative joint disease) of knee      Elevated PSA      Hemoptysis 2/16/2020     LBP (low back pain)      Lung cancer (H)      Pneumonia 1/4/2019     Smoking hx        Past Surgical History:    Past Surgical History:   Procedure Laterality Date     ARTHROSCOPY KNEE       CERVICAL FUSION       CT BIOPSY LUNG  2/5/2020     CV CORONARY ANGIOGRAM N/A 8/5/2021    Procedure: CV CORONARY ANGIOGRAM;  Surgeon: Fabio Espinosa MD;  Location: [facility] CV     CV LEFT VENTRICULOGRAM N/A 8/5/2021    Procedure: Left Ventriculogram;  Surgeon: Fabio Espinosa MD;  Location: [facility] CV     ENDOSCOPIC RETROGRADE CHOLANGIOPANCREATOGRAM N/A 12/22/2015    Procedure: ENDOSCOPIC RETROGRADE CHOLANGIOPANCREATOGRAPHY;  Surgeon: Tang Elliott MD;  Location: [facility] OR;  Service:      HEMORRHOID SURGERY       INGUINAL HERNIA REPAIR       IR LUMBAR EPIDURAL STEROID INJECTION  4/29/2003     IR LUMBAR EPIDURAL STEROID INJECTION  6/27/2007     LAPAROSCOPIC CHOLECYSTECTOMY N/A 12/16/2015    Procedure:  "CHOLECYSTECTOMY LAPAROSCOPIC;  Surgeon: Eugene Ro MD;  Location: [facility] OR;  Service:      LUMBAR FUSION  2006    & reconstruction     OTHER SURGICAL HISTORY  2013    COLOVESICULAR FISTULA REPAIR     PICC  1/3/2016          RELEASE TRIGGER FINGER  07/2020     TOTAL KNEE ARTHROPLASTY Left        Family History:    No family history on file.    Social History:  Marital Status:   [2]  Social History     Tobacco Use     Smoking status: Current Every Day Smoker     Packs/day: 0.50     Smokeless tobacco: Never Used     Tobacco comment: 1/4 ppd as of 2/25/21   Substance Use Topics     Alcohol use: Not Currently     Drug use: No        Medications:    albuterol (PROVENTIL HFA;VENTOLIN HFA) 90 mcg/actuation inhaler  aspirin (ASA) 81 MG chewable tablet  atorvastatin (LIPITOR) 40 MG tablet  diclofenac (VOLTAREN) 1 % topical gel  HYDROcodone-acetaminophen (NORCO) 7.5-325 mg per tablet  ipratropium (ATROVENT) 0.02 % nebulizer solution  lisinopril (ZESTRIL) 2.5 MG tablet  morphine (MS CONTIN) 15 MG 12 hr tablet  oxybutynin ER (DITROPAN-XL) 10 MG 24 hr tablet  polyethylene glycol-propylene glycol (SYSTANE) 0.4-0.3 % SOLN ophthalmic solution  tamsulosin (FLOMAX) 0.4 mg cap          Review of Systems  A complete review of systems performed and is otherwise negative except as noted in the HPI    Physical Exam   BP: 136/80  Pulse: 86  Temp: 98.3  F (36.8  C)  Resp: 18  Height: 170.2 cm (5' 7\")  Weight: 74.8 kg (165 lb)  SpO2: 92 %    Physical Exam  GEN: Awake, alert, and cooperative. No acute distress.  Smells of cigarette smoke  HENT: MMM. External ears and nose normal bilaterally.  EYES: EOM intact. Conjunctiva clear. No discharge.   NECK: Symmetric, freely mobile.   CV : Regular rate and rhythm.  PULM: Normal effort. No wheezes, rales, or rhonchi bilaterally.  Breath sounds reduced in bases with slight wheeze in right base  ABD: Soft, non-tender, non-distended. No rebound or guarding.   NEURO: Normal speech. " Following commands. CN II-XII grossly intact. Answering questions and interacting appropriately.   EXT: No gross deformity. Warm and well perfused.  INT: Warm. No diaphoresis. Normal color.        ED Course        Procedures           Critical Care time:  none               Results for orders placed or performed during the hospital encounter of 09/13/22 (from the past 24 hour(s))   [place] Draw    Narrative    The following orders were created for panel order [place] Draw.  Procedure                               Abnormality         Status                     ---------                               -----------         ------                     Extra Purple Top Tube[[id number]]                                                         Please view results for these tests on the individual orders.   CBC with Platelets & Differential    Narrative    The following orders were created for panel order CBC with Platelets & Differential.  Procedure                               Abnormality         Status                     ---------                               -----------         ------                     CBC with platelets and d...[[id number]]                      Final result                 Please view results for these tests on the individual orders.   CBC with platelets and differential   Result Value Ref Range    WBC Count 6.8 4.0 - 11.0 10e3/uL    RBC Count 4.54 4.40 - 5.90 10e6/uL    Hemoglobin 14.1 13.3 - 17.7 g/dL    Hematocrit 42.9 40.0 - 53.0 %    MCV 95 78 - 100 fL    MCH 31.1 26.5 - 33.0 pg    MCHC 32.9 31.5 - 36.5 g/dL    RDW 13.2 10.0 - 15.0 %    Platelet Count 173 150 - 450 10e3/uL    % Neutrophils 62 %    % Lymphocytes 23 %    % Monocytes 12 %    % Eosinophils 3 %    % Basophils 0 %    % Immature Granulocytes 0 %    NRBCs per 100 WBC 0 <1 /100    Absolute Neutrophils 4.2 1.6 - 8.3 10e3/uL    Absolute Lymphocytes 1.5 0.8 - 5.3 10e3/uL    Absolute Monocytes 0.8 0.0 - 1.3 10e3/uL    Absolute Eosinophils 0.2  0.0 - 0.7 10e3/uL    Absolute Basophils 0.0 0.0 - 0.2 10e3/uL    Absolute Immature Granulocytes 0.0 <=0.4 10e3/uL    Absolute NRBCs 0.0 10e3/uL   [place] Draw    Narrative    The following orders were created for panel order [place] Draw.  Procedure                               Abnormality         Status                     ---------                               -----------         ------                     Extra Green Top (Lithium...[[id number]]                      Final result                 Please view results for these tests on the individual orders.   Extra Green Top (Lithium Heparin) ON ICE   Result Value Ref Range    Hold Specimen Critical access hospital    Comprehensive metabolic panel   Result Value Ref Range    Sodium 139 136 - 145 mmol/L    Potassium 4.7 3.4 - 5.3 mmol/L    Chloride 99 98 - 107 mmol/L    Carbon Dioxide (CO2) 31 (H) 22 - 29 mmol/L    Anion Gap 9 7 - 15 mmol/L    Urea Nitrogen 12.9 8.0 - 23.0 mg/dL    Creatinine 0.87 0.67 - 1.17 mg/dL    Calcium 8.9 8.8 - 10.2 mg/dL    Glucose 106 (H) 70 - 99 mg/dL    Alkaline Phosphatase 72 40 - 129 U/L    AST 22 10 - 50 U/L    ALT 26 10 - 50 U/L    Protein Total 7.1 6.4 - 8.3 g/dL    Albumin 3.7 3.5 - 5.2 g/dL    Bilirubin Total 0.4 <=1.2 mg/dL    GFR Estimate >90 >60 mL/min/1.73m2   Asymptomatic COVID-19 Virus (Coronavirus) by PCR Nasopharyngeal    Specimen: Nasopharyngeal; Swab   Result Value Ref Range    SARS CoV2 PCR Negative Negative    Narrative    Testing was performed using the josselyn  SARS-CoV-2 & Influenza A/B Assay on the josselyn  Silvana  System.  This test should be ordered for the detection of SARS-COV-2 in individuals who meet SARS-CoV-2 clinical and/or epidemiological criteria. Test performance is unknown in asymptomatic patients.  This test is for in vitro diagnostic use under the FDA EUA for laboratories certified under CLIA to perform moderate and/or high complexity testing. This test has not been FDA cleared or approved.  A negative test does not rule out  the presence of PCR inhibitors in the specimen or target RNA in concentration below the limit of detection for the assay. The possibility of a false negative should be considered if the patient's recent exposure or clinical presentation suggests COVID-19.  [facility] are certified under the Clinical Laboratory Improvement Amendments of 1988 (CLIA-88) as qualified to perform moderate and/or high complexity laboratory testing.   CT Abdomen Pelvis w Contrast    Narrative    EXAM: CT ABDOMEN PELVIS W CONTRAST  LOCATION: [facility]  DATE/TIME: 9/13/2022 8:15 PM    INDICATION: pneumoperitoneum on outside CT. no abdominal pain or recent instrumentation. CT was survailance for lung neoplasm  COMPARISON: None.  TECHNIQUE: CT scan of the abdomen and pelvis was performed following injection of IV contrast. Multiplanar reformats were obtained. Dose reduction techniques were used.  CONTRAST: 70mL Isovue 370    FINDINGS:   LOWER CHEST: Basilar emphysema. Benign calcified granuloma medial left lower lobe. No pleural effusions.    HEPATOBILIARY: Postcholecystectomy. Common bile duct is enlarged measuring 13 mm. Mild intrahepatic ductal dilation. No liver lesions. Smooth liver capsule.     PANCREAS: Normal.    SPLEEN: Normal.    ADRENAL GLANDS: Normal.    KIDNEYS/BLADDER: No significant mass, stones, or hydronephrosis. There are simple or benign cysts. No follow up is needed.    BOWEL: Nondistended stomach. Normal caliber small bowel. Mobile cecum which resides in the central abdomen. Distal colon resection with colocolonic anastomosis in the posterior left pelvis. There are a few scattered colonic diverticula. No bowel wall   thickening or inflammatory stranding in the mesentery. Redemonstration of patchy pneumoperitoneum most of which layers underneath the diaphragm. There are no foci of intra-abdominal air caudal to the umbilicus. No intra-abdominal free fluid.    LYMPH NODES:  Normal.    VASCULATURE: Mixed attenuation atheroma throughout the abdominal aorta and common iliac arteries. No aneurysm or critical stenosis.    PELVIC ORGANS: Moderately enlarged prostate gland which indents the bladder base. Seminal vesicles are symmetric.    MUSCULOSKELETAL: Short paraspinous rods and pedicle screws transfix L5-S1. Moderate degenerative osteophytes of the lower thoracic and lumbar spine.      Impression    IMPRESSION:     Upper abdominal pneumoperitoneum without inflammatory stranding in the mesentery, bowel wall thickening, or intra-abdominal fluid. The precise source of pneumoperitoneum is not directly apparent. Findings could relate to a ruptured, noninflamed   diverticulum. Close clinical surveillance for abdominal symptoms is suggested.         Medications   dextrose 5% and 0.9% NaCl infusion (has no administration in time range)   iopamidol (ISOVUE-370) solution 70 mL (70 mLs Intravenous Given 9/13/22 2005)   sodium chloride 0.9 % bag 500mL for CT scan flush use (60 mLs As instructed Given 9/13/22 2006)       Assessments & Plan (with Medical Decision Making)   72 year old male with past medical history significant for coronary disease, small cell lung carcinoma status post chemoradiation, diabetes, current tobacco use, with surveillance CT scan at outside facility found to have pneumoperitoneum.    Patient has no complaints of abdominal pain, no fevers or chills.  No nausea or vomiting.  On examination he does not appear distressed and abdomen is nondistended, soft and nontender.    Reassuring vital signs.  CBC without leukocytosis.  CMP grossly normal, slightly elevated bicarbonate 31 and blood glucose 106.  Given the current pandemic SARS-CoV-2 testing obtained and results negative.  Dedicated imaging of abdomen and pelvis obtained and does show pneumoperitoneum of the upper abdomen without  inflammatory stranding in the mesentery or bowel wall thickening.  No intra-abdominal fluid.  No  precise source of pneumoperitoneum correct identified.    Case discussed with Dr. Guajardo with general surgery.  He recommends observation in hospital, n.p.o., repeat labs in morning, close monitoring of vital signs.  Possible to be a benign process.  This was an incidental finding on chest imaging with last imaging obtained 6 months prior.    On subsequent reevaluation no abdominal tenderness and still pain-free.  All findings and plan discussed with patient.  She is not excited about the thought of staying in the hospital but understands.  Unfortunate there are no beds available in the [facility] or in the [facility] area at this time the patient will board in our department.  Maintenance IV fluids with D5 normal saline at 125/hr. patient is NPO.    If patient becomes febrile, tachycardic, hypotensive leukocytosis or abdominal pain, please reconsult surgery immediately and will likely need exploratory laparotomy. Surgery consultation requested.     At the end of my shift care of patient signed out to Dr. Mares for further cares while in the Emergency Department.     Home pain medications ordered    I have reviewed the nursing notes.         New Prescriptions    No medications on file       Final diagnoses:   Pneumoperitoneum of unknown etiology     Troy Holcomb MD    9/13/2022   [facility] EMERGENCY DEPT    Disclaimer: This note consists of words and symbols derived from keyboarding and dictation using voice recognition software.  As a result, there may be errors that have gone undetected.  Please consider this when interpreting information found in this note.             Troy Holcomb MD  09/14/22 0132

## 2022-09-13 NOTE — ED TRIAGE NOTES
"Pt had CT this AM.  Advised to be seen by PCP from oncologist d/t \"air shown in abdomen.\"  PCP office advised to go to ED.  Pt has lung cancer hx, and is current smoker.      Triage Assessment     Row Name 09/13/22 0288       Triage Assessment (Adult)    Airway WDL WDL       Respiratory WDL    Respiratory WDL X;rhythm/pattern    Rhythm/Pattern, Respiratory shortness of breath  baseline, pt has previous cancer dx, and is current smoker       Skin Circulation/Temperature WDL    Skin Circulation/Temperature WDL WDL       Cardiac WDL    Cardiac WDL WDL       Peripheral/Neurovascular WDL    Peripheral Neurovascular WDL WDL       Cognitive/Neuro/Behavioral WDL    Cognitive/Neuro/Behavioral WDL WDL              "

## 2022-09-14 VITALS
WEIGHT: 165 LBS | DIASTOLIC BLOOD PRESSURE: 89 MMHG | HEART RATE: 56 BPM | SYSTOLIC BLOOD PRESSURE: 150 MMHG | RESPIRATION RATE: 18 BRPM | HEIGHT: 67 IN | BODY MASS INDEX: 25.9 KG/M2 | OXYGEN SATURATION: 95 % | TEMPERATURE: 98 F

## 2022-09-14 PROCEDURE — 250N000013 HC RX MED GY IP 250 OP 250 PS 637: Performed by: EMERGENCY MEDICINE

## 2022-09-14 PROCEDURE — 96366 THER/PROPH/DIAG IV INF ADDON: CPT | Performed by: EMERGENCY MEDICINE

## 2022-09-14 RX ADMIN — HYDROCODONE BITARTRATE AND ACETAMINOPHEN 15 ML: 7.5; 325 SOLUTION ORAL at 00:33

## 2022-09-14 RX ADMIN — MORPHINE SULFATE 15 MG: 15 TABLET, FILM COATED, EXTENDED RELEASE ORAL at 00:33

## 2022-09-14 ASSESSMENT — ACTIVITIES OF DAILY LIVING (ADL)
ADLS_ACUITY_SCORE: 35

## 2022-09-14 NOTE — ED PROVIDER NOTES
"     Emergency Department Patient Sign-out       Brief HPI:  This is a 72 year old male signed out to me by Dr. Alford .  See initial ED Provider note for details of the presentation.            Significant Events prior to my assuming care: none      Exam:   Patient Vitals for the past 24 hrs:   BP Temp Temp src Pulse Resp SpO2 Height Weight   09/14/22 0030 126/77 -- -- 54 18 94 % -- --   09/13/22 2345 (!) 132/95 -- -- 63 -- 93 % -- --   09/13/22 2206 124/82 98  F (36.7  C) Oral 60 18 92 % -- --   09/13/22 2200 124/82 -- -- 64 -- 91 % -- --   09/13/22 1900 129/84 -- -- 77 -- 94 % -- --   09/13/22 1741 136/80 98.3  F (36.8  C) Tympanic 86 18 92 % 1.702 m (5' 7\") 74.8 kg (165 lb)           ED RESULTS:   Results for orders placed or performed during the hospital encounter of 09/13/22 (from the past 24 hour(s))   [place] Draw     Status: None ()    Collection Time: 09/13/22  6:00 PM    Narrative    The following orders were created for panel order [place] Draw.  Procedure                               Abnormality         Status                     ---------                               -----------         ------                     Extra Purple Top Tube[[id number]]                                                         Please view results for these tests on the individual orders.   CBC with Platelets & Differential     Status: None    Collection Time: 09/13/22  6:04 PM    Narrative    The following orders were created for panel order CBC with Platelets & Differential.  Procedure                               Abnormality         Status                     ---------                               -----------         ------                     CBC with platelets and d...[[id number]]                      Final result                 Please view results for these tests on the individual orders.   CBC with platelets and differential     Status: None    Collection Time: 09/13/22  6:04 PM   Result Value Ref Range    WBC Count " 6.8 4.0 - 11.0 10e3/uL    RBC Count 4.54 4.40 - 5.90 10e6/uL    Hemoglobin 14.1 13.3 - 17.7 g/dL    Hematocrit 42.9 40.0 - 53.0 %    MCV 95 78 - 100 fL    MCH 31.1 26.5 - 33.0 pg    MCHC 32.9 31.5 - 36.5 g/dL    RDW 13.2 10.0 - 15.0 %    Platelet Count 173 150 - 450 10e3/uL    % Neutrophils 62 %    % Lymphocytes 23 %    % Monocytes 12 %    % Eosinophils 3 %    % Basophils 0 %    % Immature Granulocytes 0 %    NRBCs per 100 WBC 0 <1 /100    Absolute Neutrophils 4.2 1.6 - 8.3 10e3/uL    Absolute Lymphocytes 1.5 0.8 - 5.3 10e3/uL    Absolute Monocytes 0.8 0.0 - 1.3 10e3/uL    Absolute Eosinophils 0.2 0.0 - 0.7 10e3/uL    Absolute Basophils 0.0 0.0 - 0.2 10e3/uL    Absolute Immature Granulocytes 0.0 <=0.4 10e3/uL    Absolute NRBCs 0.0 10e3/uL   [place] Draw     Status: None    Collection Time: 09/13/22  6:04 PM    Narrative    The following orders were created for panel order [place] Draw.  Procedure                               Abnormality         Status                     ---------                               -----------         ------                     Extra Green Top (Lithium...[[id number]]                      Final result                 Please view results for these tests on the individual orders.   Extra Green Top (Lithium Heparin) ON ICE     Status: None    Collection Time: 09/13/22  6:04 PM   Result Value Ref Range    Hold Specimen [facility]    Comprehensive metabolic panel     Status: Abnormal    Collection Time: 09/13/22  6:04 PM   Result Value Ref Range    Sodium 139 136 - 145 mmol/L    Potassium 4.7 3.4 - 5.3 mmol/L    Chloride 99 98 - 107 mmol/L    Carbon Dioxide (CO2) 31 (H) 22 - 29 mmol/L    Anion Gap 9 7 - 15 mmol/L    Urea Nitrogen 12.9 8.0 - 23.0 mg/dL    Creatinine 0.87 0.67 - 1.17 mg/dL    Calcium 8.9 8.8 - 10.2 mg/dL    Glucose 106 (H) 70 - 99 mg/dL    Alkaline Phosphatase 72 40 - 129 U/L    AST 22 10 - 50 U/L    ALT 26 10 - 50 U/L    Protein Total 7.1 6.4 - 8.3 g/dL    Albumin 3.7 3.5 - 5.2 g/dL     Bilirubin Total 0.4 <=1.2 mg/dL    GFR Estimate >90 >60 mL/min/1.73m2   Extra Tube     Status: None    Collection Time: 09/13/22  6:04 PM    Narrative    The following orders were created for panel order Extra Tube.  Procedure                               Abnormality         Status                     ---------                               -----------         ------                     Extra Green Top (Lithium...[[id number]]                      Final result                 Please view results for these tests on the individual orders.   Extra Green Top (Lithium Heparin) Tube     Status: None    Collection Time: 09/13/22  6:04 PM   Result Value Ref Range    Hold Specimen [facility]    Asymptomatic COVID-19 Virus (Coronavirus) by PCR Nasopharyngeal     Status: Normal    Collection Time: 09/13/22  7:13 PM    Specimen: Nasopharyngeal; Swab   Result Value Ref Range    SARS CoV2 PCR Negative Negative    Narrative    Testing was performed using the josselyn  SARS-CoV-2 & Influenza A/B Assay on the josselyn  Silvana  System.  This test should be ordered for the detection of SARS-COV-2 in individuals who meet SARS-CoV-2 clinical and/or epidemiological criteria. Test performance is unknown in asymptomatic patients.  This test is for in vitro diagnostic use under the FDA EUA for laboratories certified under CLIA to perform moderate and/or high complexity testing. This test has not been FDA cleared or approved.  A negative test does not rule out the presence of PCR inhibitors in the specimen or target RNA in concentration below the limit of detection for the assay. The possibility of a false negative should be considered if the patient's recent exposure or clinical presentation suggests COVID-19.  [facility] are certified under the Clinical Laboratory Improvement Amendments of 1988 (CLIA-88) as qualified to perform moderate and/or high complexity laboratory testing.   CT Abdomen Pelvis w Contrast     Status: None     Collection Time: 09/13/22  8:15 PM    Narrative    EXAM: CT ABDOMEN PELVIS W CONTRAST  LOCATION: [facility]  DATE/TIME: 9/13/2022 8:15 PM    INDICATION: pneumoperitoneum on outside CT. no abdominal pain or recent instrumentation. CT was survailance for lung neoplasm  COMPARISON: None.  TECHNIQUE: CT scan of the abdomen and pelvis was performed following injection of IV contrast. Multiplanar reformats were obtained. Dose reduction techniques were used.  CONTRAST: 70mL Isovue 370    FINDINGS:   LOWER CHEST: Basilar emphysema. Benign calcified granuloma medial left lower lobe. No pleural effusions.    HEPATOBILIARY: Postcholecystectomy. Common bile duct is enlarged measuring 13 mm. Mild intrahepatic ductal dilation. No liver lesions. Smooth liver capsule.     PANCREAS: Normal.    SPLEEN: Normal.    ADRENAL GLANDS: Normal.    KIDNEYS/BLADDER: No significant mass, stones, or hydronephrosis. There are simple or benign cysts. No follow up is needed.    BOWEL: Nondistended stomach. Normal caliber small bowel. Mobile cecum which resides in the central abdomen. Distal colon resection with colocolonic anastomosis in the posterior left pelvis. There are a few scattered colonic diverticula. No bowel wall   thickening or inflammatory stranding in the mesentery. Redemonstration of patchy pneumoperitoneum most of which layers underneath the diaphragm. There are no foci of intra-abdominal air caudal to the umbilicus. No intra-abdominal free fluid.    LYMPH NODES: Normal.    VASCULATURE: Mixed attenuation atheroma throughout the abdominal aorta and common iliac arteries. No aneurysm or critical stenosis.    PELVIC ORGANS: Moderately enlarged prostate gland which indents the bladder base. Seminal vesicles are symmetric.    MUSCULOSKELETAL: Short paraspinous rods and pedicle screws transfix L5-S1. Moderate degenerative osteophytes of the lower thoracic and lumbar spine.      Impression    IMPRESSION:      Upper abdominal pneumoperitoneum without inflammatory stranding in the mesentery, bowel wall thickening, or intra-abdominal fluid. The precise source of pneumoperitoneum is not directly apparent. Findings could relate to a ruptured, noninflamed   diverticulum. Close clinical surveillance for abdominal symptoms is suggested.         ED MEDICATIONS:   Medications   dextrose 5% and 0.9% NaCl infusion ( Intravenous Rate/Dose Verify 9/14/22 0032)   morphine (MS CONTIN) 12 hr tablet 15 mg (15 mg Oral Given 9/14/22 0033)   HYDROcodone-acetaminophen 7.5-325 MG/15ML solution 15 mL (15 mLs Oral Given 9/14/22 0033)   iopamidol (ISOVUE-370) solution 70 mL (70 mLs Intravenous Given 9/13/22 2005)   sodium chloride 0.9 % bag 500mL for CT scan flush use (60 mLs As instructed Given 9/13/22 2006)         Impression:    ICD-10-CM    1. Pneumoperitoneum of unknown etiology  K66.8        Plan:    Pending studies include none.      5:09 AM; patient called out to nurse wondering what the next step of his care would be.  He states he has been having difficulty sleeping and would like to leave.  I evaluated the patient at bedside.  He reports he has no pain is feeling well.  We reviewed his current condition and patient was able to verbalize that he has air in the belly that is likely from a small perforation in his intestine.  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  He is requesting discharge.  He states he lives 15 minutes away and will absolutely come back immediately if he starts to feel sick in any way however he again states he does not feel sick at this time.  Patient is awake and alert and oriented and appears to have good capacity to make decisions and would like to be discharged.  This seems reasonable.  I advised that this  is not my recommendation due to the high risk of decompensation but he would like to discharge anyway.      Martín Mares MD          Mares, Martín Beckwith MD  09/14/22 0525

## 2022-09-14 NOTE — CONSULTS
[facility] Surgery Consultation    Martin Tovar MRN# [id number]   Age: 72 year old YOB: 1950     Date of Admission:  9/13/2022    Reason for consult:  Abnormal CT scan       Requesting physician: Zhang       Level of consult: Consult, follow and place orders           Assessment and Recommendation:   Assessment:   Pneumoperitoneum, suspect benign pneumoperitoneum given lack of symptoms, benign exam, normal vitals, normal labs. This can be associated with lung pathology such as COPD or other chronic pulmonary disease.      Recommendations:   Keep n.p.o., with IV fluids, serial exams, monitor vitals.  Recheck labs in AM.  If develops signs or symptoms more consistent with perforated viscus then will need to take emergently to the operating room.             Chief Complaint:   Abnormal CT scan     History is obtained from the patient    Patient is a 72-year-old male with significant history for COPD and lung cancer status post chemotherapy years ago who presented for routine chest CT earlier today.  This had a finding of pneumoperitoneum and he was instructed to present to the emergency department for further evaluation.  He denies any abdominal pain or distention.  He does note over the past few weeks he has felt more fatigued than usual and has been noting some constipation.  Otherwise bowel function continues.  He is able to eat though has some decreased appetite with this as well.          Past Medical History:     Past Medical History:   Diagnosis Date     Asthma      Avascular necrosis of bones of both hips (H)      Cancer (H)     Lung-radiation, chemo     Cholecystitis      Chronic renal failure      COPD, mild (H)      Diabetes mellitus (H)     states resolved after weight loss     Disc degeneration, lumbar      DJD (degenerative joint disease) of knee     left     Elevated PSA      Hemoptysis 2/16/2020     LBP (low back pain)      Lung cancer (H)      Pneumonia 1/4/2019     Smoking  hx              Past Surgical History:     Past Surgical History:   Procedure Laterality Date     ARTHROSCOPY KNEE       CERVICAL FUSION       CT BIOPSY LUNG  2/5/2020     CV CORONARY ANGIOGRAM N/A 8/5/2021    Procedure: CV CORONARY ANGIOGRAM;  Surgeon: Fabio Espinosa MD;  Location: [facility] CV     CV LEFT VENTRICULOGRAM N/A 8/5/2021    Procedure: Left Ventriculogram;  Surgeon: Fabio Espinosa MD;  Location: [facility] CV     ENDOSCOPIC RETROGRADE CHOLANGIOPANCREATOGRAM N/A 12/22/2015    Procedure: ENDOSCOPIC RETROGRADE CHOLANGIOPANCREATOGRAPHY;  Surgeon: Tang Elliott MD;  Location: [facility] OR;  Service:      HEMORRHOID SURGERY       INGUINAL HERNIA REPAIR       IR LUMBAR EPIDURAL STEROID INJECTION  4/29/2003     IR LUMBAR EPIDURAL STEROID INJECTION  6/27/2007     LAPAROSCOPIC CHOLECYSTECTOMY N/A 12/16/2015    Procedure: CHOLECYSTECTOMY LAPAROSCOPIC;  Surgeon: Eugene Ro MD;  Location: [facility] OR;  Service:      LUMBAR FUSION  2006    & reconstruction     OTHER SURGICAL HISTORY  2013    COLOVESICULAR FISTULA REPAIR     PICC  1/3/2016          RELEASE TRIGGER FINGER  07/2020     TOTAL KNEE ARTHROPLASTY Left              Social History:     Social History     Tobacco Use     Smoking status: Current Every Day Smoker     Packs/day: 0.50     Smokeless tobacco: Never Used     Tobacco comment: 1/4 ppd as of 2/25/21   Substance Use Topics     Alcohol use: Not Currently             Family History:   No family history on file.  Family history not discussed             Allergies:     Allergies   Allergen Reactions     Ciprofloxacin Swelling and Rash     Throat swelling     Septra [Sulfamethoxazole W/Trimethoprim] Swelling and Rash             Medications:     Current Facility-Administered Medications   Medication     dextrose 5% and 0.9% NaCl infusion     Current Outpatient Medications   Medication Sig     albuterol (PROVENTIL HFA;VENTOLIN HFA) 90 mcg/actuation inhaler  [ALBUTEROL (PROVENTIL HFA;VENTOLIN HFA) 90 MCG/ACTUATION INHALER] Inhale 2 puffs 4 (four) times a day as needed for wheezing.            aspirin (ASA) 81 MG chewable tablet Take 81 mg by mouth daily     atorvastatin (LIPITOR) 40 MG tablet TAKE 1 TABLET(40 MG) BY MOUTH DAILY     diclofenac (VOLTAREN) 1 % topical gel Apply topically 4 times daily as needed for moderate pain (hand joints)     HYDROcodone-acetaminophen (NORCO) 7.5-325 mg per tablet [HYDROCODONE-ACETAMINOPHEN (NORCO) 7.5-325 MG PER TABLET] Take 1 tablet by mouth every 6 (six) hours as needed.     ipratropium (ATROVENT) 0.02 % nebulizer solution Take 0.5 mg by nebulization 4 times daily     lisinopril (ZESTRIL) 2.5 MG tablet Take 1 tablet (2.5 mg) by mouth daily     morphine (MS CONTIN) 15 MG 12 hr tablet Take 15 mg by mouth every 6 hours      oxybutynin ER (DITROPAN-XL) 10 MG 24 hr tablet TAKE 1 TABLET BY MOUTH EVERY DAY IN THE MORNING     polyethylene glycol-propylene glycol (SYSTANE) 0.4-0.3 % SOLN ophthalmic solution Place 1 drop into both eyes 4 times daily     tamsulosin (FLOMAX) 0.4 mg cap [TAMSULOSIN (FLOMAX) 0.4 MG CAP] Take 0.4 mg by mouth daily.             Review of Systems:   The Review of Systems is negative other than noted in the HPI          Physical Exam:   Vitals were reviewed  Temp: 98  F (36.7  C) Temp src: Oral BP: 124/82 Pulse: 60   Resp: 18 SpO2: 92 % O2 Device: None (Room air)    Abdomen:   No scars, soft, non-distended, non-tender, no masses palpated, no hepatosplenomegally     Constitutional:   awake, alert, cooperative, no apparent distress, and appears stated age     Musculoskeletal:   full range of motion noted     Skin:   normal skin color, texture, turgor             Data:   All laboratory data reviewed  Lab Results   Component Value Date    WBC 6.8 09/13/2022    HGB 14.1 09/13/2022    HCT 42.9 09/13/2022     09/13/2022     09/13/2022    POTASSIUM 4.7 09/13/2022    CHLORIDE 99 09/13/2022    CO2 31 (H)  09/13/2022    BUN 12.9 09/13/2022    CR 0.87 09/13/2022     (H) 09/13/2022    DD 0.71 (H) 08/04/2021    AST 22 09/13/2022    ALT 26 09/13/2022    ALKPHOS 72 09/13/2022    BILITOTAL 0.4 09/13/2022    INR 1.04 02/05/2020     All imaging studies reviewed by me.  EXAM: CT ABDOMEN PELVIS W CONTRAST  LOCATION: [facility]  DATE/TIME: 9/13/2022 8:15 PM     INDICATION: pneumoperitoneum on outside CT. no abdominal pain or recent instrumentation. CT was survailance for lung neoplasm  COMPARISON: None.  TECHNIQUE: CT scan of the abdomen and pelvis was performed following injection of IV contrast. Multiplanar reformats were obtained. Dose reduction techniques were used.  CONTRAST: 70mL Isovue 370     FINDINGS:   LOWER CHEST: Basilar emphysema. Benign calcified granuloma medial left lower lobe. No pleural effusions.     HEPATOBILIARY: Postcholecystectomy. Common bile duct is enlarged measuring 13 mm. Mild intrahepatic ductal dilation. No liver lesions. Smooth liver capsule.      PANCREAS: Normal.     SPLEEN: Normal.     ADRENAL GLANDS: Normal.     KIDNEYS/BLADDER: No significant mass, stones, or hydronephrosis. There are simple or benign cysts. No follow up is needed.     BOWEL: Nondistended stomach. Normal caliber small bowel. Mobile cecum which resides in the central abdomen. Distal colon resection with colocolonic anastomosis in the posterior left pelvis. There are a few scattered colonic diverticula. No bowel wall   thickening or inflammatory stranding in the mesentery. Redemonstration of patchy pneumoperitoneum most of which layers underneath the diaphragm. There are no foci of intra-abdominal air caudal to the umbilicus. No intra-abdominal free fluid.     LYMPH NODES: Normal.     VASCULATURE: Mixed attenuation atheroma throughout the abdominal aorta and common iliac arteries. No aneurysm or critical stenosis.     PELVIC ORGANS: Moderately enlarged prostate gland which indents the bladder  base. Seminal vesicles are symmetric.     MUSCULOSKELETAL: Short paraspinous rods and pedicle screws transfix L5-S1. Moderate degenerative osteophytes of the lower thoracic and lumbar spine.                                                                      IMPRESSION:      Upper abdominal pneumoperitoneum without inflammatory stranding in the mesentery, bowel wall thickening, or intra-abdominal fluid. The precise source of pneumoperitoneum is not directly apparent. Findings could relate to a ruptured, noninflamed   diverticulum. Close clinical surveillance for abdominal symptoms is suggested.     Attestation:  Amount of time performed on this consult: 30 minutes.    Rd Guajardo MD

## 2022-09-14 NOTE — ED NOTES
"Patient stated \"you aren't doing anything for me here\" when asked if patient needed anything after helping pt to the bathroom. Pt stated that he needed his morning medication and that he doesn't know why he is here. Writer informed pt that he has the right to refuse care and patient stated that he would like to leave as soon as possible and that he would come back if he felt like he needed to. Writer informed physician.  "

## 2022-09-14 NOTE — DISCHARGE INSTRUCTIONS
You have air in your abdomen which is likely from a small perforation in your bowel.  Although we feel well at this time, this condition is at high risk for getting worse and potentially making you deathly ill.  If you start to feel worse at all (developing abdominal pain, nausea, diarrhea, fever, chills, or any other concerning changes), you must return to the emergency department immediately.

## 2022-09-15 ENCOUNTER — OFFICE VISIT (OUTPATIENT)
Dept: RADIATION ONCOLOGY | Facility: HOSPITAL | Age: 72
End: 2022-09-15
Attending: RADIOLOGY
Payer: MEDICARE

## 2022-09-15 VITALS
DIASTOLIC BLOOD PRESSURE: 76 MMHG | RESPIRATION RATE: 20 BRPM | HEART RATE: 82 BPM | WEIGHT: 163.3 LBS | SYSTOLIC BLOOD PRESSURE: 136 MMHG | OXYGEN SATURATION: 92 % | BODY MASS INDEX: 25.58 KG/M2

## 2022-09-15 DIAGNOSIS — C34.90 SMALL CELL LUNG CANCER (H): Primary | ICD-10-CM

## 2022-09-15 PROCEDURE — 99213 OFFICE O/P EST LOW 20 MIN: CPT | Performed by: RADIOLOGY

## 2022-09-15 PROCEDURE — G0463 HOSPITAL OUTPT CLINIC VISIT: HCPCS

## 2022-09-15 ASSESSMENT — PAIN SCALES - GENERAL: PAINLEVEL: NO PAIN (0)

## 2022-09-15 NOTE — LETTER
9/15/2022         RE: Martin Tovar  [address]  [facility] [address]        Dear Colleague,    Thank you for referring your patient, Martin Tovar, to the [facility]ATION [facility]COL[facility] [place]. Please see a copy of my visit note below.    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  The patient is status post chemoradiation therapy.  He was found to have a new diagnosis of limited stage small cell lung cancer, second primary/recurrence, clinical stage T1 N0 M0.  His case has been discussed at thoracic tumor conference and consensus recommendation is to consider SBRT.      TYPE OF RADIATION THERAPY ADMINISTERED:  5000 cGy in 5 treatments given from 3/2/120-3/12/2020.     INTERVAL SINCE COMPLETION OF RADIATION THERAPY: 2 years and 6 months.      SUBJECTIVE:  Mr. Tovar is a 72 y.o. male who is a retired  from [employer].  Patient was diagnosed with limited stage small cell left lung cancer initially in 2012, stage T2 N0 M0.  He received concurrent chemoradiation therapy with cisplatin and etoposide and radiation therapy to a total dose of 4500 cGy in 30 treatments at 150 cGy twice daily.  His treatment was given at [facility].  Patient had a complete response and also received PCI with a total dose of 2500 cGy in 10 treatments.  Patient has been followed closely with no evidence of cancer recurrence.  He will presented with recent finding of left lower lobe nodule by routine screening exam for which he was seeking further evaluation.  PET CT scan on 1/14/2020 showed a 1.3 x 1.2 cm nodule with increased SUV max 3.3 consistent with malignancy.  There is no radiographic evidence of anel and systemic metastasis.  CT-guided needle biopsy on 2/5/2020 confirmed diagnosis of small cell carcinoma.  He had a  staging MRI brain which also showed no evidence of brain metastasis.  Given the prior history of radiation therapy and location of his current cancer, the patient might require pneumonectomy.  His case has been discussed at thoracic tumor conference and that the consensus recommendation is to consider SBRT. His case has been discussed at thoracic tumor conference and consensus recommendation is to consider SBRT.  He received radiation therapy in our clinic with a total dose of 5000 cGy in 5 treatments given from 3/2/120-3/12/2020.  He tolerated radiation therapy very well with minimal side effect.     Patient has been doing well since completion of the radiation therapy.  He denies any chest pain discomfort at the time of evaluation.  He is here for routine postradiation therapy office follow-up.    Medications were reviewed and are up to date on EPIC.    The following portions of the patient's history were reviewed and updated as appropriate: allergies, current medications, past family history, past medical history, past social history, past surgical history and problem list.    Review of Systems:      General  Constitutional  Constitutional (WDL): Exceptions to WDL  Fatigue: Fatigue relieved by rest  EENT  Eye Disorders  Eye Disorder (WDL): Assessment not pertinent to visit  Ear Disorders  Ear Disorder (WDL): Assessment not pertinent to visit  Respiratory  Respiratory  Respiratory (WDL): Exceptions to WDL (smoking 1ppd cigarettes)  Cough: Mild symptoms OR nonprescription intervention indicated  Dyspnea: Shortness of breath with moderate exertion  Cardiovascular  Cardiovascular  Cardiovascular (WDL): All cardiovascular elements are within defined limits  Gastrointestinal  Gastrointestinal  Gastrointestinal (WDL): Exceptions to WDL  Anorexia: Loss of appetite without alteration in eating habits  Musculoskeletal  Musculoskeletal and Connective Tissue Disorders  Musculoskeletal & Connective (WDL): Exceptions to  WDL  Arthralgia: Mild pain  Integumentary  Integumentary  Integumentary (WDL): All integumentary elements are within defined limits  Neurological  Neurosensory  Neurosensory (WDL): All neurosensory elements are within defined limits  Genitourinary/Reproductive  Genitourinary  Genitourinary (WDL): All genitourinary elements are within defined limits  Urinary Frequency: Present  Lymphatic  Lymph System Disorders  Lymph (WDL): All lymph elements are within defined limits  Pain  Pain Score: No Pain (0)  AUA Assessment                                                              Accompanied by  Accompanied By: self only    Objective:     PHYSICAL EXAMINATION:    /76 (BP Location: Left arm, Patient Position: Sitting)   Pulse 82   Resp 20   Wt 74.1 kg (163 lb 4.8 oz)   SpO2 92%   BMI 25.58 kg/m      Gen: Alert, in NAD  Eyes: PERRL, EOMI, sclera anicteric  Pulm: No wheezing, stridor or respiratory distress  CV: Well-perfused, no cyanosis, no pedal edema  Back: No step-offs or pain to palpation along the thoracolumbar spine  Rectal: Deferred  : Deferred  Musculoskeletal: Normal muscle bulk and tone  Skin: Normal color and turgor  Neurologic: A/Ox3, CN II-XII intact, normal gait and station  Psychiatric: Appropriate mood and affect     Imaging: Imaging results 30 days: MRI Brain w & w/o contrast    Result Date: 9/14/2022  EXAM: MR BRAIN W/O and W CONTRAST LOCATION: [facility] DATE/TIME: 9/13/2022 2:02 PM INDICATION: Small cell lung cancer, staging COMPARISON: March 24, 2022 CONTRAST: Gadavist 7mL TECHNIQUE: Routine multiplanar multisequence head MRI without and with intravenous contrast. FINDINGS: INTRACRANIAL CONTENTS: No abnormal restricted diffusion to suggest acute infarct. Focal and confluent areas of signal abnormality within the cerebral hemispheric white matter which are nonspecific, though most commonly ascribed to chronic small vessel ischemic disease. The ventricles and sulci  are prominent consistent with moderate brain parenchymal volume loss. No evidence of acute intracranial hemorrhage, mass effect, or extra-axial collection. No evidence of abnormal brain parenchymal or leptomeningeal enhancement. No cerebellar tonsillar ectopia. SELLA: No abnormality accounting for technique. OSSEOUS STRUCTURES/SOFT TISSUES: The visualized skull base and calvarium are unremarkable. Expected signal voids within the distal vertebral, basilar, and bilateral internal carotid arteries. ORBITS: [Left cataract surgery surgery. The partially imaged globes are otherwise unremarkable. SINUSES/MASTOIDS: The partially imaged paranasal sinuses and mastoid air cells are unremarkable.     IMPRESSION: 1.  No evidence of acute intracranial hemorrhage, mass effect, or infarction. 2.  No evidence of intracranial metastases. 3.  Moderate nonspecific white matter changes. 4.  Moderate brain parenchymal volume loss.    CT Abdomen Pelvis w Contrast    Result Date: 9/13/2022  EXAM: CT ABDOMEN PELVIS W CONTRAST LOCATION: [facility] DATE/TIME: 9/13/2022 8:15 PM INDICATION: pneumoperitoneum on outside CT. no abdominal pain or recent instrumentation. CT was survailance for lung neoplasm COMPARISON: None. TECHNIQUE: CT scan of the abdomen and pelvis was performed following injection of IV contrast. Multiplanar reformats were obtained. Dose reduction techniques were used. CONTRAST: 70mL Isovue 370 FINDINGS: LOWER CHEST: Basilar emphysema. Benign calcified granuloma medial left lower lobe. No pleural effusions. HEPATOBILIARY: Postcholecystectomy. Common bile duct is enlarged measuring 13 mm. Mild intrahepatic ductal dilation. No liver lesions. Smooth liver capsule. PANCREAS: Normal. SPLEEN: Normal. ADRENAL GLANDS: Normal. KIDNEYS/BLADDER: No significant mass, stones, or hydronephrosis. There are simple or benign cysts. No follow up is needed. BOWEL: Nondistended stomach. Normal caliber small bowel.  Mobile cecum which resides in the central abdomen. Distal colon resection with colocolonic anastomosis in the posterior left pelvis. There are a few scattered colonic diverticula. No bowel wall thickening or inflammatory stranding in the mesentery. Redemonstration of patchy pneumoperitoneum most of which layers underneath the diaphragm. There are no foci of intra-abdominal air caudal to the umbilicus. No intra-abdominal free fluid. LYMPH NODES: Normal. VASCULATURE: Mixed attenuation atheroma throughout the abdominal aorta and common iliac arteries. No aneurysm or critical stenosis. PELVIC ORGANS: Moderately enlarged prostate gland which indents the bladder base. Seminal vesicles are symmetric. MUSCULOSKELETAL: Short paraspinous rods and pedicle screws transfix L5-S1. Moderate degenerative osteophytes of the lower thoracic and lumbar spine.     IMPRESSION: Upper abdominal pneumoperitoneum without inflammatory stranding in the mesentery, bowel wall thickening, or intra-abdominal fluid. The precise source of pneumoperitoneum is not directly apparent. Findings could relate to a ruptured, noninflamed diverticulum. Close clinical surveillance for abdominal symptoms is suggested.     CT Chest w/o Contrast    Result Date: 9/13/2022  EXAM: CT CHEST W/O CONTRAST LOCATION: [facility] DATE/TIME: 9/13/2022 1:04 PM INDICATION: Small cell lung cancer, monitor COMPARISON: CT of the chest with contrast 3/24/2022 and noncontrast chest CT 20/4/2021 TECHNIQUE: CT chest without IV contrast. Multiplanar reformats were obtained. Dose reduction techniques were used. CONTRAST: None. FINDINGS: LUNGS AND PLEURA: Volume loss and architectural distortion in the perihilar left lung involving the left upper lobe and superior segment of the left lower lobe is unchanged. Metallic fiducial in the posterior left lower lobe is also present and has surrounding architectural distortion and interstitial thickening consistent with  parenchymal scarring. Hyperinflation and fairly diffuse emphysema. In the peripheral third of the left upper lobe and in all lobes of the right lung there are multiple bilateral indistinct mixed groundglass and interstitial opacities consistent with foci of inflammation. Benign calcified granuloma in the left lower lobe along the mediastinal pleura. Trachea and central airways are patent. MEDIASTINUM: Cardiac chambers are normal in size. No pericardial effusion. Main pulmonary artery is normal caliber. Normal caliber thoracic aorta. Conventional arch anatomy. Imaged thyroid gland is normal. Esophagus is decompressed. No enlarged mediastinal or hilar lymph nodes. CORONARY ARTERY CALCIFICATION: Mild. UPPER ABDOMEN: Surgical clips in the gallbladder fossa. Pneumoperitoneum in the upper abdomen. No inflammatory stranding. MUSCULOSKELETAL: Prior ACDF. Moderate degenerative osteophytes of the thoracic spine from T3 through the upper lumbar spine. No vertebral compression deformities. No aggressive or destructive bone lesions are present.     IMPRESSION: 1.  Stable findings of post radiation fibrosis around the left hilum and focally around a fiducial in the posterior left lower lobe. No findings to suggest recurrent malignancy in the chest. 2.  Pneumoperitoneum in the imaged upper abdomen. The etiology of free air is not apparent. 3.  Multifocal mixed groundglass and interstitial opacities in the peripheral third of the left upper lobe and right lung. Inflammatory nodules are strongly favored. Attention on short-term follow-up CT chest in ~3 months is suggested. [Critical Result: Unexplained pneumoperitoneum] Finding was identified on 9/13/2022 3:34 PM. Dr. Snowden was contacted by me on 9/13/2022 4:06 PM and verbalized understanding of the critical result.        Impression     The patient is a 72-year-old gentleman with a prior history of limited stage small cell lung cancer diagnosed initially in 2012.  He was find to  "have a new limited stage small cell lung cancer involving the left lower lobe with clinical stage T1 N0 M0.  Patient received definitive SBRT and completed 2 years and 6 months ago.  He has been doing well with no clinical evidence of recurrence.    Assessment & Plan:     1.  I have personally reviewed his restaging CT and MRI scan today and compare with previous CT chest and radiation therapy record.  There is no radiographic evidence of cancer recurrence.    Patient is scheduled to return to radiation oncology in 6 months for CT chest.     2.  Continue follow-up with Dr. Hale, thoracic surgeon and Dr. Brito, pulmonology as planned.    3.  The patient was found to have abdominal pneumoperitoneum for which he has been followed with Dr. April Franco, his primary for follow-up recommendation.        Face to face time  20 minutes with > 75% spent on consultation, education and coordination of care.        Mary Snowden MD, PhD  Department of Radiation Oncology   [facility]  Tel: [phone]  Page: [phone]    [facility]  [address] [place] [address]     [address] [place] [address]    CC:  Patient Care Team:  April Franco PA as PCP - General  Mary Snowden MD as MD (Hematology & Oncology)  Mary Snowden MD as Assigned Cancer Care Provider  Maicol Cervantes MD as Assigned Heart and Vascular Provider      Oncology Rooming Note    September 15, 2022 11:26 AM   Martin Tovar is a 72 year old male who presents for:    Chief Complaint   Patient presents with     Oncology Clinic Visit     Follow up     Initial Vitals: /76 (BP Location: Left arm, Patient Position: Sitting)   Pulse 82   Resp 20   Wt 74.1 kg (163 lb 4.8 oz)   SpO2 92%   BMI 25.58 kg/m   Estimated body mass index is 25.58 kg/m  as calculated from the following:    Height as of 9/13/22: 1.702 m (5' 7\").    Weight as of this encounter: 74.1 kg (163 lb 4.8 oz). Body surface area is 1.87 meters " squared.  No Pain (0) Comment: Data Unavailable   No LMP for male patient.  Allergies reviewed: Yes  Medications reviewed: Yes    Medications: Medication refills not needed today.  Pharmacy name entered into MPV:    [id number] - [place], [place] - [address] AT [id number] - [place], [place] - [address] AT [address]    Clinical concerns: follow up CT/MRI done on 9/13 Dr. Snowden was notified.      Madison Ly RN                Again, thank you for allowing me to participate in the care of your patient.        Sincerely,        Mary Snowden MD

## 2022-09-15 NOTE — PROGRESS NOTES
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  The patient is status post chemoradiation therapy.  He was found to have a new diagnosis of limited stage small cell lung cancer, second primary/recurrence, clinical stage T1 N0 M0.  His case has been discussed at thoracic tumor conference and consensus recommendation is to consider SBRT.      TYPE OF RADIATION THERAPY ADMINISTERED:  5000 cGy in 5 treatments given from 3/2/120-3/12/2020.     INTERVAL SINCE COMPLETION OF RADIATION THERAPY: 2 years and 6 months.      SUBJECTIVE:  Mr. Tovar is a 72 y.o. male who is a retired  from [employer].  Patient was diagnosed with limited stage small cell left lung cancer initially in 2012, stage T2 N0 M0.  He received concurrent chemoradiation therapy with cisplatin and etoposide and radiation therapy to a total dose of 4500 cGy in 30 treatments at 150 cGy twice daily.  His treatment was given at [facility].  Patient had a complete response and also received PCI with a total dose of 2500 cGy in 10 treatments.  Patient has been followed closely with no evidence of cancer recurrence.  He will presented with recent finding of left lower lobe nodule by routine screening exam for which he was seeking further evaluation.  PET CT scan on 1/14/2020 showed a 1.3 x 1.2 cm nodule with increased SUV max 3.3 consistent with malignancy.  There is no radiographic evidence of anel and systemic metastasis.  CT-guided needle biopsy on 2/5/2020 confirmed diagnosis of small cell carcinoma.  He had a staging MRI brain which also showed no evidence of brain metastasis.  Given the prior history of radiation therapy and location of his current cancer, the patient might require pneumonectomy.  His case has been discussed at thoracic tumor conference and that the  consensus recommendation is to consider SBRT. His case has been discussed at thoracic tumor conference and consensus recommendation is to consider SBRT.  He received radiation therapy in our clinic with a total dose of 5000 cGy in 5 treatments given from 3/2/120-3/12/2020.  He tolerated radiation therapy very well with minimal side effect.     Patient has been doing well since completion of the radiation therapy.  He denies any chest pain discomfort at the time of evaluation.  He is here for routine postradiation therapy office follow-up.    Medications were reviewed and are up to date on EPIC.    The following portions of the patient's history were reviewed and updated as appropriate: allergies, current medications, past family history, past medical history, past social history, past surgical history and problem list.    Review of Systems:      General  Constitutional  Constitutional (WDL): Exceptions to WDL  Fatigue: Fatigue relieved by rest  EENT  Eye Disorders  Eye Disorder (WDL): Assessment not pertinent to visit  Ear Disorders  Ear Disorder (WDL): Assessment not pertinent to visit  Respiratory  Respiratory  Respiratory (WDL): Exceptions to WDL (smoking 1ppd cigarettes)  Cough: Mild symptoms OR nonprescription intervention indicated  Dyspnea: Shortness of breath with moderate exertion  Cardiovascular  Cardiovascular  Cardiovascular (WDL): All cardiovascular elements are within defined limits  Gastrointestinal  Gastrointestinal  Gastrointestinal (WDL): Exceptions to WDL  Anorexia: Loss of appetite without alteration in eating habits  Musculoskeletal  Musculoskeletal and Connective Tissue Disorders  Musculoskeletal & Connective (WDL): Exceptions to WDL  Arthralgia: Mild pain  Integumentary  Integumentary  Integumentary (WDL): All integumentary elements are within defined limits  Neurological  Neurosensory  Neurosensory (WDL): All neurosensory elements are within defined  limits  Genitourinary/Reproductive  Genitourinary  Genitourinary (WDL): All genitourinary elements are within defined limits  Urinary Frequency: Present  Lymphatic  Lymph System Disorders  Lymph (WDL): All lymph elements are within defined limits  Pain  Pain Score: No Pain (0)  AUA Assessment                                                              Accompanied by  Accompanied By: self only    Objective:     PHYSICAL EXAMINATION:    /76 (BP Location: Left arm, Patient Position: Sitting)   Pulse 82   Resp 20   Wt 74.1 kg (163 lb 4.8 oz)   SpO2 92%   BMI 25.58 kg/m      Gen: Alert, in NAD  Eyes: PERRL, EOMI, sclera anicteric  Pulm: No wheezing, stridor or respiratory distress  CV: Well-perfused, no cyanosis, no pedal edema  Back: No step-offs or pain to palpation along the thoracolumbar spine  Rectal: Deferred  : Deferred  Musculoskeletal: Normal muscle bulk and tone  Skin: Normal color and turgor  Neurologic: A/Ox3, CN II-XII intact, normal gait and station  Psychiatric: Appropriate mood and affect     Imaging: Imaging results 30 days: MRI Brain w & w/o contrast    Result Date: 9/14/2022  EXAM: MR BRAIN W/O and W CONTRAST LOCATION: [facility] DATE/TIME: 9/13/2022 2:02 PM INDICATION: Small cell lung cancer, staging COMPARISON: March 24, 2022 CONTRAST: Gadavist 7mL TECHNIQUE: Routine multiplanar multisequence head MRI without and with intravenous contrast. FINDINGS: INTRACRANIAL CONTENTS: No abnormal restricted diffusion to suggest acute infarct. Focal and confluent areas of signal abnormality within the cerebral hemispheric white matter which are nonspecific, though most commonly ascribed to chronic small vessel ischemic disease. The ventricles and sulci are prominent consistent with moderate brain parenchymal volume loss. No evidence of acute intracranial hemorrhage, mass effect, or extra-axial collection. No evidence of abnormal brain parenchymal or leptomeningeal  enhancement. No cerebellar tonsillar ectopia. SELLA: No abnormality accounting for technique. OSSEOUS STRUCTURES/SOFT TISSUES: The visualized skull base and calvarium are unremarkable. Expected signal voids within the distal vertebral, basilar, and bilateral internal carotid arteries. ORBITS: [Left cataract surgery surgery. The partially imaged globes are otherwise unremarkable. SINUSES/MASTOIDS: The partially imaged paranasal sinuses and mastoid air cells are unremarkable.     IMPRESSION: 1.  No evidence of acute intracranial hemorrhage, mass effect, or infarction. 2.  No evidence of intracranial metastases. 3.  Moderate nonspecific white matter changes. 4.  Moderate brain parenchymal volume loss.    CT Abdomen Pelvis w Contrast    Result Date: 9/13/2022  EXAM: CT ABDOMEN PELVIS W CONTRAST LOCATION: [facility] DATE/TIME: 9/13/2022 8:15 PM INDICATION: pneumoperitoneum on outside CT. no abdominal pain or recent instrumentation. CT was survailance for lung neoplasm COMPARISON: None. TECHNIQUE: CT scan of the abdomen and pelvis was performed following injection of IV contrast. Multiplanar reformats were obtained. Dose reduction techniques were used. CONTRAST: 70mL Isovue 370 FINDINGS: LOWER CHEST: Basilar emphysema. Benign calcified granuloma medial left lower lobe. No pleural effusions. HEPATOBILIARY: Postcholecystectomy. Common bile duct is enlarged measuring 13 mm. Mild intrahepatic ductal dilation. No liver lesions. Smooth liver capsule. PANCREAS: Normal. SPLEEN: Normal. ADRENAL GLANDS: Normal. KIDNEYS/BLADDER: No significant mass, stones, or hydronephrosis. There are simple or benign cysts. No follow up is needed. BOWEL: Nondistended stomach. Normal caliber small bowel. Mobile cecum which resides in the central abdomen. Distal colon resection with colocolonic anastomosis in the posterior left pelvis. There are a few scattered colonic diverticula. No bowel wall thickening or inflammatory  stranding in the mesentery. Redemonstration of patchy pneumoperitoneum most of which layers underneath the diaphragm. There are no foci of intra-abdominal air caudal to the umbilicus. No intra-abdominal free fluid. LYMPH NODES: Normal. VASCULATURE: Mixed attenuation atheroma throughout the abdominal aorta and common iliac arteries. No aneurysm or critical stenosis. PELVIC ORGANS: Moderately enlarged prostate gland which indents the bladder base. Seminal vesicles are symmetric. MUSCULOSKELETAL: Short paraspinous rods and pedicle screws transfix L5-S1. Moderate degenerative osteophytes of the lower thoracic and lumbar spine.     IMPRESSION: Upper abdominal pneumoperitoneum without inflammatory stranding in the mesentery, bowel wall thickening, or intra-abdominal fluid. The precise source of pneumoperitoneum is not directly apparent. Findings could relate to a ruptured, noninflamed diverticulum. Close clinical surveillance for abdominal symptoms is suggested.     CT Chest w/o Contrast    Result Date: 9/13/2022  EXAM: CT CHEST W/O CONTRAST LOCATION: [facility] DATE/TIME: 9/13/2022 1:04 PM INDICATION: Small cell lung cancer, monitor COMPARISON: CT of the chest with contrast 3/24/2022 and noncontrast chest CT 20/4/2021 TECHNIQUE: CT chest without IV contrast. Multiplanar reformats were obtained. Dose reduction techniques were used. CONTRAST: None. FINDINGS: LUNGS AND PLEURA: Volume loss and architectural distortion in the perihilar left lung involving the left upper lobe and superior segment of the left lower lobe is unchanged. Metallic fiducial in the posterior left lower lobe is also present and has surrounding architectural distortion and interstitial thickening consistent with parenchymal scarring. Hyperinflation and fairly diffuse emphysema. In the peripheral third of the left upper lobe and in all lobes of the right lung there are multiple bilateral indistinct mixed groundglass and  interstitial opacities consistent with foci of inflammation. Benign calcified granuloma in the left lower lobe along the mediastinal pleura. Trachea and central airways are patent. MEDIASTINUM: Cardiac chambers are normal in size. No pericardial effusion. Main pulmonary artery is normal caliber. Normal caliber thoracic aorta. Conventional arch anatomy. Imaged thyroid gland is normal. Esophagus is decompressed. No enlarged mediastinal or hilar lymph nodes. CORONARY ARTERY CALCIFICATION: Mild. UPPER ABDOMEN: Surgical clips in the gallbladder fossa. Pneumoperitoneum in the upper abdomen. No inflammatory stranding. MUSCULOSKELETAL: Prior ACDF. Moderate degenerative osteophytes of the thoracic spine from T3 through the upper lumbar spine. No vertebral compression deformities. No aggressive or destructive bone lesions are present.     IMPRESSION: 1.  Stable findings of post radiation fibrosis around the left hilum and focally around a fiducial in the posterior left lower lobe. No findings to suggest recurrent malignancy in the chest. 2.  Pneumoperitoneum in the imaged upper abdomen. The etiology of free air is not apparent. 3.  Multifocal mixed groundglass and interstitial opacities in the peripheral third of the left upper lobe and right lung. Inflammatory nodules are strongly favored. Attention on short-term follow-up CT chest in ~3 months is suggested. [Critical Result: Unexplained pneumoperitoneum] Finding was identified on 9/13/2022 3:34 PM. Dr. Snowden was contacted by me on 9/13/2022 4:06 PM and verbalized understanding of the critical result.        Impression     The patient is a 72-year-old gentleman with a prior history of limited stage small cell lung cancer diagnosed initially in 2012.  He was find to have a new limited stage small cell lung cancer involving the left lower lobe with clinical stage T1 N0 M0.  Patient received definitive SBRT and completed 2 years and 6 months ago.  He has been doing well with no  clinical evidence of recurrence.    Assessment & Plan:     1.  I have personally reviewed his restaging CT and MRI scan today and compare with previous CT chest and radiation therapy record.  There is no radiographic evidence of cancer recurrence.    Patient is scheduled to return to radiation oncology in 6 months for CT chest.     2.  Continue follow-up with Dr. Hale, thoracic surgeon and Dr. Brito, pulmonology as planned.    3.  The patient was found to have abdominal pneumoperitoneum for which he has been followed with Dr. April Franco, his primary for follow-up recommendation.        Face to face time  20 minutes with > 75% spent on consultation, education and coordination of care.        Mary Snowden MD, PhD  Department of Radiation Oncology   [facility]  Tel: [phone]  Page: [phone]    [facility]  [address] [place] [address]     [address]   [place] [address]    CC:  Patient Care Team:  April Franco PA as PCP - General  Mary Snowden MD as MD (Hematology & Oncology)  Mary Snowden MD as Assigned Cancer Care Provider  Maicol Cervantes MD as Assigned Heart and Vascular Provider

## 2022-09-15 NOTE — PROGRESS NOTES
"Oncology Rooming Note    September 15, 2022 11:26 AM   Martin Tovar is a 72 year old male who presents for:    Chief Complaint   Patient presents with     Oncology Clinic Visit     Follow up     Initial Vitals: /76 (BP Location: Left arm, Patient Position: Sitting)   Pulse 82   Resp 20   Wt 74.1 kg (163 lb 4.8 oz)   SpO2 92%   BMI 25.58 kg/m   Estimated body mass index is 25.58 kg/m  as calculated from the following:    Height as of 9/13/22: 1.702 m (5' 7\").    Weight as of this encounter: 74.1 kg (163 lb 4.8 oz). Body surface area is 1.87 meters squared.  No Pain (0) Comment: Data Unavailable   No LMP for male patient.  Allergies reviewed: Yes  Medications reviewed: Yes    Medications: Medication refills not needed today.  Pharmacy name entered into [facility]:    [id number] - [place], [place] - [address] [place] [address] A[place] [address] AT [id number] - [facility] [address] [place] [address] AVE AT [address]    Clinical concerns: follow up CT/MRI done on 9/13 Dr. Snowden was notified.      Madison Ly RN            "

## 2022-09-19 ENCOUNTER — LAB REQUISITION (OUTPATIENT)
Dept: LAB | Facility: CLINIC | Age: 72
End: 2022-09-19
Payer: MEDICARE

## 2022-09-19 DIAGNOSIS — K66.8 OTHER SPECIFIED DISORDERS OF PERITONEUM: ICD-10-CM

## 2022-09-19 LAB
ALBUMIN SERPL BCG-MCNC: 4 G/DL (ref 3.5–5.2)
ALP SERPL-CCNC: 74 U/L (ref 40–129)
ALT SERPL W P-5'-P-CCNC: 18 U/L (ref 10–50)
ANION GAP SERPL CALCULATED.3IONS-SCNC: 9 MMOL/L (ref 7–15)
AST SERPL W P-5'-P-CCNC: 18 U/L (ref 10–50)
BILIRUB SERPL-MCNC: 0.4 MG/DL
BUN SERPL-MCNC: 14.3 MG/DL (ref 8–23)
CALCIUM SERPL-MCNC: 8.9 MG/DL (ref 8.8–10.2)
CHLORIDE SERPL-SCNC: 103 MMOL/L (ref 98–107)
CREAT SERPL-MCNC: 0.91 MG/DL (ref 0.67–1.17)
DEPRECATED HCO3 PLAS-SCNC: 31 MMOL/L (ref 22–29)
GFR SERPL CREATININE-BSD FRML MDRD: >90 ML/MIN/1.73M2
GLUCOSE SERPL-MCNC: 97 MG/DL (ref 70–99)
POTASSIUM SERPL-SCNC: 4.9 MMOL/L (ref 3.4–5.3)
PROT SERPL-MCNC: 6.8 G/DL (ref 6.4–8.3)
SODIUM SERPL-SCNC: 143 MMOL/L (ref 136–145)

## 2022-09-19 PROCEDURE — 80053 COMPREHEN METABOLIC PANEL: CPT | Mod: ORL | Performed by: PHYSICIAN ASSISTANT

## 2022-12-22 ENCOUNTER — TRANSFERRED RECORDS (OUTPATIENT)
Dept: HEALTH INFORMATION MANAGEMENT | Facility: CLINIC | Age: 72
End: 2022-12-22

## 2022-12-26 ENCOUNTER — HEALTH MAINTENANCE LETTER (OUTPATIENT)
Age: 72
End: 2022-12-26

## 2023-02-13 ENCOUNTER — TRANSFERRED RECORDS (OUTPATIENT)
Dept: HEALTH INFORMATION MANAGEMENT | Facility: CLINIC | Age: 73
End: 2023-02-13

## 2023-03-13 ENCOUNTER — HOSPITAL ENCOUNTER (OUTPATIENT)
Dept: CT IMAGING | Facility: HOSPITAL | Age: 73
Discharge: HOME | End: 2023-03-13
Attending: RADIOLOGY | Admitting: RADIOLOGY
Payer: MEDICARE

## 2023-03-13 DIAGNOSIS — C34.90 SMALL CELL LUNG CANCER (H): ICD-10-CM

## 2023-03-13 PROCEDURE — 71250 CT THORAX DX C-: CPT | Mod: ME

## 2023-03-15 NOTE — PLAN OF CARE
Denies chest pain overnight. Femoral site healing well.   Problem: Adult Inpatient Plan of Care  Goal: Plan of Care Review  Outcome: No Change  Goal: Patient-Specific Goal (Individualized)  Outcome: No Change  Goal: Absence of Hospital-Acquired Illness or Injury  Outcome: No Change  Intervention: Identify and Manage Fall Risk  Recent Flowsheet Documentation  Taken 8/5/2021 2013 by Barbra Schumacher RN  Safety Promotion/Fall Prevention: safety round/check completed  Goal: Optimal Comfort and Wellbeing  Outcome: No Change  Goal: Readiness for Transition of Care  Outcome: No Change     Problem: Chest Pain  Goal: Resolution of Chest Pain Symptoms  Outcome: No Change     Problem: Adjustment to Illness (Acute Coronary Syndrome)  Goal: Optimal Adaptation to Illness  Outcome: No Change     Problem: Dysrhythmia (Acute Coronary Syndrome)  Goal: Normalized Cardiac Rhythm  Outcome: No Change     Problem: Cardiac-Related Pain (Acute Coronary Syndrome)  Goal: Absence of Cardiac-Related Pain  Outcome: No Change     Problem: Hemodynamic Instability (Acute Coronary Syndrome)  Goal: Effective Cardiac Pump Function  Outcome: No Change     Problem: Tissue Perfusion (Acute Coronary Syndrome)  Goal: Adequate Tissue Perfusion  Outcome: No Change      no

## 2023-03-16 ENCOUNTER — OFFICE VISIT (OUTPATIENT)
Dept: RADIATION ONCOLOGY | Facility: HOSPITAL | Age: 73
End: 2023-03-16
Attending: RADIOLOGY
Payer: MEDICARE

## 2023-03-16 VITALS
SYSTOLIC BLOOD PRESSURE: 126 MMHG | HEART RATE: 84 BPM | DIASTOLIC BLOOD PRESSURE: 75 MMHG | BODY MASS INDEX: 25.06 KG/M2 | WEIGHT: 160 LBS | OXYGEN SATURATION: 94 % | RESPIRATION RATE: 16 BRPM

## 2023-03-16 DIAGNOSIS — C34.90 SMALL CELL LUNG CANCER (H): Primary | ICD-10-CM

## 2023-03-16 PROCEDURE — 99214 OFFICE O/P EST MOD 30 MIN: CPT | Performed by: RADIOLOGY

## 2023-03-16 PROCEDURE — G0463 HOSPITAL OUTPT CLINIC VISIT: HCPCS | Performed by: RADIOLOGY

## 2023-03-16 RX ORDER — ZOLPIDEM TARTRATE 10 MG/1
10 TABLET ORAL
COMMUNITY
Start: 2023-03-02 | End: 2023-06-09

## 2023-03-16 RX ORDER — FLUTICASONE PROPIONATE AND SALMETEROL 250; 50 UG/1; UG/1
1 POWDER RESPIRATORY (INHALATION) 2 TIMES DAILY
COMMUNITY
Start: 2023-03-10

## 2023-03-16 ASSESSMENT — PAIN SCALES - GENERAL: PAINLEVEL: NO PAIN (0)

## 2023-03-16 NOTE — PROGRESS NOTES
"Oncology Rooming Note    March 16, 2023 10:24 AM   Martin Tovar is a 72 year old male who presents for:    Chief Complaint   Patient presents with     Oncology Clinic Visit     Lung Cancer     Follow up with Dr. Snowden     Initial Vitals: /75   Pulse 84   Resp 16   Wt 72.6 kg (160 lb)   SpO2 94%   BMI 25.06 kg/m   Estimated body mass index is 25.06 kg/m  as calculated from the following:    Height as of 9/13/22: 1.702 m (5' 7\").    Weight as of this encounter: 72.6 kg (160 lb). Body surface area is 1.85 meters squared.  No Pain (0) Comment: Data Unavailable   No LMP for male patient.  Allergies reviewed: Yes  Medications reviewed: Yes    Medications: Medication refills not needed today.  Pharmacy name entered into [facility]: [facility] [id number] [place] [address]    Clinical concerns: SOB with activity, daily cough mild. Smoking about 1ppd of cigarettes again. Concerns about weight loss discussed, encouraged extra protein as able.  Dr. Snowden was notified.      Margo Khan RN              "

## 2023-03-16 NOTE — LETTER
3/16/2023         RE: Martin Tovar  [address]  [place] [address]        Dear Colleague,    Thank you for referring your patient, Martin Tovar, to the [facility] RADIATION ONCOLOGY [place]. Please see a copy of my visit note below.    [facility] Radiation Oncology Follow Up     Patient: Martin Tovar  MRN: [id number]  Date of Service: 03/16/2023       DISEASE TREATED:  History of limited stage small cell left lung cancer diagnosed initially in 2012.  The patient is status post chemoradiation therapy.  He was found to have a new diagnosis of limited stage small cell lung cancer, second primary/recurrence, clinical stage T1 N0 M0.  His case has been discussed at thoracic tumor conference and consensus recommendation is to consider SBRT.      TYPE OF RADIATION THERAPY ADMINISTERED:    1. 4500 cGy in 30 treatments at 150 cGy twice daily at [facility] in 2012.     2.  PCI with a total dose of 2500 cGy in 10 treatments at [facility] in 2012.      5000 cGy in 5 treatments given from 3/2/120-3/12/2020.     INTERVAL SINCE COMPLETION OF RADIATION THERAPY: 3 years.      SUBJECTIVE:  Mr. Tovar is a 72 y.o. male who is a retired  from [employer].  Patient was diagnosed with limited stage small cell left lung cancer initially in 2012, stage T2 N0 M0.  He received concurrent chemoradiation therapy with cisplatin and etoposide and radiation therapy to a total dose of 4500 cGy in 30 treatments at 150 cGy twice daily.  His treatment was given at [facility].  Patient had a complete response and also received PCI with a total dose of 2500 cGy in 10 treatments.  Patient has been followed closely with no evidence of cancer recurrence.  He will presented with recent finding of left lower lobe nodule by routine screening exam for which he was seeking further evaluation.  PET CT scan on 1/14/2020 showed a 1.3 x 1.2 cm nodule with increased SUV max 3.3 consistent with malignancy.  There  is no radiographic evidence of anel and systemic metastasis.  CT-guided needle biopsy on 2/5/2020 confirmed diagnosis of small cell carcinoma.  He had a staging MRI brain which also showed no evidence of brain metastasis.  Given the prior history of radiation therapy and location of his current cancer, the patient might require pneumonectomy.  His case has been discussed at thoracic tumor conference and that the consensus recommendation is to consider SBRT. His case has been discussed at thoracic tumor conference and consensus recommendation is to consider SBRT.  He received radiation therapy in our clinic with a total dose of 5000 cGy in 5 treatments given from 3/2/120-3/12/2020.  He tolerated radiation therapy very well with minimal side effect.     Patient has been doing well since completion of the radiation therapy.  He denies any chest pain discomfort at the time of evaluation.  He is here for routine postradiation therapy office follow-up.    Medications were reviewed and are up to date on EPIC.    The following portions of the patient's history were reviewed and updated as appropriate: allergies, current medications, past family history, past medical history, past social history, past surgical history and problem list.    Review of Systems:      General  Constitutional  Constitutional (WDL): Exceptions to WDL  Fatigue: Fatigue relieved by rest  Weight Loss: 5 to less than 10% from baseline OR intervention not indicated  EENT  Eye Disorders  Eye Disorder (WDL): All eye disorder elements are within defined limits  Ear Disorders  Ear Disorder (WDL): All ear disorder elements are within defined limits  Respiratory  Respiratory  Respiratory (WDL): Exceptions to WDL (smoking 1ppd cigarettes)  Cough: Mild symptoms OR nonprescription intervention indicated  Dyspnea: Shortness of breath with moderate exertion  Cardiovascular  Cardiovascular  Cardiovascular (WDL): All cardiovascular elements are within defined  limits  Gastrointestinal  Gastrointestinal  Gastrointestinal (WDL): Exceptions to WDL (missing molars, so hard time eating)  Anorexia: Loss of appetite without alteration in eating habits  Musculoskeletal  Musculoskeletal and Connective Tissue Disorders  Musculoskeletal & Connective (WDL): Exceptions to WDL  Arthralgia: Mild pain  Integumentary  Integumentary  Integumentary (WDL): All integumentary elements are within defined limits  Neurological  Neurosensory  Neurosensory (WDL): All neurosensory elements are within defined limits  Genitourinary/Reproductive  Genitourinary  Genitourinary (WDL): All genitourinary elements are within defined limits  Urinary Frequency: Present  Lymphatic  Lymph System Disorders  Lymph (WDL): All lymph elements are within defined limits  Pain  Pain Score: No Pain (0)  AUA Assessment                                                              Accompanied by  Accompanied By: self only    Objective:      PHYSICAL EXAMINATION:    /75   Pulse 84   Resp 16   Wt 72.6 kg (160 lb)   SpO2 94%   BMI 25.06 kg/m      Gen: Alert, in NAD  Eyes: PERRL, EOMI, sclera anicteric  Pulm: No wheezing, stridor or respiratory distress  CV: Well-perfused, no cyanosis, no pedal edema  Back: No step-offs or pain to palpation along the thoracolumbar spine  Rectal: Deferred  : Deferred  Musculoskeletal: Normal muscle bulk and tone  Skin: Normal color and turgor  Neurologic: A/Ox3, CN II-XII intact, normal gait and station  Psychiatric: Appropriate mood and affect     Imaging: Imaging results 30 days: CT Chest w/o contrast    Result Date: 3/13/2023  EXAM: CT CHEST W/O CONTRAST LOCATION: [facility] DATE/TIME: 3/13/2023 10:41 AM INDICATION:  Small cell lung cancer (H) COMPARISON: 09/13/2022 TECHNIQUE: CT chest without IV contrast. Multiplanar reformats were obtained. Dose reduction techniques were used. CONTRAST: None. FINDINGS: LUNGS AND PLEURA: Unchanged volume loss and  architectural distortion in the paramedian left lung involving the left upper lobe and superior segment of the left lower lobe. Metallic fiducial in the posterior left lower lobe with architectural distortion and parenchymal scarring. Hyperinflation and centrilobular emphysema. A few remaining ground glass opacities in the right lower lobe. Benign calcified granuloma in the left lower lobe along the mediastinal pleura. Trachea and central airways are patent. MEDIASTINUM: Normal caliber thoracic aorta. No enlarged mediastinal or hilar lymph nodes. CORONARY ARTERY CALCIFICATION: Mild. UPPER ABDOMEN: Cholecystectomy. Simple cysts in both kidneys, which do not require further follow-up. MUSCULOSKELETAL: Prior ACDF. Multilevel degenerative osteophytes of the thoracolumbar spine. No vertebral compression deformities. No aggressive or destructive bone lesions.     IMPRESSION: 1.  Posttreatment changes in the paramedian left lung with no evidence of recurrence. 2.  A few groundglass opacities in the right lower lobe are likely infectious or inflammatory, overall decreased compared to the prior CT.        Impression     The patient is a 72-year-old gentleman with a prior history of limited stage small cell lung cancer diagnosed initially in 2012.  He was find to have a new limited stage small cell lung cancer involving the left lower lobe with clinical stage T1 N0 M0.  Patient received definitive SBRT and completed 3 years ago.  He has been doing well with no clinical evidence of recurrence.    Assessment & Plan:     1.  I have personally reviewed his restaging CT scan today and compare with previous CT chest and radiation therapy record.  There is no radiographic evidence of cancer recurrence.    Patient is scheduled to return to radiation oncology in 6 months for CT chest and MRI brain.     2.  Continue follow-up with Dr. Hale, thoracic surgeon and Dr. Brito, pulmonology as planned.     3.  The patient was found to  "have abdominal pneumoperitoneum for which he has been followed with Dr. April Franco, his primary for follow-up recommendation.        Face to face time  30 minutes with > 75% spent on consultation, education and coordination of care.           Mary Snowden MD, PhD  Department of Radiation Oncology   [facility]  Tel: [phone]  Page: [phone]    [facility]  [address] [place] [address]     [facility]   [address] [place] [address]    CC:  Patient Care Team:  April Franco PA as PCP - General  Mary Snowden MD as MD (Hematology & Oncology)  Mary Snowden MD as Assigned Cancer Care Provider  Maicol Cervantes MD as Assigned Heart and Vascular Provider      Oncology Rooming Note    March 16, 2023 10:24 AM   Martin Tovar is a 72 year old male who presents for:    Chief Complaint   Patient presents with     Oncology Clinic Visit     Lung Cancer     Follow up with Dr. Snowden     Initial Vitals: /75   Pulse 84   Resp 16   Wt 72.6 kg (160 lb)   SpO2 94%   BMI 25.06 kg/m   Estimated body mass index is 25.06 kg/m  as calculated from the following:    Height as of 9/13/22: 1.702 m (5' 7\").    Weight as of this encounter: 72.6 kg (160 lb). Body surface area is 1.85 meters squared.  No Pain (0) Comment: Data Unavailable   No LMP for male patient.  Allergies reviewed: Yes  Medications reviewed: Yes    Medications: Medication refills not needed today.  Pharmacy name entered into [facility]: [id number] [place] [address]    Clinical concerns: SOB with activity, daily cough mild. Smoking about 1ppd of cigarettes again. Concerns about weight loss discussed, encouraged extra protein as able.  Dr. Snowden was notified.      Margo Khan, SOUMYA                  Again, thank you for allowing me to participate in the care of your patient.        Sincerely,        Mary Snowden MD    "

## 2023-03-16 NOTE — PROGRESS NOTES
[facility] Radiation Oncology Follow Up     Patient: Martin Tovar  MRN: [id number]  Date of Service: 03/16/2023       DISEASE TREATED:  History of limited stage small cell left lung cancer diagnosed initially in 2012.  The patient is status post chemoradiation therapy.  He was found to have a new diagnosis of limited stage small cell lung cancer, second primary/recurrence, clinical stage T1 N0 M0.  His case has been discussed at thoracic tumor conference and consensus recommendation is to consider SBRT.      TYPE OF RADIATION THERAPY ADMINISTERED:    1. 4500 cGy in 30 treatments at 150 cGy twice daily at [place] [facility] in 2012.     2.  PCI with a total dose of 2500 cGy in 10 treatments at [facility] in 2012.      5000 cGy in 5 treatments given from 3/2/120-3/12/2020.     INTERVAL SINCE COMPLETION OF RADIATION THERAPY: 3 years.      SUBJECTIVE:  Mr. Tovar is a 72 y.o. male who is a retired  from [employer].  Patient was diagnosed with limited stage small cell left lung cancer initially in 2012, stage T2 N0 M0.  He received concurrent chemoradiation therapy with cisplatin and etoposide and radiation therapy to a total dose of 4500 cGy in 30 treatments at 150 cGy twice daily.  His treatment was given at [facility].  Patient had a complete response and also received PCI with a total dose of 2500 cGy in 10 treatments.  Patient has been followed closely with no evidence of cancer recurrence.  He will presented with recent finding of left lower lobe nodule by routine screening exam for which he was seeking further evaluation.  PET CT scan on 1/14/2020 showed a 1.3 x 1.2 cm nodule with increased SUV max 3.3 consistent with malignancy.  There is no radiographic evidence of anel and systemic metastasis.  CT-guided needle biopsy on 2/5/2020 confirmed diagnosis of small cell carcinoma.  He had a staging MRI brain which also showed no evidence of brain metastasis.  Given the prior history of radiation therapy  and location of his current cancer, the patient might require pneumonectomy.  His case has been discussed at thoracic tumor conference and that the consensus recommendation is to consider SBRT. His case has been discussed at thoracic tumor conference and consensus recommendation is to consider SBRT.  He received radiation therapy in our clinic with a total dose of 5000 cGy in 5 treatments given from 3/2/120-3/12/2020.  He tolerated radiation therapy very well with minimal side effect.     Patient has been doing well since completion of the radiation therapy.  He denies any chest pain discomfort at the time of evaluation.  He is here for routine postradiation therapy office follow-up.    Medications were reviewed and are up to date on EPIC.    The following portions of the patient's history were reviewed and updated as appropriate: allergies, current medications, past family history, past medical history, past social history, past surgical history and problem list.    Review of Systems:      General  Constitutional  Constitutional (WDL): Exceptions to WDL  Fatigue: Fatigue relieved by rest  Weight Loss: 5 to less than 10% from baseline OR intervention not indicated  EENT  Eye Disorders  Eye Disorder (WDL): All eye disorder elements are within defined limits  Ear Disorders  Ear Disorder (WDL): All ear disorder elements are within defined limits  Respiratory  Respiratory  Respiratory (WDL): Exceptions to WDL (smoking 1ppd cigarettes)  Cough: Mild symptoms OR nonprescription intervention indicated  Dyspnea: Shortness of breath with moderate exertion  Cardiovascular  Cardiovascular  Cardiovascular (WDL): All cardiovascular elements are within defined limits  Gastrointestinal  Gastrointestinal  Gastrointestinal (WDL): Exceptions to WDL (missing molars, so hard time eating)  Anorexia: Loss of appetite without alteration in eating habits  Musculoskeletal  Musculoskeletal and Connective Tissue Disorders  Musculoskeletal &  Connective (WDL): Exceptions to WDL  Arthralgia: Mild pain  Integumentary  Integumentary  Integumentary (WDL): All integumentary elements are within defined limits  Neurological  Neurosensory  Neurosensory (WDL): All neurosensory elements are within defined limits  Genitourinary/Reproductive  Genitourinary  Genitourinary (WDL): All genitourinary elements are within defined limits  Urinary Frequency: Present  Lymphatic  Lymph System Disorders  Lymph (WDL): All lymph elements are within defined limits  Pain  Pain Score: No Pain (0)  AUA Assessment                                                              Accompanied by  Accompanied By: self only    Objective:      PHYSICAL EXAMINATION:    /75   Pulse 84   Resp 16   Wt 72.6 kg (160 lb)   SpO2 94%   BMI 25.06 kg/m      Gen: Alert, in NAD  Eyes: PERRL, EOMI, sclera anicteric  Pulm: No wheezing, stridor or respiratory distress  CV: Well-perfused, no cyanosis, no pedal edema  Back: No step-offs or pain to palpation along the thoracolumbar spine  Rectal: Deferred  : Deferred  Musculoskeletal: Normal muscle bulk and tone  Skin: Normal color and turgor  Neurologic: A/Ox3, CN II-XII intact, normal gait and station  Psychiatric: Appropriate mood and affect     Imaging: Imaging results 30 days: CT Chest w/o contrast    Result Date: 3/13/2023  EXAM: CT CHEST W/O CONTRAST LOCATION: [facility] DATE/TIME: 3/13/2023 10:41 AM INDICATION:  Small cell lung cancer (H) COMPARISON: 09/13/2022 TECHNIQUE: CT chest without IV contrast. Multiplanar reformats were obtained. Dose reduction techniques were used. CONTRAST: None. FINDINGS: LUNGS AND PLEURA: Unchanged volume loss and architectural distortion in the paramedian left lung involving the left upper lobe and superior segment of the left lower lobe. Metallic fiducial in the posterior left lower lobe with architectural distortion and parenchymal scarring. Hyperinflation and centrilobular emphysema. A  few remaining ground glass opacities in the right lower lobe. Benign calcified granuloma in the left lower lobe along the mediastinal pleura. Trachea and central airways are patent. MEDIASTINUM: Normal caliber thoracic aorta. No enlarged mediastinal or hilar lymph nodes. CORONARY ARTERY CALCIFICATION: Mild. UPPER ABDOMEN: Cholecystectomy. Simple cysts in both kidneys, which do not require further follow-up. MUSCULOSKELETAL: Prior ACDF. Multilevel degenerative osteophytes of the thoracolumbar spine. No vertebral compression deformities. No aggressive or destructive bone lesions.     IMPRESSION: 1.  Posttreatment changes in the paramedian left lung with no evidence of recurrence. 2.  A few groundglass opacities in the right lower lobe are likely infectious or inflammatory, overall decreased compared to the prior CT.        Impression     The patient is a 72-year-old gentleman with a prior history of limited stage small cell lung cancer diagnosed initially in 2012.  He was find to have a new limited stage small cell lung cancer involving the left lower lobe with clinical stage T1 N0 M0.  Patient received definitive SBRT and completed 3 years ago.  He has been doing well with no clinical evidence of recurrence.    Assessment & Plan:     1.  I have personally reviewed his restaging CT scan today and compare with previous CT chest and radiation therapy record.  There is no radiographic evidence of cancer recurrence.    Patient is scheduled to return to radiation oncology in 6 months for CT chest and MRI brain.     2.  Continue follow-up with Dr. Hale, thoracic surgeon and Dr. Brito, pulmonology as planned.     3.  The patient was found to have abdominal pneumoperitoneum for which he has been followed with Dr. April Franco, his primary for follow-up recommendation.        Face to face time  30 minutes with > 75% spent on consultation, education and coordination of care.           Mary Snowden MD, PhD  Department of  Radiation Oncology   [facility]  Tel: [phone]  Page: [phone]    [facility]  [address] [place] [address]     [address]   [place] [address]    CC:  Patient Care Team:  April Franco PA as PCP - General  Mary Snowden MD as MD (Hematology & Oncology)  Mary Snowden MD as Assigned Cancer Care Provider  Maicol Cervantes MD as Assigned Heart and Vascular Provider

## 2023-03-27 ENCOUNTER — TELEPHONE (OUTPATIENT)
Dept: CARDIOLOGY | Facility: CLINIC | Age: 73
End: 2023-03-27
Payer: MEDICARE

## 2023-03-27 NOTE — TELEPHONE ENCOUNTER
[facility] Call Center    Phone Message    May a detailed message be left on voicemail: yes     Reason for Call: Symptoms or Concerns     If patient has red-flag symptoms, warm transfer to triage line    Current symptom or concern: Chest pressure and light headed      Symptoms have been present for:  1 day(s)  3/25/2023 for a short period    Has patient previously been seen for this? Yes    By : Maicol Cervantes MD    Date: 5/6/22    Are there any new or worsening symptoms? No      Action Taken: Other: cardio    Travel Screening: Not Applicable     Thank you!  [facility]

## 2023-03-27 NOTE — TELEPHONE ENCOUNTER
Called back Bhaskar to address his concerns. He reports a hx of a MI historically and had some concerning symptoms over the weekend and he calls in to reprot them. He reports that on Saturday, he had a sensation of chest pressure, not pain that was about 5 inches below his clavicle. The pressure lasted almost an hour and he states that he thought about going to the ER, but instead he went and laid down, fell asleep, and then woke up and felt fine. He has not had the pressure sensation since. He confirms feeling well today.     The pressure didn't radiate anywhere and he had no associated symptoms. He has chronic shortness of breath due to a hx of lung cancer and he still actively smokes cigarettes. He was instructed that he should seek out ER evaluation if this pressure resumes and lasts again. We would typically have recommended ER evaluation if he had called in to answering service over the weekend. He knows this and agreed with plan. Again, he says he feels fine today. He admits to some progressive fatigue over the year but he notes that his cancer and smoking status could also play a role. Update sent and also message to schedulers to arrange next avail with [facility]. -[facility]        Dr. Cervantes.  I will get Bhaskar in to see you but he called in with chest pressure on Saturday that was about 5 inches below clavicle, left side that lingered for about an hour. He laid down and fell asleep. When he woke up, the sensation was gone and has not returned since. He says it reminded him of lessened symptoms from prior MI but location was lowered on his chest and less intense. We had a conversation on when to seek out medical attention and this would have been recommended Saturday. NO symptoms since. Recommendations?   Thanks,  Mal

## 2023-03-28 NOTE — TELEPHONE ENCOUNTER
----- Message -----  From: Maicol Cervantes MD  Sent: 3/27/2023   3:53 PM CDT  To: Rupali Acevedo RN    Review of his record shows nonobstructive coronary artery disease, doubt chest pain is MI.  I would suggest he get seen by his primary prior to seeing us.  If persist we could always set up a stress nuclear.  LF        ==called patient and updated him on above. In the interim, he was able to get scheduled with Dr. Cervantes 3/30. He will follow up as scheduled and discuss further at that time. -[facility] [place]

## 2023-03-30 ENCOUNTER — OFFICE VISIT (OUTPATIENT)
Dept: CARDIOLOGY | Facility: CLINIC | Age: 73
End: 2023-03-30
Payer: MEDICARE

## 2023-03-30 VITALS
WEIGHT: 157.1 LBS | HEIGHT: 67 IN | DIASTOLIC BLOOD PRESSURE: 58 MMHG | RESPIRATION RATE: 16 BRPM | SYSTOLIC BLOOD PRESSURE: 126 MMHG | HEART RATE: 75 BPM | OXYGEN SATURATION: 93 % | BODY MASS INDEX: 24.66 KG/M2

## 2023-03-30 DIAGNOSIS — E11.9 DIABETES MELLITUS WITHOUT COMPLICATION (H): ICD-10-CM

## 2023-03-30 DIAGNOSIS — I25.10 MILD CORONARY ARTERY DISEASE: Primary | ICD-10-CM

## 2023-03-30 DIAGNOSIS — I20.89 OTHER FORMS OF ANGINA PECTORIS (H): ICD-10-CM

## 2023-03-30 DIAGNOSIS — E78.00 PURE HYPERCHOLESTEROLEMIA: ICD-10-CM

## 2023-03-30 DIAGNOSIS — I10 ESSENTIAL HYPERTENSION: ICD-10-CM

## 2023-03-30 DIAGNOSIS — C34.90 SMALL CELL LUNG CANCER (H): ICD-10-CM

## 2023-03-30 DIAGNOSIS — J44.1 COPD EXACERBATION (H): ICD-10-CM

## 2023-03-30 DIAGNOSIS — N18.2 CHRONIC RENAL FAILURE, STAGE 2 (MILD): ICD-10-CM

## 2023-03-30 PROBLEM — R07.9 CHEST PAIN: Status: ACTIVE | Noted: 2023-03-30

## 2023-03-30 LAB
ATRIAL RATE - MUSE: 70 BPM
DIASTOLIC BLOOD PRESSURE - MUSE: NORMAL MMHG
INTERPRETATION ECG - MUSE: NORMAL
P AXIS - MUSE: 71 DEGREES
PR INTERVAL - MUSE: 154 MS
QRS DURATION - MUSE: 96 MS
QT - MUSE: 396 MS
QTC - MUSE: 427 MS
R AXIS - MUSE: 257 DEGREES
SYSTOLIC BLOOD PRESSURE - MUSE: NORMAL MMHG
T AXIS - MUSE: 80 DEGREES
VENTRICULAR RATE- MUSE: 70 BPM

## 2023-03-30 PROCEDURE — 99214 OFFICE O/P EST MOD 30 MIN: CPT | Performed by: INTERNAL MEDICINE

## 2023-03-30 PROCEDURE — 93000 ELECTROCARDIOGRAM COMPLETE: CPT | Performed by: INTERNAL MEDICINE

## 2023-03-30 RX ORDER — ATORVASTATIN CALCIUM 40 MG/1
40 TABLET, FILM COATED ORAL DAILY
Qty: 90 TABLET | Refills: 4 | Status: SHIPPED | OUTPATIENT
Start: 2023-03-30 | End: 2023-06-12

## 2023-03-30 NOTE — LETTER
3/30/2023    RAFAEL Funk  [address] [address]    RE: Martin Tovar       Dear Colleague,     I had the pleasure of seeing Martin Tovar in the [facility].      [facility]  Heart Care Clinic Follow-up Note    Assessment & Plan        (I25.10) Mild coronary artery disease  (primary encounter diagnosis)  Comment: Angiography due to borderline troponin elevation showed normal left main, ostial LAD 35% stenosis, mid LAD 35% stenosis with proximal circumflex 25% stenosis, and 10% right coronary artery disease. Medical therapy.     (I20.8) Other forms of angina pectoris (H)  Comment: Recent episode concerns him, ECG does not show any acute changes.  Sounds pleuritic, he tells me it is not pleuritis he knows what pleuritis feels like.  I offered him angiography and he was little hesitant.  We will pursue exercise stress nuclear and if unable to exercise convert to pharmacological.  I tried to reassure him with insignificant disease 2 years ago however he tells me that this discomfort felt exactly like his heart attack 2 years ago.    (E78.00) Pure hypercholesterolemia  Comment: Total cholesterol 109 with LDL of 46 which is excellent.  This was in 2022, he has since stopped atorvastatin and I renewed this for him    (E11.9) Diabetes mellitus without complication (H)  Comment: So noted with most recent hemoglobin A1c 6.1, on no meds.    (I10) Essential hypertension  Comment: Good blood pressure currently.    (C34.90) Small cell lung cancer (H)  Comment: Status postchemotherapy as well as radiation therapy, given this we will check echocardiogram.    (J44.1) COPD exacerbation (H)  Comment: Still smoking a pack a day, defer to primary.    (N18.2) Chronic renal failure, stage 2 (mild)  Comment: Resolved with creatinine good at 0.91 and GFR greater than 90.    Plan  1.  Given chest discomfort try exercise stress nuclear, and if unable to exercise convert to pharmacological.  2.   Stop smoking.  3.  Follow-up me in 1 year or sooner if needed.  4.  Check echo given history of radiation.    Subjective  CC: 72-year-old white gentleman being seen in follow-up urgently.  Since I saw him about a week or so ago on a Saturday he was watching his son work on a car, he was not stressed and developed and all of a sudden pressure discomfort in his left upper chest, it did not radiate, there was no nausea.  There was however some lightheadedness.  After about 20 minutes he states he went to lie down and was able to fall asleep when he woke up and went away.  Has not had any episodes since then or prior to that.  Does have some generalized shortness of breath and activity most likely from smoking/COPD.  No PND, orthopnea or peripheral edema.    Medications  Current Outpatient Medications   Medication Sig Dispense Refill     albuterol (PROVENTIL HFA;VENTOLIN HFA) 90 mcg/actuation inhaler [ALBUTEROL (PROVENTIL HFA;VENTOLIN HFA) 90 MCG/ACTUATION INHALER] Inhale 2 puffs 4 (four) times a day as needed for wheezing.              aspirin (ASA) 81 MG chewable tablet Take 81 mg by mouth daily       atorvastatin (LIPITOR) 40 MG tablet Take 1 tablet (40 mg) by mouth daily 90 tablet 4     diclofenac (VOLTAREN) 1 % topical gel Apply topically 4 times daily as needed for moderate pain (hand joints)       HYDROcodone-acetaminophen (NORCO) 7.5-325 mg per tablet [HYDROCODONE-ACETAMINOPHEN (NORCO) 7.5-325 MG PER TABLET] Take 1 tablet by mouth every 6 (six) hours as needed. 30 tablet 0     ipratropium (ATROVENT) 0.02 % nebulizer solution Take 0.5 mg by nebulization 4 times daily       lisinopril (ZESTRIL) 2.5 MG tablet Take 1 tablet (2.5 mg) by mouth daily 30 tablet 1     morphine (MS CONTIN) 15 MG 12 hr tablet Take 15 mg by mouth every 6 hours        omeprazole (PRILOSEC) 20 MG DR capsule TAKE 1 CAPSULE BY MOUTH EVERY DAY 30 TO 60 MINUTES BEFORE BREAKFAST       oxybutynin ER (DITROPAN-XL) 10 MG 24 hr tablet TAKE 1 TABLET  "BY MOUTH EVERY DAY IN THE MORNING       polyethylene glycol-propylene glycol (SYSTANE) 0.4-0.3 % SOLN ophthalmic solution Place 1 drop into both eyes 4 times daily       tamsulosin (FLOMAX) 0.4 mg cap [TAMSULOSIN (FLOMAX) 0.4 MG CAP] Take 0.4 mg by mouth daily.       WIXELA INHUB 250-50 MCG/ACT inhaler INHALE 1 PUFF TWICE A DAY       zolpidem (AMBIEN) 10 MG tablet Take 10 mg by mouth nightly as needed         Objective  /58 (BP Location: Right arm, Patient Position: Sitting, Cuff Size: Adult Regular)   Pulse 75   Resp 16   Ht 1.702 m (5' 7\")   Wt 71.3 kg (157 lb 1.6 oz)   SpO2 93%   BMI 24.61 kg/m      General Appearance:    Alert, cooperative, no distress, appears stated age   Head:    Normocephalic, without obvious abnormality, atraumatic   Throat:   Lips, mucosa, and tongue normal; teeth and gums normal   Neck:   Supple, symmetrical, trachea midline, no adenopathy;        thyroid:  No enlargement/tenderness/nodules; no carotid    bruit or JVD   Back:     Symmetric, no curvature, ROM normal, no CVA tenderness   Lungs:     Clear to auscultation bilaterally, respirations unlabored   Chest wall:    No tenderness or deformity   Heart:    Regular rate and rhythm, S1 and S2 normal, no murmur, rub   or gallop, PMI shifted medially   Abdomen:     Soft, non-tender, bowel sounds active all four quadrants,     no masses, no organomegaly   Extremities:   Normal, atraumatic, no cyanosis or edema   Pulses:   2+ and symmetric all extremities   Skin:   Skin color, texture, turgor normal, no rashes or lesions     Results    Lab Results personally reviewed   Lab Results   Component Value Date    CHOL 109 08/11/2022    CHOL 105 09/23/2021     Lab Results   Component Value Date    HDL 48 08/11/2022    HDL 39 (L) 09/23/2021     No components found for: LDLCALC  Lab Results   Component Value Date    TRIG 77 08/11/2022    TRIG 145 09/23/2021     Lab Results   Component Value Date    WBC 6.8 09/13/2022    HGB 14.1 09/13/2022 "    HCT 42.9 09/13/2022     09/13/2022     Lab Results   Component Value Date    BUN 14.3 09/19/2022     09/19/2022    CO2 31 (H) 09/19/2022       Reviewed electrocardiogram sinus rhythm, right superior axis, low voltage, poor R wave progression, possible pulmonary disease pattern.          Thank you for allowing me to participate in the care of your patient.      Sincerely,     JOCELYNN CERVANTES MD     [facility]  cc:   Jocelynn Cervantes MD  [address], [address]  [place] [address]

## 2023-03-30 NOTE — PATIENT INSTRUCTIONS
Mr Martin Tovar,  I enjoyed visiting with you again today.  I am concerned with the chest pain.  Per our conversation we will set up the echo and stress test.  I will plan on seeing you 1 year.  Maicol Cervantes

## 2023-03-30 NOTE — H&P (VIEW-ONLY)
[facility]  Heart Care Clinic Follow-up Note    Assessment & Plan        (I25.10) Mild coronary artery disease  (primary encounter diagnosis)  Comment: Angiography due to borderline troponin elevation showed normal left main, ostial LAD 35% stenosis, mid LAD 35% stenosis with proximal circumflex 25% stenosis, and 10% right coronary artery disease. Medical therapy.     (I20.8) Other forms of angina pectoris (H)  Comment: Recent episode concerns him, ECG does not show any acute changes.  Sounds pleuritic, he tells me it is not pleuritis he knows what pleuritis feels like.  I offered him angiography and he was little hesitant.  We will pursue exercise stress nuclear and if unable to exercise convert to pharmacological.  I tried to reassure him with insignificant disease 2 years ago however he tells me that this discomfort felt exactly like his heart attack 2 years ago.    (E78.00) Pure hypercholesterolemia  Comment: Total cholesterol 109 with LDL of 46 which is excellent.  This was in 2022, he has since stopped atorvastatin and I renewed this for him    (E11.9) Diabetes mellitus without complication (H)  Comment: So noted with most recent hemoglobin A1c 6.1, on no meds.    (I10) Essential hypertension  Comment: Good blood pressure currently.    (C34.90) Small cell lung cancer (H)  Comment: Status postchemotherapy as well as radiation therapy, given this we will check echocardiogram.    (J44.1) COPD exacerbation (H)  Comment: Still smoking a pack a day, defer to primary.    (N18.2) Chronic renal failure, stage 2 (mild)  Comment: Resolved with creatinine good at 0.91 and GFR greater than 90.    Plan  1.  Given chest discomfort try exercise stress nuclear, and if unable to exercise convert to pharmacological.  2.  Stop smoking.  3.  Follow-up me in 1 year or sooner if needed.  4.  Check echo given history of radiation.    Subjective  CC: 72-year-old white gentleman being seen in follow-up urgently.  Since I saw  him about a week or so ago on a Saturday he was watching his son work on a car, he was not stressed and developed and all of a sudden pressure discomfort in his left upper chest, it did not radiate, there was no nausea.  There was however some lightheadedness.  After about 20 minutes he states he went to lie down and was able to fall asleep when he woke up and went away.  Has not had any episodes since then or prior to that.  Does have some generalized shortness of breath and activity most likely from smoking/COPD.  No PND, orthopnea or peripheral edema.    Medications  Current Outpatient Medications   Medication Sig Dispense Refill     albuterol (PROVENTIL HFA;VENTOLIN HFA) 90 mcg/actuation inhaler [ALBUTEROL (PROVENTIL HFA;VENTOLIN HFA) 90 MCG/ACTUATION INHALER] Inhale 2 puffs 4 (four) times a day as needed for wheezing.              aspirin (ASA) 81 MG chewable tablet Take 81 mg by mouth daily       atorvastatin (LIPITOR) 40 MG tablet Take 1 tablet (40 mg) by mouth daily 90 tablet 4     diclofenac (VOLTAREN) 1 % topical gel Apply topically 4 times daily as needed for moderate pain (hand joints)       HYDROcodone-acetaminophen (NORCO) 7.5-325 mg per tablet [HYDROCODONE-ACETAMINOPHEN (NORCO) 7.5-325 MG PER TABLET] Take 1 tablet by mouth every 6 (six) hours as needed. 30 tablet 0     ipratropium (ATROVENT) 0.02 % nebulizer solution Take 0.5 mg by nebulization 4 times daily       lisinopril (ZESTRIL) 2.5 MG tablet Take 1 tablet (2.5 mg) by mouth daily 30 tablet 1     morphine (MS CONTIN) 15 MG 12 hr tablet Take 15 mg by mouth every 6 hours        omeprazole (PRILOSEC) 20 MG DR capsule TAKE 1 CAPSULE BY MOUTH EVERY DAY 30 TO 60 MINUTES BEFORE BREAKFAST       oxybutynin ER (DITROPAN-XL) 10 MG 24 hr tablet TAKE 1 TABLET BY MOUTH EVERY DAY IN THE MORNING       polyethylene glycol-propylene glycol (SYSTANE) 0.4-0.3 % SOLN ophthalmic solution Place 1 drop into both eyes 4 times daily       tamsulosin (FLOMAX) 0.4 mg cap  "[TAMSULOSIN (FLOMAX) 0.4 MG CAP] Take 0.4 mg by mouth daily.       WIXELA INHUB 250-50 MCG/ACT inhaler INHALE 1 PUFF TWICE A DAY       zolpidem (AMBIEN) 10 MG tablet Take 10 mg by mouth nightly as needed         Objective  /58 (BP Location: Right arm, Patient Position: Sitting, Cuff Size: Adult Regular)   Pulse 75   Resp 16   Ht 1.702 m (5' 7\")   Wt 71.3 kg (157 lb 1.6 oz)   SpO2 93%   BMI 24.61 kg/m      General Appearance:    Alert, cooperative, no distress, appears stated age   Head:    Normocephalic, without obvious abnormality, atraumatic   Throat:   Lips, mucosa, and tongue normal; teeth and gums normal   Neck:   Supple, symmetrical, trachea midline, no adenopathy;        thyroid:  No enlargement/tenderness/nodules; no carotid    bruit or JVD   Back:     Symmetric, no curvature, ROM normal, no CVA tenderness   Lungs:     Clear to auscultation bilaterally, respirations unlabored   Chest wall:    No tenderness or deformity   Heart:    Regular rate and rhythm, S1 and S2 normal, no murmur, rub   or gallop, PMI shifted medially   Abdomen:     Soft, non-tender, bowel sounds active all four quadrants,     no masses, no organomegaly   Extremities:   Normal, atraumatic, no cyanosis or edema   Pulses:   2+ and symmetric all extremities   Skin:   Skin color, texture, turgor normal, no rashes or lesions     Results    Lab Results personally reviewed   Lab Results   Component Value Date    CHOL 109 08/11/2022    CHOL 105 09/23/2021     Lab Results   Component Value Date    HDL 48 08/11/2022    HDL 39 (L) 09/23/2021     No components found for: LDLCALC  Lab Results   Component Value Date    TRIG 77 08/11/2022    TRIG 145 09/23/2021     Lab Results   Component Value Date    WBC 6.8 09/13/2022    HGB 14.1 09/13/2022    HCT 42.9 09/13/2022     09/13/2022     Lab Results   Component Value Date    BUN 14.3 09/19/2022     09/19/2022    CO2 31 (H) 09/19/2022       Reviewed electrocardiogram sinus rhythm, " right superior axis, low voltage, poor R wave progression, possible pulmonary disease pattern.

## 2023-03-30 NOTE — PROGRESS NOTES
[facility]  Heart Care Clinic Follow-up Note    Assessment & Plan        (I25.10) Mild coronary artery disease  (primary encounter diagnosis)  Comment: Angiography due to borderline troponin elevation showed normal left main, ostial LAD 35% stenosis, mid LAD 35% stenosis with proximal circumflex 25% stenosis, and 10% right coronary artery disease. Medical therapy.     (I20.8) Other forms of angina pectoris (H)  Comment: Recent episode concerns him, ECG does not show any acute changes.  Sounds pleuritic, he tells me it is not pleuritis he knows what pleuritis feels like.  I offered him angiography and he was little hesitant.  We will pursue exercise stress nuclear and if unable to exercise convert to pharmacological.  I tried to reassure him with insignificant disease 2 years ago however he tells me that this discomfort felt exactly like his heart attack 2 years ago.    (E78.00) Pure hypercholesterolemia  Comment: Total cholesterol 109 with LDL of 46 which is excellent.  This was in 2022, he has since stopped atorvastatin and I renewed this for him    (E11.9) Diabetes mellitus without complication (H)  Comment: So noted with most recent hemoglobin A1c 6.1, on no meds.    (I10) Essential hypertension  Comment: Good blood pressure currently.    (C34.90) Small cell lung cancer (H)  Comment: Status postchemotherapy as well as radiation therapy, given this we will check echocardiogram.    (J44.1) COPD exacerbation (H)  Comment: Still smoking a pack a day, defer to primary.    (N18.2) Chronic renal failure, stage 2 (mild)  Comment: Resolved with creatinine good at 0.91 and GFR greater than 90.    Plan  1.  Given chest discomfort try exercise stress nuclear, and if unable to exercise convert to pharmacological.  2.  Stop smoking.  3.  Follow-up me in 1 year or sooner if needed.  4.  Check echo given history of radiation.    Subjective  CC: 72-year-old white gentleman being seen in follow-up urgently.  Since I saw  him about a week or so ago on a Saturday he was watching his son work on a car, he was not stressed and developed and all of a sudden pressure discomfort in his left upper chest, it did not radiate, there was no nausea.  There was however some lightheadedness.  After about 20 minutes he states he went to lie down and was able to fall asleep when he woke up and went away.  Has not had any episodes since then or prior to that.  Does have some generalized shortness of breath and activity most likely from smoking/COPD.  No PND, orthopnea or peripheral edema.    Medications  Current Outpatient Medications   Medication Sig Dispense Refill     albuterol (PROVENTIL HFA;VENTOLIN HFA) 90 mcg/actuation inhaler [ALBUTEROL (PROVENTIL HFA;VENTOLIN HFA) 90 MCG/ACTUATION INHALER] Inhale 2 puffs 4 (four) times a day as needed for wheezing.              aspirin (ASA) 81 MG chewable tablet Take 81 mg by mouth daily       atorvastatin (LIPITOR) 40 MG tablet Take 1 tablet (40 mg) by mouth daily 90 tablet 4     diclofenac (VOLTAREN) 1 % topical gel Apply topically 4 times daily as needed for moderate pain (hand joints)       HYDROcodone-acetaminophen (NORCO) 7.5-325 mg per tablet [HYDROCODONE-ACETAMINOPHEN (NORCO) 7.5-325 MG PER TABLET] Take 1 tablet by mouth every 6 (six) hours as needed. 30 tablet 0     ipratropium (ATROVENT) 0.02 % nebulizer solution Take 0.5 mg by nebulization 4 times daily       lisinopril (ZESTRIL) 2.5 MG tablet Take 1 tablet (2.5 mg) by mouth daily 30 tablet 1     morphine (MS CONTIN) 15 MG 12 hr tablet Take 15 mg by mouth every 6 hours        omeprazole (PRILOSEC) 20 MG DR capsule TAKE 1 CAPSULE BY MOUTH EVERY DAY 30 TO 60 MINUTES BEFORE BREAKFAST       oxybutynin ER (DITROPAN-XL) 10 MG 24 hr tablet TAKE 1 TABLET BY MOUTH EVERY DAY IN THE MORNING       polyethylene glycol-propylene glycol (SYSTANE) 0.4-0.3 % SOLN ophthalmic solution Place 1 drop into both eyes 4 times daily       tamsulosin (FLOMAX) 0.4 mg cap  "[TAMSULOSIN (FLOMAX) 0.4 MG CAP] Take 0.4 mg by mouth daily.       WIXELA INHUB 250-50 MCG/ACT inhaler INHALE 1 PUFF TWICE A DAY       zolpidem (AMBIEN) 10 MG tablet Take 10 mg by mouth nightly as needed         Objective  /58 (BP Location: Right arm, Patient Position: Sitting, Cuff Size: Adult Regular)   Pulse 75   Resp 16   Ht 1.702 m (5' 7\")   Wt 71.3 kg (157 lb 1.6 oz)   SpO2 93%   BMI 24.61 kg/m      General Appearance:    Alert, cooperative, no distress, appears stated age   Head:    Normocephalic, without obvious abnormality, atraumatic   Throat:   Lips, mucosa, and tongue normal; teeth and gums normal   Neck:   Supple, symmetrical, trachea midline, no adenopathy;        thyroid:  No enlargement/tenderness/nodules; no carotid    bruit or JVD   Back:     Symmetric, no curvature, ROM normal, no CVA tenderness   Lungs:     Clear to auscultation bilaterally, respirations unlabored   Chest wall:    No tenderness or deformity   Heart:    Regular rate and rhythm, S1 and S2 normal, no murmur, rub   or gallop, PMI shifted medially   Abdomen:     Soft, non-tender, bowel sounds active all four quadrants,     no masses, no organomegaly   Extremities:   Normal, atraumatic, no cyanosis or edema   Pulses:   2+ and symmetric all extremities   Skin:   Skin color, texture, turgor normal, no rashes or lesions     Results    Lab Results personally reviewed   Lab Results   Component Value Date    CHOL 109 08/11/2022    CHOL 105 09/23/2021     Lab Results   Component Value Date    HDL 48 08/11/2022    HDL 39 (L) 09/23/2021     No components found for: LDLCALC  Lab Results   Component Value Date    TRIG 77 08/11/2022    TRIG 145 09/23/2021     Lab Results   Component Value Date    WBC 6.8 09/13/2022    HGB 14.1 09/13/2022    HCT 42.9 09/13/2022     09/13/2022     Lab Results   Component Value Date    BUN 14.3 09/19/2022     09/19/2022    CO2 31 (H) 09/19/2022       Reviewed electrocardiogram sinus rhythm, " right superior axis, low voltage, poor R wave progression, possible pulmonary disease pattern.

## 2023-04-06 ENCOUNTER — HOSPITAL ENCOUNTER (OUTPATIENT)
Dept: NUCLEAR MEDICINE | Facility: HOSPITAL | Age: 73
Discharge: HOME | End: 2023-04-06
Attending: INTERNAL MEDICINE
Payer: MEDICARE

## 2023-04-06 ENCOUNTER — HOSPITAL ENCOUNTER (OUTPATIENT)
Dept: CARDIOLOGY | Facility: HOSPITAL | Age: 73
Discharge: HOME | End: 2023-04-06
Attending: INTERNAL MEDICINE
Payer: MEDICARE

## 2023-04-06 DIAGNOSIS — I25.10 MILD CORONARY ARTERY DISEASE: ICD-10-CM

## 2023-04-06 LAB
CV BLOOD PRESSURE: 60 MMHG
CV STRESS CURRENT BP HE: NORMAL
CV STRESS CURRENT HR HE: 102
CV STRESS CURRENT HR HE: 105
CV STRESS CURRENT HR HE: 106
CV STRESS CURRENT HR HE: 107
CV STRESS CURRENT HR HE: 107
CV STRESS CURRENT HR HE: 113
CV STRESS CURRENT HR HE: 116
CV STRESS CURRENT HR HE: 117
CV STRESS CURRENT HR HE: 118
CV STRESS CURRENT HR HE: 127
CV STRESS CURRENT HR HE: 127
CV STRESS CURRENT HR HE: 128
CV STRESS CURRENT HR HE: 83
CV STRESS CURRENT HR HE: 86
CV STRESS CURRENT HR HE: 90
CV STRESS CURRENT HR HE: 91
CV STRESS CURRENT HR HE: 91
CV STRESS CURRENT HR HE: 93
CV STRESS CURRENT HR HE: 93
CV STRESS CURRENT HR HE: 94
CV STRESS CURRENT HR HE: 98
CV STRESS CURRENT HR HE: 99
CV STRESS DEVIATION TIME HE: NORMAL
CV STRESS ECHO PERCENT HR HE: NORMAL
CV STRESS EXERCISE STAGE HE: NORMAL
CV STRESS EXERCISE STAGE REACHED HE: NORMAL
CV STRESS FINAL RESTING BP HE: NORMAL
CV STRESS FINAL RESTING HR HE: 93
CV STRESS MAX HR HE: 128
CV STRESS MAX TREADMILL GRADE HE: 14
CV STRESS MAX TREADMILL SPEED HE: 3.4
CV STRESS PEAK DIA BP HE: NORMAL
CV STRESS PEAK SYS BP HE: NORMAL
CV STRESS PHASE HE: NORMAL
CV STRESS PROTOCOL HE: NORMAL
CV STRESS RESTING PT POSITION HE: NORMAL
CV STRESS RESTING PT POSITION HE: NORMAL
CV STRESS ST DEVIATION AMOUNT HE: NORMAL
CV STRESS ST DEVIATION ELEVATION HE: NORMAL
CV STRESS ST EVELATION AMOUNT HE: NORMAL
CV STRESS TEST TYPE HE: NORMAL
CV STRESS TOTAL STAGE TIME MIN 1 HE: NORMAL
RATE PRESSURE PRODUCT: NORMAL
STRESS ANGINA INDEX: 0
STRESS ECHO BASELINE DIASTOLIC HE: 78
STRESS ECHO BASELINE HR: 86
STRESS ECHO BASELINE SYSTOLIC BP: 135
STRESS ECHO CALCULATED PERCENT HR: 85 %
STRESS ECHO LAST STRESS DIASTOLIC BP: 70
STRESS ECHO LAST STRESS HR: 127
STRESS ECHO LAST STRESS SYSTOLIC BP: 162
STRESS ECHO POST ESTIMATED WORKLOAD: 7.3
STRESS ECHO POST EXERCISE DUR MIN: 6
STRESS ECHO POST EXERCISE DUR SEC: 0
STRESS ECHO TARGET HR: 150

## 2023-04-06 PROCEDURE — 93016 CV STRESS TEST SUPVJ ONLY: CPT | Performed by: INTERNAL MEDICINE

## 2023-04-06 PROCEDURE — 93018 CV STRESS TEST I&R ONLY: CPT | Performed by: INTERNAL MEDICINE

## 2023-04-06 PROCEDURE — G1010 CDSM STANSON: HCPCS

## 2023-04-06 PROCEDURE — 343N000001 HC RX 343: Performed by: INTERNAL MEDICINE

## 2023-04-06 PROCEDURE — 78452 HT MUSCLE IMAGE SPECT MULT: CPT | Mod: 26 | Performed by: INTERNAL MEDICINE

## 2023-04-06 PROCEDURE — A9500 TC99M SESTAMIBI: HCPCS | Performed by: INTERNAL MEDICINE

## 2023-04-06 PROCEDURE — G1010 CDSM STANSON: HCPCS | Performed by: INTERNAL MEDICINE

## 2023-04-06 PROCEDURE — 93017 CV STRESS TEST TRACING ONLY: CPT

## 2023-04-06 RX ADMIN — Medication 8.5 MILLICURIE: at 13:53

## 2023-04-06 RX ADMIN — Medication 31 MILLICURIE: at 15:14

## 2023-04-07 ENCOUNTER — TELEPHONE (OUTPATIENT)
Dept: CARDIOLOGY | Facility: CLINIC | Age: 73
End: 2023-04-07
Payer: MEDICARE

## 2023-04-07 DIAGNOSIS — Z01.812 PRE-PROCEDURE LAB EXAM: ICD-10-CM

## 2023-04-07 DIAGNOSIS — R94.39 ABNORMAL CARDIOVASCULAR STRESS TEST: Primary | ICD-10-CM

## 2023-04-07 DIAGNOSIS — I25.118 CORONARY ARTERY DISEASE INVOLVING NATIVE HEART WITH OTHER FORM OF ANGINA PECTORIS, UNSPECIFIED VESSEL OR LESION TYPE (H): ICD-10-CM

## 2023-04-07 NOTE — TELEPHONE ENCOUNTER
Called Bhaskar to discuss abnormal stress test results. We discussed cors poss pci and he is agreeable. Procedure discussed and what it involves. He does not recall much from his NSTEMI. Angiogram was done August 2021, which revealed only mild cad. Case request placed. + labs. -[facility]    Case Type: Coronary angiogram with possible percutaneous cardiac intervention   Ordering Provider: Dr. Cervantes   H&P: 3/30/23  Alerts: none   Additional Info/Urgency: n/a  Orders for Pre-Procedure Labs? Done

## 2023-04-07 NOTE — TELEPHONE ENCOUNTER
----- Message from Rupali Acevedo RN sent at 4/7/2023  8:36 AM CDT -----    From: Maicol Cervantes MD  Sent: 4/6/2023   5:08 PM CDT  To: Rupali Acevedo RN    Please let him know that I looked at stress test images, somewhat suboptimal, but not normal.  Given this, as well as his mild blockages 2 years ago, can we set up invasive angiography.  LF

## 2023-04-09 LAB
ABO/RH(D): NORMAL
ANTIBODY SCREEN: NEGATIVE
SPECIMEN EXPIRATION DATE: NORMAL

## 2023-04-10 ENCOUNTER — LAB (OUTPATIENT)
Dept: CARDIOLOGY | Facility: CLINIC | Age: 73
End: 2023-04-10
Payer: MEDICARE

## 2023-04-10 DIAGNOSIS — I25.118 CORONARY ARTERY DISEASE INVOLVING NATIVE HEART WITH OTHER FORM OF ANGINA PECTORIS, UNSPECIFIED VESSEL OR LESION TYPE (H): ICD-10-CM

## 2023-04-10 DIAGNOSIS — Z01.812 PRE-PROCEDURE LAB EXAM: ICD-10-CM

## 2023-04-10 LAB
ANION GAP SERPL CALCULATED.3IONS-SCNC: 8 MMOL/L (ref 7–15)
BUN SERPL-MCNC: 12.8 MG/DL (ref 8–23)
CALCIUM SERPL-MCNC: 9 MG/DL (ref 8.8–10.2)
CHLORIDE SERPL-SCNC: 103 MMOL/L (ref 98–107)
CREAT SERPL-MCNC: 0.86 MG/DL (ref 0.67–1.17)
DEPRECATED HCO3 PLAS-SCNC: 31 MMOL/L (ref 22–29)
ERYTHROCYTE [DISTWIDTH] IN BLOOD BY AUTOMATED COUNT: 12.2 % (ref 10–15)
GFR SERPL CREATININE-BSD FRML MDRD: >90 ML/MIN/1.73M2
GLUCOSE SERPL-MCNC: 121 MG/DL (ref 70–99)
HCT VFR BLD AUTO: 43.3 % (ref 40–53)
HGB BLD-MCNC: 14.4 G/DL (ref 13.3–17.7)
MCH RBC QN AUTO: 32.1 PG (ref 26.5–33)
MCHC RBC AUTO-ENTMCNC: 33.3 G/DL (ref 31.5–36.5)
MCV RBC AUTO: 96 FL (ref 78–100)
PLATELET # BLD AUTO: 163 10E3/UL (ref 150–450)
POTASSIUM SERPL-SCNC: 4.3 MMOL/L (ref 3.4–5.3)
RBC # BLD AUTO: 4.49 10E6/UL (ref 4.4–5.9)
SODIUM SERPL-SCNC: 142 MMOL/L (ref 136–145)
WBC # BLD AUTO: 7.1 10E3/UL (ref 4–11)

## 2023-04-10 PROCEDURE — 86900 BLOOD TYPING SEROLOGIC ABO: CPT

## 2023-04-10 PROCEDURE — 80048 BASIC METABOLIC PNL TOTAL CA: CPT

## 2023-04-10 PROCEDURE — 36415 COLL VENOUS BLD VENIPUNCTURE: CPT

## 2023-04-10 PROCEDURE — 86901 BLOOD TYPING SEROLOGIC RH(D): CPT

## 2023-04-10 PROCEDURE — 85027 COMPLETE CBC AUTOMATED: CPT

## 2023-04-10 PROCEDURE — 86850 RBC ANTIBODY SCREEN: CPT

## 2023-04-10 NOTE — TELEPHONE ENCOUNTER
Jazmyne Escalante Mallory J, RN  Caller: Unspecified (3 days ago,  2:23 PM)  Case type: CA W/ POSS PCI   Procedure Physician(s): GEMA/ALOK   Procedure Date and Patient Arrival Time: Thursday 4/13, with arrival time of 0630   H&P: Completed on 3/30 W/ [facility]   Pre-Procedure Lab Appt: Monday 4/10 at [facility]   Alerts/Important Info: NONE     THANK YOU,   JAZMYNE           ==will send initial info via [employer]. -[facility]

## 2023-04-11 ENCOUNTER — TELEPHONE (OUTPATIENT)
Dept: CARDIOLOGY | Facility: CLINIC | Age: 73
End: 2023-04-11
Payer: MEDICARE

## 2023-04-11 NOTE — TELEPHONE ENCOUNTER
Attempted to call patient to discuss procedure instructions; pt is currently at the dentist. Will try again later. -[facility]

## 2023-04-12 ENCOUNTER — PREP FOR PROCEDURE (OUTPATIENT)
Dept: CARDIOLOGY | Facility: CLINIC | Age: 73
End: 2023-04-12
Payer: MEDICARE

## 2023-04-12 DIAGNOSIS — R94.39 ABNORMAL CARDIOVASCULAR STRESS TEST: Primary | ICD-10-CM

## 2023-04-12 RX ORDER — FENTANYL CITRATE 50 UG/ML
25 INJECTION, SOLUTION INTRAMUSCULAR; INTRAVENOUS
Status: CANCELLED | OUTPATIENT
Start: 2023-04-13

## 2023-04-12 RX ORDER — SODIUM CHLORIDE 9 MG/ML
INJECTION, SOLUTION INTRAVENOUS CONTINUOUS
Status: CANCELLED | OUTPATIENT
Start: 2023-04-13

## 2023-04-12 RX ORDER — LIDOCAINE 40 MG/G
CREAM TOPICAL
Status: CANCELLED | OUTPATIENT
Start: 2023-04-12

## 2023-04-12 RX ORDER — ASPIRIN 325 MG
325 TABLET ORAL ONCE
Status: CANCELLED | OUTPATIENT
Start: 2023-04-13 | End: 2023-04-12

## 2023-04-12 RX ORDER — ASPIRIN 81 MG/1
243 TABLET, CHEWABLE ORAL ONCE
Status: CANCELLED | OUTPATIENT
Start: 2023-04-13

## 2023-04-12 NOTE — TELEPHONE ENCOUNTER
Martin Tovar  [address] [facility] [place] [address]  [phone] (home)     PCP:  April Franco  H&P completed by:  SHERITA  Admit date 4/13 Arrival time:  0630  Anticoagulation:  NA  Previous PCI: Yes  Bypass Grafts: No  Renal Issues: No  Diabetic?: No  Device?: No    Ambulation status: ambulatory     Reason for Visit:  Telephone call to discuss pre-procedure education in preparation for: Coronary Angiogram with Possible PCI    Procedure Prep:  EKG results obtained, dated: To be completed on day of cath lab procedure  Hemogram results obtained: Completed on 4/10  Basic Metabolic Panel results obtained: Completed on 4/10  Pertinent cardiac test results: in epic       Patient Education    1. Your arrival time is 0630.  [place] [address] - Main Entrance of the Hospital  2. Please plan on being at the hospital all day.  3. At any time, emergencies and/or urgent cases may come up which could delay the start of your procedure.    COVID Testing Instructions  *Mandatory COVID Testing:   ALL Patients will need to complete a COVID test no sooner than 4 days prior to their procedure (regardless of vaccination status).      To schedule COVID testing Please call [phone]    If you want to complete this at an outside facility please call them to find out if they will have the results within the appropriate time frame and their fax number.  You will need to provide us with that information so we can send the order.    The facility completing the test will need to fax the results to [phone]    If you are running into and issues please call us.     Pre-procedure instructions  Take your temperature in the morning prior to coming in.  If your temperature is 100 F please call [facility] [phone] and notify them.  If you do not have access to a thermometer at home, please come in for testing.  If you are running a temperature your procedure may be  rescheduled.  Patient instructed to not Eat or Drink after midnight.  Patient instructed to shower the evening before or the morning of the procedure.  Patient instructed to arrange for transportation home following procedure from a responsible family member or friend. No driving for at least 24 hours.  Patient instructed to have a responsible adult with them for 24 hours post-procedure.  Post-procedure follow up process.  Conscious sedation discussed.      Pre-procedure medication instructions.  Continue medications as scheduled, with a small amount of water on the day of the procedure unless indicated. (NO Diabetic Medications or Blood Thinners)  Pt instructed not to consume Alcohol, Tobacco, Caffeine, or Carbonated beverages 12 hours prior to procedure.  Patient instructed to take 324 mg of Aspirin the night before and morning of procedure: Yes  Other medication: instructed to only take prescription medications the  a.m. of the procedure. He will let staff know if he takes any prn medication. He will bring his inhalers.              Diabetic Medication Instructions  ? DO NOT take any oral diabetic medication, short-acting diabetes medications/insulin, humalog or regular insulin the morning of your test  ? Take   dose of long-acting insulin (Lantus, Levemir) the day of your test  ? Hold Oral Diabetic on the day of the procedure and for 48 hours after IV contrast given  ? Remember to  bring your glucometer and insulin with you to take after your test if needed               Patient states understanding of procedure and agrees to proceed.    *PATIENTS RECORDS AVAILABLE IN [facility] UNLESS OTHERWISE INDICATED*      Patient Active Problem List   Diagnosis     Chronic pain     COPD (chronic obstructive pulmonary disease) (H)     Acute kidney injury (H)     Bile leak, postoperative (lap anna 12/16, s/p ercp with stent 12/22/15)     Chronic renal failure     Intra-abdominal fluid collection     COPD exacerbation (H)      Benign prostatic hyperplasia without lower urinary tract symptoms     Acute hypoxemic respiratory failure (H)     Anxiety     Small cell lung cancer (H)     Degeneration of lumbar or lumbosacral intervertebral disc     Diabetes mellitus without complication (H)     Essential hypertension     Hyperlipidemia     Status post laparoscopic-assisted sigmoidectomy     Elevated troponin     NSTEMI (non-ST elevated myocardial infarction) (H)     Mild coronary artery disease     Chest pain       Current Outpatient Medications   Medication Sig Dispense Refill     albuterol (PROVENTIL HFA;VENTOLIN HFA) 90 mcg/actuation inhaler [ALBUTEROL (PROVENTIL HFA;VENTOLIN HFA) 90 MCG/ACTUATION INHALER] Inhale 2 puffs 4 (four) times a day as needed for wheezing.              aspirin (ASA) 81 MG chewable tablet Take 81 mg by mouth daily       atorvastatin (LIPITOR) 40 MG tablet Take 1 tablet (40 mg) by mouth daily 90 tablet 4     diclofenac (VOLTAREN) 1 % topical gel Apply topically 4 times daily as needed for moderate pain (hand joints)       HYDROcodone-acetaminophen (NORCO) 7.5-325 mg per tablet [HYDROCODONE-ACETAMINOPHEN (NORCO) 7.5-325 MG PER TABLET] Take 1 tablet by mouth every 6 (six) hours as needed. 30 tablet 0     ipratropium (ATROVENT) 0.02 % nebulizer solution Take 0.5 mg by nebulization 4 times daily       lisinopril (ZESTRIL) 2.5 MG tablet Take 1 tablet (2.5 mg) by mouth daily 30 tablet 1     morphine (MS CONTIN) 15 MG 12 hr tablet Take 15 mg by mouth every 6 hours        omeprazole (PRILOSEC) 20 MG DR capsule TAKE 1 CAPSULE BY MOUTH EVERY DAY 30 TO 60 MINUTES BEFORE BREAKFAST       oxybutynin ER (DITROPAN-XL) 10 MG 24 hr tablet TAKE 1 TABLET BY MOUTH EVERY DAY IN THE MORNING       polyethylene glycol-propylene glycol (SYSTANE) 0.4-0.3 % SOLN ophthalmic solution Place 1 drop into both eyes 4 times daily       tamsulosin (FLOMAX) 0.4 mg cap [TAMSULOSIN (FLOMAX) 0.4 MG CAP] Take 0.4 mg by mouth daily.       WIXELA INHUB 250-50  MCG/ACT inhaler INHALE 1 PUFF TWICE A DAY       zolpidem (AMBIEN) 10 MG tablet Take 10 mg by mouth nightly as needed         Allergies   Allergen Reactions     Ciprofloxacin Swelling and Rash     Throat swelling     Septra [Sulfamethoxazole W/Trimethoprim] Swelling and Rash       Rupali Acevedo RN on 4/12/2023 at 11:05 AM        Pt's Son, Jacoby, will be his . His number is [phone]. He will drop him off and be back for discharge instructions.

## 2023-04-13 ENCOUNTER — HOSPITAL ENCOUNTER (OUTPATIENT)
Facility: HOSPITAL | Age: 73
Discharge: HOME | End: 2023-04-13
Attending: INTERNAL MEDICINE | Admitting: INTERNAL MEDICINE
Payer: MEDICARE

## 2023-04-13 VITALS
WEIGHT: 157.19 LBS | BODY MASS INDEX: 24.67 KG/M2 | OXYGEN SATURATION: 93 % | DIASTOLIC BLOOD PRESSURE: 67 MMHG | TEMPERATURE: 98.4 F | RESPIRATION RATE: 20 BRPM | HEART RATE: 61 BPM | HEIGHT: 67 IN | SYSTOLIC BLOOD PRESSURE: 135 MMHG

## 2023-04-13 DIAGNOSIS — R94.39 ABNORMAL CARDIOVASCULAR STRESS TEST: ICD-10-CM

## 2023-04-13 DIAGNOSIS — I25.118 CORONARY ARTERY DISEASE INVOLVING NATIVE HEART WITH OTHER FORM OF ANGINA PECTORIS, UNSPECIFIED VESSEL OR LESION TYPE (H): ICD-10-CM

## 2023-04-13 LAB
ATRIAL RATE - MUSE: 67 BPM
CHOLEST SERPL-MCNC: 84 MG/DL
DIASTOLIC BLOOD PRESSURE - MUSE: NORMAL MMHG
HDLC SERPL-MCNC: 44 MG/DL
INTERPRETATION ECG - MUSE: NORMAL
LDLC SERPL CALC-MCNC: 27 MG/DL
NONHDLC SERPL-MCNC: 40 MG/DL
P AXIS - MUSE: 64 DEGREES
PR INTERVAL - MUSE: 154 MS
QRS DURATION - MUSE: 100 MS
QT - MUSE: 412 MS
QTC - MUSE: 435 MS
R AXIS - MUSE: 261 DEGREES
SYSTOLIC BLOOD PRESSURE - MUSE: NORMAL MMHG
T AXIS - MUSE: 81 DEGREES
TRIGL SERPL-MCNC: 64 MG/DL
VENTRICULAR RATE- MUSE: 67 BPM

## 2023-04-13 PROCEDURE — 80061 LIPID PANEL: CPT | Performed by: INTERNAL MEDICINE

## 2023-04-13 PROCEDURE — 255N000002 HC RX 255 OP 636: Performed by: INTERNAL MEDICINE

## 2023-04-13 PROCEDURE — C1894 INTRO/SHEATH, NON-LASER: HCPCS | Performed by: INTERNAL MEDICINE

## 2023-04-13 PROCEDURE — 258N000003 HC RX IP 258 OP 636: Performed by: INTERNAL MEDICINE

## 2023-04-13 PROCEDURE — 272N000001 HC OR GENERAL SUPPLY STERILE: Performed by: INTERNAL MEDICINE

## 2023-04-13 PROCEDURE — 93454 CORONARY ARTERY ANGIO S&I: CPT | Performed by: INTERNAL MEDICINE

## 2023-04-13 PROCEDURE — 93454 CORONARY ARTERY ANGIO S&I: CPT | Mod: 26 | Performed by: INTERNAL MEDICINE

## 2023-04-13 PROCEDURE — 99152 MOD SED SAME PHYS/QHP 5/>YRS: CPT | Performed by: INTERNAL MEDICINE

## 2023-04-13 PROCEDURE — 250N000009 HC RX 250: Performed by: INTERNAL MEDICINE

## 2023-04-13 PROCEDURE — 999N000054 HC STATISTIC EKG NON-CHARGEABLE

## 2023-04-13 PROCEDURE — 250N000013 HC RX MED GY IP 250 OP 250 PS 637: Performed by: NURSE PRACTITIONER

## 2023-04-13 PROCEDURE — C1760 CLOSURE DEV, VASC: HCPCS | Performed by: INTERNAL MEDICINE

## 2023-04-13 PROCEDURE — 36415 COLL VENOUS BLD VENIPUNCTURE: CPT | Performed by: INTERNAL MEDICINE

## 2023-04-13 PROCEDURE — 250N000011 HC RX IP 250 OP 636: Performed by: INTERNAL MEDICINE

## 2023-04-13 PROCEDURE — 93005 ELECTROCARDIOGRAM TRACING: CPT

## 2023-04-13 DEVICE — CLOSURE ANGIOSEAL 6FR 610130: Type: IMPLANTABLE DEVICE | Status: FUNCTIONAL

## 2023-04-13 RX ORDER — LIDOCAINE 40 MG/G
CREAM TOPICAL
Status: DISCONTINUED | OUTPATIENT
Start: 2023-04-13 | End: 2023-04-13 | Stop reason: HOSPADM

## 2023-04-13 RX ORDER — HYDROCODONE BITARTRATE AND ACETAMINOPHEN 7.5; 325 MG/1; MG/1
1 TABLET ORAL EVERY 6 HOURS PRN
Status: DISCONTINUED | OUTPATIENT
Start: 2023-04-13 | End: 2023-04-13 | Stop reason: HOSPADM

## 2023-04-13 RX ORDER — FLUMAZENIL 0.1 MG/ML
0.2 INJECTION, SOLUTION INTRAVENOUS
Status: DISCONTINUED | OUTPATIENT
Start: 2023-04-13 | End: 2023-04-13 | Stop reason: HOSPADM

## 2023-04-13 RX ORDER — ASPIRIN 325 MG
325 TABLET ORAL ONCE
Status: DISCONTINUED | OUTPATIENT
Start: 2023-04-13 | End: 2023-04-13 | Stop reason: HOSPADM

## 2023-04-13 RX ORDER — SODIUM CHLORIDE 9 MG/ML
INJECTION, SOLUTION INTRAVENOUS CONTINUOUS
Status: DISCONTINUED | OUTPATIENT
Start: 2023-04-13 | End: 2023-04-13 | Stop reason: HOSPADM

## 2023-04-13 RX ORDER — NALOXONE HYDROCHLORIDE 0.4 MG/ML
0.2 INJECTION, SOLUTION INTRAMUSCULAR; INTRAVENOUS; SUBCUTANEOUS
Status: DISCONTINUED | OUTPATIENT
Start: 2023-04-13 | End: 2023-04-13 | Stop reason: HOSPADM

## 2023-04-13 RX ORDER — ASPIRIN 81 MG/1
243 TABLET, CHEWABLE ORAL ONCE
Status: DISCONTINUED | OUTPATIENT
Start: 2023-04-13 | End: 2023-04-13 | Stop reason: HOSPADM

## 2023-04-13 RX ORDER — OXYCODONE HYDROCHLORIDE 5 MG/1
5 TABLET ORAL EVERY 4 HOURS PRN
Status: DISCONTINUED | OUTPATIENT
Start: 2023-04-13 | End: 2023-04-13

## 2023-04-13 RX ORDER — NALOXONE HYDROCHLORIDE 0.4 MG/ML
0.4 INJECTION, SOLUTION INTRAMUSCULAR; INTRAVENOUS; SUBCUTANEOUS
Status: DISCONTINUED | OUTPATIENT
Start: 2023-04-13 | End: 2023-04-13 | Stop reason: HOSPADM

## 2023-04-13 RX ORDER — IODIXANOL 320 MG/ML
INJECTION, SOLUTION INTRAVASCULAR
Status: DISCONTINUED | OUTPATIENT
Start: 2023-04-13 | End: 2023-04-13 | Stop reason: HOSPADM

## 2023-04-13 RX ORDER — OXYCODONE HYDROCHLORIDE 5 MG/1
10 TABLET ORAL EVERY 4 HOURS PRN
Status: DISCONTINUED | OUTPATIENT
Start: 2023-04-13 | End: 2023-04-13

## 2023-04-13 RX ORDER — ACETAMINOPHEN 325 MG/1
650 TABLET ORAL EVERY 4 HOURS PRN
Status: DISCONTINUED | OUTPATIENT
Start: 2023-04-13 | End: 2023-04-13 | Stop reason: HOSPADM

## 2023-04-13 RX ORDER — DIAZEPAM 5 MG
5 TABLET ORAL ONCE
Status: COMPLETED | OUTPATIENT
Start: 2023-04-13 | End: 2023-04-13

## 2023-04-13 RX ORDER — FENTANYL CITRATE 50 UG/ML
INJECTION, SOLUTION INTRAMUSCULAR; INTRAVENOUS
Status: DISCONTINUED | OUTPATIENT
Start: 2023-04-13 | End: 2023-04-13 | Stop reason: HOSPADM

## 2023-04-13 RX ORDER — FENTANYL CITRATE 50 UG/ML
25 INJECTION, SOLUTION INTRAMUSCULAR; INTRAVENOUS
Status: DISCONTINUED | OUTPATIENT
Start: 2023-04-13 | End: 2023-04-13 | Stop reason: HOSPADM

## 2023-04-13 RX ORDER — ATROPINE SULFATE 0.1 MG/ML
0.5 INJECTION INTRAVENOUS
Status: DISCONTINUED | OUTPATIENT
Start: 2023-04-13 | End: 2023-04-13 | Stop reason: HOSPADM

## 2023-04-13 RX ADMIN — SODIUM CHLORIDE: 9 INJECTION, SOLUTION INTRAVENOUS at 06:58

## 2023-04-13 RX ADMIN — DIAZEPAM 5 MG: 5 TABLET ORAL at 07:54

## 2023-04-13 ASSESSMENT — ACTIVITIES OF DAILY LIVING (ADL)
ADLS_ACUITY_SCORE: 35

## 2023-04-13 NOTE — PRE-PROCEDURE
GENERAL PRE-PROCEDURE:   Procedure:  Coronary angiogram with possible PCI  Date/Time:  4/13/2023 6:51 AM    Written consent obtained?: Yes    Risks and benefits: Risks, benefits and alternatives were discussed    Consent given by:  Patient  Patient states understanding of procedure being performed: Yes    Patient's understanding of procedure matches consent: Yes    Procedure consent matches procedure scheduled: Yes    Expected level of sedation:  Moderate  Appropriately NPO:  Yes  ASA Class:  3 (mild CAD, HTN, hypercholeterolemia, chronic renal failure, DM Type II)  Mallampati  :  Grade 2- soft palate, base of uvula, tonsillar pillars, and portion of posterior pharyngeal wall visible  Lungs:  Lungs clear with good breath sounds bilaterally  Heart:  Normal heart sounds and rate  History & Physical reviewed:  History and physical reviewed and no updates needed  Statement of review:  I have reviewed the lab findings, diagnostic data, medications, and the plan for sedation

## 2023-04-13 NOTE — PLAN OF CARE
Patient was kept comfortable during post-procedure stay.VSS. Denies pain. Right femoral access site remains dry & free from signs of bleeding even after ambulation. Appointments made & included in AVS.  Post-op instructions given to patient. Able to ask questions. Verbalized no concerns. Belongings returned. Discharged in stable condition accompanied by son.

## 2023-04-13 NOTE — PLAN OF CARE
Goal Outcome Evaluation:             Pt admitted for CA/P.PCI due to abnormal stress test, dyspnea and dizziness. Pt has a hx CAD. Pt has chronic back pain rating pain at a 3. Pt took his morphine contin.this morning. Pt prepped and ready for procedure.      Belkys Evans RN

## 2023-04-13 NOTE — DISCHARGE INSTRUCTIONS
Interventional Cardiology  Coronary Angiogram/Angioplasty/Stent/Atherectomy Discharge Instructions -   Femoral Approach    The instructions below are to help you understand how to take care of yourself. There is also information about when to call the doctor or emergency services    **Do not stop your aspirin or platelet inhibitor unless directed by your Cardiologist.  These medications help to prevent platelets in your blood from sticking together and forming a clot.  Examples of these medications are:  Ticagrelor (Brilinta), Clopidigrel (Plavix), Prasugrel (Effient)          For the first 72 hours after your procedure:  No driving for 24 hours  Do not lift more than 15 pounds.  If you usually lift 50 pounds or more daily, please contact your cardiologist  Avoid any hard work or tiring activities, this includes, yard work, jogging, biking, sexual activity  During the day get up and walk around every 2 hours  You may return to work after 72 hours if you are feeling well and your job does not involve heavy lifting          Groin Site / Wound Care / Bleeding    You may take off the dressing on your groin the day after your procedure  Keep the area dry and clean  It is ok to shower with regular soap.  Pat dry, do not rub  You do not need to use a bandage  No tub/pool or hot tub for 1 week  If your groin starts to bleed or begins to swell suddenly after leaving the hospital, lie flat and apply firm pressure above the site for 15 minutes.  If bleeding continues, call 9-1-1  Bruising around the groin area is normal.  It may take 2-3 weeks for this to go away.  It is normal for the bruised area to turn green and/or yellow as it is healing.  A small lump may also be present and may last 2-3 months.  Your leg may be sore or stiff for a few days.  You may take Tylenol or a pain medicine recommended by your doctor  If you have a fever over 100.4, that lasts more than one day - call your cardiologist.      Your Procedural  Physician was: Dr. Floyd  the phone number is: [phone]      [facility]:  [phone]  If you are calling after hours, please listen to the entire voicemail, a live  will answer at the end of the message

## 2023-04-19 ENCOUNTER — TELEPHONE (OUTPATIENT)
Dept: CARDIOLOGY | Facility: CLINIC | Age: 73
End: 2023-04-19
Payer: MEDICARE

## 2023-04-19 NOTE — TELEPHONE ENCOUNTER
----- Message from Renee Benton sent at 4/18/2023  8:18 AM CDT -----  Regarding: SHERITA  General phone call:    Caller: Bhaskar  Primary cardiologist: SHERITA   Detailed reason for call: Patient is asking if he should still do the echo since he was just hospitalized and had an angio?    Best phone number: [phone]  Best time to contact: any  Ok to leave a detailedmessage? yes  Device? no    Additional Info:

## 2023-04-21 NOTE — TELEPHONE ENCOUNTER
Noted message and chart reviewed. Echo ordered due to hx of radiation per last note with LBF. Left a detailed message with Bhaskar indicating that this was the rationale and he should keep echo appt as scheduled. Direct line left for call back should it be needed. -aicha

## 2023-05-02 ENCOUNTER — HOSPITAL ENCOUNTER (OUTPATIENT)
Dept: CARDIOLOGY | Facility: HOSPITAL | Age: 73
Discharge: HOME | End: 2023-05-02
Attending: INTERNAL MEDICINE | Admitting: INTERNAL MEDICINE
Payer: MEDICARE

## 2023-05-02 DIAGNOSIS — I25.10 MILD CORONARY ARTERY DISEASE: ICD-10-CM

## 2023-05-02 LAB — LVEF ECHO: NORMAL

## 2023-05-02 PROCEDURE — 93306 TTE W/DOPPLER COMPLETE: CPT | Mod: 26 | Performed by: INTERNAL MEDICINE

## 2023-05-02 PROCEDURE — 93306 TTE W/DOPPLER COMPLETE: CPT

## 2023-06-09 ENCOUNTER — OFFICE VISIT (OUTPATIENT)
Dept: CARDIOLOGY | Facility: CLINIC | Age: 73
End: 2023-06-09
Payer: MEDICARE

## 2023-06-09 VITALS
WEIGHT: 150 LBS | DIASTOLIC BLOOD PRESSURE: 66 MMHG | RESPIRATION RATE: 16 BRPM | SYSTOLIC BLOOD PRESSURE: 116 MMHG | BODY MASS INDEX: 23.49 KG/M2 | HEART RATE: 88 BPM

## 2023-06-09 DIAGNOSIS — I25.10 MILD CORONARY ARTERY DISEASE: Primary | ICD-10-CM

## 2023-06-09 DIAGNOSIS — C34.90 SMALL CELL LUNG CANCER (H): ICD-10-CM

## 2023-06-09 DIAGNOSIS — R63.4 WEIGHT LOSS: ICD-10-CM

## 2023-06-09 DIAGNOSIS — E78.00 PURE HYPERCHOLESTEROLEMIA: ICD-10-CM

## 2023-06-09 DIAGNOSIS — I21.4 NSTEMI (NON-ST ELEVATED MYOCARDIAL INFARCTION) (H): ICD-10-CM

## 2023-06-09 DIAGNOSIS — I10 ESSENTIAL HYPERTENSION: ICD-10-CM

## 2023-06-09 DIAGNOSIS — E11.9 DIABETES MELLITUS WITHOUT COMPLICATION (H): ICD-10-CM

## 2023-06-09 DIAGNOSIS — N18.31 CHRONIC RENAL FAILURE, STAGE 3A (H): ICD-10-CM

## 2023-06-09 PROCEDURE — 99214 OFFICE O/P EST MOD 30 MIN: CPT | Performed by: INTERNAL MEDICINE

## 2023-06-09 NOTE — PATIENT INSTRUCTIONS
Mr Martin Tovar,  I enjoyed visiting with you again today.  I am glad to hear you are doing well.  Per our conversation the heart looked good with no major blockages.  Make sure you are not taking LISINOPRIL and if you are just stop.  I will plan on seeing you 6 months.  If you continue to lose weight despite eating let your primary know to look at gut.  Maicol Cervantes

## 2023-06-09 NOTE — LETTER
6/9/2023    RAFAEL Funk  [address] [place] [address]    RE: Martin Tovar       Dear Colleague,     I had the pleasure of seeing Martin Tovar in the [facility].      [facility]  Heart Care Clinic Follow-up Note    Assessment & Plan        (I25.10) Mild coronary artery disease  (primary encounter diagnosis)  Comment: Due to chest discomfort that he was concerned was of cardiac origin he had angiography which showed normal left main, ostial LAD 35% lesion with a mid 35% lesion, proximal circumflex 25% lesion and normal right coronary artery.  At this point time would not pursue invasive angiography and consider stress testing although prior stress test was prompted this showed ischemia of the anterolateral wall.  Work on prevention.    (I21.4) NSTEMI (non-ST elevated myocardial infarction) (H)  Comment: Type II with borderline troponin elevation without any significant obstructive disease.    (I10) Essential hypertension  Comment: Under good control of anything a little orthostatic, currently on Flomax as well as lisinopril.  He is not sure if he is taking lisinopril but he will call and check his meds at home, will discontinue lisinopril.  He is a little orthostatic.    (E78.00) Pure hypercholesterolemia  Comment: Total cholesterol of 84 with LDL of 27, could probably cut atorvastatin in half.      (E11.9) Diabetes mellitus without complication (H)  Comment: Given significant weight loss this is resolved and no longer an issue.    (C34.90) Small cell lung cancer (H)  Comment: Status post chemotherapy as well as radiation therapy, echo shows no major abnormalities, defer to primary.    (N18.31) Chronic renal failure, stage 3a (H)  Comment: Resolved, creatinine 0.86 with a GFR greater than 90.    (R63.4) Weight loss  Comment: This is his biggest issue, he has lost about 16 pounds within the last year.  I defer to his primary, he tells me he cannot eat as well as he would  like.  This is due to loss of appetite from chemotherapy as well as dental procedures.  He is also had some gallbladder issues.  I told him to try to eat a high caloric diet and if he continues to lose weight strongly recommend being seen by primary to rule out malignancy recurrence let alone malabsorption syndromes.    Plan  1.  Try high caloric diet and speak with primary concerning weight loss.  2.  Follow-up with me in about 6 months or sooner if needed.    Subjective  CC: 72-year-old white gentleman being seen post hospital discharge follow-up.  When I saw him he had vague chest discomfort which sounded GI in origin, prompted stress test which was abnormal prompting angiography showing no significant obstructive disease.  He comes in today with his biggest concern being continued weight loss.  He thinks it might be due to not being able to eat due to dental procedures, gallbladder issues, chemotherapy in past.  He does have some orthostatic lightheadedness if he gets up quickly.  There is no chest pains, palpitations, chest discomfort, significant shortness of breath, PND, orthopnea or peripheral edema.    Medications  Current Outpatient Medications   Medication Sig Dispense Refill    albuterol (PROVENTIL HFA;VENTOLIN HFA) 90 mcg/actuation inhaler [ALBUTEROL (PROVENTIL HFA;VENTOLIN HFA) 90 MCG/ACTUATION INHALER] Inhale 2 puffs 4 (four) times a day as needed for wheezing.             aspirin (ASA) 81 MG chewable tablet Take 81 mg by mouth daily      atorvastatin (LIPITOR) 40 MG tablet Take 1 tablet (40 mg) by mouth daily 90 tablet 4    diclofenac (VOLTAREN) 1 % topical gel Apply topically 4 times daily as needed for moderate pain (hand joints)      HYDROcodone-acetaminophen (NORCO) 7.5-325 mg per tablet [HYDROCODONE-ACETAMINOPHEN (NORCO) 7.5-325 MG PER TABLET] Take 1 tablet by mouth every 6 (six) hours as needed. 30 tablet 0    ipratropium (ATROVENT) 0.02 % nebulizer solution Take 0.5 mg by nebulization 4 times  daily      lisinopril (ZESTRIL) 2.5 MG tablet Take 1 tablet (2.5 mg) by mouth daily 30 tablet 1    morphine (MS CONTIN) 15 MG 12 hr tablet Take 15 mg by mouth every 6 hours       omeprazole (PRILOSEC) 20 MG DR capsule TAKE 1 CAPSULE BY MOUTH EVERY DAY 30 TO 60 MINUTES BEFORE BREAKFAST      oxybutynin ER (DITROPAN-XL) 10 MG 24 hr tablet TAKE 1 TABLET BY MOUTH EVERY DAY IN THE MORNING      polyethylene glycol-propylene glycol (SYSTANE) 0.4-0.3 % SOLN ophthalmic solution Place 1 drop into both eyes 4 times daily      tamsulosin (FLOMAX) 0.4 mg cap [TAMSULOSIN (FLOMAX) 0.4 MG CAP] Take 0.4 mg by mouth daily.      WIXELA INHUB 250-50 MCG/ACT inhaler INHALE 1 PUFF TWICE A DAY         Objective  /66 (BP Location: Left arm, Patient Position: Sitting, Cuff Size: Adult Regular)   Pulse 88   Resp 16   Wt 68 kg (150 lb)   BMI 23.49 kg/m      General Appearance:    Alert, cooperative, no distress, appears stated age   Head:    Normocephalic, without obvious abnormality, atraumatic   Throat:   Lips, mucosa, and tongue normal; teeth and gums normal   Neck:   Supple, symmetrical, trachea midline, no adenopathy;        thyroid:  No enlargement/tenderness/nodules; no carotid    bruit or JVD   Back:     Symmetric, no curvature, ROM normal, no CVA tenderness   Lungs:     Clear to auscultation bilaterally, respirations unlabored   Chest wall:    No tenderness or deformity   Heart:    Regular rate and rhythm, S1 and S2 normal, no murmur, rub   or gallop   Abdomen:     Soft, non-tender, bowel sounds active all four quadrants,     no masses, no organomegaly   Extremities:   Normal, atraumatic, no cyanosis or edema   Pulses:   2+ and symmetric all extremities   Skin:   Skin color, texture, turgor normal, no rashes or lesions     Results    Lab Results personally reviewed   Lab Results   Component Value Date    CHOL 84 04/13/2023    CHOL 109 08/11/2022     Lab Results   Component Value Date    HDL 44 04/13/2023    HDL 48 08/11/2022      No components found for: LDLCALC  Lab Results   Component Value Date    TRIG 64 04/13/2023    TRIG 77 08/11/2022     Lab Results   Component Value Date    WBC 7.1 04/10/2023    HGB 14.4 04/10/2023    HCT 43.3 04/10/2023     04/10/2023     Lab Results   Component Value Date    BUN 12.8 04/10/2023     04/10/2023    CO2 31 (H) 04/10/2023               Thank you for allowing me to participate in the care of your patient.      Sincerely,     JOCELYNN PORTER MD     [facility]  cc:   No referring provider defined for this encounter.

## 2023-06-12 ENCOUNTER — TELEPHONE (OUTPATIENT)
Dept: CARDIOLOGY | Facility: CLINIC | Age: 73
End: 2023-06-12
Payer: MEDICARE

## 2023-06-12 DIAGNOSIS — E78.5 HYPERLIPIDEMIA: Primary | ICD-10-CM

## 2023-06-12 RX ORDER — ATORVASTATIN CALCIUM 20 MG/1
20 TABLET, FILM COATED ORAL DAILY
Qty: 90 TABLET | Refills: 1 | Status: SHIPPED | OUTPATIENT
Start: 2023-06-12 | End: 2023-12-18

## 2023-06-12 NOTE — TELEPHONE ENCOUNTER
Recommendations discussed with pt.  Pt confirmed he is NOT taking lisinopril, rx removed from list.  Pt will cut atorvastatin in half, he will cut 40 mg tablets in half.  Informed pt - next fill he should take a full tab because they will be 20 mg tablets.  Pt verbalized understanding and had no questions.  -[facility]    ----- Message from Maicol Cervantes MD sent at 6/9/2023  1:23 PM CDT -----  You might want to give him a call and confirm whether he was on lisinopril or not, I told him not to take it due to orthostasis.  In addition, his lipids look so good I think he could cut his atorvastatin in one half.  Thank you.  LF

## 2023-07-09 ENCOUNTER — HEALTH MAINTENANCE LETTER (OUTPATIENT)
Age: 73
End: 2023-07-09

## 2023-08-28 ENCOUNTER — HOSPITAL ENCOUNTER (OUTPATIENT)
Dept: MRI IMAGING | Facility: HOSPITAL | Age: 73
Discharge: HOME | End: 2023-08-28
Attending: RADIOLOGY
Payer: MEDICARE

## 2023-08-28 ENCOUNTER — HOSPITAL ENCOUNTER (OUTPATIENT)
Dept: CT IMAGING | Facility: HOSPITAL | Age: 73
Discharge: HOME | End: 2023-08-28
Attending: RADIOLOGY
Payer: MEDICARE

## 2023-08-28 DIAGNOSIS — C34.90 SMALL CELL LUNG CANCER (H): ICD-10-CM

## 2023-08-28 PROCEDURE — G1010 CDSM STANSON: HCPCS

## 2023-08-28 PROCEDURE — 70553 MRI BRAIN STEM W/O & W/DYE: CPT | Mod: MG

## 2023-08-28 PROCEDURE — 255N000002 HC RX 255 OP 636: Mod: JZ | Performed by: RADIOLOGY

## 2023-08-28 PROCEDURE — 71250 CT THORAX DX C-: CPT | Mod: MG

## 2023-08-28 PROCEDURE — A9585 GADOBUTROL INJECTION: HCPCS | Mod: JZ | Performed by: RADIOLOGY

## 2023-08-28 RX ORDER — GADOBUTROL 604.72 MG/ML
6.5 INJECTION INTRAVENOUS ONCE
Status: COMPLETED | OUTPATIENT
Start: 2023-08-28 | End: 2023-08-28

## 2023-08-28 RX ADMIN — GADOBUTROL 6.5 ML: 604.72 INJECTION INTRAVENOUS at 15:23

## 2023-08-31 ENCOUNTER — OFFICE VISIT (OUTPATIENT)
Dept: RADIATION ONCOLOGY | Facility: HOSPITAL | Age: 73
End: 2023-08-31
Attending: RADIOLOGY
Payer: MEDICARE

## 2023-08-31 VITALS
WEIGHT: 147.6 LBS | RESPIRATION RATE: 20 BRPM | SYSTOLIC BLOOD PRESSURE: 116 MMHG | DIASTOLIC BLOOD PRESSURE: 73 MMHG | HEART RATE: 86 BPM | BODY MASS INDEX: 23.12 KG/M2 | TEMPERATURE: 98.3 F | OXYGEN SATURATION: 92 %

## 2023-08-31 DIAGNOSIS — C34.90 SMALL CELL CARCINOMA OF LUNG (H): Primary | ICD-10-CM

## 2023-08-31 PROCEDURE — 99214 OFFICE O/P EST MOD 30 MIN: CPT | Performed by: RADIOLOGY

## 2023-08-31 PROCEDURE — G0463 HOSPITAL OUTPT CLINIC VISIT: HCPCS | Performed by: RADIOLOGY

## 2023-08-31 RX ORDER — ZOLPIDEM TARTRATE 6.25 MG/1
1 TABLET, FILM COATED, EXTENDED RELEASE ORAL AT BEDTIME
Status: ON HOLD | COMMUNITY
End: 2023-12-15

## 2023-08-31 RX ORDER — CYCLOBENZAPRINE HCL 5 MG
5 TABLET ORAL EVERY 8 HOURS PRN
COMMUNITY
Start: 2023-08-21

## 2023-08-31 ASSESSMENT — PAIN SCALES - GENERAL: PAINLEVEL: NO PAIN (0)

## 2023-08-31 NOTE — PROGRESS NOTES
"Oncology Rooming Note    August 31, 2023 10:55 AM   Martin Tovar is a 73 year old male who presents for:    Chief Complaint   Patient presents with    Oncology Clinic Visit     Follow up     Initial Vitals: /73 (BP Location: Left arm, Patient Position: Sitting)   Pulse 86   Temp 98.3  F (36.8  C)   Resp 20   Wt 67 kg (147 lb 9.6 oz)   SpO2 92%   BMI 23.12 kg/m   Estimated body mass index is 23.12 kg/m  as calculated from the following:    Height as of 4/13/23: 1.702 m (5' 7\").    Weight as of this encounter: 67 kg (147 lb 9.6 oz). Body surface area is 1.78 meters squared.  No Pain (0) Comment: Data Unavailable   No LMP for male patient.  Allergies reviewed: Yes  Medications reviewed: Yes    Medications: Medication refills not needed today.  Pharmacy name entered into [employer]: [facility] [id number] [place] [address]    Clinical concerns: Follow up, CT chest and brain done on 8/28, concerned about weight  Dr. Snowden was notified.      Madison Ly RN              "

## 2023-08-31 NOTE — LETTER
8/31/2023         RE: Martin Tovar  [address]  [place] [address]        Dear Colleague,    Thank you for referring your patient, Martin Tovar, to the [facility] RADIATION ONCOLOGY [place]. Please see a copy of my visit note below.    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  The patient is status post chemoradiation therapy.  He was found to have a new diagnosis of limited stage small cell lung cancer, second primary/recurrence, clinical stage T1 N0 M0.  His case has been discussed at thoracic tumor conference and consensus recommendation is to consider SBRT.      TYPE OF RADIATION THERAPY ADMINISTERED:    1. 4500 cGy in 30 treatments at 150 cGy twice daily at [facility] in 2012.      2.  PCI with a total dose of 2500 cGy in 10 treatments at [facility] in 2012.      3. 5000 cGy in 5 treatments to left lower lobe tumor given from 3/2/120-3/12/2020.     INTERVAL SINCE COMPLETION OF RADIATION THERAPY: 3 years and 5 months.      SUBJECTIVE:  Mr. Tovar is a 72 y.o. male who is a retired  from [employer].  Patient was diagnosed with limited stage small cell left lung cancer initially in 2012, stage T2 N0 M0.  He received concurrent chemoradiation therapy with cisplatin and etoposide and radiation therapy to a total dose of 4500 cGy in 30 treatments at 150 cGy twice daily.  His treatment was given at [facility].  Patient had a complete response and also received PCI with a total dose of 2500 cGy in 10 treatments.  Patient has been followed closely with no evidence of cancer recurrence.  He will presented with recent finding of left lower lobe nodule by routine screening exam for which he was seeking further evaluation.  PET CT scan on 1/14/2020 showed a 1.3 x 1.2 cm nodule with increased SUV  max 3.3 consistent with malignancy.  There is no radiographic evidence of anel and systemic metastasis.  CT-guided needle biopsy on 2/5/2020 confirmed diagnosis of small cell carcinoma.  He had a staging MRI brain which also showed no evidence of brain metastasis.  Given the prior history of radiation therapy and location of his current cancer, the patient might require pneumonectomy.  His case has been discussed at thoracic tumor conference and that the consensus recommendation is to consider SBRT. His case has been discussed at thoracic tumor conference and consensus recommendation is to consider SBRT.  He received radiation therapy in our clinic with a total dose of 5000 cGy in 5 treatments given from 3/2/120-3/12/2020.  He tolerated radiation therapy very well with minimal side effect.     Patient has been doing well since completion of the radiation therapy.  He denies any chest pain discomfort at the time of evaluation.  He is here for routine postradiation therapy office follow-up.     Medications were reviewed and are up to date on EPIC.    The following portions of the patient's history were reviewed and updated as appropriate: allergies, current medications, past family history, past medical history, past social history, past surgical history and problem list.    Review of Systems:      General  Constitutional  Constitutional (WDL): Exceptions to WDL  Fatigue: Fatigue relieved by rest  Weight Loss: 5 to less than 10% from baseline OR intervention not indicated  EENT  Eye Disorders  Eye Disorder (WDL): All eye disorder elements are within defined limits  Ear Disorders  Ear Disorder (WDL): Exceptions to WDL  Tinnitus: Mild symptoms OR intervention not indicated (chronic)  Respiratory  Respiratory  Respiratory (WDL): Exceptions to WDL (smoking 1ppd cigarettes)  Cough: Mild symptoms OR nonprescription intervention indicated  Dyspnea: Shortness of breath with moderate  exertion  Cardiovascular  Cardiovascular  Cardiovascular (WDL): All cardiovascular elements are within defined limits  Gastrointestinal  Gastrointestinal  Gastrointestinal (WDL): Exceptions to WDL (missing molars, so hard time eating)  Anorexia: Loss of appetite without alteration in eating habits  Musculoskeletal  Musculoskeletal and Connective Tissue Disorders  Musculoskeletal & Connective (WDL): Exceptions to WDL  Arthralgia: Mild pain  Integumentary  Integumentary  Integumentary (WDL): All integumentary elements are within defined limits  Neurological  Neurosensory  Neurosensory (WDL): All neurosensory elements are within defined limits  Genitourinary/Reproductive  Genitourinary  Genitourinary (WDL): All genitourinary elements are within defined limits  Urinary Frequency: Present  Lymphatic  Lymph System Disorders  Lymph (WDL): All lymph elements are within defined limits  Pain  Pain Score: No Pain (0)  AUA Assessment                                                              Accompanied by  Accompanied By: self only    Objective:      PHYSICAL EXAMINATION:    /73 (BP Location: Left arm, Patient Position: Sitting)   Pulse 86   Temp 98.3  F (36.8  C)   Resp 20   Wt 67 kg (147 lb 9.6 oz)   SpO2 92%   BMI 23.12 kg/m      Gen: Alert, in NAD  Eyes: PERRL, EOMI, sclera anicteric  Neck: Supple, full ROM, no LAD  Pulm: No wheezing, stridor or respiratory distress  CV: Well-perfused, no cyanosis, no pedal edema  Back: No step-offs or pain to palpation along the thoracolumbar spine  Rectal: Deferred  : Deferred  Musculoskeletal: Normal muscle bulk and tone  Skin: Normal color and turgor  Neurologic: A/Ox3, CN II-XII intact, normal gait and station  Psychiatric: Appropriate mood and affect     Imaging: Imaging results 30 days: MRI Brain w & w/o contrast    Result Date: 8/29/2023  EXAM: MR BRAIN W/O and W CONTRAST LOCATION: [facility] DATE: 8/28/2023 INDICATION: Small cell lung  cancer (H). COMPARISON: Brain MRI 09/13/2022 and 3/24/2022 and 9/21/2021. CONTRAST: 6.5 mL Gadavist. TECHNIQUE: Routine multiplanar multisequence head MRI without and with intravenous contrast. FINDINGS: INTRACRANIAL CONTENTS: Axial diffusion sequence shows no evidence for restricted diffusion abnormality. Nothing for acute/subacute infarction is present. No mass, mass effect, acute hemorrhage, or extra-axial fluid collections. Tiny punctate foci of chronic microhemorrhage adjacent to the posterior left caudate nuclear region, new from previous MRI examination. Stable appearance to punctate focus of chronic microhemorrhage left periatrial parietal lobe deep white matter. Patchy and confluent nonspecific T2/FLAIR hyperintensities within the cerebral white matter and marvin most consistent with moderate chronic microvascular ischemic change. Moderate generalized cerebral atrophy. No hydrocephalus. Corpus callosum is normal. Normal position of the cerebellar tonsils. No pathologic contrast enhancement. No specific evidence for CNS metastatic disease. No abnormal intraparenchymal, leptomeningeal or dural enhancement abnormality. Meckel's cave and cavernous sinus enhancement are normal. SELLA: No abnormality accounting for technique. No mass, or evidence for hemorrhage. Nothing for metastatic involvement of the sella. OSSEOUS STRUCTURES/SOFT TISSUES: Nothing for a marrow infiltrative process. No diploic space, skull base/clivus or upper cervical marrow signal/enhancement abnormality is present to suggest neoplastic involvement. There are degenerative changes involving  the TMJs as before. The major intracranial vascular flow voids are maintained. ORBITS: No acute orbital process. No pathologic orbital enhancement. Nothing for orbital metastatic disease. SINUSES/MASTOIDS: Circumferential enhancing mucosal membrane thickening right maxillary sinus with air-fluid level suggests acute right maxillary sinusitis inflammatory  changes. This has progressed/worsened from the most recent brain MR examination 09/13/2023. Partial opacification of the right-sided ethmoid air cells persists with membrane thickening in the right frontal sinus. Small amount of membrane thickening in the mastoid air cells right more than left. No adenopathy in the suprahyoid neck.     IMPRESSION: 1.  No evidence for CNS metastatic disease is identified. 2.  No intracranial mass, mass effect, recent hemorrhage, or pathologic enhancement. There is a new focus of chronic microhemorrhage in the region of the posterior left caudate nucleus with stable chronic focus of microhemorrhage deep white matter left parietal level. 3.  No other changes from 09/13/2023, 03/24/2022 or 09/21/2021 brain MRI examinations. No evidence for a marrow infiltrative process. 4.  Progressive opacification right maxillary sinus with air-fluid level from previous MRI may represent developing right maxillary inflammatory sinusitis. 5.  Stable moderate chronic small vessel ischemic changes deep white matter of both cerebral hemispheres and central marvin. Stable degree of parenchymal volume loss.    CT Chest w/o contrast    Result Date: 8/29/2023  EXAM: CT CHEST W/O CONTRAST LOCATION: [facility] DATE: 8/28/2023 INDICATION:  Small cell lung cancer (H) COMPARISON: CT of the chest 03/13/2023, 09/13/2022 TECHNIQUE: CT chest without IV contrast. Multiplanar reformats were obtained. Dose reduction techniques were used. CONTRAST: None. FINDINGS: LUNGS AND PLEURA: Focal architectural distortion, mixed interstitial and alveolar opacity and volume loss around the left hilum consistent with fibrosis from previous treatment dose related radiation. No new nodular or convex borders. Multiple new foci of airway-centric opacity have developed within the peripheral third of the right lung, which follow a posterior/dependent distribution greatest in the right lower lobe. The largest opacity  is somewhat nodular measuring 12 x 16 mm (series 4, image 204). There is more focal, band like opacity near the pleura in the right lateral costophrenic sulcus consistent with atelectasis. Upper lung predominant emphysema. Stable 2 to 3 mm nodule inferior anterolateral right middle lobe (series 4, image 262). Benign calcified granuloma in the left lower lobe along the mediastinal pleura. Central airway wall thickening is similar to prior. Mild cylindrical bronchiectasis of proximal subsegmental airways in the basilar right lower lobe is unchanged. No pleural space abnormality. MEDIASTINUM: Cardiac chambers are normal in size. No pericardial effusion. Main pulmonary artery is normal caliber. Nonaneurysmal thoracic aorta. Imaged thyroid gland is normal. No enlarged mediastinal or hilar lymph nodes. Decompressed esophagus. CORONARY ARTERY CALCIFICATION: Mild. UPPER ABDOMEN: Prior cholecystectomy. There are no new findings in the imaged upper abdomen. MUSCULOSKELETAL: Diffuse thoracic spine degenerative osteophytes and mild disc space narrowing. Prior ACDF with solid ankylosis at the disc space. No aggressive or destructive bone lesions.     IMPRESSION: 1.  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 No findings to suggest local disease recurrence. 2.  Development of multiple new peripheral airway centric opacities in the right lung greatest in the right lower lobe and atelectasis in the right lateral costophrenic sulcus. Findings are consistent with an peripheral airways inflammatory process and could relate to infection or bronchopulmonary aspiration. 3.  Emphysema.       Impression     The patient is a 72-year-old gentleman with a prior history of limited stage small cell lung cancer diagnosed initially in 2012.  He was find to have a new limited stage small cell lung cancer involving the left lower lobe with clinical stage T1 N0 M0.  Patient received definitive SBRT and completed 3 years and 5 months ago.   "He has been doing well with no clinical evidence of recurrence.     Assessment & Plan:     1.  I have personally reviewed his restaging MRI and CT scan today and compare with previous MRI and CT chest and radiation therapy record.  There is no radiographic evidence of cancer recurrence.    Patient is scheduled to return to radiation oncology in 6 months for CT CAP.     2.  Continue follow-up with Dr. Hale, thoracic surgeon and Dr. Brito, pulmonology as planned.     3.  The patient was found to have a new focus of chronic microhemorrhage in the region of the posterior left caudate nucleus with stable chronic focus of microhemorrhage deep white matter left parietal level.  Patient will see Dr. April Frnaco, his primary physician for follow-up recommendation.     Face to face time  30 minutes with > 75% spent on consultation, education and coordination of care.        Mary Snowden MD  Department of Radiation Oncology   [facility]  Tel: [phone]  Page: [phone]    [facility]  [address] [place] [address]     [address]   [place] [address]    CC:  Patient Care Team:  April Franco PA as PCP - General  Mary Snowden MD as MD (Hematology & Oncology)  Mary Snowden MD as Assigned Cancer Care Provider  Maicol Cervantes MD as Assigned Heart and Vascular Provider      Oncology Rooming Note    August 31, 2023 10:55 AM   Martin Tovar is a 73 year old male who presents for:    Chief Complaint   Patient presents with     Oncology Clinic Visit     Follow up     Initial Vitals: /73 (BP Location: Left arm, Patient Position: Sitting)   Pulse 86   Temp 98.3  F (36.8  C)   Resp 20   Wt 67 kg (147 lb 9.6 oz)   SpO2 92%   BMI 23.12 kg/m   Estimated body mass index is 23.12 kg/m  as calculated from the following:    Height as of 4/13/23: 1.702 m (5' 7\").    Weight as of this encounter: 67 kg (147 lb 9.6 oz). Body surface area is 1.78 meters squared.  No Pain (0) " Comment: Data Unavailable   No LMP for male patient.  Allergies reviewed: Yes  Medications reviewed: Yes    Medications: Medication refills not needed today.  Pharmacy name entered into [employer]: [facility] [id number] [place] [address]    Clinical concerns: Follow up, CT chest and brain done on 8/28, concerned about weight  Dr. Snowden was notified.      Madison Ly RN                Again, thank you for allowing me to participate in the care of your patient.        Sincerely,        Mary Snowden MD

## 2023-08-31 NOTE — PROGRESS NOTES
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  The patient is status post chemoradiation therapy.  He was found to have a new diagnosis of limited stage small cell lung cancer, second primary/recurrence, clinical stage T1 N0 M0.  His case has been discussed at thoracic tumor conference and consensus recommendation is to consider SBRT.      TYPE OF RADIATION THERAPY ADMINISTERED:    1. 4500 cGy in 30 treatments at 150 cGy twice daily at [facility] in 2012.      2.  PCI with a total dose of 2500 cGy in 10 treatments at [facility] in 2012.      3. 5000 cGy in 5 treatments to left lower lobe tumor given from 3/2/120-3/12/2020.     INTERVAL SINCE COMPLETION OF RADIATION THERAPY: 3 years and 5 months.      SUBJECTIVE:  Mr. Tovar is a 72 y.o. male who is a retired  from [employer].  Patient was diagnosed with limited stage small cell left lung cancer initially in 2012, stage T2 N0 M0.  He received concurrent chemoradiation therapy with cisplatin and etoposide and radiation therapy to a total dose of 4500 cGy in 30 treatments at 150 cGy twice daily.  His treatment was given at [facility].  Patient had a complete response and also received PCI with a total dose of 2500 cGy in 10 treatments.  Patient has been followed closely with no evidence of cancer recurrence.  He will presented with recent finding of left lower lobe nodule by routine screening exam for which he was seeking further evaluation.  PET CT scan on 1/14/2020 showed a 1.3 x 1.2 cm nodule with increased SUV max 3.3 consistent with malignancy.  There is no radiographic evidence of anel and systemic metastasis.  CT-guided needle biopsy on 2/5/2020 confirmed diagnosis of small cell carcinoma.  He had a staging MRI brain which also showed no evidence of brain metastasis.   Given the prior history of radiation therapy and location of his current cancer, the patient might require pneumonectomy.  His case has been discussed at thoracic tumor conference and that the consensus recommendation is to consider SBRT. His case has been discussed at thoracic tumor conference and consensus recommendation is to consider SBRT.  He received radiation therapy in our clinic with a total dose of 5000 cGy in 5 treatments given from 3/2/120-3/12/2020.  He tolerated radiation therapy very well with minimal side effect.     Patient has been doing well since completion of the radiation therapy.  He denies any chest pain discomfort at the time of evaluation.  He is here for routine postradiation therapy office follow-up.     Medications were reviewed and are up to date on EPIC.    The following portions of the patient's history were reviewed and updated as appropriate: allergies, current medications, past family history, past medical history, past social history, past surgical history and problem list.    Review of Systems:      General  Constitutional  Constitutional (WDL): Exceptions to WDL  Fatigue: Fatigue relieved by rest  Weight Loss: 5 to less than 10% from baseline OR intervention not indicated  EENT  Eye Disorders  Eye Disorder (WDL): All eye disorder elements are within defined limits  Ear Disorders  Ear Disorder (WDL): Exceptions to WDL  Tinnitus: Mild symptoms OR intervention not indicated (chronic)  Respiratory  Respiratory  Respiratory (WDL): Exceptions to WDL (smoking 1ppd cigarettes)  Cough: Mild symptoms OR nonprescription intervention indicated  Dyspnea: Shortness of breath with moderate exertion  Cardiovascular  Cardiovascular  Cardiovascular (WDL): All cardiovascular elements are within defined limits  Gastrointestinal  Gastrointestinal  Gastrointestinal (WDL): Exceptions to WDL (missing molars, so hard time eating)  Anorexia: Loss of appetite without alteration in eating  habits  Musculoskeletal  Musculoskeletal and Connective Tissue Disorders  Musculoskeletal & Connective (WDL): Exceptions to WDL  Arthralgia: Mild pain  Integumentary  Integumentary  Integumentary (WDL): All integumentary elements are within defined limits  Neurological  Neurosensory  Neurosensory (WDL): All neurosensory elements are within defined limits  Genitourinary/Reproductive  Genitourinary  Genitourinary (WDL): All genitourinary elements are within defined limits  Urinary Frequency: Present  Lymphatic  Lymph System Disorders  Lymph (WDL): All lymph elements are within defined limits  Pain  Pain Score: No Pain (0)  AUA Assessment                                                              Accompanied by  Accompanied By: self only    Objective:      PHYSICAL EXAMINATION:    /73 (BP Location: Left arm, Patient Position: Sitting)   Pulse 86   Temp 98.3  F (36.8  C)   Resp 20   Wt 67 kg (147 lb 9.6 oz)   SpO2 92%   BMI 23.12 kg/m      Gen: Alert, in NAD  Eyes: PERRL, EOMI, sclera anicteric  Neck: Supple, full ROM, no LAD  Pulm: No wheezing, stridor or respiratory distress  CV: Well-perfused, no cyanosis, no pedal edema  Back: No step-offs or pain to palpation along the thoracolumbar spine  Rectal: Deferred  : Deferred  Musculoskeletal: Normal muscle bulk and tone  Skin: Normal color and turgor  Neurologic: A/Ox3, CN II-XII intact, normal gait and station  Psychiatric: Appropriate mood and affect     Imaging: Imaging results 30 days: MRI Brain w & w/o contrast    Result Date: 8/29/2023  EXAM: MR BRAIN W/O and W CONTRAST LOCATION: [facility] DATE: 8/28/2023 INDICATION: Small cell lung cancer (H). COMPARISON: Brain MRI 09/13/2022 and 3/24/2022 and 9/21/2021. CONTRAST: 6.5 mL Gadavist. TECHNIQUE: Routine multiplanar multisequence head MRI without and with intravenous contrast. FINDINGS: INTRACRANIAL CONTENTS: Axial diffusion sequence shows no evidence for restricted diffusion  abnormality. Nothing for acute/subacute infarction is present. No mass, mass effect, acute hemorrhage, or extra-axial fluid collections. Tiny punctate foci of chronic microhemorrhage adjacent to the posterior left caudate nuclear region, new from previous MRI examination. Stable appearance to punctate focus of chronic microhemorrhage left periatrial parietal lobe deep white matter. Patchy and confluent nonspecific T2/FLAIR hyperintensities within the cerebral white matter and marvin most consistent with moderate chronic microvascular ischemic change. Moderate generalized cerebral atrophy. No hydrocephalus. Corpus callosum is normal. Normal position of the cerebellar tonsils. No pathologic contrast enhancement. No specific evidence for CNS metastatic disease. No abnormal intraparenchymal, leptomeningeal or dural enhancement abnormality. Meckel's cave and cavernous sinus enhancement are normal. SELLA: No abnormality accounting for technique. No mass, or evidence for hemorrhage. Nothing for metastatic involvement of the sella. OSSEOUS STRUCTURES/SOFT TISSUES: Nothing for a marrow infiltrative process. No diploic space, skull base/clivus or upper cervical marrow signal/enhancement abnormality is present to suggest neoplastic involvement. There are degenerative changes involving  the TMJs as before. The major intracranial vascular flow voids are maintained. ORBITS: No acute orbital process. No pathologic orbital enhancement. Nothing for orbital metastatic disease. SINUSES/MASTOIDS: Circumferential enhancing mucosal membrane thickening right maxillary sinus with air-fluid level suggests acute right maxillary sinusitis inflammatory changes. This has progressed/worsened from the most recent brain MR examination 09/13/2023. Partial opacification of the right-sided ethmoid air cells persists with membrane thickening in the right frontal sinus. Small amount of membrane thickening in the mastoid air cells right more than left. No  adenopathy in the suprahyoid neck.     IMPRESSION: 1.  No evidence for CNS metastatic disease is identified. 2.  No intracranial mass, mass effect, recent hemorrhage, or pathologic enhancement. There is a new focus of chronic microhemorrhage in the region of the posterior left caudate nucleus with stable chronic focus of microhemorrhage deep white matter left parietal level. 3.  No other changes from 09/13/2023, 03/24/2022 or 09/21/2021 brain MRI examinations. No evidence for a marrow infiltrative process. 4.  Progressive opacification right maxillary sinus with air-fluid level from previous MRI may represent developing right maxillary inflammatory sinusitis. 5.  Stable moderate chronic small vessel ischemic changes deep white matter of both cerebral hemispheres and central marvin. Stable degree of parenchymal volume loss.    CT Chest w/o contrast    Result Date: 8/29/2023  EXAM: CT CHEST W/O CONTRAST LOCATION: [facility] DATE: 8/28/2023 INDICATION:  Small cell lung cancer (H) COMPARISON: CT of the chest 03/13/2023, 09/13/2022 TECHNIQUE: CT chest without IV contrast. Multiplanar reformats were obtained. Dose reduction techniques were used. CONTRAST: None. FINDINGS: LUNGS AND PLEURA: Focal architectural distortion, mixed interstitial and alveolar opacity and volume loss around the left hilum consistent with fibrosis from previous treatment dose related radiation. No new nodular or convex borders. Multiple new foci of airway-centric opacity have developed within the peripheral third of the right lung, which follow a posterior/dependent distribution greatest in the right lower lobe. The largest opacity is somewhat nodular measuring 12 x 16 mm (series 4, image 204). There is more focal, band like opacity near the pleura in the right lateral costophrenic sulcus consistent with atelectasis. Upper lung predominant emphysema. Stable 2 to 3 mm nodule inferior anterolateral right middle lobe (series 4,  image 262). Benign calcified granuloma in the left lower lobe along the mediastinal pleura. Central airway wall thickening is similar to prior. Mild cylindrical bronchiectasis of proximal subsegmental airways in the basilar right lower lobe is unchanged. No pleural space abnormality. MEDIASTINUM: Cardiac chambers are normal in size. No pericardial effusion. Main pulmonary artery is normal caliber. Nonaneurysmal thoracic aorta. Imaged thyroid gland is normal. No enlarged mediastinal or hilar lymph nodes. Decompressed esophagus. CORONARY ARTERY CALCIFICATION: Mild. UPPER ABDOMEN: Prior cholecystectomy. There are no new findings in the imaged upper abdomen. MUSCULOSKELETAL: Diffuse thoracic spine degenerative osteophytes and mild disc space narrowing. Prior ACDF with solid ankylosis at the disc space. No aggressive or destructive bone lesions.     IMPRESSION: 1.  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 No findings to suggest local disease recurrence. 2.  Development of multiple new peripheral airway centric opacities in the right lung greatest in the right lower lobe and atelectasis in the right lateral costophrenic sulcus. Findings are consistent with an peripheral airways inflammatory process and could relate to infection or bronchopulmonary aspiration. 3.  Emphysema.       Impression     The patient is a 72-year-old gentleman with a prior history of limited stage small cell lung cancer diagnosed initially in 2012.  He was find to have a new limited stage small cell lung cancer involving the left lower lobe with clinical stage T1 N0 M0.  Patient received definitive SBRT and completed 3 years and 5 months ago.  He has been doing well with no clinical evidence of recurrence.     Assessment & Plan:     1.  I have personally reviewed his restaging MRI and CT scan today and compare with previous MRI and CT chest and radiation therapy record.  There is no radiographic evidence of cancer recurrence.    Patient  is scheduled to return to radiation oncology in 6 months for CT CAP.     2.  Continue follow-up with Dr. Hale, thoracic surgeon and Dr. Brito, pulmonology as planned.     3.  The patient was found to have a new focus of chronic microhemorrhage in the region of the posterior left caudate nucleus with stable chronic focus of microhemorrhage deep white matter left parietal level.  Patient will see Dr. April Franco, his primary physician for follow-up recommendation.     Face to face time  30 minutes with > 75% spent on consultation, education and coordination of care.        Mary Snowden MD  Department of Radiation Oncology   [facility]  Tel: [phone]  Page: [phone]    [facility]  [address] [place] [address]     [address]   [place] [address]    CC:  Patient Care Team:  April Franco PA as PCP - General  Mary Snowden MD as MD (Hematology & Oncology)  Mary Snowden MD as Assigned Cancer Care Provider  Maicol Cervantes MD as Assigned Heart and Vascular Provider

## 2023-09-17 ENCOUNTER — HEALTH MAINTENANCE LETTER (OUTPATIENT)
Age: 73
End: 2023-09-17

## 2023-10-05 ENCOUNTER — LAB REQUISITION (OUTPATIENT)
Dept: LAB | Facility: CLINIC | Age: 73
End: 2023-10-05
Payer: MEDICARE

## 2023-10-05 DIAGNOSIS — E11.9 TYPE 2 DIABETES MELLITUS WITHOUT COMPLICATIONS (H): ICD-10-CM

## 2023-10-05 DIAGNOSIS — I10 ESSENTIAL (PRIMARY) HYPERTENSION: ICD-10-CM

## 2023-10-05 PROCEDURE — 80053 COMPREHEN METABOLIC PANEL: CPT | Mod: ORL | Performed by: PHYSICIAN ASSISTANT

## 2023-10-05 PROCEDURE — 80061 LIPID PANEL: CPT | Mod: ORL | Performed by: PHYSICIAN ASSISTANT

## 2023-10-06 LAB
ALBUMIN SERPL BCG-MCNC: 3.6 G/DL (ref 3.5–5.2)
ALP SERPL-CCNC: 63 U/L (ref 40–129)
ALT SERPL W P-5'-P-CCNC: 26 U/L (ref 0–70)
ANION GAP SERPL CALCULATED.3IONS-SCNC: 13 MMOL/L (ref 7–15)
AST SERPL W P-5'-P-CCNC: 22 U/L (ref 0–45)
BILIRUB SERPL-MCNC: 0.4 MG/DL
BUN SERPL-MCNC: 11.4 MG/DL (ref 8–23)
CALCIUM SERPL-MCNC: 9.1 MG/DL (ref 8.8–10.2)
CHLORIDE SERPL-SCNC: 101 MMOL/L (ref 98–107)
CHOLEST SERPL-MCNC: 108 MG/DL
CREAT SERPL-MCNC: 0.75 MG/DL (ref 0.67–1.17)
DEPRECATED HCO3 PLAS-SCNC: 27 MMOL/L (ref 22–29)
EGFRCR SERPLBLD CKD-EPI 2021: >90 ML/MIN/1.73M2
GLUCOSE SERPL-MCNC: 102 MG/DL (ref 70–99)
HDLC SERPL-MCNC: 45 MG/DL
LDLC SERPL CALC-MCNC: 50 MG/DL
NONHDLC SERPL-MCNC: 63 MG/DL
POTASSIUM SERPL-SCNC: 4.3 MMOL/L (ref 3.4–5.3)
PROT SERPL-MCNC: 6.9 G/DL (ref 6.4–8.3)
SODIUM SERPL-SCNC: 141 MMOL/L (ref 135–145)
TRIGL SERPL-MCNC: 64 MG/DL

## 2023-12-11 ENCOUNTER — LAB REQUISITION (OUTPATIENT)
Dept: LAB | Facility: CLINIC | Age: 73
End: 2023-12-11
Payer: MEDICARE

## 2023-12-11 DIAGNOSIS — J44.1 CHRONIC OBSTRUCTIVE PULMONARY DISEASE WITH (ACUTE) EXACERBATION (H): ICD-10-CM

## 2023-12-11 PROCEDURE — 80053 COMPREHEN METABOLIC PANEL: CPT | Mod: ORL | Performed by: PHYSICIAN ASSISTANT

## 2023-12-12 LAB
ALBUMIN SERPL BCG-MCNC: 3.8 G/DL (ref 3.5–5.2)
ALP SERPL-CCNC: 68 U/L (ref 40–150)
ALT SERPL W P-5'-P-CCNC: 15 U/L (ref 0–70)
ANION GAP SERPL CALCULATED.3IONS-SCNC: 12 MMOL/L (ref 7–15)
AST SERPL W P-5'-P-CCNC: 21 U/L (ref 0–45)
BILIRUB SERPL-MCNC: 0.5 MG/DL
BUN SERPL-MCNC: 19.7 MG/DL (ref 8–23)
CALCIUM SERPL-MCNC: 9 MG/DL (ref 8.8–10.2)
CHLORIDE SERPL-SCNC: 97 MMOL/L (ref 98–107)
CREAT SERPL-MCNC: 0.99 MG/DL (ref 0.67–1.17)
DEPRECATED HCO3 PLAS-SCNC: 30 MMOL/L (ref 22–29)
EGFRCR SERPLBLD CKD-EPI 2021: 81 ML/MIN/1.73M2
GLUCOSE SERPL-MCNC: 148 MG/DL (ref 70–99)
POTASSIUM SERPL-SCNC: 3.9 MMOL/L (ref 3.4–5.3)
PROT SERPL-MCNC: 7.1 G/DL (ref 6.4–8.3)
SODIUM SERPL-SCNC: 139 MMOL/L (ref 135–145)

## 2023-12-14 ENCOUNTER — APPOINTMENT (OUTPATIENT)
Dept: CT IMAGING | Facility: CLINIC | Age: 73
DRG: 193 | End: 2023-12-14
Attending: EMERGENCY MEDICINE
Payer: MEDICARE

## 2023-12-14 ENCOUNTER — HOSPITAL ENCOUNTER (INPATIENT)
Facility: CLINIC | Age: 73
LOS: 2 days | Discharge: HOME | DRG: 193 | End: 2023-12-17
Attending: EMERGENCY MEDICINE | Admitting: INTERNAL MEDICINE
Payer: MEDICARE

## 2023-12-14 DIAGNOSIS — J44.1 COPD EXACERBATION (H): ICD-10-CM

## 2023-12-14 DIAGNOSIS — J96.01 ACUTE RESPIRATORY FAILURE WITH HYPOXIA (H): ICD-10-CM

## 2023-12-14 DIAGNOSIS — J18.9 PNEUMONIA OF RIGHT LOWER LOBE DUE TO INFECTIOUS ORGANISM: ICD-10-CM

## 2023-12-14 DIAGNOSIS — J41.1 MUCOPURULENT CHRONIC BRONCHITIS (H): ICD-10-CM

## 2023-12-14 DIAGNOSIS — I25.10 MILD CORONARY ARTERY DISEASE: Primary | ICD-10-CM

## 2023-12-14 LAB
ANION GAP SERPL CALCULATED.3IONS-SCNC: 8 MMOL/L (ref 7–15)
BASE EXCESS BLDV CALC-SCNC: 9.3 MMOL/L (ref -7.7–1.9)
BASOPHILS # BLD AUTO: 0 10E3/UL (ref 0–0.2)
BASOPHILS NFR BLD AUTO: 0 %
BUN SERPL-MCNC: 21.4 MG/DL (ref 8–23)
CALCIUM SERPL-MCNC: 8.9 MG/DL (ref 8.8–10.2)
CHLORIDE SERPL-SCNC: 98 MMOL/L (ref 98–107)
CREAT SERPL-MCNC: 0.74 MG/DL (ref 0.67–1.17)
DEPRECATED HCO3 PLAS-SCNC: 33 MMOL/L (ref 22–29)
EGFRCR SERPLBLD CKD-EPI 2021: >90 ML/MIN/1.73M2
EOSINOPHIL # BLD AUTO: 0 10E3/UL (ref 0–0.7)
EOSINOPHIL NFR BLD AUTO: 0 %
ERYTHROCYTE [DISTWIDTH] IN BLOOD BY AUTOMATED COUNT: 13.2 % (ref 10–15)
GLUCOSE SERPL-MCNC: 172 MG/DL (ref 70–99)
HCO3 BLDV-SCNC: 38 MMOL/L (ref 21–28)
HCT VFR BLD AUTO: 42.9 % (ref 40–53)
HGB BLD-MCNC: 13.9 G/DL (ref 13.3–17.7)
IMM GRANULOCYTES # BLD: 0.1 10E3/UL
IMM GRANULOCYTES NFR BLD: 1 %
INR PPP: 1.08 (ref 0.85–1.15)
LACTATE SERPL-SCNC: 1.3 MMOL/L (ref 0.7–2)
LYMPHOCYTES # BLD AUTO: 0.8 10E3/UL (ref 0.8–5.3)
LYMPHOCYTES NFR BLD AUTO: 7 %
MCH RBC QN AUTO: 31.6 PG (ref 26.5–33)
MCHC RBC AUTO-ENTMCNC: 32.4 G/DL (ref 31.5–36.5)
MCV RBC AUTO: 98 FL (ref 78–100)
MONOCYTES # BLD AUTO: 1.2 10E3/UL (ref 0–1.3)
MONOCYTES NFR BLD AUTO: 11 %
NEUTROPHILS # BLD AUTO: 8.6 10E3/UL (ref 1.6–8.3)
NEUTROPHILS NFR BLD AUTO: 81 %
NRBC # BLD AUTO: 0 10E3/UL
NRBC BLD AUTO-RTO: 0 /100
NT-PROBNP SERPL-MCNC: 983 PG/ML (ref 0–900)
O2/TOTAL GAS SETTING VFR VENT: 40 %
PCO2 BLDV: 65 MM HG (ref 40–50)
PH BLDV: 7.37 [PH] (ref 7.32–7.43)
PLATELET # BLD AUTO: 164 10E3/UL (ref 150–450)
PO2 BLDV: 30 MM HG (ref 25–47)
POTASSIUM SERPL-SCNC: 4.4 MMOL/L (ref 3.4–5.3)
RBC # BLD AUTO: 4.4 10E6/UL (ref 4.4–5.9)
SODIUM SERPL-SCNC: 139 MMOL/L (ref 135–145)
TROPONIN T SERPL HS-MCNC: 8 NG/L
WBC # BLD AUTO: 10.6 10E3/UL (ref 4–11)

## 2023-12-14 PROCEDURE — 83880 ASSAY OF NATRIURETIC PEPTIDE: CPT | Performed by: EMERGENCY MEDICINE

## 2023-12-14 PROCEDURE — 83605 ASSAY OF LACTIC ACID: CPT | Performed by: EMERGENCY MEDICINE

## 2023-12-14 PROCEDURE — 96365 THER/PROPH/DIAG IV INF INIT: CPT | Mod: 59

## 2023-12-14 PROCEDURE — G1010 CDSM STANSON: HCPCS

## 2023-12-14 PROCEDURE — 96366 THER/PROPH/DIAG IV INF ADDON: CPT

## 2023-12-14 PROCEDURE — 250N000011 HC RX IP 250 OP 636: Performed by: EMERGENCY MEDICINE

## 2023-12-14 PROCEDURE — 36415 COLL VENOUS BLD VENIPUNCTURE: CPT | Performed by: EMERGENCY MEDICINE

## 2023-12-14 PROCEDURE — 80048 BASIC METABOLIC PNL TOTAL CA: CPT | Performed by: EMERGENCY MEDICINE

## 2023-12-14 PROCEDURE — 84484 ASSAY OF TROPONIN QUANT: CPT | Performed by: EMERGENCY MEDICINE

## 2023-12-14 PROCEDURE — 999N000157 HC STATISTIC RCP TIME EA 10 MIN

## 2023-12-14 PROCEDURE — 87040 BLOOD CULTURE FOR BACTERIA: CPT | Performed by: EMERGENCY MEDICINE

## 2023-12-14 PROCEDURE — 94640 AIRWAY INHALATION TREATMENT: CPT

## 2023-12-14 PROCEDURE — 82803 BLOOD GASES ANY COMBINATION: CPT | Performed by: EMERGENCY MEDICINE

## 2023-12-14 PROCEDURE — 93005 ELECTROCARDIOGRAM TRACING: CPT

## 2023-12-14 PROCEDURE — 85610 PROTHROMBIN TIME: CPT | Performed by: EMERGENCY MEDICINE

## 2023-12-14 PROCEDURE — 250N000009 HC RX 250: Performed by: EMERGENCY MEDICINE

## 2023-12-14 PROCEDURE — 99285 EMERGENCY DEPT VISIT HI MDM: CPT | Mod: 25

## 2023-12-14 PROCEDURE — 93010 ELECTROCARDIOGRAM REPORT: CPT | Performed by: EMERGENCY MEDICINE

## 2023-12-14 PROCEDURE — 99285 EMERGENCY DEPT VISIT HI MDM: CPT | Mod: 25 | Performed by: EMERGENCY MEDICINE

## 2023-12-14 PROCEDURE — 85025 COMPLETE CBC W/AUTO DIFF WBC: CPT | Performed by: EMERGENCY MEDICINE

## 2023-12-14 RX ORDER — ALBUTEROL SULFATE 0.83 MG/ML
2.5 SOLUTION RESPIRATORY (INHALATION)
Status: DISCONTINUED | OUTPATIENT
Start: 2023-12-14 | End: 2023-12-17 | Stop reason: HOSPADM

## 2023-12-14 RX ORDER — IPRATROPIUM BROMIDE AND ALBUTEROL SULFATE 2.5; .5 MG/3ML; MG/3ML
3 SOLUTION RESPIRATORY (INHALATION) ONCE
Status: COMPLETED | OUTPATIENT
Start: 2023-12-14 | End: 2023-12-14

## 2023-12-14 RX ORDER — IOPAMIDOL 755 MG/ML
67 INJECTION, SOLUTION INTRAVASCULAR ONCE
Status: COMPLETED | OUTPATIENT
Start: 2023-12-14 | End: 2023-12-14

## 2023-12-14 RX ADMIN — IPRATROPIUM BROMIDE AND ALBUTEROL SULFATE 3 ML: 2.5; .5 SOLUTION RESPIRATORY (INHALATION) at 23:07

## 2023-12-14 RX ADMIN — IOPAMIDOL 67 ML: 755 INJECTION, SOLUTION INTRAVENOUS at 23:50

## 2023-12-14 RX ADMIN — SODIUM CHLORIDE 95 ML: 9 INJECTION, SOLUTION INTRAVENOUS at 23:50

## 2023-12-14 ASSESSMENT — ACTIVITIES OF DAILY LIVING (ADL): ADLS_ACUITY_SCORE: 35

## 2023-12-15 DIAGNOSIS — E78.5 HYPERLIPIDEMIA: ICD-10-CM

## 2023-12-15 PROBLEM — J41.1 MUCOPURULENT CHRONIC BRONCHITIS (H): Status: ACTIVE | Noted: 2023-12-15

## 2023-12-15 PROBLEM — K22.719 BARRETT'S ESOPHAGUS WITH DYSPLASIA: Status: ACTIVE | Noted: 2022-12-01

## 2023-12-15 PROBLEM — J96.01 ACUTE RESPIRATORY FAILURE WITH HYPOXIA (H): Status: ACTIVE | Noted: 2023-12-15

## 2023-12-15 PROBLEM — J18.9 PNEUMONIA OF RIGHT LOWER LOBE DUE TO INFECTIOUS ORGANISM: Status: ACTIVE | Noted: 2023-12-15

## 2023-12-15 LAB
ANION GAP SERPL CALCULATED.3IONS-SCNC: 9 MMOL/L (ref 7–15)
BUN SERPL-MCNC: 17.6 MG/DL (ref 8–23)
CALCIUM SERPL-MCNC: 8.8 MG/DL (ref 8.8–10.2)
CHLORIDE SERPL-SCNC: 99 MMOL/L (ref 98–107)
CREAT SERPL-MCNC: 0.7 MG/DL (ref 0.67–1.17)
DEPRECATED HCO3 PLAS-SCNC: 32 MMOL/L (ref 22–29)
EGFRCR SERPLBLD CKD-EPI 2021: >90 ML/MIN/1.73M2
ERYTHROCYTE [DISTWIDTH] IN BLOOD BY AUTOMATED COUNT: 13.2 % (ref 10–15)
GLUCOSE SERPL-MCNC: 112 MG/DL (ref 70–99)
HCT VFR BLD AUTO: 42 % (ref 40–53)
HGB BLD-MCNC: 13.6 G/DL (ref 13.3–17.7)
HOLD SPECIMEN: NORMAL
MCH RBC QN AUTO: 31.6 PG (ref 26.5–33)
MCHC RBC AUTO-ENTMCNC: 32.4 G/DL (ref 31.5–36.5)
MCV RBC AUTO: 98 FL (ref 78–100)
PLATELET # BLD AUTO: 159 10E3/UL (ref 150–450)
POTASSIUM SERPL-SCNC: 3.9 MMOL/L (ref 3.4–5.3)
PROCALCITONIN SERPL IA-MCNC: 0.33 NG/ML
RBC # BLD AUTO: 4.3 10E6/UL (ref 4.4–5.9)
S PNEUM AG SPEC QL: POSITIVE
SODIUM SERPL-SCNC: 140 MMOL/L (ref 135–145)
WBC # BLD AUTO: 10.8 10E3/UL (ref 4–11)

## 2023-12-15 PROCEDURE — 258N000003 HC RX IP 258 OP 636: Performed by: EMERGENCY MEDICINE

## 2023-12-15 PROCEDURE — 250N000011 HC RX IP 250 OP 636: Performed by: FAMILY MEDICINE

## 2023-12-15 PROCEDURE — 250N000011 HC RX IP 250 OP 636: Performed by: INTERNAL MEDICINE

## 2023-12-15 PROCEDURE — 250N000012 HC RX MED GY IP 250 OP 636 PS 637: Performed by: EMERGENCY MEDICINE

## 2023-12-15 PROCEDURE — 250N000013 HC RX MED GY IP 250 OP 250 PS 637: Performed by: INTERNAL MEDICINE

## 2023-12-15 PROCEDURE — 94640 AIRWAY INHALATION TREATMENT: CPT | Mod: 76

## 2023-12-15 PROCEDURE — 258N000003 HC RX IP 258 OP 636: Performed by: FAMILY MEDICINE

## 2023-12-15 PROCEDURE — 36415 COLL VENOUS BLD VENIPUNCTURE: CPT | Performed by: INTERNAL MEDICINE

## 2023-12-15 PROCEDURE — 250N000009 HC RX 250: Performed by: INTERNAL MEDICINE

## 2023-12-15 PROCEDURE — 87899 AGENT NOS ASSAY W/OPTIC: CPT | Performed by: INTERNAL MEDICINE

## 2023-12-15 PROCEDURE — 80048 BASIC METABOLIC PNL TOTAL CA: CPT | Performed by: FAMILY MEDICINE

## 2023-12-15 PROCEDURE — 120N000004 HC R&B MS OVERFLOW

## 2023-12-15 PROCEDURE — 99233 SBSQ HOSP IP/OBS HIGH 50: CPT | Performed by: FAMILY MEDICINE

## 2023-12-15 PROCEDURE — 87205 SMEAR GRAM STAIN: CPT | Performed by: EMERGENCY MEDICINE

## 2023-12-15 PROCEDURE — 99418 PROLNG IP/OBS E/M EA 15 MIN: CPT | Performed by: FAMILY MEDICINE

## 2023-12-15 PROCEDURE — 999N000157 HC STATISTIC RCP TIME EA 10 MIN

## 2023-12-15 PROCEDURE — 87040 BLOOD CULTURE FOR BACTERIA: CPT | Performed by: INTERNAL MEDICINE

## 2023-12-15 PROCEDURE — 85027 COMPLETE CBC AUTOMATED: CPT | Performed by: FAMILY MEDICINE

## 2023-12-15 PROCEDURE — 250N000013 HC RX MED GY IP 250 OP 250 PS 637: Performed by: EMERGENCY MEDICINE

## 2023-12-15 PROCEDURE — 250N000011 HC RX IP 250 OP 636: Performed by: EMERGENCY MEDICINE

## 2023-12-15 PROCEDURE — 84145 PROCALCITONIN (PCT): CPT | Performed by: INTERNAL MEDICINE

## 2023-12-15 RX ORDER — BUDESONIDE 0.5 MG/2ML
0.5 INHALANT ORAL 2 TIMES DAILY
Status: DISCONTINUED | OUTPATIENT
Start: 2023-12-15 | End: 2023-12-17 | Stop reason: HOSPADM

## 2023-12-15 RX ORDER — PREDNISONE 20 MG/1
40 TABLET ORAL DAILY
Status: DISCONTINUED | OUTPATIENT
Start: 2023-12-15 | End: 2023-12-15

## 2023-12-15 RX ORDER — ASPIRIN 81 MG/1
81 TABLET, CHEWABLE ORAL DAILY
Status: DISCONTINUED | OUTPATIENT
Start: 2023-12-15 | End: 2023-12-17 | Stop reason: HOSPADM

## 2023-12-15 RX ORDER — MONTELUKAST SODIUM 10 MG/1
10 TABLET ORAL AT BEDTIME
Status: DISCONTINUED | OUTPATIENT
Start: 2023-12-15 | End: 2023-12-17 | Stop reason: HOSPADM

## 2023-12-15 RX ORDER — PREDNISONE 20 MG/1
1-2 TABLET ORAL DAILY
Status: ON HOLD | COMMUNITY
Start: 2023-12-11 | End: 2023-12-17

## 2023-12-15 RX ORDER — TAMSULOSIN HYDROCHLORIDE 0.4 MG/1
0.4 CAPSULE ORAL DAILY
Status: DISCONTINUED | OUTPATIENT
Start: 2023-12-15 | End: 2023-12-17 | Stop reason: HOSPADM

## 2023-12-15 RX ORDER — HYDROCODONE BITARTRATE AND ACETAMINOPHEN 5; 325 MG/1; MG/1
1-2 TABLET ORAL EVERY 6 HOURS PRN
Status: DISCONTINUED | OUTPATIENT
Start: 2023-12-15 | End: 2023-12-17 | Stop reason: HOSPADM

## 2023-12-15 RX ORDER — HYDROCODONE BITARTRATE AND ACETAMINOPHEN 5; 325 MG/1; MG/1
1 TABLET ORAL ONCE
Status: COMPLETED | OUTPATIENT
Start: 2023-12-15 | End: 2023-12-15

## 2023-12-15 RX ORDER — CALCIUM CARBONATE 500 MG/1
1000 TABLET, CHEWABLE ORAL 4 TIMES DAILY PRN
Status: DISCONTINUED | OUTPATIENT
Start: 2023-12-15 | End: 2023-12-17 | Stop reason: HOSPADM

## 2023-12-15 RX ORDER — MORPHINE SULFATE 15 MG/1
15 TABLET, FILM COATED, EXTENDED RELEASE ORAL EVERY 8 HOURS
Status: DISCONTINUED | OUTPATIENT
Start: 2023-12-15 | End: 2023-12-17 | Stop reason: HOSPADM

## 2023-12-15 RX ORDER — PREDNISONE 20 MG/1
40 TABLET ORAL DAILY
Status: DISCONTINUED | OUTPATIENT
Start: 2023-12-16 | End: 2023-12-17 | Stop reason: HOSPADM

## 2023-12-15 RX ORDER — ZOLPIDEM TARTRATE 5 MG/1
5 TABLET ORAL
Status: DISCONTINUED | OUTPATIENT
Start: 2023-12-15 | End: 2023-12-15

## 2023-12-15 RX ORDER — CARBOXYMETHYLCELLULOSE SODIUM 5 MG/ML
1 SOLUTION/ DROPS OPHTHALMIC 4 TIMES DAILY
Status: DISCONTINUED | OUTPATIENT
Start: 2023-12-15 | End: 2023-12-17 | Stop reason: HOSPADM

## 2023-12-15 RX ORDER — METHYLPREDNISOLONE SODIUM SUCCINATE 40 MG/ML
40 INJECTION, POWDER, LYOPHILIZED, FOR SOLUTION INTRAMUSCULAR; INTRAVENOUS EVERY 8 HOURS
Status: DISCONTINUED | OUTPATIENT
Start: 2023-12-15 | End: 2023-12-15

## 2023-12-15 RX ORDER — ATORVASTATIN CALCIUM 20 MG/1
20 TABLET, FILM COATED ORAL DAILY
Status: DISCONTINUED | OUTPATIENT
Start: 2023-12-15 | End: 2023-12-17 | Stop reason: HOSPADM

## 2023-12-15 RX ORDER — HYDROCODONE BITARTRATE AND ACETAMINOPHEN 7.5; 325 MG/1; MG/1
1 TABLET ORAL EVERY 6 HOURS PRN
Status: DISCONTINUED | OUTPATIENT
Start: 2023-12-15 | End: 2023-12-15

## 2023-12-15 RX ORDER — CEFTRIAXONE 2 G/1
2 INJECTION, POWDER, FOR SOLUTION INTRAMUSCULAR; INTRAVENOUS EVERY 24 HOURS
Status: DISCONTINUED | OUTPATIENT
Start: 2023-12-15 | End: 2023-12-15

## 2023-12-15 RX ORDER — ACETAMINOPHEN 325 MG/1
975 TABLET ORAL EVERY 6 HOURS PRN
Status: DISCONTINUED | OUTPATIENT
Start: 2023-12-15 | End: 2023-12-17 | Stop reason: HOSPADM

## 2023-12-15 RX ORDER — ZOLPIDEM TARTRATE 5 MG/1
5 TABLET ORAL
Status: DISCONTINUED | OUTPATIENT
Start: 2023-12-15 | End: 2023-12-16

## 2023-12-15 RX ORDER — IPRATROPIUM BROMIDE AND ALBUTEROL SULFATE 2.5; .5 MG/3ML; MG/3ML
3 SOLUTION RESPIRATORY (INHALATION)
Status: DISCONTINUED | OUTPATIENT
Start: 2023-12-15 | End: 2023-12-17 | Stop reason: HOSPADM

## 2023-12-15 RX ORDER — ENOXAPARIN SODIUM 100 MG/ML
40 INJECTION SUBCUTANEOUS EVERY 24 HOURS
Status: DISCONTINUED | OUTPATIENT
Start: 2023-12-15 | End: 2023-12-17 | Stop reason: HOSPADM

## 2023-12-15 RX ORDER — NALOXONE HYDROCHLORIDE 0.4 MG/ML
0.4 INJECTION, SOLUTION INTRAMUSCULAR; INTRAVENOUS; SUBCUTANEOUS
Status: DISCONTINUED | OUTPATIENT
Start: 2023-12-15 | End: 2023-12-17 | Stop reason: HOSPADM

## 2023-12-15 RX ORDER — IPRATROPIUM BROMIDE AND ALBUTEROL SULFATE 2.5; .5 MG/3ML; MG/3ML
3 SOLUTION RESPIRATORY (INHALATION) 4 TIMES DAILY
Status: DISCONTINUED | OUTPATIENT
Start: 2023-12-15 | End: 2023-12-15

## 2023-12-15 RX ORDER — AZITHROMYCIN 250 MG/1
250 TABLET, FILM COATED ORAL SEE ADMIN INSTRUCTIONS
Status: ON HOLD | COMMUNITY
Start: 2023-12-11 | End: 2023-12-17

## 2023-12-15 RX ORDER — NALOXONE HYDROCHLORIDE 0.4 MG/ML
0.2 INJECTION, SOLUTION INTRAMUSCULAR; INTRAVENOUS; SUBCUTANEOUS
Status: DISCONTINUED | OUTPATIENT
Start: 2023-12-15 | End: 2023-12-17 | Stop reason: HOSPADM

## 2023-12-15 RX ORDER — ALBUTEROL SULFATE 90 UG/1
2 AEROSOL, METERED RESPIRATORY (INHALATION) 4 TIMES DAILY PRN
Status: DISCONTINUED | OUTPATIENT
Start: 2023-12-15 | End: 2023-12-17 | Stop reason: HOSPADM

## 2023-12-15 RX ORDER — OXYBUTYNIN CHLORIDE 10 MG/1
10 TABLET, EXTENDED RELEASE ORAL EVERY MORNING
Status: DISCONTINUED | OUTPATIENT
Start: 2023-12-15 | End: 2023-12-17 | Stop reason: HOSPADM

## 2023-12-15 RX ORDER — CEFTRIAXONE 2 G/1
2 INJECTION, POWDER, FOR SOLUTION INTRAMUSCULAR; INTRAVENOUS EVERY 24 HOURS
Status: DISCONTINUED | OUTPATIENT
Start: 2023-12-15 | End: 2023-12-17 | Stop reason: HOSPADM

## 2023-12-15 RX ORDER — PANTOPRAZOLE SODIUM 40 MG/1
40 TABLET, DELAYED RELEASE ORAL
Status: DISCONTINUED | OUTPATIENT
Start: 2023-12-15 | End: 2023-12-17 | Stop reason: HOSPADM

## 2023-12-15 RX ORDER — CYCLOBENZAPRINE HCL 5 MG
5 TABLET ORAL EVERY 8 HOURS PRN
Status: DISCONTINUED | OUTPATIENT
Start: 2023-12-15 | End: 2023-12-17 | Stop reason: HOSPADM

## 2023-12-15 RX ORDER — ZOLPIDEM TARTRATE 10 MG/1
10 TABLET ORAL
COMMUNITY
Start: 2023-11-06 | End: 2024-03-07

## 2023-12-15 RX ORDER — NICOTINE 21 MG/24HR
1 PATCH, TRANSDERMAL 24 HOURS TRANSDERMAL DAILY
Status: DISCONTINUED | OUTPATIENT
Start: 2023-12-15 | End: 2023-12-17 | Stop reason: HOSPADM

## 2023-12-15 RX ADMIN — VANCOMYCIN HYDROCHLORIDE 750 MG: 500 INJECTION, POWDER, LYOPHILIZED, FOR SOLUTION INTRAVENOUS at 10:07

## 2023-12-15 RX ADMIN — VANCOMYCIN HYDROCHLORIDE 750 MG: 500 INJECTION, POWDER, LYOPHILIZED, FOR SOLUTION INTRAVENOUS at 21:34

## 2023-12-15 RX ADMIN — TAMSULOSIN HYDROCHLORIDE 0.4 MG: 0.4 CAPSULE ORAL at 07:44

## 2023-12-15 RX ADMIN — IPRATROPIUM BROMIDE AND ALBUTEROL SULFATE 3 ML: 2.5; .5 SOLUTION RESPIRATORY (INHALATION) at 13:12

## 2023-12-15 RX ADMIN — BUDESONIDE 0.5 MG: 0.5 INHALANT RESPIRATORY (INHALATION) at 08:10

## 2023-12-15 RX ADMIN — BUDESONIDE 0.5 MG: 0.5 INHALANT RESPIRATORY (INHALATION) at 20:39

## 2023-12-15 RX ADMIN — AZITHROMYCIN MONOHYDRATE 500 MG: 500 INJECTION, POWDER, LYOPHILIZED, FOR SOLUTION INTRAVENOUS at 02:43

## 2023-12-15 RX ADMIN — PANTOPRAZOLE SODIUM 40 MG: 40 TABLET, DELAYED RELEASE ORAL at 06:59

## 2023-12-15 RX ADMIN — CEFTRIAXONE SODIUM 2 G: 2 INJECTION, POWDER, FOR SOLUTION INTRAMUSCULAR; INTRAVENOUS at 01:02

## 2023-12-15 RX ADMIN — MONTELUKAST 10 MG: 10 TABLET, FILM COATED ORAL at 21:34

## 2023-12-15 RX ADMIN — MORPHINE SULFATE 15 MG: 15 TABLET, FILM COATED, EXTENDED RELEASE ORAL at 01:02

## 2023-12-15 RX ADMIN — ENOXAPARIN SODIUM 40 MG: 100 INJECTION SUBCUTANEOUS at 11:43

## 2023-12-15 RX ADMIN — HYDROCODONE BITARTRATE AND ACETAMINOPHEN 1 TABLET: 5; 325 TABLET ORAL at 22:49

## 2023-12-15 RX ADMIN — ATORVASTATIN CALCIUM 20 MG: 20 TABLET, FILM COATED ORAL at 07:44

## 2023-12-15 RX ADMIN — CARBOXYMETHYLCELLULOSE SODIUM 1 DROP: 5 SOLUTION/ DROPS OPHTHALMIC at 07:44

## 2023-12-15 RX ADMIN — METHYLPREDNISOLONE SODIUM SUCCINATE 40 MG: 40 INJECTION, POWDER, FOR SOLUTION INTRAMUSCULAR; INTRAVENOUS at 07:45

## 2023-12-15 RX ADMIN — PREDNISONE 40 MG: 20 TABLET ORAL at 00:56

## 2023-12-15 RX ADMIN — IPRATROPIUM BROMIDE AND ALBUTEROL SULFATE 3 ML: 2.5; .5 SOLUTION RESPIRATORY (INHALATION) at 20:39

## 2023-12-15 RX ADMIN — HYDROCODONE BITARTRATE AND ACETAMINOPHEN 1 TABLET: 5; 325 TABLET ORAL at 01:20

## 2023-12-15 RX ADMIN — MORPHINE SULFATE 15 MG: 15 TABLET, FILM COATED, EXTENDED RELEASE ORAL at 16:06

## 2023-12-15 RX ADMIN — IPRATROPIUM BROMIDE AND ALBUTEROL SULFATE 3 ML: 2.5; .5 SOLUTION RESPIRATORY (INHALATION) at 07:45

## 2023-12-15 RX ADMIN — OXYBUTYNIN 10 MG: 10 TABLET, FILM COATED, EXTENDED RELEASE ORAL at 08:46

## 2023-12-15 RX ADMIN — MORPHINE SULFATE 15 MG: 15 TABLET, FILM COATED, EXTENDED RELEASE ORAL at 07:52

## 2023-12-15 RX ADMIN — ASPIRIN 81 MG CHEWABLE TABLET 81 MG: 81 TABLET CHEWABLE at 07:44

## 2023-12-15 ASSESSMENT — ACTIVITIES OF DAILY LIVING (ADL)
ADLS_ACUITY_SCORE: 25
ADLS_ACUITY_SCORE: 22
ADLS_ACUITY_SCORE: 25
ADLS_ACUITY_SCORE: 25
ADLS_ACUITY_SCORE: 35
ADLS_ACUITY_SCORE: 22
ADLS_ACUITY_SCORE: 22
ADLS_ACUITY_SCORE: 25
ADLS_ACUITY_SCORE: 35

## 2023-12-15 NOTE — PHARMACY-VANCOMYCIN DOSING SERVICE
Pharmacy Vancomycin Initial Note  Date of Service December 15, 2023  Patient's  1950  73 year old, male    Indication: Community Acquired Pneumonia    Current estimated CrCl = Estimated Creatinine Clearance: 82.9 mL/min (based on SCr of 0.74 mg/dL).    Creatinine for last 3 days  2023: 10:59 PM Creatinine 0.74 mg/dL    Recent Vancomycin Level(s) for last 3 days  No results found for requested labs within last 3 days.      Vancomycin IV Administrations (past 72 hours)        No vancomycin orders with administrations in past 72 hours.                    Nephrotoxins and other renal medications (From now, onward)      Start     Dose/Rate Route Frequency Ordered Stop    12/15/23 1000  vancomycin (VANCOCIN) 750 mg in 0.9% NaCl 250 mL intermittent infusion         750 mg  over 60 Minutes Intravenous EVERY 12 HOURS 12/15/23 0946              Contrast Orders - past 72 hours (72h ago, onward)      Start     Dose/Rate Route Frequency Stop    23 2305  iopamidol (ISOVUE-370) solution 67 mL         67 mL Intravenous ONCE 23 2350            Insight[employer] Prediction of Planned Initial Vancomycin Regimen  Loading dose: N/A  Regimen: 750 mg IV every 12 hours.  Start time: 09:44 on 12/15/2023  Exposure target: AUC24 (range)400-600 mg/L.hr   AUC24,ss: 433 mg/L.hr  Probability of AUC24 > 400: 59 %  Ctrough,ss: 13.6 mg/L  Probability of Ctrough,ss > 20: 17 %  Probability of nephrotoxicity (Lodise CHERYL ): 9 %          Plan:  Start vancomycin  750 mg IV q12h.   Vancomycin monitoring method: AUC  Vancomycin therapeutic monitoring goal: 400-600 mg*h/L  Pharmacy will check vancomycin levels as appropriate in 1-3 Days.    Serum creatinine levels will be ordered daily for the first week of therapy and at least twice weekly for subsequent weeks.      Mine Gonzalez, [facility]

## 2023-12-15 NOTE — ED NOTES
"[facility]   Admission Handoff    The patient is Martin Tovar, 73 year old who arrived in the ED by WHEELCHAIR from home with a complaint of Shortness of Breath  . The patient's current symptoms are new and during this time the symptoms have remained the same. In the ED, patient was diagnosed with   Final diagnoses:   None         Needed?: No    Allergies:    Allergies   Allergen Reactions    Ciprofloxacin Swelling and Rash     Throat swelling    Septra [Sulfamethoxazole-Trimethoprim] Swelling and Rash       Past Medical Hx:   Past Medical History:   Diagnosis Date    Asthma     Avascular necrosis of bones of both hips (H)     Cancer (H)     Lung-radiation, chemo    Cholecystitis     Chronic renal failure     COPD, mild (H)     Diabetes mellitus (H)     states resolved after weight loss    Disc degeneration, lumbar     DJD (degenerative joint disease) of knee     left    Elevated PSA     Hemoptysis 2/16/2020    LBP (low back pain)     Lung cancer (H)     Pneumonia 1/4/2019    Smoking hx        Initial vitals were: BP: (!) 150/77  Pulse: 81  Temp: 97.7  F (36.5  C)  Resp: 20  Height: 170.2 cm (5' 7\")  Weight: 64.9 kg (143 lb)  SpO2: 92 %   Recent vital Signs: BP (!) 150/77   Pulse 81   Temp 97.7  F (36.5  C) (Oral)   Resp 20   Ht 1.702 m (5' 7\")   Wt 64.9 kg (143 lb)   SpO2 92%   BMI 22.40 kg/m      Elimination Status: Continent: Yes     Activity Level: SBA    Fall Status: Reason for falls risk:  Mobility  bed/chair alarm on, nonskid shoes/slippers when out of bed, arm band in place, patient and family education, assistive device/personal items within reach, and activity supervised    Baseline Mental status: WDL  Current Mental Status changes: at basesline    Infection present or suspected this encounter: yes respiratory  Sepsis suspected: No    Isolation type: droplet    Bariatric equipment needed?: No    In the ED these meds were given:   Medications   albuterol " (PROVENTIL) neb solution 2.5 mg (has no administration in time range)   ipratropium - albuterol 0.5 mg/2.5 mg/3 mL (DUONEB) neb solution 3 mL (3 mLs Nebulization $Given 12/14/23 2231)   iopamidol (ISOVUE-370) solution 67 mL (67 mLs Intravenous $Given 12/14/23 2350)   sodium chloride 0.9 % bag 500mL for CT scan flush use (95 mLs As instructed $Given 12/14/23 2350)       Drips running?  No    Home pump  No    Current LDAs: Peripheral IV: Site right AC; Gauge 18g  none     Results:   Labs/Imaging  Ordered and Resulted from Time of ED Arrival Up to the Time of Departure from the ED  Results for orders placed or performed during the hospital encounter of 12/14/23 (from the past 24 hour(s))   CBC with platelets differential    Narrative    The following orders were created for panel order CBC with platelets differential.  Procedure                               Abnormality         Status                     ---------                               -----------         ------                     CBC with platelets and d...[[id number]]  Abnormal            Final result                 Please view results for these tests on the individual orders.   Basic metabolic panel   Result Value Ref Range    Sodium 139 135 - 145 mmol/L    Potassium 4.4 3.4 - 5.3 mmol/L    Chloride 98 98 - 107 mmol/L    Carbon Dioxide (CO2) 33 (H) 22 - 29 mmol/L    Anion Gap 8 7 - 15 mmol/L    Urea Nitrogen 21.4 8.0 - 23.0 mg/dL    Creatinine 0.74 0.67 - 1.17 mg/dL    GFR Estimate >90 >60 mL/min/1.73m2    Calcium 8.9 8.8 - 10.2 mg/dL    Glucose 172 (H) 70 - 99 mg/dL   INR   Result Value Ref Range    INR 1.08 0.85 - 1.15   Troponin T, High Sensitivity   Result Value Ref Range    Troponin T, High Sensitivity 8 <=22 ng/L   Nt probnp inpatient   Result Value Ref Range    N terminal Pro BNP Inpatient 983 (H) 0 - 900 pg/mL   Blood gas venous   Result Value Ref Range    pH Venous 7.37 7.32 - 7.43    pCO2 Venous 65 (H) 40 - 50 mm Hg    pO2 Venous 30 25 - 47 mm Hg     Bicarbonate Venous 38 (H) 21 - 28 mmol/L    Base Excess/Deficit 9.3 (H) -7.7 - 1.9 mmol/L    FIO2 40    Lactic acid whole blood   Result Value Ref Range    Lactic Acid 1.3 0.7 - 2.0 mmol/L   CBC with platelets and differential   Result Value Ref Range    WBC Count 10.6 4.0 - 11.0 10e3/uL    RBC Count 4.40 4.40 - 5.90 10e6/uL    Hemoglobin 13.9 13.3 - 17.7 g/dL    Hematocrit 42.9 40.0 - 53.0 %    MCV 98 78 - 100 fL    MCH 31.6 26.5 - 33.0 pg    MCHC 32.4 31.5 - 36.5 g/dL    RDW 13.2 10.0 - 15.0 %    Platelet Count 164 150 - 450 10e3/uL    % Neutrophils 81 %    % Lymphocytes 7 %    % Monocytes 11 %    % Eosinophils 0 %    % Basophils 0 %    % Immature Granulocytes 1 %    NRBCs per 100 WBC 0 <1 /100    Absolute Neutrophils 8.6 (H) 1.6 - 8.3 10e3/uL    Absolute Lymphocytes 0.8 0.8 - 5.3 10e3/uL    Absolute Monocytes 1.2 0.0 - 1.3 10e3/uL    Absolute Eosinophils 0.0 0.0 - 0.7 10e3/uL    Absolute Basophils 0.0 0.0 - 0.2 10e3/uL    Absolute Immature Granulocytes 0.1 <=0.4 10e3/uL    Absolute NRBCs 0.0 10e3/uL       For the majority of the shift this patient's behavior was Green     Cardiac Rhythm: Normal Sinus  Pt needs tele? No  Skin/wound Issues: None    Code Status: Full Code    Pain control: good    Nausea control: pt had none    Abnormal labs/tests/findings requiring intervention: hypoxic    Patient tested for COVID 19 prior to admission: NO     OBS brochure/video discussed/provided to patient/family: N/A     Family present during ED course? No     Family Comments/Social Situation comments: family went home for night    Tasks needing completion: None    Lisa Haq RN

## 2023-12-15 NOTE — ED TRIAGE NOTES
Patient reports diagnosis of pneumonia on Monday. Has been feeling progressively short of breath since then and tonight it was the worst. Patient denies pain. Arrived to triage with sats of 84. Placed on nasal cannula O2 titrated up to 4L to get patient >90%.     Triage Assessment (Adult)       Row Name 12/14/23 0877          Triage Assessment    Airway WDL WDL        Respiratory WDL    Respiratory WDL X;rhythm/pattern;cough     Rhythm/Pattern, Respiratory shortness of breath     Cough Frequency infrequent     Cough Type congested;nonproductive        Skin Circulation/Temperature WDL    Skin Circulation/Temperature WDL WDL        Cardiac WDL    Cardiac WDL WDL        Peripheral/Neurovascular WDL    Peripheral Neurovascular WDL WDL        Cognitive/Neuro/Behavioral WDL    Cognitive/Neuro/Behavioral WDL WDL

## 2023-12-15 NOTE — PROGRESS NOTES
[facility] Hospitalist Progress Note           Assessment & Plan          Acute respiratory failure with hypoxia (H)  Not on home oxygen.    Due to pneumonia and COPD exacerbation as below.  Treating as below.    On 5LNC 12/15  Wean oxygen as able.         Bronchopneumonia of right lower lobe due to infectious organism   - diagnosed with pneumonia about 1 week prior to admission and was started on azithromycin as an outpatient but did not improve.  CT chest in the ER confirmed right lower lobe bronchopneumonia new from 8/2023.  No PE or other acute change.    - sputum growing gram positive cocci - pending  - strep pneumo positive -  suspect this is what is growing in sputum  - blood cultures pending   - started on azithromycin and rocephin in the ER, continued for now    COPD with exacerbation due to pneumonia as above  - was given solumedrol on admission, switched to prednisone 40 mg daily 12/16   - on 4 times daily duonebs   - held home atrovent and Wixela inhub in [facility] for now - resume Wixela on discharge or once tapering down prednisone       Mild CAD with history of type 2 NSTEMI (non-ST elevated myocardial infarction) (H)  Follows with cardiology( Dr. Cervantes)   - appears stable.    - continued home aspirin       History of Small cell lung cancer (H)  - diagnosed with limited stage 3 small cell left lung cancer in 2012.  s/p chemo and radiation   - follows with oncology, no apparent recurrence       ? Diabetes mellitus without complication (H)  Listed in history but not on any medications and last A1c was 6.1 2 years ago.  Glucose normal here  - reassess if glucose climbing markedly, otherwise ok to follow-up with primary care provider.       Essential hypertension  Not on any medications.  Blood pressure mildly up so far.  Follow       Chronic pain  No report of pain 12/15.    - continued home flexeril prn   - continued home prn voltaren gel for hand joints   - continued home MScontin and norco prn        "Benign prostatic hyperplasia without lower urinary tract symptoms  - continued home ditropan and flomax        Anxiety  Not on any medications, doing well so far          Hyperlipidemia  Continued home lipitor        Status post laparoscopic-assisted sigmoidectomy      Perry's esophagus with dysplasia  On protonix in place of home omeprazole while inpatient       Tobacco use disorder  Quit 5 days prior to admission.  Does not want nicotine replacement or other help, but will order if desired.        Insomnia  Continued home ambien but at 5mg instead of 10 given age         Prophylaxis  lovenox    Lines   PIV    Diet  Orders Placed This Encounter      Regular Diet Adult                    Disposition Plan      Anticipate at least 2 more days inpatient                 Leonard Kimble MD, MD  Hospitalist Service  [facility]  Securely message with the Vocera Web Console (learn more here)  Text page via sendwithus Paging/Avexxiny             Interval History:   Feels much better than yesterday but nowhere near baseline.  No dyspnea at rest with oxygen on, does not wear oxygen at baseline.  Does still feel somewhat wheezy.  No fever or chills today.  No recent aspiration or emesis.  No recent changes in medications.  No new or worsening symptoms today.      Quit smoking 5 days ago and doing well  Rare alcohol use               Review of Systems:    ROS: 10 point ROS neg other than the symptoms noted above in the HPI.           Medications:   Current active medications and PTA medications reviewed, see medication list for details.            Physical Exam:   Vitals were reviewed  Patient Vitals for the past 24 hrs:   BP Temp Temp src Pulse Resp SpO2 Height Weight   12/15/23 0330 (!) 155/99 98.7  F (37.1  C) Axillary 94 20 94 % 1.702 m (5' 7\") 64 kg (141 lb 1.5 oz)   12/15/23 0324 -- 98.5  F (36.9  C) Oral 92 22 94 % -- --   12/15/23 0245 -- -- -- 91 12 93 % -- --   12/15/23 0230 -- -- -- 91 26 92 " "% -- --   12/15/23 0215 -- -- -- 94 22 93 % -- --   12/15/23 0200 (!) 147/96 -- -- 98 26 94 % -- --   12/15/23 0130 130/89 -- -- 97 24 93 % -- --   12/15/23 0115 -- -- -- -- -- 94 % -- --   12/15/23 0100 -- -- -- 90 23 94 % -- --   12/15/23 0030 -- -- -- 86 23 95 % -- --   12/15/23 0020 (!) 173/99 -- -- 80 16 96 % -- --   23 2202 (!) 150/77 97.7  F (36.5  C) Oral 81 20 92 % 1.702 m (5' 7\") 64.9 kg (143 lb)       Temperatures:  Current - Temp: 98.7  F (37.1  C); Max - Temp  Av.3  F (36.8  C)  Min: 97.7  F (36.5  C)  Max: 98.7  F (37.1  C)  Respiration range: Resp  Av.3  Min: 12  Max: 26  Pulse range: Pulse  Av.4  Min: 80  Max: 98  Blood pressure range: Systolic (24hrs), Av , Min:130 , Max:173   ; Diastolic (24hrs), Av, Min:77, Max:99    Pulse oximetry range: SpO2  Av.7 %  Min: 92 %  Max: 96 %  I/O last 3 completed shifts:  In: 100 [IV Piggyback:100]  Out: 250 [Urine:250]    Intake/Output Summary (Last 24 hours) at 12/15/2023 0911  Last data filed at 12/15/2023 0856  Gross per 24 hour   Intake 100 ml   Output 400 ml   Net -300 ml     EXAM:  General: awake and alert, NAD, oriented x 3  Head: normocephalic  Neck: unremarkable, no lymphadenopathy   HEENT: oropharynx pink and moist    Heart: Regular rate and rhythm, no murmurs, rubs, or gallops  Lungs: wheezy throughout but with good air movement.  No focal changes.    Abdomen: soft, non-tender, no masses or organomegaly  Extremities: no edema in lower extremities   Skin unremarkable as visualized.               Data:     Reviewed data:  Results for orders placed or performed during the hospital encounter of 23 (from the past 24 hour(s))   CBC with platelets differential    Narrative    The following orders were created for panel order CBC with platelets differential.  Procedure                               Abnormality         Status                     ---------                               -----------         ------             "         CBC with platelets and d...[[id number]]  Abnormal            Final result                 Please view results for these tests on the individual orders.   Basic metabolic panel   Result Value Ref Range    Sodium 139 135 - 145 mmol/L    Potassium 4.4 3.4 - 5.3 mmol/L    Chloride 98 98 - 107 mmol/L    Carbon Dioxide (CO2) 33 (H) 22 - 29 mmol/L    Anion Gap 8 7 - 15 mmol/L    Urea Nitrogen 21.4 8.0 - 23.0 mg/dL    Creatinine 0.74 0.67 - 1.17 mg/dL    GFR Estimate >90 >60 mL/min/1.73m2    Calcium 8.9 8.8 - 10.2 mg/dL    Glucose 172 (H) 70 - 99 mg/dL   INR   Result Value Ref Range    INR 1.08 0.85 - 1.15   Troponin T, High Sensitivity   Result Value Ref Range    Troponin T, High Sensitivity 8 <=22 ng/L   Nt probnp inpatient   Result Value Ref Range    N terminal Pro BNP Inpatient 983 (H) 0 - 900 pg/mL   Blood gas venous   Result Value Ref Range    pH Venous 7.37 7.32 - 7.43    pCO2 Venous 65 (H) 40 - 50 mm Hg    pO2 Venous 30 25 - 47 mm Hg    Bicarbonate Venous 38 (H) 21 - 28 mmol/L    Base Excess/Deficit 9.3 (H) -7.7 - 1.9 mmol/L    FIO2 40    Lactic acid whole blood   Result Value Ref Range    Lactic Acid 1.3 0.7 - 2.0 mmol/L   CBC with platelets and differential   Result Value Ref Range    WBC Count 10.6 4.0 - 11.0 10e3/uL    RBC Count 4.40 4.40 - 5.90 10e6/uL    Hemoglobin 13.9 13.3 - 17.7 g/dL    Hematocrit 42.9 40.0 - 53.0 %    MCV 98 78 - 100 fL    MCH 31.6 26.5 - 33.0 pg    MCHC 32.4 31.5 - 36.5 g/dL    RDW 13.2 10.0 - 15.0 %    Platelet Count 164 150 - 450 10e3/uL    % Neutrophils 81 %    % Lymphocytes 7 %    % Monocytes 11 %    % Eosinophils 0 %    % Basophils 0 %    % Immature Granulocytes 1 %    NRBCs per 100 WBC 0 <1 /100    Absolute Neutrophils 8.6 (H) 1.6 - 8.3 10e3/uL    Absolute Lymphocytes 0.8 0.8 - 5.3 10e3/uL    Absolute Monocytes 1.2 0.0 - 1.3 10e3/uL    Absolute Eosinophils 0.0 0.0 - 0.7 10e3/uL    Absolute Basophils 0.0 0.0 - 0.2 10e3/uL    Absolute Immature Granulocytes 0.1 <=0.4 10e3/uL     Absolute NRBCs 0.0 10e3/uL   CT Chest Pulmonary Embolism w Contrast    Narrative    EXAM: CT CHEST PULMONARY EMBOLISM W CONTRAST  LOCATION: [facility]  DATE: 12/15/2023    INDICATION: hypoxia.  History of small cell lung cancer status post chemoradiation.  Recent antibiotic therapy for pneumonia  COMPARISON: Chest radiograph 12/11/2023. Noncontrast CT of the chest 08/28/2023.  TECHNIQUE: CT chest pulmonary angiogram during arterial phase injection of IV contrast. Multiplanar reformats and MIP reconstructions were performed. Dose reduction techniques were used.   CONTRAST: 67 ml Isovue 370    FINDINGS:  ANGIOGRAM CHEST: No pulmonary embolus. No aortic aneurysm.    LUNGS AND PLEURA: Stable treatment related architectural distortion and scarring of the perihilar left lung. Compared to 08/20/2023, consolidation has developed throughout much of the right lower lobe. Peribronchiolar nodular interstitial infiltrate and   patchy alveolar opacities have worsened elsewhere in the right lower lobe and have also progressed in the right upper and right middle lobes. Moderate centrilobular emphysema.    MEDIASTINUM/AXILLAE: Top normal sized mediastinal and right hilar lymph nodes are likely reactive.    CORONARY ARTERY CALCIFICATION: Mild.    UPPER ABDOMEN: Postcholecystectomy.    MUSCULOSKELETAL: Degenerative changes of the spine.      Impression    IMPRESSION:  1.  Compared to 08/20/2023, bronchopneumonia in the right lung has significantly worsened. Findings are most pronounced in the right lower lobe.  2.  Stable treatment related fibrosis of the perihilar left lung.  3.  Emphysema.  4.  No pulmonary embolus.   Respiratory Aerobic Bacterial Culture with Gram Stain    Specimen: Expectorate; Sputum   Result Value Ref Range    Gram Stain Result >10 Squamous epithelial cells/low power field (A)     Gram Stain Result >25 PMNs/low power field (A)     Gram Stain Result 4+ Gram positive cocci (A)     Procalcitonin   Result Value Ref Range    Procalcitonin 0.33 <0.50 ng/mL   Extra Purple Top EDTA (LAB USE ONLY)   Result Value Ref Range    Hold Specimen JIC            Attestation:  I have reviewed today's vital signs, notes, medications, labs and imaging.  Amount of time spent managing this patient today:  60 minutes.     Leonard iKmble MD

## 2023-12-15 NOTE — H&P
"CC: Shortness of breath, cough, weakness     HPI:  Martin Tovar is an 73 year old male who presents with above complaints..  Patient is a lifelong smoker (1 pack/day).  He was diagnosed with pneumonia about a week ago and was started on azithromycin.  According to the patient his condition did not improve.  He continued to be short of breath, weak.  He continued to have productive cough.  Evaluation emergency room confirmed diagnosis of pneumonia.  Patient started on azithromycin and ceftriaxone, breathing treatments, steroids.  Medical services consulted for admission.  Patient not using any oxygen at home.  Currently requires oxygen supplementation to keep pulse ox above 92%.    Past Medical History:   Diagnosis Date    Asthma     Avascular necrosis of bones of both hips (H)     Cancer (H)     Lung-radiation, chemo    Cholecystitis     Chronic renal failure     COPD, mild (H)     Diabetes mellitus (H)     states resolved after weight loss    Disc degeneration, lumbar     DJD (degenerative joint disease) of knee     left    Elevated PSA     Hemoptysis 2/16/2020    LBP (low back pain)     Lung cancer (H)     Pneumonia 1/4/2019    Smoking hx        Allergies:   Allergies   Allergen Reactions    Ciprofloxacin Swelling and Rash     Throat swelling    Septra [Sulfamethoxazole-Trimethoprim] Swelling and Rash       Principal Problem:    Mucopurulent chronic bronchitis (H)  Active Problems:    Acute respiratory failure with hypoxia (H)    Pneumonia of right lower lobe due to infectious organism    Blood pressure (!) 155/99, pulse 92, temperature 98.7  F (37.1  C), temperature source Axillary, resp. rate 22, height 1.702 m (5' 7\"), weight 64 kg (141 lb 1.5 oz), SpO2 94%.    Review of Systems  ROS:    1.General: no fever, chills, not in distress  2.HEENT: no HA, no blurry vision, no swallow problems, no nasal congestion, no sore throat  3.Pulmonary: See above   4.CVS: no CP, no palpitations, no VALENCIA, no SOB, no " intermittent claudication  5.GI: no nausea, vomiting or diarrhea, no abdominal pain, no constipation, no hematemesis or hematochezia  6.: no renal colic, no hematuria, urinary frequency or urgency  7.Extremities: no edema  8.Neurological: no dizziness, vertigo, double or blurry vision, no no focal weakness, no paresthesia, no swallow or speech problems  9.Musculosceletal: no joint pains, no joint swelling, no back pain  10.Dermatological: no skin rashes, no lesions, no pruritus  11.Hematological: no bleeding, no hx/o clots  12.Endocrinological: no heat/cold intolerance, no hx/o diabetes  13.Psychiatric: no suicidal or homicidal thoughts  Physical Exam  Physical Exam:    Not in distress, pleasant, lucid, cooperative,  Head - atraumatic, eyes - pupils equal, round, reactive to light, extra ocular movement intact, MMM  Neck - supple, thyroid not enlarged, LN not palpated  Lungs -coarse breath sounds bilaterally, diminished at bases, wheezing CVS - heart sounds S1, S2, no additional murmurs gallop, regular rate and rhythm  Gastrointestinal-abdomen is soft, non-tender, non-distended, no organomegaly, positive bowel sounds  Extremities no clubbing, cyanosis or edema  Neurological-cranial nerve II-XII grossly intact, no meningeal signs, no cerebellar signs, no sensory deficit  Musculoskeletal -  joints, no effusions, ROM preserved  Dermatological - the skin dry, warm, no rashes  Psychiatric-patient is AAO X3, patient has normal affect      Assessment:  Community-acquired pneumonia  Failure of outpatient antibiotic management  Hypoxia  Bronchospasm  Tobacco abuse    Plan:  Monitor on medical floor  Started on empiric, broad-spectrum antibiotics  Follow-up results of the cultures  Continue with inhaled and systemic steroids  Continue with bronchodilators  Started on Singulair  Continue with oxygen supplementation-wean off if possible  Started on nicotine patch 21 mg daily  Patient stating that he quit smoking about a week  ago-additional counseling provided    As the provider for the telehealth service, I attest that I introduced myself to the patient, provided my credentials, disclosed by location and determined that based on a review of the patient's chart and discussion with members of the patient's treatment team, telemedicine via real-time, 2 way, and interactive audio and video platform is an appropriate and effective means of providing the service.  The patient and I mutually agree this visit is appropriate for telemedicine.  The virtual encounter was taken place from  [place], [place].  The encounter took approximately 35 minutes.  The nurse was present during the entire time and I was able to move the stethoscope in appropriate directions.  The patient was evaluated at the Hospital         Portions of this note may be dictated using Dragon voice recognition software. Variances in spelling and vocabulary are possible and unintentional. Not all errors may be caught and/or corrected. Please notify the author if any discrepancies are noted and/or if the meaning of any statement is unclear.          Patient verbally consented for treatment via video visit with patient currently located at [facility] and provider located in [place].         Time Spent: 40 minutes     King Paige MD  12/15/2023

## 2023-12-15 NOTE — PROGRESS NOTES
"CLINICAL NUTRITION SERVICES - ASSESSMENT NOTE     Nutrition Prescription    RECOMMENDATIONS FOR MDs/PROVIDERS TO ORDER:  None at this time    Malnutrition Status:    Moderate malnutrition in the context of acute/chronic illness.       Recommendations already ordered by Registered Dietitian (RD):  Please send patient vanilla ensure with breakfast and lunch     Future/Additional Recommendations:  Monitor patient weight, intakes, labs/meds and GI/BM     REASON FOR ASSESSMENT  Martin Tovar is a/an 73 year old male assessed by the dietitian for Admission Nutrition Risk Screen for positive    NUTRITION HISTORY  Martin Tovar is an 73 year old male who presents with community acquired pneumonia, hypoxia, bronchospasm, and tobacco abuse.     Patient scored positive on nutrition risk screen for 2-13 lb weight loss and decreased appetite.     Per patient interview  Patient reports a decreased appetite over the last year. Patient notes that he used to weigh around 300 lbs prior to his lung cancer dx. After treatment he lost around 100 lb and then had a gallbladder surgery that caused him to lose more weight. He notes that his weight is typically around 140 lbs. Patient will typically have a small breakfast and supper and drink an ensure at lunch. Patient was agreeable to supplements with a preference for vanilla. Patient denied any further nutrition questions or concerns at this time. RD provided brief education on the importance of calories and protein for the preservation of LBM.    CURRENT NUTRITION ORDERS  Diet: Orders Placed This Encounter      Regular Diet Adult      Intake/Tolerance: Patient has no recorded meal intakes in chart.    LABS  Labs reviewed  Glucose elevated-172 mg/dL    MEDICATIONS  Medications reviewed  Scheduled: Lipitor, Zithromax, Rocephin, MS contin, nicotine patch, Prilosec, Protonix.  Continuous: None  PRN: None pertinent    ANTHROPOMETRICS  Height: 170.2 cm (5' 7\")  Most Recent Weight: 64 kg " (141 lb 1.5 oz)    IBW: 67.3 kg  BMI: Normal BMI  Weight History:   Wt Readings from Last 15 Encounters:   12/15/23 64 kg (141 lb 1.5 oz)   08/31/23 67 kg (147 lb 9.6 oz)   06/09/23 68 kg (150 lb)   04/13/23 71.3 kg (157 lb 3 oz)   03/30/23 71.3 kg (157 lb 1.6 oz)   03/16/23 72.6 kg (160 lb)   09/15/22 74.1 kg (163 lb 4.8 oz)   09/13/22 74.8 kg (165 lb)   05/06/22 75.6 kg (166 lb 9.6 oz)   03/31/22 78 kg (172 lb)   10/18/21 80.7 kg (178 lb)   09/24/21 80.7 kg (178 lb)   08/26/21 83.5 kg (184 lb)   08/19/21 83 kg (183 lb)   08/06/21 83.6 kg (184 lb 4.8 oz)   13.5% non significant weight loss within one year.    Dosing Weight: 64 kg-actual BW used.     ASSESSED NUTRITION NEEDS  Estimated Energy Needs: 1,600-1,920 kcals/day (25 - 30 kcals/kg)  Justification: Maintenance  Estimated Protein Needs: 77-96 grams protein/day (1.2 - 1.5 grams of pro/kg)  Justification: Increased needs  Estimated Fluid Needs: 1,600-1,920 mL/day (1 mL/kcal)   Justification: Per provider pending fluid status    PHYSICAL FINDINGS  See malnutrition section below.    MALNUTRITION  % Intake: </=75% for >/= 1 month (severe)  % Weight Loss: Weight loss does not meet criteria  Subcutaneous Fat Loss: Facial region:  mild to moderate  Muscle Loss: Thoracic region (clavicle, acromium bone, deltoid, trapezius, pectoral):  moderate  Fluid Accumulation/Edema: None noted  Malnutrition Diagnosis: Moderate malnutrition in the context of acute/chronic illness.     NUTRITION DIAGNOSIS  Malnutrition related to poor appetite as evidenced by Intakes  </=75% for >/= 1 month, 13.5% weight loss in one year and mild to moderate muscle and fat loss.     INTERVENTIONS  Implementation  Nutrition education for nutrition relationship to health/disease   Collaboration with other providers-IDT rounds  Medical food supplement therapy-Please send vanilla ensure with breakfast and lunch meals.     Goals  Patient to consume % of nutritionally adequate meal trays TID, or the  equivalent with supplements/snacks.     Monitoring/Evaluation  Progress toward goals will be monitored and evaluated per protocol.  Margo Dyer RDN, LD  Clinical Dietitian  Office: [phone]  Weekend pager: [phone]

## 2023-12-15 NOTE — ED PROVIDER NOTES
ED Provider Note  [facility]      History     Chief Complaint   Patient presents with    Shortness of Breath     HPI  Martin Tovar is a 73 year old male who has medical history significant for history of COPD, with remote history of tobacco use, small cell lung cancer status post chemoradiation therapy, chronic pain, coronary artery disease, presenting the emergency department with concerns regarding increased amounts of shortness of breath.  Patient's recent history is significant for diagnosis of pneumonia from this Monday, 3 days ago.  I am unable to view outside records as patient states he goes to [facility].  He does state that he was treated with azithromycin.  However, symptoms have worsened over the past few days.  He does have home pulse oximeter, and they noted his oxygen saturations in the upper 80s this evening, prompting ED visit.  Denies any severe chest pains.  He is short of breath with activity.  No lower extremity swelling.  Does use inhalers and nebulizers and that has not provided significant relief.  No home oxygen use.        Independent Historian:    Wife, also present, stating that they were checking oxygen saturations, and patient noted to have decreased oxygen saturations.  Was not appearing significantly distressed with breathing, however activity level is decreased over the past week.    Review of External Notes:  As above.  Also reviewed August 31 radiation oncology visit.  Patient had lung cancer of the left lung in 2012, stage T2 N0 M0.  Patient had complete response with no evidence of cancer recurrence.      Allergies:  Allergies   Allergen Reactions    Ciprofloxacin Swelling and Rash     Throat swelling    Septra [Sulfamethoxazole-Trimethoprim] Swelling and Rash       Problem List:    Patient Active Problem List    Diagnosis Date Noted    Weight loss 06/09/2023     Priority: Medium    Chest pain 03/30/2023     Priority: Medium    Mild coronary  artery disease 08/18/2021     Priority: Medium     Per coronary angiogram on 8/5/2021      Elevated troponin 08/04/2021     Priority: Medium    NSTEMI (non-ST elevated myocardial infarction) (H) 08/04/2021     Priority: Medium    Small cell lung cancer (H) 02/13/2020     Priority: Medium    Anxiety      Priority: Medium    Acute hypoxemic respiratory failure (H)      Priority: Medium    Benign prostatic hyperplasia without lower urinary tract symptoms 01/05/2019     Priority: Medium    COPD exacerbation (H)      Priority: Medium    Intra-abdominal fluid collection 12/31/2015     Priority: Medium    Chronic renal failure      Priority: Medium    Bile leak, postoperative (lap anna 12/16, s/p ercp with stent 12/22/15) 12/23/2015     Priority: Medium    Chronic pain 12/21/2015     Priority: Medium    COPD (chronic obstructive pulmonary disease) (H) 12/21/2015     Priority: Medium    Acute kidney injury (H24) 12/21/2015     Priority: Medium    Status post laparoscopic-assisted sigmoidectomy 11/11/2013     Priority: Medium    Degeneration of lumbar or lumbosacral intervertebral disc 12/14/2012     Priority: Medium    Diabetes mellitus without complication (H) 12/14/2012     Priority: Medium    Essential hypertension 12/14/2012     Priority: Medium    Hyperlipidemia 12/14/2012     Priority: Medium        Past Medical History:    Past Medical History:   Diagnosis Date    Asthma     Avascular necrosis of bones of both hips (H)     Cancer (H)     Cholecystitis     Chronic renal failure     COPD, mild (H)     Diabetes mellitus (H)     Disc degeneration, lumbar     DJD (degenerative joint disease) of knee     Elevated PSA     Hemoptysis 2/16/2020    LBP (low back pain)     Lung cancer (H)     Pneumonia 1/4/2019    Smoking hx        Past Surgical History:    Past Surgical History:   Procedure Laterality Date    ARTHROSCOPY KNEE      CERVICAL FUSION      CT BIOPSY LUNG  2/5/2020    CV CORONARY ANGIOGRAM N/A 8/5/2021     Procedure: CV CORONARY ANGIOGRAM;  Surgeon: Fabio Espinosa MD;  Location: [facility] CV    CV CORONARY ANGIOGRAM N/A 4/13/2023    Procedure: Coronary Angiogram;  Surgeon: Mikayla Floyd MD;  Location: [facility] LAB CV    CV LEFT VENTRICULOGRAM N/A 8/5/2021    Procedure: Left Ventriculogram;  Surgeon: Fabio Espinosa MD;  Location: [facility] CV    ENDOSCOPIC RETROGRADE CHOLANGIOPANCREATOGRAM N/A 12/22/2015    Procedure: ENDOSCOPIC RETROGRADE CHOLANGIOPANCREATOGRAPHY;  Surgeon: Tang Elliott MD;  Location: [facility] OR;  Service:     HEMORRHOID SURGERY      INGUINAL HERNIA REPAIR      IR LUMBAR EPIDURAL STEROID INJECTION  4/29/2003    IR LUMBAR EPIDURAL STEROID INJECTION  6/27/2007    IR LUNG BIOPSY MEDIASTINUM LEFT  12/21/2012    LAPAROSCOPIC CHOLECYSTECTOMY N/A 12/16/2015    Procedure: CHOLECYSTECTOMY LAPAROSCOPIC;  Surgeon: Eugene Ro MD;  Location: [facility] OR;  Service:     LUMBAR FUSION  2006    & reconstruction    OTHER SURGICAL HISTORY  2013    COLOVESICULAR FISTULA REPAIR    PICC  1/3/2016         RELEASE TRIGGER FINGER  07/2020    TOTAL KNEE ARTHROPLASTY Left        Family History:    No family history on file.    Social History:  Marital Status:   [2]  Social History     Tobacco Use    Smoking status: Every Day     Packs/day: 1     Types: Cigarettes    Smokeless tobacco: Never    Tobacco comments:     1 ppd as of 3/2023   Substance Use Topics    Alcohol use: Not Currently    Drug use: No        Medications:    morphine (MS CONTIN) 15 MG 12 hr tablet  albuterol (PROVENTIL HFA;VENTOLIN HFA) 90 mcg/actuation inhaler  aspirin (ASA) 81 MG chewable tablet  atorvastatin (LIPITOR) 20 MG tablet  cyclobenzaprine (FLEXERIL) 5 MG tablet  diclofenac (VOLTAREN) 1 % topical gel  HYDROcodone-acetaminophen (NORCO) 7.5-325 mg per tablet  ipratropium (ATROVENT) 0.02 % nebulizer solution  omeprazole (PRILOSEC) 20 MG DR capsule  oxybutynin ER (DITROPAN-XL) 10  "MG 24 hr tablet  polyethylene glycol-propylene glycol (SYSTANE) 0.4-0.3 % SOLN ophthalmic solution  tamsulosin (FLOMAX) 0.4 mg cap  WIXELA INHUB 250-50 MCG/ACT inhaler  zolpidem ER (AMBIEN CR) 6.25 MG CR tablet          Review of Systems  A medically appropriate review of systems was performed with pertinent positives and negatives noted in the HPI, and all other systems negative.    Physical Exam   Patient Vitals for the past 24 hrs:   BP Temp Temp src Pulse Resp SpO2 Height Weight   12/15/23 0030 -- -- -- 86 23 95 % -- --   12/15/23 0020 (!) 173/99 -- -- 80 16 96 % -- --   12/14/23 2202 (!) 150/77 97.7  F (36.5  C) Oral 81 20 92 % 1.702 m (5' 7\") 64.9 kg (143 lb)          Physical Exam  General: alert and in mild acute distress on arrival  Head: atraumatic, normocephalic  Lungs:  nonlabored.  Coarse with right lower lung crackles  CV:  extremities warm and perfused  Abd: nondistended  Skin: no rashes, no diaphoresis and skin color normal  Neuro: Patient awake, alert, speech is fluent,   Psychiatric: affect/mood normal,        ED Course                 Procedures  EKG, reviewed by myself shows normal sinus rhythm.  Rate 81 bpm.  Left axis deviation with anterior fascicular block.  Nonspecific [id number] changes.  No acute ischemic appearing changes.                         Results for orders placed or performed during the hospital encounter of 12/14/23 (from the past 24 hour(s))   CBC with platelets differential    Narrative    The following orders were created for panel order CBC with platelets differential.  Procedure                               Abnormality         Status                     ---------                               -----------         ------                     CBC with platelets and d...[[id number]]  Abnormal            Final result                 Please view results for these tests on the individual orders.   Basic metabolic panel   Result Value Ref Range    Sodium 139 135 - 145 mmol/L    " Potassium 4.4 3.4 - 5.3 mmol/L    Chloride 98 98 - 107 mmol/L    Carbon Dioxide (CO2) 33 (H) 22 - 29 mmol/L    Anion Gap 8 7 - 15 mmol/L    Urea Nitrogen 21.4 8.0 - 23.0 mg/dL    Creatinine 0.74 0.67 - 1.17 mg/dL    GFR Estimate >90 >60 mL/min/1.73m2    Calcium 8.9 8.8 - 10.2 mg/dL    Glucose 172 (H) 70 - 99 mg/dL   INR   Result Value Ref Range    INR 1.08 0.85 - 1.15   Troponin T, High Sensitivity   Result Value Ref Range    Troponin T, High Sensitivity 8 <=22 ng/L   Nt probnp inpatient   Result Value Ref Range    N terminal Pro BNP Inpatient 983 (H) 0 - 900 pg/mL   Blood gas venous   Result Value Ref Range    pH Venous 7.37 7.32 - 7.43    pCO2 Venous 65 (H) 40 - 50 mm Hg    pO2 Venous 30 25 - 47 mm Hg    Bicarbonate Venous 38 (H) 21 - 28 mmol/L    Base Excess/Deficit 9.3 (H) -7.7 - 1.9 mmol/L    FIO2 40    Lactic acid whole blood   Result Value Ref Range    Lactic Acid 1.3 0.7 - 2.0 mmol/L   CBC with platelets and differential   Result Value Ref Range    WBC Count 10.6 4.0 - 11.0 10e3/uL    RBC Count 4.40 4.40 - 5.90 10e6/uL    Hemoglobin 13.9 13.3 - 17.7 g/dL    Hematocrit 42.9 40.0 - 53.0 %    MCV 98 78 - 100 fL    MCH 31.6 26.5 - 33.0 pg    MCHC 32.4 31.5 - 36.5 g/dL    RDW 13.2 10.0 - 15.0 %    Platelet Count 164 150 - 450 10e3/uL    % Neutrophils 81 %    % Lymphocytes 7 %    % Monocytes 11 %    % Eosinophils 0 %    % Basophils 0 %    % Immature Granulocytes 1 %    NRBCs per 100 WBC 0 <1 /100    Absolute Neutrophils 8.6 (H) 1.6 - 8.3 10e3/uL    Absolute Lymphocytes 0.8 0.8 - 5.3 10e3/uL    Absolute Monocytes 1.2 0.0 - 1.3 10e3/uL    Absolute Eosinophils 0.0 0.0 - 0.7 10e3/uL    Absolute Basophils 0.0 0.0 - 0.2 10e3/uL    Absolute Immature Granulocytes 0.1 <=0.4 10e3/uL    Absolute NRBCs 0.0 10e3/uL   CT Chest Pulmonary Embolism w Contrast    Narrative    EXAM: CT CHEST PULMONARY EMBOLISM W CONTRAST  LOCATION: [facility]  DATE: 12/15/2023    INDICATION: hypoxia.  History of small  cell lung cancer status post chemoradiation.  Recent antibiotic therapy for pneumonia  COMPARISON: Chest radiograph 12/11/2023. Noncontrast CT of the chest 08/28/2023.  TECHNIQUE: CT chest pulmonary angiogram during arterial phase injection of IV contrast. Multiplanar reformats and MIP reconstructions were performed. Dose reduction techniques were used.   CONTRAST: 67 ml Isovue 370    FINDINGS:  ANGIOGRAM CHEST: No pulmonary embolus. No aortic aneurysm.    LUNGS AND PLEURA: Stable treatment related architectural distortion and scarring of the perihilar left lung. Compared to 08/20/2023, consolidation has developed throughout much of the right lower lobe. Peribronchiolar nodular interstitial infiltrate and   patchy alveolar opacities have worsened elsewhere in the right lower lobe and have also progressed in the right upper and right middle lobes. Moderate centrilobular emphysema.    MEDIASTINUM/AXILLAE: Top normal sized mediastinal and right hilar lymph nodes are likely reactive.    CORONARY ARTERY CALCIFICATION: Mild.    UPPER ABDOMEN: Postcholecystectomy.    MUSCULOSKELETAL: Degenerative changes of the spine.      Impression    IMPRESSION:  1.  Compared to 08/20/2023, bronchopneumonia in the right lung has significantly worsened. Findings are most pronounced in the right lower lobe.  2.  Stable treatment related fibrosis of the perihilar left lung.  3.  Emphysema.  4.  No pulmonary embolus.       MEDICATIONS GIVEN IN THE EMERGENCY DEPARTMENT:  Medications   albuterol (PROVENTIL) neb solution 2.5 mg (has no administration in time range)   cefTRIAXone (ROCEPHIN) 2 g vial to attach to  ml bag for ADULTS or NS 50 ml bag for PEDS (2 g Intravenous $New Bag 12/15/23 0102)   azithromycin (ZITHROMAX) 500 mg in sodium chloride 0.9 % 250 mL intermittent infusion (has no administration in time range)   azithromycin (ZITHROMAX) 250 mg in sodium chloride 0.9 % 250 mL intermittent infusion (has no administration in time  range)   acetaminophen (TYLENOL) tablet 975 mg (has no administration in time range)   albuterol (PROVENTIL HFA/VENTOLIN HFA) inhaler (has no administration in time range)   aspirin (ASA) chewable tablet 81 mg (has no administration in time range)   atorvastatin (LIPITOR) tablet 20 mg (has no administration in time range)   tamsulosin (FLOMAX) capsule 0.4 mg (has no administration in time range)   oxyBUTYnin ER (DITROPAN XL) 24 hr tablet 10 mg (has no administration in time range)   morphine (MS CONTIN) 12 hr tablet 15 mg (15 mg Oral $Given 12/15/23 0102)   pantoprazole (PROTONIX) EC tablet 40 mg (has no administration in time range)   predniSONE (DELTASONE) tablet 40 mg (40 mg Oral $Given 12/15/23 0056)   HYDROcodone-acetaminophen (NORCO) 5-325 MG per tablet 1-2 tablet (has no administration in time range)   ipratropium - albuterol 0.5 mg/2.5 mg/3 mL (DUONEB) neb solution 3 mL (3 mLs Nebulization $Given 12/14/23 2307)   iopamidol (ISOVUE-370) solution 67 mL (67 mLs Intravenous $Given 12/14/23 2350)   sodium chloride 0.9 % bag 500mL for CT scan flush use (95 mLs As instructed $Given 12/14/23 2350)   HYDROcodone-acetaminophen (NORCO) 5-325 MG per tablet 1 tablet (1 tablet Oral $Given 12/15/23 0120)           Independent Interpretation (X-rays, CTs, rhythm strip):  CT images are personally reviewed.  Right lower lung infiltrate.    Consultations/Discussion of Management or Tests:  None       Social Determinants of Health affecting care:         Assessments & Plan (with Medical Decision Making)  73 year old male who presents to the Emergency Department for evaluation of increased amounts of shortness of breath, and patient noted to be hypoxic.  Patient with Arrival oxygen saturations of 84% on room air.  Patient required initial titration up to 4 L of nasal cannula oxygen in order to maintain oxygen saturations of greater than 90%.  He was with increased respiratory effort upon arrival.  Given duo nebulizer.  Given the  hypoxia, with remote history of lung cancer, however no evidence of recurrence in many years since his initial 2012 diagnosis, decision made for CT of the chest to rule out PE, or other acute cause of patient's hypoxia.    CT scan results and images are reviewed.  Has evidence of right lower lung pneumonia.  Recent treatment with azithromycin.  Patient will be treated for community-acquired pneumonia, and Rocephin and azithromycin are administered.    Patient continues to require supplemental oxygen for his acute hypoxic respiratory failure secondary to pneumonia.  Remainder of blood tests are unremarkable.  [facility]  Patient will be boarded in the emergency department until bed becomes available.  Home medications are ordered.  Patient with bed which became available as a medical/surgical boarder in the ICU.  I discussed with Dr. Paige, hospitalist at 3:10 AM.  Patient accepted for inpatient hospitalization.     I have reviewed the nursing notes.    I have reviewed the findings, diagnosis, plan and need for follow up with the patient.       Critical Care time:  none      NEW PRESCRIPTIONS STARTED AT TODAY'S ER VISIT  New Prescriptions    No medications on file       Final diagnoses:   Pneumonia of right lower lobe due to infectious organism   Acute respiratory failure with hypoxia (H)   Mucopurulent chronic bronchitis (H)       12/14/2023   [facility] EMERGENCY DEPT       Nii Vicente MD  12/15/23 0135       Nii Vicente MD  12/15/23 0157       Nii Vicente MD  12/15/23 [id number]

## 2023-12-15 NOTE — PLAN OF CARE
"[facility] ADMISSION NOTE    Patient admitted to room ICU 1009 at approximately 0330 via cart from emergency room. Patient was accompanied by nurse.     Verbal SBAR report received from SOUMYA Dozier prior to patient arrival.     Patient ambulated to bed with stand-by assist. Patient alert and oriented X 3. The patient is not having any pain.  . Admission vital signs: Blood pressure (!) 155/99, pulse 94, temperature 98.7  F (37.1  C), temperature source Axillary, resp. rate 20, height 1.702 m (5' 7\"), weight 64 kg (141 lb 1.5 oz), SpO2 94%. Patient was oriented to plan of care, call light, bed controls, tv, telephone, bathroom, and visiting hours.     Risk Assessment    The following safety risks were identified during admission: fall. Yellow risk band applied: YES.     Skin Initial Assessment    This writer admitted this patient and completed a full skin assessment and Gage score in the Adult PCS flowsheet. Appropriate interventions initiated as needed.     Secondary skin check completed by SOUMYA Butler.    Gage Risk Assessment  Sensory Perception: 4-->no impairment  Moisture: 4-->rarely moist  Activity: 3-->walks occasionally  Mobility: 3-->slightly limited  Nutrition: 2-->probably inadequate  Friction and Shear: 3-->no apparent problem  Gage Score: 19    Education    Patient has a [place] to Observation order: No  Observation education completed and documented: N/A      Marley Smith RN      "

## 2023-12-15 NOTE — ED NOTES
"[facility]   Admission Handoff    The patient is Martin Tovar, 73 year old who arrived in the ED by WHEELCHAIR from home with a complaint of Shortness of Breath  . The patient's current symptoms are a recurrence of a past episode and during this time the symptoms have decreased. In the ED, patient was diagnosed with   Final diagnoses:   Pneumonia of right lower lobe due to infectious organism   Acute respiratory failure with hypoxia (H)   Mucopurulent chronic bronchitis (H)         Needed?: No    Allergies:    Allergies   Allergen Reactions    Ciprofloxacin Swelling and Rash     Throat swelling    Septra [Sulfamethoxazole-Trimethoprim] Swelling and Rash       Past Medical Hx:   Past Medical History:   Diagnosis Date    Asthma     Avascular necrosis of bones of both hips (H)     Cancer (H)     Lung-radiation, chemo    Cholecystitis     Chronic renal failure     COPD, mild (H)     Diabetes mellitus (H)     states resolved after weight loss    Disc degeneration, lumbar     DJD (degenerative joint disease) of knee     left    Elevated PSA     Hemoptysis 2/16/2020    LBP (low back pain)     Lung cancer (H)     Pneumonia 1/4/2019    Smoking hx        Initial vitals were: BP: (!) 150/77  Pulse: 81  Temp: 97.7  F (36.5  C)  Resp: 20  Height: 170.2 cm (5' 7\")  Weight: 64.9 kg (143 lb)  SpO2: 92 %   Recent vital Signs: BP (!) 147/96   Pulse 91   Temp 97.7  F (36.5  C) (Oral)   Resp 12   Ht 1.702 m (5' 7\")   Wt 64.9 kg (143 lb)   SpO2 93%   BMI 22.40 kg/m      Elimination Status: Continent: Yes     Activity Level: SBA    Fall Status: Reason for falls risk:  Mobility  bed/chair alarm on    Baseline Mental status: WDL  Current Mental Status changes: at basesline    Infection present or suspected this encounter: no  Sepsis suspected: No    Isolation type: none    Bariatric equipment needed?: No    In the ED these meds were given:   Medications   albuterol (PROVENTIL) neb solution " 2.5 mg (has no administration in time range)   cefTRIAXone (ROCEPHIN) 2 g vial to attach to  ml bag for ADULTS or NS 50 ml bag for PEDS (0 g Intravenous Stopped 12/15/23 0207)   azithromycin (ZITHROMAX) 500 mg in sodium chloride 0.9 % 250 mL intermittent infusion (500 mg Intravenous $New Bag 12/15/23 0243)   azithromycin (ZITHROMAX) 250 mg in sodium chloride 0.9 % 250 mL intermittent infusion (has no administration in time range)   acetaminophen (TYLENOL) tablet 975 mg (has no administration in time range)   albuterol (PROVENTIL HFA/VENTOLIN HFA) inhaler (has no administration in time range)   aspirin (ASA) chewable tablet 81 mg (has no administration in time range)   atorvastatin (LIPITOR) tablet 20 mg (has no administration in time range)   tamsulosin (FLOMAX) capsule 0.4 mg (has no administration in time range)   oxyBUTYnin ER (DITROPAN XL) 24 hr tablet 10 mg (has no administration in time range)   morphine (MS CONTIN) 12 hr tablet 15 mg (15 mg Oral $Given 12/15/23 0102)   pantoprazole (PROTONIX) EC tablet 40 mg (has no administration in time range)   predniSONE (DELTASONE) tablet 40 mg (40 mg Oral $Given 12/15/23 0056)   HYDROcodone-acetaminophen (NORCO) 5-325 MG per tablet 1-2 tablet (has no administration in time range)   ipratropium - albuterol 0.5 mg/2.5 mg/3 mL (DUONEB) neb solution 3 mL (3 mLs Nebulization $Given 12/14/23 2307)   iopamidol (ISOVUE-370) solution 67 mL (67 mLs Intravenous $Given 12/14/23 2350)   sodium chloride 0.9 % bag 500mL for CT scan flush use (95 mLs As instructed $Given 12/14/23 2350)   HYDROcodone-acetaminophen (NORCO) 5-325 MG per tablet 1 tablet (1 tablet Oral $Given 12/15/23 0120)       Drips running?  No    Home pump  No    Current LDAs: Peripheral IV: Site left lower forearm; Gauge 20   IV push only, no IV fluids     Results:   Labs/Imaging  Ordered and Resulted from Time of ED Arrival Up to the Time of Departure from the ED  Results for orders placed or performed during  the hospital encounter of 12/14/23 (from the past 24 hour(s))   CBC with platelets differential    Narrative    The following orders were created for panel order CBC with platelets differential.  Procedure                               Abnormality         Status                     ---------                               -----------         ------                     CBC with platelets and d...[[id number]]  Abnormal            Final result                 Please view results for these tests on the individual orders.   Basic metabolic panel   Result Value Ref Range    Sodium 139 135 - 145 mmol/L    Potassium 4.4 3.4 - 5.3 mmol/L    Chloride 98 98 - 107 mmol/L    Carbon Dioxide (CO2) 33 (H) 22 - 29 mmol/L    Anion Gap 8 7 - 15 mmol/L    Urea Nitrogen 21.4 8.0 - 23.0 mg/dL    Creatinine 0.74 0.67 - 1.17 mg/dL    GFR Estimate >90 >60 mL/min/1.73m2    Calcium 8.9 8.8 - 10.2 mg/dL    Glucose 172 (H) 70 - 99 mg/dL   INR   Result Value Ref Range    INR 1.08 0.85 - 1.15   Troponin T, High Sensitivity   Result Value Ref Range    Troponin T, High Sensitivity 8 <=22 ng/L   Nt probnp inpatient   Result Value Ref Range    N terminal Pro BNP Inpatient 983 (H) 0 - 900 pg/mL   Blood gas venous   Result Value Ref Range    pH Venous 7.37 7.32 - 7.43    pCO2 Venous 65 (H) 40 - 50 mm Hg    pO2 Venous 30 25 - 47 mm Hg    Bicarbonate Venous 38 (H) 21 - 28 mmol/L    Base Excess/Deficit 9.3 (H) -7.7 - 1.9 mmol/L    FIO2 40    Lactic acid whole blood   Result Value Ref Range    Lactic Acid 1.3 0.7 - 2.0 mmol/L   CBC with platelets and differential   Result Value Ref Range    WBC Count 10.6 4.0 - 11.0 10e3/uL    RBC Count 4.40 4.40 - 5.90 10e6/uL    Hemoglobin 13.9 13.3 - 17.7 g/dL    Hematocrit 42.9 40.0 - 53.0 %    MCV 98 78 - 100 fL    MCH 31.6 26.5 - 33.0 pg    MCHC 32.4 31.5 - 36.5 g/dL    RDW 13.2 10.0 - 15.0 %    Platelet Count 164 150 - 450 10e3/uL    % Neutrophils 81 %    % Lymphocytes 7 %    % Monocytes 11 %    % Eosinophils 0 %    %  Basophils 0 %    % Immature Granulocytes 1 %    NRBCs per 100 WBC 0 <1 /100    Absolute Neutrophils 8.6 (H) 1.6 - 8.3 10e3/uL    Absolute Lymphocytes 0.8 0.8 - 5.3 10e3/uL    Absolute Monocytes 1.2 0.0 - 1.3 10e3/uL    Absolute Eosinophils 0.0 0.0 - 0.7 10e3/uL    Absolute Basophils 0.0 0.0 - 0.2 10e3/uL    Absolute Immature Granulocytes 0.1 <=0.4 10e3/uL    Absolute NRBCs 0.0 10e3/uL   CT Chest Pulmonary Embolism w Contrast    Narrative    EXAM: CT CHEST PULMONARY EMBOLISM W CONTRAST  LOCATION: [facility]  DATE: 12/15/2023    INDICATION: hypoxia.  History of small cell lung cancer status post chemoradiation.  Recent antibiotic therapy for pneumonia  COMPARISON: Chest radiograph 12/11/2023. Noncontrast CT of the chest 08/28/2023.  TECHNIQUE: CT chest pulmonary angiogram during arterial phase injection of IV contrast. Multiplanar reformats and MIP reconstructions were performed. Dose reduction techniques were used.   CONTRAST: 67 ml Isovue 370    FINDINGS:  ANGIOGRAM CHEST: No pulmonary embolus. No aortic aneurysm.    LUNGS AND PLEURA: Stable treatment related architectural distortion and scarring of the perihilar left lung. Compared to 08/20/2023, consolidation has developed throughout much of the right lower lobe. Peribronchiolar nodular interstitial infiltrate and   patchy alveolar opacities have worsened elsewhere in the right lower lobe and have also progressed in the right upper and right middle lobes. Moderate centrilobular emphysema.    MEDIASTINUM/AXILLAE: Top normal sized mediastinal and right hilar lymph nodes are likely reactive.    CORONARY ARTERY CALCIFICATION: Mild.    UPPER ABDOMEN: Postcholecystectomy.    MUSCULOSKELETAL: Degenerative changes of the spine.      Impression    IMPRESSION:  1.  Compared to 08/20/2023, bronchopneumonia in the right lung has significantly worsened. Findings are most pronounced in the right lower lobe.  2.  Stable treatment related fibrosis of  the perihilar left lung.  3.  Emphysema.  4.  No pulmonary embolus.       For the majority of the shift this patient's behavior was Green     Cardiac Rhythm: Normal Sinus  Pt needs tele? No  Skin/wound Issues: None    Code Status: Full Code    Pain control: good    Nausea control: good    Abnormal labs/tests/findings requiring intervention: none    Patient tested for COVID 19 prior to admission: NO     OBS brochure/video discussed/provided to patient/family: N/A     Family present during ED course? Yes     Family Comments/Social Situation comments: noen    Tasks needing completion: None    Jacoby Pompa RN

## 2023-12-16 LAB
ANION GAP SERPL CALCULATED.3IONS-SCNC: 8 MMOL/L (ref 7–15)
BASE EXCESS BLDV CALC-SCNC: 9.6 MMOL/L (ref -7.7–1.9)
BUN SERPL-MCNC: 22.8 MG/DL (ref 8–23)
CALCIUM SERPL-MCNC: 8.5 MG/DL (ref 8.8–10.2)
CHLORIDE SERPL-SCNC: 99 MMOL/L (ref 98–107)
CREAT SERPL-MCNC: 0.7 MG/DL (ref 0.67–1.17)
DEPRECATED HCO3 PLAS-SCNC: 31 MMOL/L (ref 22–29)
EGFRCR SERPLBLD CKD-EPI 2021: >90 ML/MIN/1.73M2
ERYTHROCYTE [DISTWIDTH] IN BLOOD BY AUTOMATED COUNT: 13.2 % (ref 10–15)
GLUCOSE SERPL-MCNC: 180 MG/DL (ref 70–99)
HCO3 BLDV-SCNC: 36 MMOL/L (ref 21–28)
HCT VFR BLD AUTO: 37.5 % (ref 40–53)
HGB BLD-MCNC: 12.1 G/DL (ref 13.3–17.7)
MCH RBC QN AUTO: 31.4 PG (ref 26.5–33)
MCHC RBC AUTO-ENTMCNC: 32.3 G/DL (ref 31.5–36.5)
MCV RBC AUTO: 97 FL (ref 78–100)
O2/TOTAL GAS SETTING VFR VENT: 0 %
PCO2 BLDV: 58 MM HG (ref 40–50)
PH BLDV: 7.4 [PH] (ref 7.32–7.43)
PLATELET # BLD AUTO: 150 10E3/UL (ref 150–450)
PO2 BLDV: 68 MM HG (ref 25–47)
POTASSIUM SERPL-SCNC: 4.4 MMOL/L (ref 3.4–5.3)
RBC # BLD AUTO: 3.85 10E6/UL (ref 4.4–5.9)
SODIUM SERPL-SCNC: 138 MMOL/L (ref 135–145)
WBC # BLD AUTO: 10 10E3/UL (ref 4–11)

## 2023-12-16 PROCEDURE — 80048 BASIC METABOLIC PNL TOTAL CA: CPT | Performed by: FAMILY MEDICINE

## 2023-12-16 PROCEDURE — 258N000003 HC RX IP 258 OP 636: Performed by: FAMILY MEDICINE

## 2023-12-16 PROCEDURE — 36415 COLL VENOUS BLD VENIPUNCTURE: CPT | Performed by: FAMILY MEDICINE

## 2023-12-16 PROCEDURE — 250N000009 HC RX 250: Performed by: INTERNAL MEDICINE

## 2023-12-16 PROCEDURE — 85027 COMPLETE CBC AUTOMATED: CPT | Performed by: FAMILY MEDICINE

## 2023-12-16 PROCEDURE — 250N000012 HC RX MED GY IP 250 OP 636 PS 637: Performed by: FAMILY MEDICINE

## 2023-12-16 PROCEDURE — 250N000011 HC RX IP 250 OP 636: Performed by: EMERGENCY MEDICINE

## 2023-12-16 PROCEDURE — 120N000004 HC R&B MS OVERFLOW

## 2023-12-16 PROCEDURE — 94640 AIRWAY INHALATION TREATMENT: CPT | Mod: 76

## 2023-12-16 PROCEDURE — 250N000011 HC RX IP 250 OP 636: Performed by: FAMILY MEDICINE

## 2023-12-16 PROCEDURE — 82803 BLOOD GASES ANY COMBINATION: CPT | Performed by: FAMILY MEDICINE

## 2023-12-16 PROCEDURE — 258N000003 HC RX IP 258 OP 636: Performed by: INTERNAL MEDICINE

## 2023-12-16 PROCEDURE — 250N000011 HC RX IP 250 OP 636: Performed by: INTERNAL MEDICINE

## 2023-12-16 PROCEDURE — 94640 AIRWAY INHALATION TREATMENT: CPT

## 2023-12-16 PROCEDURE — 99232 SBSQ HOSP IP/OBS MODERATE 35: CPT | Mod: FS | Performed by: FAMILY MEDICINE

## 2023-12-16 PROCEDURE — 99207 PR APP CREDIT; MD BILLING SHARED VISIT: CPT

## 2023-12-16 PROCEDURE — 250N000013 HC RX MED GY IP 250 OP 250 PS 637: Performed by: INTERNAL MEDICINE

## 2023-12-16 PROCEDURE — 250N000013 HC RX MED GY IP 250 OP 250 PS 637: Performed by: EMERGENCY MEDICINE

## 2023-12-16 RX ORDER — ZOLPIDEM TARTRATE 5 MG/1
5 TABLET ORAL
Status: DISCONTINUED | OUTPATIENT
Start: 2023-12-16 | End: 2023-12-17 | Stop reason: HOSPADM

## 2023-12-16 RX ORDER — AMOXICILLIN 250 MG
1 CAPSULE ORAL 2 TIMES DAILY PRN
Status: DISCONTINUED | OUTPATIENT
Start: 2023-12-16 | End: 2023-12-17 | Stop reason: HOSPADM

## 2023-12-16 RX ADMIN — AZITHROMYCIN MONOHYDRATE 250 MG: 500 INJECTION, POWDER, LYOPHILIZED, FOR SOLUTION INTRAVENOUS at 02:52

## 2023-12-16 RX ADMIN — ASPIRIN 81 MG CHEWABLE TABLET 81 MG: 81 TABLET CHEWABLE at 08:50

## 2023-12-16 RX ADMIN — MORPHINE SULFATE 15 MG: 15 TABLET, FILM COATED, EXTENDED RELEASE ORAL at 00:40

## 2023-12-16 RX ADMIN — IPRATROPIUM BROMIDE AND ALBUTEROL SULFATE 3 ML: 2.5; .5 SOLUTION RESPIRATORY (INHALATION) at 19:56

## 2023-12-16 RX ADMIN — MONTELUKAST 10 MG: 10 TABLET, FILM COATED ORAL at 22:28

## 2023-12-16 RX ADMIN — IPRATROPIUM BROMIDE AND ALBUTEROL SULFATE 3 ML: 2.5; .5 SOLUTION RESPIRATORY (INHALATION) at 08:22

## 2023-12-16 RX ADMIN — IPRATROPIUM BROMIDE AND ALBUTEROL SULFATE 3 ML: 2.5; .5 SOLUTION RESPIRATORY (INHALATION) at 13:01

## 2023-12-16 RX ADMIN — ENOXAPARIN SODIUM 40 MG: 100 INJECTION SUBCUTANEOUS at 10:32

## 2023-12-16 RX ADMIN — VANCOMYCIN HYDROCHLORIDE 750 MG: 500 INJECTION, POWDER, LYOPHILIZED, FOR SOLUTION INTRAVENOUS at 10:32

## 2023-12-16 RX ADMIN — CEFTRIAXONE SODIUM 2 G: 2 INJECTION, POWDER, FOR SOLUTION INTRAMUSCULAR; INTRAVENOUS at 00:40

## 2023-12-16 RX ADMIN — MORPHINE SULFATE 15 MG: 15 TABLET, FILM COATED, EXTENDED RELEASE ORAL at 08:50

## 2023-12-16 RX ADMIN — TAMSULOSIN HYDROCHLORIDE 0.4 MG: 0.4 CAPSULE ORAL at 08:50

## 2023-12-16 RX ADMIN — PANTOPRAZOLE SODIUM 40 MG: 40 TABLET, DELAYED RELEASE ORAL at 06:49

## 2023-12-16 RX ADMIN — BUDESONIDE 0.5 MG: 0.5 INHALANT RESPIRATORY (INHALATION) at 19:56

## 2023-12-16 RX ADMIN — OXYBUTYNIN 10 MG: 10 TABLET, FILM COATED, EXTENDED RELEASE ORAL at 08:50

## 2023-12-16 RX ADMIN — BUDESONIDE 0.5 MG: 0.5 INHALANT RESPIRATORY (INHALATION) at 08:22

## 2023-12-16 RX ADMIN — ATORVASTATIN CALCIUM 20 MG: 20 TABLET, FILM COATED ORAL at 08:50

## 2023-12-16 RX ADMIN — MORPHINE SULFATE 15 MG: 15 TABLET, FILM COATED, EXTENDED RELEASE ORAL at 16:45

## 2023-12-16 RX ADMIN — PREDNISONE 40 MG: 20 TABLET ORAL at 08:50

## 2023-12-16 RX ADMIN — HYDROCODONE BITARTRATE AND ACETAMINOPHEN 2 TABLET: 5; 325 TABLET ORAL at 14:30

## 2023-12-16 ASSESSMENT — ACTIVITIES OF DAILY LIVING (ADL)
ADLS_ACUITY_SCORE: 22
ADLS_ACUITY_SCORE: 25
ADLS_ACUITY_SCORE: 22
ADLS_ACUITY_SCORE: 25
ADLS_ACUITY_SCORE: 22
ADLS_ACUITY_SCORE: 25
ADLS_ACUITY_SCORE: 25

## 2023-12-16 NOTE — PROGRESS NOTES
[facility] Medicine Progress Note  Date of Service: 12/16/2023    Assessment & Plan   Martin Tovar is a 71 year old male admitted on 12/14/2021. He has history of small cell lung cancer status post chemo and radiation, COPD, tobacco abuse, chronic pain, BPH, anxiety, CAD, essential hypertension, dyslipidemia.  He came to the emergency department for evaluation of worsening SOB after being diagnosed with PNA on 12/11.    Acute respiratory failure with hypoxia (H)  Bronchopneumonia of right lower lobe due to infectious organism  States he was diagnosed with PNA 1wk prior to admission and started on Azithromycin. Unable to view these records. Presents with worsening SOB. Has a history of lung cancer (see below). Not on oxygen at home but requiring O2 to maintain saturations here. Has COPD (see below). Ambulates without a walker. 1ppd smoker but quit prior to admission.  CT showed RLL bronchopneumonia worse from 8/28/23 CT. Lactate, troponin, WBC WNL.    12/16: on 3-5 L NC with SpO2 93-96%. VBG today shows pH 7.4, CO2 58, bicarb 36. Pt feels better but not quite at baseline. Wants to discharge and willing to wean oxygen.     - Wean oxygen as able to maintain SpO2 88-92%  - sputum growing gram positive cocci - pending; tailor abx  - strep pneumo positive -  suspect this is what is growing in sputum  - blood cultures NGTD; tailor abx  - started on azithromycin, and rocephin in the ER, continued for now  - Vancomycin was started on admission but discontinued due to strep pneumo positive and no MRSA risk     COPD with exacerbation due to pneumonia as above  Given Solumedrol in ER. Unable to find prior PFTs.  - Prednisone 40 mg daily   - 4 times daily duonebs   - Held home atrovent and Wixela inhaler for now - resume Wixela on discharge or once tapering down prednisone        Mild CAD with history of type 2 NSTEMI (non-ST elevated myocardial infarction) (H)  Follows with cardiology  (Dr. Cervantes). Last cardiology visit 6/9/23 states angiography showed ostial LAD 35% lesion with mid 35% lesion, proximal circumflex 25% lesion and normal RCA and left main. No plan for invasive angiography. Stable.  - continued home aspirin        History of Small cell lung cancer (H)  Diagnosed 2012. S/p chemoradiation therapy. Found to have new diagnosis of limited stage small cell lung cancer, second primary/recurrence, clinical stage T1N0M0. Had a scan in August showing no recurrence. Scheduled for CT CAP in 3 months. Followed by Dr. Hale, thoracic surgeon and Dr. Brito, pulmonology.        Diabetes mellitus without complication (H)  Listed in history but not on any medications and last A1c was 6.1 2 years ago. Glucose normal here. Given significant weight loss this is resolved.   - reassess if glucose climbing markedly, otherwise ok to follow-up with primary care provider.        Essential hypertension  Not on any medications.  Blood pressure mildly up so far.        Chronic pain  No report of pain 12/16    - continued home flexeril prn   - continued home prn voltaren gel for hand joints   - continued home MScontin and norco prn        Benign prostatic hyperplasia without lower urinary tract symptoms  Last PSA 1.7 on 4/16/21.  - continued home ditropan and flomax         Anxiety  Not on any medications, doing well so far         Hyperlipidemia  LDL 50. Continued home lipitor         Perry's esophagus with dysplasia  On protonix in place of home omeprazole while inpatient        Tobacco use disorder  Quit 5 days prior to admission.  Does not want nicotine replacement or other help, but will order if desired.         Insomnia  Continued home ambien but at 5mg instead of 10mg given age               Diet: Regular Diet Adult  Snacks/Supplements Adult: Ensure Enlive; With Meals    DVT Prophylaxis: Enoxaparin (Lovenox) SQ  Escalera Catheter: Not present  Lines: Peripheral IV     Code Status:  Full Code      Discussion: Wean off oxygen today and tomorrow for hopeful discharge tomorrow. Patient wants to go home as soon as possible but understands he may be here until Monday.    Disposition: Anticipate discharge 12/17/23     Attestation:  I have reviewed today's vital signs, notes, medications, labs and imaging.    I have discussed, or will be discussing, the patient with hospitalist physician, Dr. Kimble.    Hardeep Sykes PA-C       Interval History   NAEO. Afebrile. Doesn't feel short of breath and feels ready to wean off the oxygen. Wants to discharge as soon as possible. Has a cough and feels like he needs to produce sputum but can't get it up. Endorses weight loss due to prior chemo, cancer, and prior gallbladder surgery where he had a complication of E. Coli infection. He states he used to weigh 300 pounds. Hasn't had a bowel movement since he's been here.    Denies chest pain, increased SOB, n/v/d, fatigue, weakness, worsening cough, palpitations, abdominal pain, edema.    Physical Exam   Temp:  [97.5  F (36.4  C)-98.4  F (36.9  C)] 98.2  F (36.8  C)  Pulse:  [61-83] 62  Resp:  [16-20] 18  BP: (134-143)/(72-85) 141/85  SpO2:  [93 %-96 %] 96 %    Weights:   Vitals:    12/14/23 2202 12/15/23 0330   Weight: 64.9 kg (143 lb) 64 kg (141 lb 1.5 oz)    Body mass index is 22.1 kg/m .    Constitutional: Alert, oriented, cooperative, in no acute distress, appears nontoxic.  HENT: Oropharynx is clear and dry. No evidence of cranial trauma. Normocephalic. Eyes anicteric. EOM intact. PERRLA  Lymph/Hematologic: No anterior or posterior cervical or supraclavicular lymphadenopathy is appreciated.  Cardiovascular: Regular rate and rhythm, normal S1 and S2, and no murmur noted. Good peripheral pulses in wrists bilaterally. No lower extremity edema.  Respiratory: Diffuse bilateral inspiratory and expiratory wheezes.  GI: Soft, non-tender, normal bowel sounds, no hepatomegaly, no masses.  Musculoskeletal: Normal muscle bulk and  tone. FROM in all extremities   Skin: Warm and dry, no rashes.   Neurologic: Cranial nerves 2-12 are grossly intact.       Data   Recent Labs   Lab 12/16/23  0559 12/15/23  0425 12/14/23  2259 12/11/23  1301   WBC 10.0 10.8 10.6  --    HGB 12.1* 13.6 13.9  --    MCV 97 98 98  --     159 164  --    INR  --   --  1.08  --     140 139 139   POTASSIUM 4.4 3.9 4.4 3.9   CHLORIDE 99 99 98 97*   CO2 31* 32* 33* 30*   BUN 22.8 17.6 21.4 19.7   CR 0.70 0.70 0.74 0.99   ANIONGAP 8 9 8 12   LEANNA 8.5* 8.8 8.9 9.0   * 112* 172* 148*   ALBUMIN  --   --   --  3.8   PROTTOTAL  --   --   --  7.1   BILITOTAL  --   --   --  0.5   ALKPHOS  --   --   --  68   ALT  --   --   --  15   AST  --   --   --  21       Recent Labs   Lab 12/16/23  0559 12/15/23  0425 12/14/23  2259 12/11/23  1301   * 112* 172* 148*        Unresulted Labs Ordered in the Past 30 Days of this Admission       Date and Time Order Name Status Description    12/15/2023  4:13 AM Blood Culture Hand, Right Preliminary     12/15/2023  4:13 AM Blood Culture Arm, Right Preliminary     12/14/2023 10:50 PM Respiratory Aerobic Bacterial Culture with Gram Stain Preliminary     12/14/2023 10:50 PM Blood Culture Peripheral Blood Preliminary     12/14/2023 10:50 PM Blood Culture Peripheral Blood Preliminary              Imaging  No results found for this or any previous visit (from the past 24 hour(s)).     I reviewed all new labs and imaging results over the last 24 hours. I personally reviewed no images or EKG's today.    Medications      aspirin  81 mg Oral Daily    atorvastatin  20 mg Oral Daily    azithromycin  250 mg Intravenous Q24H    budesonide  0.5 mg Nebulization BID    carboxymethylcellulose PF  1 drop Both Eyes 4x Daily    cefTRIAXone  2 g Intravenous Q24H    enoxaparin ANTICOAGULANT  40 mg Subcutaneous Q24H    ipratropium - albuterol 0.5 mg/2.5 mg/3 mL  3 mL Nebulization Q6H    montelukast  10 mg Oral At Bedtime    morphine  15 mg Oral Q8H     nicotine  1 patch Transdermal Daily    nicotine   Transdermal Q8H    oxyBUTYnin ER  10 mg Oral QAM    pantoprazole  40 mg Oral QAM AC    predniSONE  40 mg Oral Daily    tamsulosin  0.4 mg Oral Daily    vancomycin  750 mg Intravenous Q12H     Hardeep Sykes PA-C

## 2023-12-16 NOTE — PLAN OF CARE
"  Problem: Gas Exchange Impaired  Goal: Optimal Gas Exchange  Intervention: Optimize Oxygenation and Ventilation  Recent Flowsheet Documentation  Taken 12/16/2023 0506 by Amy Evans RN  Head of Bed (HOB) Positioning: HOB at 30 degrees  Taken 12/16/2023 0044 by Amy Evans RN  Head of Bed (HOB) Positioning: HOB at 20-30 degrees  Taken 12/15/2023 2142 by Amy Evans RN  Head of Bed (HOB) Positioning: HOB at 15 degrees  Taken 12/15/2023 2028 by Amy Evans RN  Head of Bed (HOB) Positioning: HOB at 20-30 degrees     Problem: Gas Exchange Impaired  Goal: Optimal Gas Exchange  12/16/2023 0600 by Amy Evans RN  Outcome: Progressing  12/16/2023 0600 by Amy Evans RN  Outcome: Progressing  Intervention: Optimize Oxygenation and Ventilation  Recent Flowsheet Documentation  Taken 12/16/2023 0506 by Amy Evans RN  Head of Bed (HOB) Positioning: HOB at 30 degrees  Taken 12/16/2023 0044 by Amy Evans RN  Head of Bed (HOB) Positioning: HOB at 20-30 degrees  Taken 12/15/2023 2142 by Amy Evans RN  Head of Bed (HOB) Positioning: HOB at 15 degrees  Taken 12/15/2023 2028 by Amy Evans RN  Head of Bed (HOB) Positioning: HOB at 20-30 degrees   Goal Outcome Evaluation:                    Patient is alert and oriented but does not use call light and will set bed alarm off attempting to stand to use urinal at the bedside.  Pt maintaining oxygen saturations greater than 90% on 4L of oxygen via nasal cannula.  Pt using incentive spirometer and is now starting to clear sputum.  Pt reports that his breathing \"feels much better than when I came in and I can actually stand up and move in bed without becoming short of breath\".  IV saline locked    "

## 2023-12-16 NOTE — PLAN OF CARE
Problem: Gas Exchange Impaired  Goal: Optimal Gas Exchange  Outcome: Progressing  Intervention: Optimize Oxygenation and Ventilation  Recent Flowsheet Documentation  Taken 12/15/2023 1606 by Amy Wen RN  Head of Bed (HOB) Positioning: HOB at 20-30 degrees  Taken 12/15/2023 0752 by Amy Wen RN  Head of Bed (HOB) Positioning: HOB at 30 degrees     Problem: Adult Inpatient Plan of Care  Goal: Optimal Comfort and Wellbeing  Intervention: Monitor Pain and Promote Comfort  Recent Flowsheet Documentation  Taken 12/15/2023 0752 by Amy Wen, RN  Pain Management Interventions: medication (see MAR)   Goal Outcome Evaluation:       Pt progressing down to 4L NC. Pt has chronic pain, see MAR. Using urinal at beside. Luis AARON.

## 2023-12-17 VITALS
WEIGHT: 142.42 LBS | HEART RATE: 57 BPM | OXYGEN SATURATION: 89 % | SYSTOLIC BLOOD PRESSURE: 143 MMHG | HEIGHT: 67 IN | DIASTOLIC BLOOD PRESSURE: 88 MMHG | RESPIRATION RATE: 18 BRPM | BODY MASS INDEX: 22.35 KG/M2 | TEMPERATURE: 97.7 F

## 2023-12-17 LAB
ANION GAP SERPL CALCULATED.3IONS-SCNC: 9 MMOL/L (ref 7–15)
BASE EXCESS BLDV CALC-SCNC: 10.7 MMOL/L (ref -7.7–1.9)
BUN SERPL-MCNC: 23.1 MG/DL (ref 8–23)
CALCIUM SERPL-MCNC: 8.8 MG/DL (ref 8.8–10.2)
CHLORIDE SERPL-SCNC: 96 MMOL/L (ref 98–107)
CREAT SERPL-MCNC: 0.7 MG/DL (ref 0.67–1.17)
DEPRECATED HCO3 PLAS-SCNC: 31 MMOL/L (ref 22–29)
EGFRCR SERPLBLD CKD-EPI 2021: >90 ML/MIN/1.73M2
ERYTHROCYTE [DISTWIDTH] IN BLOOD BY AUTOMATED COUNT: 13 % (ref 10–15)
GLUCOSE SERPL-MCNC: 138 MG/DL (ref 70–99)
HCO3 BLDV-SCNC: 36 MMOL/L (ref 21–28)
HCT VFR BLD AUTO: 36.5 % (ref 40–53)
HGB BLD-MCNC: 12.1 G/DL (ref 13.3–17.7)
MCH RBC QN AUTO: 31.6 PG (ref 26.5–33)
MCHC RBC AUTO-ENTMCNC: 33.2 G/DL (ref 31.5–36.5)
MCV RBC AUTO: 95 FL (ref 78–100)
O2/TOTAL GAS SETTING VFR VENT: 21 %
PCO2 BLDV: 52 MM HG (ref 40–50)
PH BLDV: 7.46 [PH] (ref 7.32–7.43)
PLATELET # BLD AUTO: 166 10E3/UL (ref 150–450)
PO2 BLDV: 48 MM HG (ref 25–47)
POTASSIUM SERPL-SCNC: 4.2 MMOL/L (ref 3.4–5.3)
RBC # BLD AUTO: 3.83 10E6/UL (ref 4.4–5.9)
SODIUM SERPL-SCNC: 136 MMOL/L (ref 135–145)
WBC # BLD AUTO: 9.4 10E3/UL (ref 4–11)

## 2023-12-17 PROCEDURE — 258N000003 HC RX IP 258 OP 636: Performed by: INTERNAL MEDICINE

## 2023-12-17 PROCEDURE — 250N000013 HC RX MED GY IP 250 OP 250 PS 637: Performed by: EMERGENCY MEDICINE

## 2023-12-17 PROCEDURE — 250N000012 HC RX MED GY IP 250 OP 636 PS 637: Performed by: FAMILY MEDICINE

## 2023-12-17 PROCEDURE — 250N000011 HC RX IP 250 OP 636: Performed by: INTERNAL MEDICINE

## 2023-12-17 PROCEDURE — 80048 BASIC METABOLIC PNL TOTAL CA: CPT

## 2023-12-17 PROCEDURE — 36415 COLL VENOUS BLD VENIPUNCTURE: CPT

## 2023-12-17 PROCEDURE — 82803 BLOOD GASES ANY COMBINATION: CPT

## 2023-12-17 PROCEDURE — 85014 HEMATOCRIT: CPT

## 2023-12-17 PROCEDURE — 250N000013 HC RX MED GY IP 250 OP 250 PS 637: Performed by: INTERNAL MEDICINE

## 2023-12-17 PROCEDURE — 250N000011 HC RX IP 250 OP 636: Performed by: EMERGENCY MEDICINE

## 2023-12-17 PROCEDURE — 99239 HOSP IP/OBS DSCHRG MGMT >30: CPT | Performed by: FAMILY MEDICINE

## 2023-12-17 PROCEDURE — 250N000009 HC RX 250: Performed by: INTERNAL MEDICINE

## 2023-12-17 RX ORDER — DOXYCYCLINE 100 MG/1
100 CAPSULE ORAL 2 TIMES DAILY
Qty: 10 CAPSULE | Refills: 0 | Status: SHIPPED | OUTPATIENT
Start: 2023-12-17 | End: 2023-12-22

## 2023-12-17 RX ORDER — PREDNISONE 10 MG/1
TABLET ORAL
Qty: 30 TABLET | Refills: 0 | Status: SHIPPED | OUTPATIENT
Start: 2023-12-17 | End: 2024-03-07

## 2023-12-17 RX ADMIN — BUDESONIDE 0.5 MG: 0.5 INHALANT RESPIRATORY (INHALATION) at 07:53

## 2023-12-17 RX ADMIN — CARBOXYMETHYLCELLULOSE SODIUM 1 DROP: 5 SOLUTION/ DROPS OPHTHALMIC at 07:53

## 2023-12-17 RX ADMIN — IPRATROPIUM BROMIDE AND ALBUTEROL SULFATE 3 ML: 2.5; .5 SOLUTION RESPIRATORY (INHALATION) at 07:53

## 2023-12-17 RX ADMIN — MORPHINE SULFATE 15 MG: 15 TABLET, FILM COATED, EXTENDED RELEASE ORAL at 07:54

## 2023-12-17 RX ADMIN — PREDNISONE 40 MG: 20 TABLET ORAL at 07:54

## 2023-12-17 RX ADMIN — MORPHINE SULFATE 15 MG: 15 TABLET, FILM COATED, EXTENDED RELEASE ORAL at 01:44

## 2023-12-17 RX ADMIN — AZITHROMYCIN MONOHYDRATE 250 MG: 500 INJECTION, POWDER, LYOPHILIZED, FOR SOLUTION INTRAVENOUS at 02:49

## 2023-12-17 RX ADMIN — ATORVASTATIN CALCIUM 20 MG: 20 TABLET, FILM COATED ORAL at 07:54

## 2023-12-17 RX ADMIN — IPRATROPIUM BROMIDE AND ALBUTEROL SULFATE 3 ML: 2.5; .5 SOLUTION RESPIRATORY (INHALATION) at 01:56

## 2023-12-17 RX ADMIN — HYDROCODONE BITARTRATE AND ACETAMINOPHEN 1 TABLET: 5; 325 TABLET ORAL at 09:37

## 2023-12-17 RX ADMIN — TAMSULOSIN HYDROCHLORIDE 0.4 MG: 0.4 CAPSULE ORAL at 07:54

## 2023-12-17 RX ADMIN — OXYBUTYNIN 10 MG: 10 TABLET, FILM COATED, EXTENDED RELEASE ORAL at 09:37

## 2023-12-17 RX ADMIN — ASPIRIN 81 MG CHEWABLE TABLET 81 MG: 81 TABLET CHEWABLE at 07:54

## 2023-12-17 RX ADMIN — CEFTRIAXONE SODIUM 2 G: 2 INJECTION, POWDER, FOR SOLUTION INTRAMUSCULAR; INTRAVENOUS at 01:44

## 2023-12-17 RX ADMIN — PANTOPRAZOLE SODIUM 40 MG: 40 TABLET, DELAYED RELEASE ORAL at 07:54

## 2023-12-17 ASSESSMENT — ACTIVITIES OF DAILY LIVING (ADL)
ADLS_ACUITY_SCORE: 22

## 2023-12-17 NOTE — PLAN OF CARE
Problem: Adult Inpatient Plan of Care  Goal: Readiness for Transition of Care  Outcome: Progressing     Problem: Gas Exchange Impaired  Goal: Optimal Gas Exchange  Outcome: Progressing   Goal Outcome Evaluation:       Pt progressing. Pt has been on RA since 1500 with oxygen saturation between 89-93%. Pt reports no SOB this shift. Possible discharge tomorrow.

## 2023-12-17 NOTE — DISCHARGE SUMMARY
[facility] Discharge Summary    Martin Tovar MRN# [id number]   Age: 73 year old YOB: 1950     Date of Admission:  12/14/2023  Date of Discharge::  12/17/2023  Admitting Physician:  Nii Vicente MD  Discharge Physician:  Leonard Kimble MD             Admission Diagnoses:   Mucopurulent chronic bronchitis (H) [J41.1]  Acute respiratory failure with hypoxia (H) [J96.01]  Pneumonia of right lower lobe due to infectious organism [J18.9]          Principle Discharge Diagnosis:       Acute respiratory failure with hypoxia (H) due to pneumonia and COPD exacerbation   Bronchopneumonia of right lower lobe due to infectious organism    See hospital course for further active diagnoses addressed during this admission.                 Medications Prior to Admission:     Medications Prior to Admission   Medication Sig Dispense Refill Last Dose    albuterol (PROVENTIL HFA;VENTOLIN HFA) 90 mcg/actuation inhaler [ALBUTEROL (PROVENTIL HFA;VENTOLIN HFA) 90 MCG/ACTUATION INHALER] Inhale 2 puffs 4 (four) times a day as needed for wheezing.          12/14/2023    aspirin (ASA) 81 MG chewable tablet Take 81 mg by mouth daily   More than a month    atorvastatin (LIPITOR) 20 MG tablet Take 1 tablet (20 mg) by mouth daily 90 tablet 1 12/14/2023    azithromycin (ZITHROMAX) 250 MG tablet Take 250 mg by mouth See Admin Instructions TAKE 2 TABLETS ON 1ST DAY, THEN TAKE 1 TABLET BY MOUTH EVERY DAY FOR 4 DAYS FOR A TOTAL OF 5 DAYS   12/14/2023    cyclobenzaprine (FLEXERIL) 5 MG tablet Take 5 mg by mouth every 8 hours as needed for muscle spasms   Past Week    diclofenac (VOLTAREN) 1 % topical gel Apply topically 4 times daily as needed for moderate pain (hand joints)   More than a month    HYDROcodone-acetaminophen (NORCO) 7.5-325 mg per tablet [HYDROCODONE-ACETAMINOPHEN (NORCO) 7.5-325 MG PER TABLET] Take 1 tablet by mouth every 6 (six) hours as needed. 30 tablet 0 12/14/2023    ipratropium (ATROVENT) 0.02 %  nebulizer solution Take 0.5 mg by nebulization 4 times daily   12/14/2023    morphine (MS CONTIN) 15 MG 12 hr tablet Take 15 mg by mouth every 6 hours    12/14/2023 at 1800    omeprazole (PRILOSEC) 20 MG DR capsule Take 20 mg by mouth daily before breakfast   12/14/2023    oxybutynin ER (DITROPAN-XL) 10 MG 24 hr tablet Take 10 mg by mouth daily   12/14/2023    polyethylene glycol-propylene glycol (SYSTANE) 0.4-0.3 % SOLN ophthalmic solution Place 1 drop into both eyes 4 times daily   Past Week    predniSONE (DELTASONE) 20 MG tablet Take 1-2 tablets by mouth daily TAKE 2 TABLETS BY MOUTH DAILY IN THE MORNING FOR 4 DAYS. THEN 1 TABLET DAILY FOR 3 DAYS.   12/14/2023    tamsulosin (FLOMAX) 0.4 mg cap [TAMSULOSIN (FLOMAX) 0.4 MG CAP] Take 0.4 mg by mouth daily.   12/14/2023    WIXELA INHUB 250-50 MCG/ACT inhaler Inhale 1 puff into the lungs 2 times daily   12/14/2023    zolpidem (AMBIEN) 10 MG tablet Take 10 mg by mouth nightly as needed   Past Month             Discharge Medications:     Current Discharge Medication List        START taking these medications    Details   doxycycline hyclate (VIBRAMYCIN) 100 MG capsule Take 1 capsule (100 mg) by mouth 2 times daily for 5 days  Qty: 10 capsule, Refills: 0    Comments: Ok to substitute alternate formulation of doxycycline 100 mg pending cost/availability  Associated Diagnoses: Pneumonia of right lower lobe due to infectious organism           CONTINUE these medications which have CHANGED    Details   predniSONE (DELTASONE) 10 MG tablet 4 tablets by mouth daily x 3 days, then 3 tablets daily x 3 days, then 2 tablets x 3 days then 1 tablet x 3 days then stop.  Qty: 30 tablet, Refills: 0    Associated Diagnoses: COPD exacerbation (H)           CONTINUE these medications which have NOT CHANGED    Details   albuterol (PROVENTIL HFA;VENTOLIN HFA) 90 mcg/actuation inhaler [ALBUTEROL (PROVENTIL HFA;VENTOLIN HFA) 90 MCG/ACTUATION INHALER] Inhale 2 puffs 4 (four) times a day as  needed for wheezing.             aspirin (ASA) 81 MG chewable tablet Take 81 mg by mouth daily      atorvastatin (LIPITOR) 20 MG tablet Take 1 tablet (20 mg) by mouth daily  Qty: 90 tablet, Refills: 1    Associated Diagnoses: Hyperlipidemia      cyclobenzaprine (FLEXERIL) 5 MG tablet Take 5 mg by mouth every 8 hours as needed for muscle spasms      diclofenac (VOLTAREN) 1 % topical gel Apply topically 4 times daily as needed for moderate pain (hand joints)      HYDROcodone-acetaminophen (NORCO) 7.5-325 mg per tablet [HYDROCODONE-ACETAMINOPHEN (NORCO) 7.5-325 MG PER TABLET] Take 1 tablet by mouth every 6 (six) hours as needed.  Qty: 30 tablet, Refills: 0    Associated Diagnoses: Chronic pain      ipratropium (ATROVENT) 0.02 % nebulizer solution Take 0.5 mg by nebulization 4 times daily      morphine (MS CONTIN) 15 MG 12 hr tablet Take 15 mg by mouth every 6 hours       omeprazole (PRILOSEC) 20 MG DR capsule Take 20 mg by mouth daily before breakfast      oxybutynin ER (DITROPAN-XL) 10 MG 24 hr tablet Take 10 mg by mouth daily      polyethylene glycol-propylene glycol (SYSTANE) 0.4-0.3 % SOLN ophthalmic solution Place 1 drop into both eyes 4 times daily      tamsulosin (FLOMAX) 0.4 mg cap [TAMSULOSIN (FLOMAX) 0.4 MG CAP] Take 0.4 mg by mouth daily.      WIXELA INHUB 250-50 MCG/ACT inhaler Inhale 1 puff into the lungs 2 times daily      zolpidem (AMBIEN) 10 MG tablet Take 10 mg by mouth nightly as needed           STOP taking these medications       azithromycin (ZITHROMAX) 250 MG tablet Comments:   Reason for Stopping:                          Brief History of Illness from time of admission:     From Admission H+P:   Martin Tovar is an 73 year old male who presents with above complaints..  Patient is a lifelong smoker (1 pack/day).  He was diagnosed with pneumonia about a week ago and was started on azithromycin.  According to the patient his condition did not improve.  He continued to be short of breath, weak.   "He continued to have productive cough.  Evaluation emergency room confirmed diagnosis of pneumonia.  Patient started on azithromycin and ceftriaxone, breathing treatments, steroids.  Medical services consulted for admission.  Patient not using any oxygen at home.  Currently requires oxygen supplementation to keep pulse ox above 92%.             TODAY:     Subjective:  Doing well.  On room air and feels good with this.  Not yet to baseline but feels much improved and ready for discharge home today.  No new concerns.   No other pain.       ROS:   ROS: 10 point ROS neg other than the symptoms noted above in the HPI.   BP (!) 143/88 (BP Location: Right arm)   Pulse 57   Temp 97.7  F (36.5  C) (Oral)   Resp 18   Ht 1.702 m (5' 7\")   Wt 64.6 kg (142 lb 6.7 oz)   SpO2 (!) 89%   BMI 22.31 kg/m     EXAM:  General: awake and alert, NAD, oriented x 3  Head: normocephalic  Neck: unremarkable, no lymphadenopathy   HEENT: oropharynx pink and moist    Heart: Regular rate and rhythm, no murmurs, rubs, or gallops  Lungs: mild wheezing still but otherwise clear to auscultation bilaterally with good air movement throughout  Abdomen: soft, non-tender, no masses or organomegaly  Extremities: no edema in lower extremities   Skin unremarkable as visualized.       Hospital Course:      Martin Tovar is a 71 year old male admitted on 12/14/2021. He has history of small cell lung cancer status post chemo and radiation, COPD, tobacco abuse, chronic pain, BPH, anxiety, CAD, essential hypertension, dyslipidemia.  He came to the emergency department for evaluation of worsening SOB after being diagnosed with PNA on 12/11.     Acute respiratory failure with hypoxia (H) due to strep pneumo pneumonia and COPD exacerbation   Bronchopneumonia of right lower lobe due to infectious organism  States he was diagnosed with PNA 1wk prior to admission and started on Azithromycin. Unable to view these records. Presents with worsening SOB. Has a " history of lung cancer (see below). Not on oxygen at home but requiring O2 to maintain saturations here. Has COPD (see below). Ambulates without a walker. 1ppd smoker but quit prior to admission.  CT showed RLL bronchopneumonia worse from 8/28/23 CT. Lactate, troponin, WBC WNL.    12/16: on 3-5 L NC with SpO2 93-96%. VBG today shows pH 7.4, CO2 58, bicarb 36. Pt feels better but not quite at baseline. Wants to discharge and willing to wean oxygen.   - Wean oxygen as able to maintain SpO2 88-92%  - strep pneumo urine antigen positive and strep pneumo growing from sputum- this appears to be the causative organism here   - blood cultures NGTD; confirm final results at follow-up with primary care provider   - started on azithromycin, and rocephin in the ER, continued for now  - Vancomycin was started on admission but discontinued due to strep pneumo positive and no significant MRSA risk  - ok for discharge on course of doxycycline.        COPD with exacerbation due to pneumonia as above  Given Solumedrol in ER. Unable to find prior PFTs.  - Prednisone 40 mg daily   - 4 times daily duonebs   - Held home atrovent and Wixela inhaler for now - resume Wixela on discharge   - prednisone taper on discharge        Mild CAD with history of type 2 NSTEMI (non-ST elevated myocardial infarction) (H)  Follows with cardiology (Dr. Cervantes). Last cardiology visit 6/9/23 states angiography showed ostial LAD 35% lesion with mid 35% lesion, proximal circumflex 25% lesion and normal RCA and left main. No plan for invasive angiography. Stable.  - continued home aspirin        History of Small cell lung cancer (H)  Diagnosed 2012. S/p chemoradiation therapy. Found to have new diagnosis of limited stage small cell lung cancer, second primary/recurrence, clinical stage T1N0M0. Had a scan in August showing no recurrence. Scheduled for CT CAP in 3 months. Followed by Dr. Hale, thoracic surgeon and Dr. Brito, pulmonology.        Diabetes  mellitus without complication (H)  Listed in history but not on any medications and last A1c was 6.1 2 years ago. Glucose normal here. Given significant weight loss this is resolved.   - follow-up with primary care provider.        Essential hypertension  Not on any medications.  Blood pressure mildly up so far.        Chronic pain  No report of pain 12/16    - continued home flexeril prn   - continued home prn voltaren gel for hand joints   - continued home MScontin and norco prn        Benign prostatic hyperplasia without lower urinary tract symptoms  Last PSA 1.7 on 4/16/21.  - continued home ditropan and flomax         Anxiety  Not on any medications, doing well so far         Hyperlipidemia  LDL 50. Continued home lipitor         Perry's esophagus with dysplasia  On protonix in place of home omeprazole while inpatient      Moderate malnutrition in the context of acute/chronic illness   Nutrition consulted, see notes for details.       Tobacco use disorder  Quit 5 days prior to admission.  Does not want nicotine replacement or other help, but will order if desired.         Insomnia  Continued home ambien but at 5mg instead of 10mg given age                 Diet: Regular Diet Adult  Snacks/Supplements Adult: Ensure Enlive; With Meals    DVT Prophylaxis: Enoxaparin (Lovenox) SQ  Escalera Catheter: Not present  Lines: Peripheral IV     Code Status:  Full Code        Discharge Instructions and Follow-Up:      Discharge diet: Orders Placed This Encounter      Regular Diet Adult       Discharge activity: Activity as tolerated   Discharge follow-up: Follow-up with primary care provider in 1 week            Discharge Disposition:     Discharged to home       Attestation:  Amount of time performed on this discharge summary: 45 minutes.    Leonard Kimble MD

## 2023-12-17 NOTE — PROGRESS NOTES
[facility] DISCHARGE NOTE    Patient discharged to home at 12:04 PM via wheel chair. Accompanied by  RN and staff. Discharge instructions reviewed with patient, opportunity offered to ask questions. Prescriptions filled and sent with patient upon discharge. All belongings sent with patient.    Constanza Unger RN

## 2023-12-17 NOTE — PLAN OF CARE
Goal Outcome Evaluation:      Plan of Care Reviewed With: patient    Overall Patient Progress: improving    Outcome Evaluation: patient continues on IV antibiotics. O2 sats maintained >88 on room air. patient denies shortness of breath.    Continue to assess per plan of care and update care team as needed.

## 2023-12-18 RX ORDER — ATORVASTATIN CALCIUM 20 MG/1
20 TABLET, FILM COATED ORAL DAILY
Qty: 90 TABLET | Refills: 1 | Status: SHIPPED | OUTPATIENT
Start: 2023-12-18 | End: 2024-06-10

## 2023-12-20 LAB
BACTERIA BLD CULT: NO GROWTH
BACTERIA SPT CULT: ABNORMAL
BACTERIA SPT CULT: ABNORMAL
GRAM STAIN RESULT: ABNORMAL

## 2023-12-22 ENCOUNTER — LAB REQUISITION (OUTPATIENT)
Dept: LAB | Facility: CLINIC | Age: 73
End: 2023-12-22
Payer: MEDICARE

## 2023-12-22 DIAGNOSIS — J18.9 PNEUMONIA, UNSPECIFIED ORGANISM: ICD-10-CM

## 2023-12-22 LAB
ERYTHROCYTE [DISTWIDTH] IN BLOOD BY AUTOMATED COUNT: 13.6 % (ref 10–15)
HCT VFR BLD AUTO: 44.6 % (ref 40–53)
HGB BLD-MCNC: 14.6 G/DL (ref 13.3–17.7)
MCH RBC QN AUTO: 31.1 PG (ref 26.5–33)
MCHC RBC AUTO-ENTMCNC: 32.7 G/DL (ref 31.5–36.5)
MCV RBC AUTO: 95 FL (ref 78–100)
PLATELET # BLD AUTO: 290 10E3/UL (ref 150–450)
RBC # BLD AUTO: 4.69 10E6/UL (ref 4.4–5.9)
WBC # BLD AUTO: 10.7 10E3/UL (ref 4–11)

## 2023-12-22 PROCEDURE — 85027 COMPLETE CBC AUTOMATED: CPT | Mod: ORL | Performed by: PHYSICIAN ASSISTANT

## 2023-12-22 PROCEDURE — 80053 COMPREHEN METABOLIC PANEL: CPT | Mod: ORL | Performed by: PHYSICIAN ASSISTANT

## 2023-12-23 LAB
ALBUMIN SERPL BCG-MCNC: 3.7 G/DL (ref 3.5–5.2)
ALP SERPL-CCNC: 56 U/L (ref 40–150)
ALT SERPL W P-5'-P-CCNC: 88 U/L (ref 0–70)
ANION GAP SERPL CALCULATED.3IONS-SCNC: 14 MMOL/L (ref 7–15)
AST SERPL W P-5'-P-CCNC: 16 U/L (ref 0–45)
BILIRUB SERPL-MCNC: 0.3 MG/DL
BUN SERPL-MCNC: 23.7 MG/DL (ref 8–23)
CALCIUM SERPL-MCNC: 8.8 MG/DL (ref 8.8–10.2)
CHLORIDE SERPL-SCNC: 99 MMOL/L (ref 98–107)
CREAT SERPL-MCNC: 0.93 MG/DL (ref 0.67–1.17)
DEPRECATED HCO3 PLAS-SCNC: 26 MMOL/L (ref 22–29)
EGFRCR SERPLBLD CKD-EPI 2021: 88 ML/MIN/1.73M2
GLUCOSE SERPL-MCNC: 230 MG/DL (ref 70–99)
POTASSIUM SERPL-SCNC: 4.7 MMOL/L (ref 3.4–5.3)
PROT SERPL-MCNC: 6.5 G/DL (ref 6.4–8.3)
SODIUM SERPL-SCNC: 139 MMOL/L (ref 135–145)

## 2024-01-10 ENCOUNTER — APPOINTMENT (OUTPATIENT)
Dept: CT IMAGING | Facility: CLINIC | Age: 74
End: 2024-01-10
Attending: EMERGENCY MEDICINE
Payer: MEDICARE

## 2024-01-10 ENCOUNTER — HOSPITAL ENCOUNTER (EMERGENCY)
Facility: CLINIC | Age: 74
Discharge: HOME | End: 2024-01-10
Attending: EMERGENCY MEDICINE | Admitting: EMERGENCY MEDICINE
Payer: MEDICARE

## 2024-01-10 VITALS
TEMPERATURE: 98 F | SYSTOLIC BLOOD PRESSURE: 178 MMHG | HEART RATE: 64 BPM | RESPIRATION RATE: 18 BRPM | DIASTOLIC BLOOD PRESSURE: 51 MMHG | OXYGEN SATURATION: 95 % | WEIGHT: 145 LBS | HEIGHT: 67 IN | BODY MASS INDEX: 22.76 KG/M2

## 2024-01-10 DIAGNOSIS — S09.90XA MINOR CLOSED HEAD INJURY: ICD-10-CM

## 2024-01-10 DIAGNOSIS — S16.1XXA CERVICAL STRAIN, INITIAL ENCOUNTER: ICD-10-CM

## 2024-01-10 DIAGNOSIS — S22.41XA CLOSED FRACTURE OF MULTIPLE RIBS OF RIGHT SIDE, INITIAL ENCOUNTER: ICD-10-CM

## 2024-01-10 LAB
ALBUMIN SERPL BCG-MCNC: 3.6 G/DL (ref 3.5–5.2)
ALBUMIN UR-MCNC: NEGATIVE MG/DL
ALP SERPL-CCNC: 66 U/L (ref 40–150)
ALT SERPL W P-5'-P-CCNC: 16 U/L (ref 0–70)
ANION GAP SERPL CALCULATED.3IONS-SCNC: 10 MMOL/L (ref 7–15)
APPEARANCE UR: CLEAR
AST SERPL W P-5'-P-CCNC: 13 U/L (ref 0–45)
BASOPHILS # BLD AUTO: 0 10E3/UL (ref 0–0.2)
BASOPHILS NFR BLD AUTO: 0 %
BILIRUB SERPL-MCNC: 0.4 MG/DL
BILIRUB UR QL STRIP: NEGATIVE
BUN SERPL-MCNC: 17.8 MG/DL (ref 8–23)
CALCIUM SERPL-MCNC: 9 MG/DL (ref 8.8–10.2)
CHLORIDE SERPL-SCNC: 101 MMOL/L (ref 98–107)
COLOR UR AUTO: YELLOW
CREAT SERPL-MCNC: 0.83 MG/DL (ref 0.67–1.17)
DEPRECATED HCO3 PLAS-SCNC: 28 MMOL/L (ref 22–29)
EGFRCR SERPLBLD CKD-EPI 2021: >90 ML/MIN/1.73M2
EOSINOPHIL # BLD AUTO: 0 10E3/UL (ref 0–0.7)
EOSINOPHIL NFR BLD AUTO: 0 %
ERYTHROCYTE [DISTWIDTH] IN BLOOD BY AUTOMATED COUNT: 13.9 % (ref 10–15)
GLUCOSE SERPL-MCNC: 127 MG/DL (ref 70–99)
GLUCOSE UR STRIP-MCNC: NEGATIVE MG/DL
HCT VFR BLD AUTO: 41.4 % (ref 40–53)
HGB BLD-MCNC: 13.4 G/DL (ref 13.3–17.7)
HGB UR QL STRIP: NEGATIVE
IMM GRANULOCYTES # BLD: 0 10E3/UL
IMM GRANULOCYTES NFR BLD: 0 %
KETONES UR STRIP-MCNC: NEGATIVE MG/DL
LEUKOCYTE ESTERASE UR QL STRIP: NEGATIVE
LIPASE SERPL-CCNC: 15 U/L (ref 13–60)
LYMPHOCYTES # BLD AUTO: 0.7 10E3/UL (ref 0.8–5.3)
LYMPHOCYTES NFR BLD AUTO: 8 %
MCH RBC QN AUTO: 31.6 PG (ref 26.5–33)
MCHC RBC AUTO-ENTMCNC: 32.4 G/DL (ref 31.5–36.5)
MCV RBC AUTO: 98 FL (ref 78–100)
MONOCYTES # BLD AUTO: 0.4 10E3/UL (ref 0–1.3)
MONOCYTES NFR BLD AUTO: 4 %
MUCOUS THREADS #/AREA URNS LPF: PRESENT /LPF
NEUTROPHILS # BLD AUTO: 8 10E3/UL (ref 1.6–8.3)
NEUTROPHILS NFR BLD AUTO: 88 %
NITRATE UR QL: NEGATIVE
NRBC # BLD AUTO: 0 10E3/UL
NRBC BLD AUTO-RTO: 0 /100
PH UR STRIP: 6 [PH] (ref 5–7)
PLATELET # BLD AUTO: 148 10E3/UL (ref 150–450)
POTASSIUM SERPL-SCNC: 4.5 MMOL/L (ref 3.4–5.3)
PROT SERPL-MCNC: 6.5 G/DL (ref 6.4–8.3)
RBC # BLD AUTO: 4.24 10E6/UL (ref 4.4–5.9)
RBC URINE: <1 /HPF
SODIUM SERPL-SCNC: 139 MMOL/L (ref 135–145)
SP GR UR STRIP: 1.02 (ref 1–1.03)
UROBILINOGEN UR STRIP-MCNC: 2 MG/DL
WBC # BLD AUTO: 9.1 10E3/UL (ref 4–11)
WBC URINE: <1 /HPF

## 2024-01-10 PROCEDURE — 250N000011 HC RX IP 250 OP 636: Performed by: EMERGENCY MEDICINE

## 2024-01-10 PROCEDURE — 99284 EMERGENCY DEPT VISIT MOD MDM: CPT | Performed by: EMERGENCY MEDICINE

## 2024-01-10 PROCEDURE — 250N000009 HC RX 250: Performed by: EMERGENCY MEDICINE

## 2024-01-10 PROCEDURE — 96361 HYDRATE IV INFUSION ADD-ON: CPT | Performed by: EMERGENCY MEDICINE

## 2024-01-10 PROCEDURE — 85025 COMPLETE CBC W/AUTO DIFF WBC: CPT | Performed by: EMERGENCY MEDICINE

## 2024-01-10 PROCEDURE — 81001 URINALYSIS AUTO W/SCOPE: CPT | Performed by: EMERGENCY MEDICINE

## 2024-01-10 PROCEDURE — 71260 CT THORAX DX C+: CPT | Mod: MA

## 2024-01-10 PROCEDURE — 72125 CT NECK SPINE W/O DYE: CPT | Mod: MA

## 2024-01-10 PROCEDURE — 258N000003 HC RX IP 258 OP 636: Performed by: EMERGENCY MEDICINE

## 2024-01-10 PROCEDURE — 83690 ASSAY OF LIPASE: CPT | Performed by: EMERGENCY MEDICINE

## 2024-01-10 PROCEDURE — 36415 COLL VENOUS BLD VENIPUNCTURE: CPT | Performed by: EMERGENCY MEDICINE

## 2024-01-10 PROCEDURE — 70450 CT HEAD/BRAIN W/O DYE: CPT | Mod: MA

## 2024-01-10 PROCEDURE — 96374 THER/PROPH/DIAG INJ IV PUSH: CPT | Mod: 59 | Performed by: EMERGENCY MEDICINE

## 2024-01-10 PROCEDURE — 99285 EMERGENCY DEPT VISIT HI MDM: CPT | Mod: 25 | Performed by: EMERGENCY MEDICINE

## 2024-01-10 PROCEDURE — 80053 COMPREHEN METABOLIC PANEL: CPT | Performed by: EMERGENCY MEDICINE

## 2024-01-10 RX ORDER — OXYCODONE AND ACETAMINOPHEN 5; 325 MG/1; MG/1
1-2 TABLET ORAL EVERY 6 HOURS PRN
Qty: 15 TABLET | Refills: 0 | Status: SHIPPED | OUTPATIENT
Start: 2024-01-10 | End: 2024-07-12

## 2024-01-10 RX ORDER — KETOROLAC TROMETHAMINE 15 MG/ML
15 INJECTION, SOLUTION INTRAMUSCULAR; INTRAVENOUS ONCE
Status: COMPLETED | OUTPATIENT
Start: 2024-01-10 | End: 2024-01-10

## 2024-01-10 RX ORDER — IOPAMIDOL 755 MG/ML
78 INJECTION, SOLUTION INTRAVASCULAR ONCE
Status: COMPLETED | OUTPATIENT
Start: 2024-01-10 | End: 2024-01-10

## 2024-01-10 RX ADMIN — KETOROLAC TROMETHAMINE 15 MG: 15 INJECTION, SOLUTION INTRAMUSCULAR; INTRAVENOUS at 14:46

## 2024-01-10 RX ADMIN — IOPAMIDOL 78 ML: 755 INJECTION, SOLUTION INTRAVENOUS at 15:33

## 2024-01-10 RX ADMIN — SODIUM CHLORIDE 500 ML: 9 INJECTION, SOLUTION INTRAVENOUS at 14:45

## 2024-01-10 RX ADMIN — SODIUM CHLORIDE 59 ML: 9 INJECTION, SOLUTION INTRAVENOUS at 15:33

## 2024-01-10 ASSESSMENT — ACTIVITIES OF DAILY LIVING (ADL)
ADLS_ACUITY_SCORE: 35
ADLS_ACUITY_SCORE: 35

## 2024-01-10 NOTE — DISCHARGE INSTRUCTIONS
Avoid cigarette smoking and use the incentive spirometer hourly until rib pain has resolved.    Return for any signs or symptoms of pneumonia, fever or any new problems or concerns.

## 2024-01-10 NOTE — ED NOTES
73-year-old male with significant past medical history initially presented to the urgent care for evaluation of right-sided rib pain after fall which occurred 1-2 nights prior to arrival.  Also admitted to neck pain.  He denied any current head pain at this time however states he is unsure if he lost consciousness during his fall.  He had elevated blood pressure upon arrival and oxygen saturations were between 88 to 90%.  Given patient's age, current vitals, past medical history including recent hospitalization for pneumonia last month recommend evaluation in the emergency department for CT evaluation which is outside typical scope available in the urgent care.  Patient was amenable to plan.  He was transferred to ED triage nurse.      Disclaimer: This note consists of symbols derived from keyboarding, dictation, and/or voice recognition software. As a result, there may be errors in the script that have gone undetected.  Please consider this when interpreting information found in the chart.       Acacia Biswas PA-C  01/10/24 1242

## 2024-01-10 NOTE — ED TRIAGE NOTES
"Pt states he let the dog out and fell backwards , hit head on hardwood floor.     Neck and right rib pain since incident occurred yesterday 1/9/24    Pt \" thinks I lost consciousness \" but not sure.       Writer and provider spoke with pt about treatment and diagnostic options in ED vs. UC. Pt agreed to be evaluated in the ED.     "

## 2024-01-10 NOTE — ED NOTES
Pt reports falling backwards hitting back of head on Monday during night when getting up to let dog out.  Pt complaining of neck and right rib pain.   Pt not on blood thinner.

## 2024-01-10 NOTE — ED TRIAGE NOTES
Pt fell at 0200 on 1/9/2023.  Tripped over his dog. Unsure if he had +LOC.  Denies blood thinners.  He has neck pain and R sided rib pain.      Triage Assessment (Adult)       Row Name 01/10/24 1244          Triage Assessment    Airway WDL WDL        Respiratory WDL    Respiratory WDL WDL        Peripheral/Neurovascular WDL    Peripheral Neurovascular WDL WDL

## 2024-01-10 NOTE — ED PROVIDER NOTES
"  History     Chief Complaint   Patient presents with    Fall     HPI  As mentioned in the HPI, in addition focused review of systems was negative.    Martin Tovar is a 73 year old male who presents ~ 36 hours to 60 hours after losing his balance from tripping over his dog causing a fall at home onto hardwood floor with occipital head injury with possible brief LOC, he is unsure if he lost consciousness but he \"saw stars\", with posterior neck stiffness and pain and primary injury of right chest wall and right flank pain.  He is unsure if he fell in the early morning 1 day ago (approximately 36 hours ago) or 2 days ago (approximately 60 hours ago).  He has no headache, nausea, vomiting, visual, motor or sensory deficit or abnormality.  He has no shortness of breath, cough or hemoptysis.  He has no signs or symptoms of GI bleeding.  Primary complaint of right sided \"rib pain\" is sharp, severe, exacerbated by change in position and movement, exacerbated by deep inspiration and also exacerbated coming to the emergency department when he reached over and leaned over against the center console of his vehicle and compressed the right chest wall.  No fever or chills.  He smokes.  No anticoagulation therapy.  Recent history of right pneumonia most pronounced in the right lower lobe approximately 1 month ago, diagnosed on CT scan 12/15/2023.  He completed antibiotic therapy for this with IV ceftriaxone and azithromycin followed by doxycycline p.o.and has continued to use an incentive spirometer daily.  He has chronic pain syndrome, chronic back pain and takes hydrocodone 7.5 mg/acetaminophen 325 mg 1 tablet daily.  No other pertinent history or acute complaints or concerns.    Urgent Care Provider's note this AM PTA in the ED:       73-year-old male with significant past medical history initially presented to the urgent care for evaluation of right-sided rib pain after fall which occurred 1-2 nights prior to arrival.  " Also admitted to neck pain.  He denied any current head pain at this time however states he is unsure if he lost consciousness during his fall.  He had elevated blood pressure upon arrival and oxygen saturations were between 88 to 90%.  Given patient's age, current vitals, past medical history including recent hospitalization for pneumonia last month recommend evaluation in the emergency department for CT evaluation which is outside typical scope available in the urgent care.  Patient was amenable to plan.  He was transferred to ED triage nurse.        Allergies:  Allergies   Allergen Reactions    Ciprofloxacin Swelling and Rash     Throat swelling    Septra [Sulfamethoxazole-Trimethoprim] Swelling and Rash       Problem List:    Patient Active Problem List    Diagnosis Date Noted    Mucopurulent chronic bronchitis (H) 12/15/2023     Priority: Medium    Acute respiratory failure with hypoxia (H) 12/15/2023     Priority: Medium    Pneumonia of right lower lobe due to infectious organism 12/15/2023     Priority: Medium    Weight loss 06/09/2023     Priority: Medium    Chest pain 03/30/2023     Priority: Medium    Perry's esophagus with dysplasia 12/01/2022     Priority: Medium    Mild coronary artery disease 08/18/2021     Priority: Medium     Per coronary angiogram on 8/5/2021      Elevated troponin 08/04/2021     Priority: Medium    NSTEMI (non-ST elevated myocardial infarction) (H) 08/04/2021     Priority: Medium    Small cell lung cancer (H) 02/13/2020     Priority: Medium    Anxiety      Priority: Medium    Acute hypoxemic respiratory failure (H)      Priority: Medium    Benign prostatic hyperplasia without lower urinary tract symptoms 01/05/2019     Priority: Medium    COPD exacerbation (H)      Priority: Medium    Intra-abdominal fluid collection 12/31/2015     Priority: Medium    Chronic renal failure      Priority: Medium    Bile leak, postoperative (lap anna 12/16, s/p ercp with stent 12/22/15) 12/23/2015      Priority: Medium    Chronic pain 12/21/2015     Priority: Medium    COPD (chronic obstructive pulmonary disease) (H) 12/21/2015     Priority: Medium    Acute kidney injury (H24) 12/21/2015     Priority: Medium    Status post laparoscopic-assisted sigmoidectomy 11/11/2013     Priority: Medium    Tobacco use disorder 12/19/2012     Priority: Medium    Degeneration of lumbar or lumbosacral intervertebral disc 12/14/2012     Priority: Medium    Diabetes mellitus without complication (H) 12/14/2012     Priority: Medium    Essential hypertension 12/14/2012     Priority: Medium    Hyperlipidemia 12/14/2012     Priority: Medium        Past Medical History:    Past Medical History:   Diagnosis Date    Asthma     Avascular necrosis of bones of both hips (H)     Cancer (H)     Cholecystitis     Chronic renal failure     COPD, mild (H)     Diabetes mellitus (H)     Disc degeneration, lumbar     DJD (degenerative joint disease) of knee     Elevated PSA     Hemoptysis 2/16/2020    LBP (low back pain)     Lung cancer (H)     Pneumonia 1/4/2019    Smoking hx        Past Surgical History:    Past Surgical History:   Procedure Laterality Date    ARTHROSCOPY KNEE      CERVICAL FUSION      CT BIOPSY LUNG  2/5/2020    CV CORONARY ANGIOGRAM N/A 8/5/2021    Procedure: CV CORONARY ANGIOGRAM;  Surgeon: Fabio Espinosa MD;  Location: [facility] CV    CV CORONARY ANGIOGRAM N/A 4/13/2023    Procedure: Coronary Angiogram;  Surgeon: Mikayla Floyd MD;  Location: [facility] CV    CV LEFT VENTRICULOGRAM N/A 8/5/2021    Procedure: Left Ventriculogram;  Surgeon: Fabio Espinosa MD;  Location: [facility] CV    ENDOSCOPIC RETROGRADE CHOLANGIOPANCREATOGRAM N/A 12/22/2015    Procedure: ENDOSCOPIC RETROGRADE CHOLANGIOPANCREATOGRAPHY;  Surgeon: Tang Elliott MD;  Location: [facility] OR;  Service:     HEMORRHOID SURGERY      INGUINAL HERNIA REPAIR      IR LUMBAR EPIDURAL STEROID INJECTION  4/29/2003     "IR LUMBAR EPIDURAL STEROID INJECTION  6/27/2007    IR LUNG BIOPSY MEDIASTINUM LEFT  12/21/2012    LAPAROSCOPIC CHOLECYSTECTOMY N/A 12/16/2015    Procedure: CHOLECYSTECTOMY LAPAROSCOPIC;  Surgeon: Eugene Ro MD;  Location: [facility];  Service:     LUMBAR FUSION  2006    & reconstruction    OTHER SURGICAL HISTORY  2013    COLOVESICULAR FISTULA REPAIR    PICC  1/3/2016         RELEASE TRIGGER FINGER  07/2020    TOTAL KNEE ARTHROPLASTY Left        Family History:    History reviewed. No pertinent family history.    Social History:  Marital Status:   [2]  Social History     Tobacco Use    Smoking status: Every Day     Packs/day: 1     Types: Cigarettes    Smokeless tobacco: Never    Tobacco comments:     1 ppd as of 3/2023   Substance Use Topics    Alcohol use: Not Currently    Drug use: No        Medications:    oxyCODONE-acetaminophen (PERCOCET) 5-325 MG tablet  albuterol (PROVENTIL HFA;VENTOLIN HFA) 90 mcg/actuation inhaler  aspirin (ASA) 81 MG chewable tablet  atorvastatin (LIPITOR) 20 MG tablet  cyclobenzaprine (FLEXERIL) 5 MG tablet  diclofenac (VOLTAREN) 1 % topical gel  HYDROcodone-acetaminophen (NORCO) 7.5-325 mg per tablet  ipratropium (ATROVENT) 0.02 % nebulizer solution  morphine (MS CONTIN) 15 MG 12 hr tablet  omeprazole (PRILOSEC) 20 MG DR capsule  oxybutynin ER (DITROPAN-XL) 10 MG 24 hr tablet  polyethylene glycol-propylene glycol (SYSTANE) 0.4-0.3 % SOLN ophthalmic solution  predniSONE (DELTASONE) 10 MG tablet  tamsulosin (FLOMAX) 0.4 mg cap  WIXELA INHUB 250-50 MCG/ACT inhaler  zolpidem (AMBIEN) 10 MG tablet          Review of Systems  As mentioned in the HPI, in addition focused review of systems was negative.      Physical Exam   BP: (!) 148/85  Pulse: 71  Temp: 98  F (36.7  C)  Resp: 18  Height: 170.2 cm (5' 7\")  Weight: 65.8 kg (145 lb)  SpO2: (!) 89 % (88-90)      Physical Exam  Vitals and nursing note reviewed.   Constitutional:       General: He is not in acute distress.     " Appearance: Normal appearance. He is well-developed. He is not ill-appearing or diaphoretic.   HENT:      Head: Normocephalic and atraumatic. No raccoon eyes, Awan's sign, abrasion or contusion.      Right Ear: Tympanic membrane, ear canal and external ear normal. No drainage. No hemotympanum.      Left Ear: Tympanic membrane, ear canal and external ear normal. No drainage. No hemotympanum.      Nose: Nose normal.      Mouth/Throat:      Mouth: Mucous membranes are moist.      Pharynx: Oropharynx is clear.   Eyes:      General: No scleral icterus.     Extraocular Movements: Extraocular movements intact.      Conjunctiva/sclera: Conjunctivae normal.      Pupils: Pupils are equal, round, and reactive to light.   Neck:      Trachea: No tracheal deviation.     Cardiovascular:      Rate and Rhythm: Normal rate and regular rhythm.      Heart sounds: Normal heart sounds. No murmur heard.     No friction rub. No gallop.   Pulmonary:      Effort: Pulmonary effort is normal. No respiratory distress.      Breath sounds: Normal breath sounds. No stridor. No wheezing, rhonchi or rales.      Comments: No chest wall contusion, abrasions, crepitance or bony step-off/defect/deformity.  Right chest wall tenderness as diagrammed.  Chest:      Chest wall: Tenderness (as diagrammed, no contusions or abrasions.) present. No deformity, swelling, crepitus or edema.   Abdominal:      General: Bowel sounds are normal. There is no distension or abdominal bruit.      Palpations: Abdomen is soft. There is no pulsatile mass.      Tenderness: There is no abdominal tenderness. There is no right CVA tenderness, left CVA tenderness, guarding or rebound.          Comments: No abdominal contusions or abrasions.   Musculoskeletal:         General: Normal range of motion.        Arms:       Cervical back: Normal range of motion and neck supple. Tenderness (Bilateral paraspinous muscular area tenderness.) present. No swelling, edema, rigidity, bony  tenderness or crepitus. Pain with movement and muscular tenderness present. No spinous process tenderness. Normal range of motion.      Thoracic back: Tenderness and bony tenderness present. No signs of trauma. Normal range of motion.      Lumbar back: Normal. No signs of trauma or bony tenderness.        Back:       Right lower leg: No edema.      Left lower leg: No edema.      Comments: No contusions or abrasions.   Skin:     General: Skin is warm and dry.      Coloration: Skin is not pale.      Findings: No bruising, erythema or rash.   Neurological:      General: No focal deficit present.      Mental Status: He is alert and oriented to person, place, and time.      GCS: GCS eye subscore is 4. GCS verbal subscore is 5. GCS motor subscore is 6.      Cranial Nerves: Cranial nerves 2-12 are intact. No cranial nerve deficit, dysarthria or facial asymmetry.      Sensory: Sensation is intact. No sensory deficit.      Motor: Motor function is intact. No weakness or abnormal muscle tone.      Coordination: Coordination is intact. Coordination normal.      Gait: Gait is intact.   Psychiatric:         Mood and Affect: Mood normal.         Behavior: Behavior normal.           ED Course             Procedures                Results for orders placed or performed during the hospital encounter of 01/10/24 (from the past 24 hour(s))   Comprehensive metabolic panel   Result Value Ref Range    Sodium 139 135 - 145 mmol/L    Potassium 4.5 3.4 - 5.3 mmol/L    Carbon Dioxide (CO2) 28 22 - 29 mmol/L    Anion Gap 10 7 - 15 mmol/L    Urea Nitrogen 17.8 8.0 - 23.0 mg/dL    Creatinine 0.83 0.67 - 1.17 mg/dL    GFR Estimate >90 >60 mL/min/1.73m2    Calcium 9.0 8.8 - 10.2 mg/dL    Chloride 101 98 - 107 mmol/L    Glucose 127 (H) 70 - 99 mg/dL    Alkaline Phosphatase 66 40 - 150 U/L    AST 13 0 - 45 U/L    ALT 16 0 - 70 U/L    Protein Total 6.5 6.4 - 8.3 g/dL    Albumin 3.6 3.5 - 5.2 g/dL    Bilirubin Total 0.4 <=1.2 mg/dL   Lipase   Result  Value Ref Range    Lipase 15 13 - 60 U/L   CBC with platelets differential    Narrative    The following orders were created for panel order CBC with platelets differential.  Procedure                               Abnormality         Status                     ---------                               -----------         ------                     CBC with platelets and d...[[id number]]  Abnormal            Final result                 Please view results for these tests on the individual orders.   CBC with platelets and differential   Result Value Ref Range    WBC Count 9.1 4.0 - 11.0 10e3/uL    RBC Count 4.24 (L) 4.40 - 5.90 10e6/uL    Hemoglobin 13.4 13.3 - 17.7 g/dL    Hematocrit 41.4 40.0 - 53.0 %    MCV 98 78 - 100 fL    MCH 31.6 26.5 - 33.0 pg    MCHC 32.4 31.5 - 36.5 g/dL    RDW 13.9 10.0 - 15.0 %    Platelet Count 148 (L) 150 - 450 10e3/uL    % Neutrophils 88 %    % Lymphocytes 8 %    % Monocytes 4 %    % Eosinophils 0 %    % Basophils 0 %    % Immature Granulocytes 0 %    NRBCs per 100 WBC 0 <1 /100    Absolute Neutrophils 8.0 1.6 - 8.3 10e3/uL    Absolute Lymphocytes 0.7 (L) 0.8 - 5.3 10e3/uL    Absolute Monocytes 0.4 0.0 - 1.3 10e3/uL    Absolute Eosinophils 0.0 0.0 - 0.7 10e3/uL    Absolute Basophils 0.0 0.0 - 0.2 10e3/uL    Absolute Immature Granulocytes 0.0 <=0.4 10e3/uL    Absolute NRBCs 0.0 10e3/uL   UA with Microscopic reflex to Culture    Specimen: Urine, Clean Catch   Result Value Ref Range    Color Urine Yellow Colorless, Straw, Light Yellow, Yellow    Appearance Urine Clear Clear    Glucose Urine Negative Negative mg/dL    Bilirubin Urine Negative Negative    Ketones Urine Negative Negative mg/dL    Specific Gravity Urine 1.019 1.003 - 1.035    Blood Urine Negative Negative    pH Urine 6.0 5.0 - 7.0    Protein Albumin Urine Negative Negative mg/dL    Urobilinogen Urine 2.0 Normal, 2.0 mg/dL    Nitrite Urine Negative Negative    Leukocyte Esterase Urine Negative Negative    Mucus Urine Present (A)  None Seen /LPF    RBC Urine <1 <=2 /HPF    WBC Urine <1 <=5 /HPF    Narrative    Urine Culture not indicated   CT Head w/o Contrast    Narrative    EXAM: CT HEAD W/O CONTRAST  1/10/2024 3:40 PM     HISTORY:  occiptal CHI with ? LOC       COMPARISON:  Brain MRI 8/20/2023    TECHNIQUE: Using multidetector thin collimation helical acquisition  technique, axial, coronal and sagittal CT images from the skull base  to the vertex were obtained without intravenous contrast.   (topogram) image(s) also obtained and reviewed.    FINDINGS:  No intracranial hemorrhage, mass effect, or midline shift. No acute  loss of gray-white matter differentiation in the cerebral hemispheres.  Ventricles are proportionate to the cerebral sulci. Clear basal  cisterns. Extensive periventricular hypoattenuation, consistent with  chronic small vessel ischemic disease. Mild diffuse cerebral volume  loss.    The bony calvaria and the bones of the skull base are normal. Layering  frothy debris within the right maxillary sinus. Grossly normal orbits.        Impression    IMPRESSION:   1. No acute intracranial pathology.   2. Extensive chronic small vessel ischemic disease.  2. Frothy debris within the right maxillary sinus. This may represent  acute sinusitis in the appropriate clinical setting.    MJ POLLOCK MD         SYSTEM ID:  [id number]   CT Cervical Spine w/o Contrast    Narrative    CT CERVICAL SPINE W/O CONTRAST 1/10/2024 3:41 PM    Provided History: posterior neck pain after fall and CHI    Comparison: No prior similar studies    Technique: Using multidetector thin collimation helical acquisition  technique, axial, coronal and sagittal CT images through the cervical  spine were obtained without intravenous contrast.     Findings:  Minimal anterolisthesis at C2-3. Normal cervical lordosis.     No acute fracture or subluxation. No prevertebral edema.     Instrumented anterior and posterior spinal fusion at C6-7. Surgical  hardware  is intact with no evidence of fracture or loosening. Complete  ventral and partial dorsolateral osseous fusion.    There is multilevel disc height narrowing. The findings on a level by  level basis are as follows:    C2-3:  Left-sided facet hypertrophy. No spinal canal or neural  foraminal stenosis.    C3-4:  Uncovertebral spurring and mild bilateral facet hypertrophy.  Moderate to severe bilateral neural foraminal stenosis. Mild to  moderate spinal canal stenosis.     C4-5:  Uncovertebral spurring and bilateral facet hypertrophy.  Moderate bilateral neural foraminal stenosis. Mild spinal canal  stenosis.    C5-6:  Bilateral facet hypertrophy. No spinal canal or neural  foraminal stenosis.    C6-7:  Fusion level. No spinal canal or neural foraminal stenosis.    C7-T1:  No spinal canal or neural foraminal stenosis.     No abnormality of the paraspinous soft tissues.      Impression    Impression:   1. No acute fracture or traumatic subluxation.  2. Multilevel cervical spondylosis as detailed above. Postsurgical  changes of instrumented anterior spinal fusion at C6-7 with complete  ventral and partial dorsolateral osseous fusion.    MJ POLLOCK MD         SYSTEM ID:  [id number]   CT Chest/Abdomen/Pelvis w Contrast    Narrative    CT CHEST/ABDOMEN/PELVIS W CONTRAST 1/10/2024 3:48 PM    CLINICAL HISTORY: right chest and flank pain after fall with blunt  trauma    TECHNIQUE: CT scan of the chest, abdomen, and pelvis was performed  following injection of IV contrast. Multiplanar reformats were  obtained. Dose reduction techniques were used.   CONTRAST: 78mL Isovue-370    COMPARISON: Chest CT 12/14/2023, CT abdomen/pelvis 9/13/2022    FINDINGS:   LUNGS AND PLEURA: Moderate upper lobe predominant centrilobular and  paraseptal emphysema. Stable post treatment fibrosis surrounding  fiducial marker in the perihilar left lower lobe. Near complete  resolution of previously described airspace disease throughout the  right  lung with some possible residual scarring in the right lower  lobe. No evidence of pulmonary laceration or contusion. No pleural  effusion or pneumothorax.    MEDIASTINUM/AXILLAE: No evidence of acute injury. No suspicious  lymphadenopathy.    CORONARY ARTERY CALCIFICATION: Mild.    HEPATOBILIARY: Cholecystectomy with stable intra and extrahepatic  biliary ductal dilation, likely related to reservoir effect. No  evidence of acute injury.    PANCREAS: Normal.    SPLEEN: Normal.    ADRENAL GLANDS: Normal.    KIDNEYS/BLADDER: No hydronephrosis. Likely bilateral renal cysts, no  specific follow-up recommended. Urinary bladder is unremarkable.    BOWEL: Prior partial sigmoid colectomy. No obstruction or inflammatory  change. No mesenteric hematoma or pneumoperitoneum. Large volume of  formed stool throughout the colon.    PELVIC ORGANS: Mild prostatomegaly.    ADDITIONAL FINDINGS: No lymphadenopathy or ascites. Moderate calcified  and noncalcified atherosclerosis.    MUSCULOSKELETAL: Minimally displaced fractures of the posterior right  9th and 10th ribs. No acute bony abnormality in the abdomen or pelvis.  Sequela of avascular necrosis in both femoral heads without evidence  of collapse or secondary degenerative changes. Stable lumbosacral  posterior fusion hardware.      Impression    IMPRESSION:  1.  Minimally displaced fractures of the posterior right 9th and 10th  ribs. No underlying pulmonary contusion, pleural effusion or  pneumothorax.  2.  No evidence of acute trauma in the abdomen or pelvis.  3.  Stable posttreatment changes in the perihilar left lung.  4.  Near complete resolution of previously described right lower and  middle lobe pneumonia.    GARRET TEMPLETON MD         SYSTEM ID:  [id number]       Medications   sodium chloride 0.9% BOLUS 500 mL (500 mLs Intravenous $New Bag 1/10/24 1442)   ketorolac (TORADOL) injection 15 mg (15 mg Intravenous $Given 1/10/24 1446)   iopamidol (ISOVUE-370) solution 78 mL (78  "mLs Intravenous $Given 1/10/24 1537)   sodium chloride 0.9 % bag 500mL for CT scan flush use (59 mLs As instructed $Given 1/10/24 7840)     Triage O2 sat of 89% on room air but when I examined him his O2 sat was 95-96% on room air.    Assessments & Plan (with Medical Decision Making)   73 year old male who presents ~ 36 hours to 60 hours after losing his balance from tripping over his dog causing a fall at home onto hardwood floor with occipital head injury with possible brief LOC, he is unsure if he lost consciousness but he \"saw stars\", with posterior neck stiffness and pain and primary injury of right chest wall and right flank pain.  Primary complaint and reason eval today is stable right sided \"rib pain\".  CT of the head, cervical spine, chest/abdomen/pelvis were only remarkable for minimally displaced fractures of the posterior right ninth and 10th ribs. Incidentally there is near complete resolution of the previously seen right lower and middle lobe pneumonia. In the ED sats are WNL and he has no respiratory distress or difficulty.  His laboratory evaluation is unremarkable.  He would like, and he appears stable and appropriate for, outpatient management with continued pulmonary toilet, pain management with Oxycodone and he will return should he develop any problems or complications of his rib fractures. He was provided instructions for supportive care and will return as needed for worsened condition or worsening symptoms, or new problems or concerns.  He has an incentive spirometer at home from his recent hospitalization with pneumonia and he was instructed to use it very frequently, multiple times an hour and at least hourly.  He was provided instructions for supportive care and will return as needed for worsened condition or worsening symptoms, or new problems or concerns.        I have reviewed the nursing notes.    I have reviewed the findings, diagnosis, plan and need for follow up with the " patient.      Medical Decision Making: Moderate Complexity        New Prescriptions    OXYCODONE-ACETAMINOPHEN (PERCOCET) 5-325 MG TABLET    Take 1-2 tablets by mouth every 6 hours as needed for severe pain       Final diagnoses:   Minor closed head injury   Cervical strain, initial encounter   Closed fracture of multiple ribs of right side, initial encounter - Minimally displaced fractures of the posterior right ninth and 10th ribs.       1/10/2024   [facility] EMERGENCY DEPT       Jose Carlos Marquez MD  01/14/24 0807

## 2024-02-04 ENCOUNTER — HEALTH MAINTENANCE LETTER (OUTPATIENT)
Age: 74
End: 2024-02-04

## 2024-02-27 ENCOUNTER — HOSPITAL ENCOUNTER (OUTPATIENT)
Dept: CT IMAGING | Facility: HOSPITAL | Age: 74
Discharge: HOME | End: 2024-02-27
Attending: RADIOLOGY | Admitting: RADIOLOGY
Payer: MEDICARE

## 2024-02-27 DIAGNOSIS — C34.90 SMALL CELL CARCINOMA OF LUNG (H): ICD-10-CM

## 2024-02-27 LAB
CREAT BLD-MCNC: 1 MG/DL (ref 0.7–1.3)
EGFRCR SERPLBLD CKD-EPI 2021: >60 ML/MIN/1.73M2

## 2024-02-27 PROCEDURE — 71260 CT THORAX DX C+: CPT | Mod: MG

## 2024-02-27 PROCEDURE — 82565 ASSAY OF CREATININE: CPT

## 2024-02-27 PROCEDURE — 250N000011 HC RX IP 250 OP 636: Performed by: RADIOLOGY

## 2024-02-27 RX ORDER — IOPAMIDOL 755 MG/ML
71 INJECTION, SOLUTION INTRAVASCULAR ONCE
Status: COMPLETED | OUTPATIENT
Start: 2024-02-27 | End: 2024-02-27

## 2024-02-27 RX ADMIN — IOPAMIDOL 71 ML: 755 INJECTION, SOLUTION INTRAVENOUS at 11:11

## 2024-03-07 ENCOUNTER — OFFICE VISIT (OUTPATIENT)
Dept: RADIATION ONCOLOGY | Facility: HOSPITAL | Age: 74
End: 2024-03-07
Attending: RADIOLOGY
Payer: MEDICARE

## 2024-03-07 VITALS
BODY MASS INDEX: 23.81 KG/M2 | SYSTOLIC BLOOD PRESSURE: 148 MMHG | DIASTOLIC BLOOD PRESSURE: 77 MMHG | RESPIRATION RATE: 20 BRPM | WEIGHT: 152 LBS | OXYGEN SATURATION: 91 % | TEMPERATURE: 98.1 F | HEART RATE: 88 BPM

## 2024-03-07 DIAGNOSIS — C34.90 SMALL CELL CARCINOMA OF LUNG (H): Primary | ICD-10-CM

## 2024-03-07 PROCEDURE — G0463 HOSPITAL OUTPT CLINIC VISIT: HCPCS | Performed by: RADIOLOGY

## 2024-03-07 PROCEDURE — 99214 OFFICE O/P EST MOD 30 MIN: CPT | Performed by: RADIOLOGY

## 2024-03-07 ASSESSMENT — PAIN SCALES - GENERAL: PAINLEVEL: NO PAIN (0)

## 2024-03-07 NOTE — LETTER
3/7/2024         RE: Martin Tovar  [address]  [place] [address]        Dear Colleague,    Thank you for referring your patient, Martin Tovar, to the [facility] [place]. Please see a copy of my visit note below.    [facility] Radiation Oncology Follow Up     Patient: Martin Tovar  MRN: [id number]  Date of Service: 03/07/2024       DISEASE TREATED:  History of limited stage small cell left lung cancer diagnosed initially in 2012.  The patient is status post chemoradiation therapy.  He was found to have a new diagnosis of limited stage small cell lung cancer, second primary/recurrence, clinical stage T1 N0 M0.  His case has been discussed at thoracic tumor conference and consensus recommendation is to consider SBRT.      TYPE OF RADIATION THERAPY ADMINISTERED:    1. 4500 cGy in 30 treatments at 150 cGy twice daily at [place] [facility] in 2012.      2.  PCI with a total dose of 2500 cGy in 10 treatments at [facility] in 2012.      3. 5000 cGy in 5 treatments to left lower lobe tumor given from 3/2/120-3/12/2020.     INTERVAL SINCE COMPLETION OF RADIATION THERAPY: 4 years.      SUBJECTIVE:  Mr. Tovar is a 73 y.o. male who is a retired  from [employer].  Patient was diagnosed with limited stage small cell left lung cancer initially in 2012, stage T2 N0 M0.  He received concurrent chemoradiation therapy with cisplatin and etoposide and radiation therapy to a total dose of 4500 cGy in 30 treatments at 150 cGy twice daily.  His treatment was given at [facility].  Patient had a complete response and also received PCI with a total dose of 2500 cGy in 10 treatments.  Patient has been followed closely with no evidence of cancer recurrence.  He will presented with recent finding of left lower lobe nodule by routine screening exam for which he was seeking further evaluation.  PET CT scan on 1/14/2020 showed a 1.3 x 1.2 cm nodule with increased SUV max 3.3  consistent with malignancy.  There is no radiographic evidence of anel and systemic metastasis.  CT-guided needle biopsy on 2/5/2020 confirmed diagnosis of small cell carcinoma.  He had a staging MRI brain which also showed no evidence of brain metastasis.  Given the prior history of radiation therapy and location of his current cancer, the patient might require pneumonectomy.  His case has been discussed at thoracic tumor conference and that the consensus recommendation is to consider SBRT. His case has been discussed at thoracic tumor conference and consensus recommendation is to consider SBRT.  He received radiation therapy in our clinic with a total dose of 5000 cGy in 5 treatments given from 3/2/120-3/12/2020.  He tolerated radiation therapy very well with minimal side effect.     Patient has been doing well since completion of the radiation therapy.  He denies any chest pain discomfort at the time of evaluation.  He is here for routine postradiation therapy office follow-up.     Medications were reviewed and are up to date on EPIC.    The following portions of the patient's history were reviewed and updated as appropriate: allergies, current medications, past family history, past medical history, past social history, past surgical history and problem list.    Review of Systems:      General  Constitutional  Constitutional (WDL): Exceptions to WDL  Fatigue: Fatigue relieved by rest  EENT  Eye Disorders  Eye Disorder (WDL): Assessment not pertinent to visit  Ear Disorders  Ear Disorder (WDL): Exceptions to WDL  Tinnitus: Mild symptoms OR intervention not indicated (chronic)  Respiratory  Respiratory  Respiratory (WDL): Exceptions to WDL  Cough: Mild symptoms OR nonprescription intervention indicated  Dyspnea: Shortness of breath with moderate exertion  Cardiovascular  Cardiovascular  Cardiovascular (WDL): All cardiovascular elements are within defined limits  Gastrointestinal  Gastrointestinal  Gastrointestinal  (WDL): Exceptions to WDL  Anorexia: Loss of appetite without alteration in eating habits  Musculoskeletal  Musculoskeletal and Connective Tissue Disorders  Musculoskeletal & Connective (WDL): Exceptions to WDL  Arthralgia: Mild pain  Integumentary  Integumentary  Integumentary (WDL): All integumentary elements are within defined limits  Neurological  Neurosensory  Neurosensory (WDL): All neurosensory elements are within defined limits  Genitourinary/Reproductive  Genitourinary  Genitourinary (WDL): Exceptions to WDL  Urinary Frequency: Present  Lymphatic  Lymph System Disorders  Lymph (WDL): All lymph elements are within defined limits  Pain  Pain Score: No Pain (0)  AUA Assessment                                                              Accompanied by  Accompanied By: self only    Objective:     PHYSICAL EXAMINATION:    BP (!) 148/77 (BP Location: Left arm, Patient Position: Sitting)   Pulse 88   Temp 98.1  F (36.7  C)   Resp 20   Wt 68.9 kg (152 lb)   SpO2 91%   BMI 23.81 kg/m      Gen: Alert, in NAD  Eyes: PERRL, EOMI, sclera anicteric  Pulm: No wheezing, stridor or respiratory distress  CV: Well-perfused, no cyanosis, no pedal edema  Back: No step-offs or pain to palpation along the thoracolumbar spine  Rectal: Deferred  : Deferred  Musculoskeletal: Normal muscle bulk and tone  Skin: Normal color and turgor  Neurologic: A/Ox3, CN II-XII intact, normal gait and station  Psychiatric: Appropriate mood and affect     Imaging: Imaging results 30 days: CT Chest/Abdomen/Pelvis w Contrast    Result Date: 2/27/2024  EXAM: CT CHEST/ABDOMEN/PELVIS W CONTRAST LOCATION: [facility] DATE: 2/27/2024 INDICATION:  Small cell carcinoma of lung. COMPARISON: 01/10/2024 TECHNIQUE: CT scan of the chest, abdomen, and pelvis was performed following injection of IV contrast. Multiplanar reformats were obtained. Dose reduction techniques were used. CONTRAST: 71 mL Isovue-370 FINDINGS: LUNGS AND PLEURA:  Moderate upper lobe predominant emphysema redemonstrated. Radiating left paramediastinal fibrosis extending along a left lower lobe fiducial marker is again seen, similar in extent and morphology. However, mild pleural thickening in the  medial left lower lobe scalloping the descending thoracic aortic contour has slightly increased, for example measuring 1 cm in maximum thickness on series 3 image 72, previously 9 mm. Reticular nodular opacities in the right lower lobe have decreased in  size. A 6 mm right upper lobe nodule on series 4 image 64 with partial cavitation is new. Another 5 mm right lower lobe nodule on series 4 image 170 is also new. No confluent pneumonic consolidations, pleural effusion or pneumothorax. MEDIASTINUM/AXILLAE: No intrathoracic, axillary, or supraclavicular lymphadenopathy CORONARY ARTERY CALCIFICATION: Mild three-vessel coronary artery atherosclerotic calcifications. HEPATOBILIARY: Normal liver. Gallbladder is surgically absent. PANCREAS: Normal. SPLEEN: Normal. ADRENAL GLANDS: Normal right adrenal gland. Hypertrophy of the left adrenal gland is stable in morphology. KIDNEYS/BLADDER: Normal. Bilateral benign-appearing cysts measuring up to 2.3 cm on the left do not require follow-up.  BOWEL: No focal bowel thickening or obstruction. A colorectal anastomosis is seen in the pelvis. Appendix is normal. Scattered diverticula seen in the descending colon without acute diverticulitis. LYMPH NODES: Normal. VASCULATURE: Moderate aortoiliofemoral atherosclerotic calcifications. No abdominal aortic aneurysm. PELVIC ORGANS: Normal. MUSCULOSKELETAL: Stable spinal degenerative changes including multilevel mild and moderate degenerative disc space narrowing in the lumbar spine with posterior spinal fusion instrumentation at L5/S1. 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 No suspicious osseous lesions or acute fractures. Interval healing of posterior  ninth and 10th rib fractures.     IMPRESSION: 1.  Stable left paramediastinal posttreatment fibrosis. 2.  Mildly increased pleural thickening in the medial left lung base which scallops the descending thoracic aortic contour, nonspecific for progressive fibrosis versus pleural metastasis. Close attention on follow-up is warranted. 3.  Two new 5 to 6 mm nodules in the right lung are indeterminate. Close attention on follow-up also warranted. 4.  No metastatic disease in the abdomen and pelvis. 5.  Mild colonic diverticulosis without acute diverticulitis.      Impression     The patient is a 73-year-old gentleman with a prior history of limited stage small cell lung cancer diagnosed initially in 2012.  He was find to have a new limited stage small cell lung cancer involving the left lower lobe with clinical stage T1 N0 M0.  Patient received definitive SBRT and completed 4 years ago.  He has been doing well with no clinical evidence of recurrence.      Assessment & Plan:     1.  I have personally reviewed his restaging MRI and CT scan today and compare with previous MRI and CT chest and radiation therapy record.  There is no radiographic evidence of cancer recurrence.  There are changes including 2 new 5 to 6 mm nodule in the right lung recommend close follow-up.  Patient is scheduled to return to radiation oncology in 4 months for CT chest.     2.  Continue follow-up with Dr. Hale, thoracic surgeon and Dr. Brito, pulmonology as planned.     3.  The patient was found to have a new focus of chronic microhemorrhage in the region of the posterior left caudate nucleus with stable chronic focus of microhemorrhage deep white matter left parietal level.  Patient will see Dr. April Franco, his primary physician for follow-up recommendation.     Face to face time  30 minutes with > 75% spent on consultation, education and coordination of care.              Mary Snowden MD  Department of Radiation Oncology   [facility]  "Care  Tel: [phone]  Page: [phone]    [facility]  [address]  [place] [address]     [facility]   [address] [place] [address]    CC:  Patient Care Team:  April Franco PA as PCP - General  Mary Snowden MD as MD (Hematology & Oncology)  Mary Snowden MD as Assigned Cancer Care Provider  Maicol Cervantes MD as Assigned Heart and Vascular Provider      Oncology Rooming Note    March 7, 2024 11:36 AM   Martin Tovar is a 73 year old male who presents for:    Chief Complaint   Patient presents with     Oncology Clinic Visit     Lung Cancer     Follow up     Initial Vitals: BP (!) 148/77 (BP Location: Left arm, Patient Position: Sitting)   Pulse 88   Temp 98.1  F (36.7  C)   Resp 20   Wt 68.9 kg (152 lb)   SpO2 91%   BMI 23.81 kg/m   Estimated body mass index is 23.81 kg/m  as calculated from the following:    Height as of 1/10/24: 1.702 m (5' 7\").    Weight as of this encounter: 68.9 kg (152 lb). Body surface area is 1.8 meters squared.  No Pain (0) Comment: Data Unavailable   No LMP for male patient.  Allergies reviewed: Yes  Medications reviewed: Yes    Medications: Medication refills not needed today.  Pharmacy name entered into [facility]: [id number] [place], [place] [facility] [address] [facility]    Frailty Screening:   Is the patient here for a new oncology consult visit in cancer care? 2. No      Clinical concerns: Follow up, CT done on 2/27  Dr. Snowden was notified.      Madison Ly RN                Again, thank you for allowing me to participate in the care of your patient.        Sincerely,        Mary Snowden MD  "

## 2024-03-07 NOTE — PROGRESS NOTES
[facility] Radiation Oncology Follow Up     Patient: Martin Tovar  MRN: [id number]  Date of Service: 03/07/2024       DISEASE TREATED:  History of limited stage small cell left lung cancer diagnosed initially in 2012.  The patient is status post chemoradiation therapy.  He was found to have a new diagnosis of limited stage small cell lung cancer, second primary/recurrence, clinical stage T1 N0 M0.  His case has been discussed at thoracic tumor conference and consensus recommendation is to consider SBRT.      TYPE OF RADIATION THERAPY ADMINISTERED:    1. 4500 cGy in 30 treatments at 150 cGy twice daily at [place] [facility] in 2012.      2.  PCI with a total dose of 2500 cGy in 10 treatments at [place] [facility] in 2012.      3. 5000 cGy in 5 treatments to left lower lobe tumor given from 3/2/120-3/12/2020.     INTERVAL SINCE COMPLETION OF RADIATION THERAPY: 4 years.      SUBJECTIVE:  Mr. Tovar is a 73 y.o. male who is a retired  from [employer].  Patient was diagnosed with limited stage small cell left lung cancer initially in 2012, stage T2 N0 M0.  He received concurrent chemoradiation therapy with cisplatin and etoposide and radiation therapy to a total dose of 4500 cGy in 30 treatments at 150 cGy twice daily.  His treatment was given at [facility].  Patient had a complete response and also received PCI with a total dose of 2500 cGy in 10 treatments.  Patient has been followed closely with no evidence of cancer recurrence.  He will presented with recent finding of left lower lobe nodule by routine screening exam for which he was seeking further evaluation.  PET CT scan on 1/14/2020 showed a 1.3 x 1.2 cm nodule with increased SUV max 3.3 consistent with malignancy.  There is no radiographic evidence of anel and systemic metastasis.  CT-guided needle biopsy on 2/5/2020 confirmed diagnosis of small cell carcinoma.  He had a staging MRI brain which also showed no evidence of brain metastasis.  Given the prior  history of radiation therapy and location of his current cancer, the patient might require pneumonectomy.  His case has been discussed at thoracic tumor conference and that the consensus recommendation is to consider SBRT. His case has been discussed at thoracic tumor conference and consensus recommendation is to consider SBRT.  He received radiation therapy in our clinic with a total dose of 5000 cGy in 5 treatments given from 3/2/120-3/12/2020.  He tolerated radiation therapy very well with minimal side effect.     Patient has been doing well since completion of the radiation therapy.  He denies any chest pain discomfort at the time of evaluation.  He is here for routine postradiation therapy office follow-up.     Medications were reviewed and are up to date on EPIC.    The following portions of the patient's history were reviewed and updated as appropriate: allergies, current medications, past family history, past medical history, past social history, past surgical history and problem list.    Review of Systems:      General  Constitutional  Constitutional (WDL): Exceptions to WDL  Fatigue: Fatigue relieved by rest  EENT  Eye Disorders  Eye Disorder (WDL): Assessment not pertinent to visit  Ear Disorders  Ear Disorder (WDL): Exceptions to WDL  Tinnitus: Mild symptoms OR intervention not indicated (chronic)  Respiratory  Respiratory  Respiratory (WDL): Exceptions to WDL  Cough: Mild symptoms OR nonprescription intervention indicated  Dyspnea: Shortness of breath with moderate exertion  Cardiovascular  Cardiovascular  Cardiovascular (WDL): All cardiovascular elements are within defined limits  Gastrointestinal  Gastrointestinal  Gastrointestinal (WDL): Exceptions to WDL  Anorexia: Loss of appetite without alteration in eating habits  Musculoskeletal  Musculoskeletal and Connective Tissue Disorders  Musculoskeletal & Connective (WDL): Exceptions to WDL  Arthralgia: Mild  pain  Integumentary  Integumentary  Integumentary (WDL): All integumentary elements are within defined limits  Neurological  Neurosensory  Neurosensory (WDL): All neurosensory elements are within defined limits  Genitourinary/Reproductive  Genitourinary  Genitourinary (WDL): Exceptions to WDL  Urinary Frequency: Present  Lymphatic  Lymph System Disorders  Lymph (WDL): All lymph elements are within defined limits  Pain  Pain Score: No Pain (0)  AUA Assessment                                                              Accompanied by  Accompanied By: self only    Objective:     PHYSICAL EXAMINATION:    BP (!) 148/77 (BP Location: Left arm, Patient Position: Sitting)   Pulse 88   Temp 98.1  F (36.7  C)   Resp 20   Wt 68.9 kg (152 lb)   SpO2 91%   BMI 23.81 kg/m      Gen: Alert, in NAD  Eyes: PERRL, EOMI, sclera anicteric  Pulm: No wheezing, stridor or respiratory distress  CV: Well-perfused, no cyanosis, no pedal edema  Back: No step-offs or pain to palpation along the thoracolumbar spine  Rectal: Deferred  : Deferred  Musculoskeletal: Normal muscle bulk and tone  Skin: Normal color and turgor  Neurologic: A/Ox3, CN II-XII intact, normal gait and station  Psychiatric: Appropriate mood and affect     Imaging: Imaging results 30 days: CT Chest/Abdomen/Pelvis w Contrast    Result Date: 2/27/2024  EXAM: CT CHEST/ABDOMEN/PELVIS W CONTRAST LOCATION: [facility] DATE: 2/27/2024 INDICATION:  Small cell carcinoma of lung. COMPARISON: 01/10/2024 TECHNIQUE: CT scan of the chest, abdomen, and pelvis was performed following injection of IV contrast. Multiplanar reformats were obtained. Dose reduction techniques were used. CONTRAST: 71 mL Isovue-370 FINDINGS: LUNGS AND PLEURA: Moderate upper lobe predominant emphysema redemonstrated. Radiating left paramediastinal fibrosis extending along a left lower lobe fiducial marker is again seen, similar in extent and morphology. However, mild pleural  thickening in the  medial left lower lobe scalloping the descending thoracic aortic contour has slightly increased, for example measuring 1 cm in maximum thickness on series 3 image 72, previously 9 mm. Reticular nodular opacities in the right lower lobe have decreased in  size. A 6 mm right upper lobe nodule on series 4 image 64 with partial cavitation is new. Another 5 mm right lower lobe nodule on series 4 image 170 is also new. No confluent pneumonic consolidations, pleural effusion or pneumothorax. MEDIASTINUM/AXILLAE: No intrathoracic, axillary, or supraclavicular lymphadenopathy CORONARY ARTERY CALCIFICATION: Mild three-vessel coronary artery atherosclerotic calcifications. HEPATOBILIARY: Normal liver. Gallbladder is surgically absent. PANCREAS: Normal. SPLEEN: Normal. ADRENAL GLANDS: Normal right adrenal gland. Hypertrophy of the left adrenal gland is stable in morphology. KIDNEYS/BLADDER: Normal. Bilateral benign-appearing cysts measuring up to 2.3 cm on the left do not require follow-up.  BOWEL: No focal bowel thickening or obstruction. A colorectal anastomosis is seen in the pelvis. Appendix is normal. Scattered diverticula seen in the descending colon without acute diverticulitis. LYMPH NODES: Normal. VASCULATURE: Moderate aortoiliofemoral atherosclerotic calcifications. No abdominal aortic aneurysm. PELVIC ORGANS: Normal. MUSCULOSKELETAL: Stable spinal degenerative changes including multilevel mild and moderate degenerative disc space narrowing in the lumbar spine with posterior spinal fusion instrumentation at L5/S1. 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 No suspicious osseous lesions or acute fractures. Interval healing of posterior ninth and 10th rib fractures.     IMPRESSION: 1.  Stable left paramediastinal posttreatment fibrosis. 2.  Mildly increased pleural thickening in the medial left lung base which scallops the descending thoracic aortic  contour, nonspecific for progressive fibrosis versus pleural metastasis. Close attention on follow-up is warranted. 3.  Two new 5 to 6 mm nodules in the right lung are indeterminate. Close attention on follow-up also warranted. 4.  No metastatic disease in the abdomen and pelvis. 5.  Mild colonic diverticulosis without acute diverticulitis.      Impression     The patient is a 73-year-old gentleman with a prior history of limited stage small cell lung cancer diagnosed initially in 2012.  He was find to have a new limited stage small cell lung cancer involving the left lower lobe with clinical stage T1 N0 M0.  Patient received definitive SBRT and completed 4 years ago.  He has been doing well with no clinical evidence of recurrence.      Assessment & Plan:     1.  I have personally reviewed his restaging MRI and CT scan today and compare with previous MRI and CT chest and radiation therapy record.  There is no radiographic evidence of cancer recurrence.  There are changes including 2 new 5 to 6 mm nodule in the right lung recommend close follow-up.  Patient is scheduled to return to radiation oncology in 4 months for CT chest.     2.  Continue follow-up with Dr. Hale, thoracic surgeon and Dr. Brito, pulmonology as planned.     3.  The patient was found to have a new focus of chronic microhemorrhage in the region of the posterior left caudate nucleus with stable chronic focus of microhemorrhage deep white matter left parietal level.  Patient will see Dr. April Franco, his primary physician for follow-up recommendation.     Face to face time  30 minutes with > 75% spent on consultation, education and coordination of care.              Mary Snowden MD  Department of Radiation Oncology   [facility]  Tel: [phone]  Page: [phone]    [facility]  [address]  [place] [address]     [facility]   [address] [place] [address]    CC:  Patient Care Team:  April Franco PA as PCP -  General  Mary Snowden MD as MD (Hematology & Oncology)  Mary Snowden MD as Assigned Cancer Care Provider  Maicol Cervantes MD as Assigned Heart and Vascular Provider

## 2024-03-07 NOTE — PROGRESS NOTES
"Oncology Rooming Note    March 7, 2024 11:36 AM   Martin Tovar is a 73 year old male who presents for:    Chief Complaint   Patient presents with    Oncology Clinic Visit    Lung Cancer     Follow up     Initial Vitals: BP (!) 148/77 (BP Location: Left arm, Patient Position: Sitting)   Pulse 88   Temp 98.1  F (36.7  C)   Resp 20   Wt 68.9 kg (152 lb)   SpO2 91%   BMI 23.81 kg/m   Estimated body mass index is 23.81 kg/m  as calculated from the following:    Height as of 1/10/24: 1.702 m (5' 7\").    Weight as of this encounter: 68.9 kg (152 lb). Body surface area is 1.8 meters squared.  No Pain (0) Comment: Data Unavailable   No LMP for male patient.  Allergies reviewed: Yes  Medications reviewed: Yes    Medications: Medication refills not needed today.  Pharm[id number] [place] [address] [address] [facility]    Frailty Screening:   Is the patient here for a new oncology consult visit in cancer care? 2. No      Clinical concerns: Follow up, CT done on 2/27  Dr. Snowden was notified.      Madison Ly RN              "

## 2024-03-15 ENCOUNTER — LAB REQUISITION (OUTPATIENT)
Dept: LAB | Facility: CLINIC | Age: 74
End: 2024-03-15
Payer: MEDICARE

## 2024-03-15 DIAGNOSIS — J44.1 CHRONIC OBSTRUCTIVE PULMONARY DISEASE WITH (ACUTE) EXACERBATION (H): ICD-10-CM

## 2024-03-15 LAB
BASOPHILS # BLD AUTO: 0 10E3/UL (ref 0–0.2)
BASOPHILS NFR BLD AUTO: 1 %
EOSINOPHIL # BLD AUTO: 0.2 10E3/UL (ref 0–0.7)
EOSINOPHIL NFR BLD AUTO: 3 %
ERYTHROCYTE [DISTWIDTH] IN BLOOD BY AUTOMATED COUNT: 13.3 % (ref 10–15)
HCT VFR BLD AUTO: 44 % (ref 40–53)
HGB BLD-MCNC: 14.6 G/DL (ref 13.3–17.7)
IMM GRANULOCYTES # BLD: 0 10E3/UL
IMM GRANULOCYTES NFR BLD: 0 %
LYMPHOCYTES # BLD AUTO: 1.5 10E3/UL (ref 0.8–5.3)
LYMPHOCYTES NFR BLD AUTO: 20 %
MCH RBC QN AUTO: 32.3 PG (ref 26.5–33)
MCHC RBC AUTO-ENTMCNC: 33.2 G/DL (ref 31.5–36.5)
MCV RBC AUTO: 97 FL (ref 78–100)
MONOCYTES # BLD AUTO: 0.7 10E3/UL (ref 0–1.3)
MONOCYTES NFR BLD AUTO: 10 %
NEUTROPHILS # BLD AUTO: 5 10E3/UL (ref 1.6–8.3)
NEUTROPHILS NFR BLD AUTO: 66 %
NRBC # BLD AUTO: 0 10E3/UL
NRBC BLD AUTO-RTO: 0 /100
PLATELET # BLD AUTO: 175 10E3/UL (ref 150–450)
RBC # BLD AUTO: 4.52 10E6/UL (ref 4.4–5.9)
WBC # BLD AUTO: 7.4 10E3/UL (ref 4–11)

## 2024-03-15 PROCEDURE — 80048 BASIC METABOLIC PNL TOTAL CA: CPT | Mod: ORL | Performed by: PHYSICIAN ASSISTANT

## 2024-03-15 PROCEDURE — 85025 COMPLETE CBC W/AUTO DIFF WBC: CPT | Mod: ORL | Performed by: PHYSICIAN ASSISTANT

## 2024-03-16 LAB
ANION GAP SERPL CALCULATED.3IONS-SCNC: 13 MMOL/L (ref 7–15)
BUN SERPL-MCNC: 14.4 MG/DL (ref 8–23)
CALCIUM SERPL-MCNC: 9 MG/DL (ref 8.8–10.2)
CHLORIDE SERPL-SCNC: 102 MMOL/L (ref 98–107)
CREAT SERPL-MCNC: 0.93 MG/DL (ref 0.67–1.17)
DEPRECATED HCO3 PLAS-SCNC: 28 MMOL/L (ref 22–29)
EGFRCR SERPLBLD CKD-EPI 2021: 88 ML/MIN/1.73M2
GLUCOSE SERPL-MCNC: 165 MG/DL (ref 70–99)
POTASSIUM SERPL-SCNC: 4.3 MMOL/L (ref 3.4–5.3)
SODIUM SERPL-SCNC: 143 MMOL/L (ref 135–145)

## 2024-05-22 ENCOUNTER — HOSPITAL ENCOUNTER (OUTPATIENT)
Dept: CT IMAGING | Facility: HOSPITAL | Age: 74
Discharge: HOME | End: 2024-05-22
Attending: PHYSICIAN ASSISTANT | Admitting: PHYSICIAN ASSISTANT
Payer: MEDICARE

## 2024-05-22 DIAGNOSIS — K22.70 BARRETT'S ESOPHAGUS WITHOUT DYSPLASIA: ICD-10-CM

## 2024-05-22 DIAGNOSIS — R14.0 ABDOMINAL DISTENSION: ICD-10-CM

## 2024-05-22 DIAGNOSIS — R13.19 OTHER DYSPHAGIA: ICD-10-CM

## 2024-05-22 LAB
CREAT BLD-MCNC: 1 MG/DL (ref 0.7–1.3)
EGFRCR SERPLBLD CKD-EPI 2021: >60 ML/MIN/1.73M2

## 2024-05-22 PROCEDURE — 250N000011 HC RX IP 250 OP 636: Performed by: PHYSICIAN ASSISTANT

## 2024-05-22 PROCEDURE — 74177 CT ABD & PELVIS W/CONTRAST: CPT

## 2024-05-22 PROCEDURE — 82565 ASSAY OF CREATININE: CPT

## 2024-05-22 RX ORDER — IOPAMIDOL 755 MG/ML
75 INJECTION, SOLUTION INTRAVASCULAR ONCE
Status: COMPLETED | OUTPATIENT
Start: 2024-05-22 | End: 2024-05-22

## 2024-05-22 RX ADMIN — IOPAMIDOL 75 ML: 755 INJECTION, SOLUTION INTRAVENOUS at 11:07

## 2024-06-10 DIAGNOSIS — E78.5 HYPERLIPIDEMIA: ICD-10-CM

## 2024-06-10 DIAGNOSIS — I25.10 MILD CORONARY ARTERY DISEASE: Primary | ICD-10-CM

## 2024-06-10 RX ORDER — ATORVASTATIN CALCIUM 20 MG/1
20 TABLET, FILM COATED ORAL DAILY
Qty: 90 TABLET | Refills: 1 | Status: SHIPPED | OUTPATIENT
Start: 2024-06-10 | End: 2024-09-10

## 2024-06-15 ENCOUNTER — APPOINTMENT (OUTPATIENT)
Dept: GENERAL RADIOLOGY | Facility: CLINIC | Age: 74
End: 2024-06-15
Attending: PHYSICIAN ASSISTANT
Payer: MEDICARE

## 2024-06-15 ENCOUNTER — HOSPITAL ENCOUNTER (EMERGENCY)
Facility: CLINIC | Age: 74
Discharge: HOME | End: 2024-06-15
Attending: PHYSICIAN ASSISTANT | Admitting: PHYSICIAN ASSISTANT
Payer: MEDICARE

## 2024-06-15 ENCOUNTER — NURSE TRIAGE (OUTPATIENT)
Dept: NURSING | Facility: CLINIC | Age: 74
End: 2024-06-15
Payer: MEDICARE

## 2024-06-15 VITALS
HEART RATE: 86 BPM | TEMPERATURE: 98.2 F | OXYGEN SATURATION: 94 % | DIASTOLIC BLOOD PRESSURE: 71 MMHG | SYSTOLIC BLOOD PRESSURE: 130 MMHG
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
## 2024-06-15 DIAGNOSIS — M79.18 ACUTE BUTTOCK PAIN: ICD-10-CM

## 2024-06-15 PROCEDURE — 72170 X-RAY EXAM OF PELVIS: CPT

## 2024-06-15 PROCEDURE — 99212 OFFICE O/P EST SF 10 MIN: CPT | Performed by: PHYSICIAN ASSISTANT

## 2024-06-15 PROCEDURE — G0463 HOSPITAL OUTPT CLINIC VISIT: HCPCS | Performed by: PHYSICIAN ASSISTANT

## 2024-06-15 ASSESSMENT — COLUMBIA-SUICIDE SEVERITY RATING SCALE - C-SSRS
1. IN THE PAST MONTH, HAVE YOU WISHED YOU WERE DEAD OR WISHED YOU COULD GO TO SLEEP AND NOT WAKE UP?: NO
6. HAVE YOU EVER DONE ANYTHING, STARTED TO DO ANYTHING, OR PREPARED TO DO ANYTHING TO END YOUR LIFE?: NO
2. HAVE YOU ACTUALLY HAD ANY THOUGHTS OF KILLING YOURSELF IN THE PAST MONTH?: NO

## 2024-06-15 ASSESSMENT — ACTIVITIES OF DAILY LIVING (ADL)
ADLS_ACUITY_SCORE: 37
ADLS_ACUITY_SCORE: 37

## 2024-06-15 NOTE — TELEPHONE ENCOUNTER
Patient calling reports falling a week ago landing on his left buttocks. Reports having bruising and ongoing pelvic pain since the fall. Reports pain where leg meets the pelvis. Patient reports his PCP advised he be seen in urgent care with patient inquiring into imaging in the ER. Advised urgent care can do xrays but any additional imaging would need to be done in the ER. Patient expressed understanding and will seek care today.     Safia Gustafson RN 06/15/24 9:50 AM    Health Triage Nurse Advisor    Reason for Disposition   Health Information question, no triage required and triager able to answer question    Additional Information   Negative: [1] Caller is not with the adult (patient) AND [2] reporting urgent symptoms   Negative: Lab result questions   Negative: Medication questions   Negative: Caller can't be reached by phone   Negative: Caller has already spoken to PCP or another triager   Negative: RN needs further essential information from caller in order to complete triage   Negative: Requesting regular office appointment   Negative: [1] Caller requesting NON-URGENT health information AND [2] PCP's office is the best resource    Protocols used: Information Only Call - [facility]

## 2024-06-15 NOTE — ED PROVIDER NOTES
History   No chief complaint on file.    HPI  Martin Tovar is a 73 year old male who presents to urgent care with concern over left buttock pain after he sustained a fall 5 days prior to arrival.  Patient reports that he was moving a heavy box into his vehicle when a strap broke resulting in him falling landing directly on his buttocks.  He complains of pain primarily overlying the left ischium.  He is unaware of any ecchymosis, swelling, lacerations or abrasions.  No significant pain with ambulation, movement of the left hip.  He denies any distal numbness or paresthesias.  He has not been taking OTC medications consistently.      Allergies:  Allergies   Allergen Reactions    Ciprofloxacin Swelling and Rash     Throat swelling    Septra [Sulfamethoxazole-Trimethoprim] Swelling and Rash       Problem List:    Patient Active Problem List    Diagnosis Date Noted    Mucopurulent chronic bronchitis (H) 12/15/2023     Priority: Medium    Acute respiratory failure with hypoxia (H) 12/15/2023     Priority: Medium    Pneumonia of right lower lobe due to infectious organism 12/15/2023     Priority: Medium    Weight loss 06/09/2023     Priority: Medium    Chest pain 03/30/2023     Priority: Medium    Perry's esophagus with dysplasia 12/01/2022     Priority: Medium    Mild coronary artery disease 08/18/2021     Priority: Medium     Per coronary angiogram on 8/5/2021      Elevated troponin 08/04/2021     Priority: Medium    NSTEMI (non-ST elevated myocardial infarction) (H) 08/04/2021     Priority: Medium    Small cell lung cancer (H) 02/13/2020     Priority: Medium    Anxiety      Priority: Medium    Acute hypoxemic respiratory failure (H)      Priority: Medium    Benign prostatic hyperplasia without lower urinary tract symptoms 01/05/2019     Priority: Medium    COPD exacerbation (H)      Priority: Medium    Intra-abdominal fluid collection 12/31/2015     Priority: Medium    Chronic renal failure      Priority: Medium     Bile leak, postoperative (lap anna 12/16, s/p ercp with stent 12/22/15) 12/23/2015     Priority: Medium    Chronic pain 12/21/2015     Priority: Medium    COPD (chronic obstructive pulmonary disease) (H) 12/21/2015     Priority: Medium    Acute kidney injury (H24) 12/21/2015     Priority: Medium    Status post laparoscopic-assisted sigmoidectomy 11/11/2013     Priority: Medium    Tobacco use disorder 12/19/2012     Priority: Medium    Degeneration of lumbar or lumbosacral intervertebral disc 12/14/2012     Priority: Medium    Diabetes mellitus without complication (H) 12/14/2012     Priority: Medium    Essential hypertension 12/14/2012     Priority: Medium    Hyperlipidemia 12/14/2012     Priority: Medium        Past Medical History:    Past Medical History:   Diagnosis Date    Asthma     Avascular necrosis of bones of both hips (H)     Cancer (H)     Cholecystitis     Chronic renal failure     COPD, mild (H)     Diabetes mellitus (H)     Disc degeneration, lumbar     DJD (degenerative joint disease) of knee     Elevated PSA     Hemoptysis 2/16/2020    LBP (low back pain)     Lung cancer (H)     Pneumonia 1/4/2019    Smoking hx        Past Surgical History:    Past Surgical History:   Procedure Laterality Date    ARTHROSCOPY KNEE      CERVICAL FUSION      CT BIOPSY LUNG  2/5/2020    CV CORONARY ANGIOGRAM N/A 8/5/2021    Procedure: CV CORONARY ANGIOGRAM;  Surgeon: Fabio Espinosa MD;  Location: [facility] CV    CV CORONARY ANGIOGRAM N/A 4/13/2023    Procedure: Coronary Angiogram;  Surgeon: Mikayla Floyd MD;  Location: [facility] CV    CV LEFT VENTRICULOGRAM N/A 8/5/2021    Procedure: Left Ventriculogram;  Surgeon: Fabio Espinosa MD;  Location: [facility] CATH LAB CV    ENDOSCOPIC RETROGRADE CHOLANGIOPANCREATOGRAM N/A 12/22/2015    Procedure: ENDOSCOPIC RETROGRADE CHOLANGIOPANCREATOGRAPHY;  Surgeon: Tang Elliott MD;  Location: [facility] OR;  Service:     HEMORRHOID  SURGERY      INGUINAL HERNIA REPAIR      IR LUMBAR EPIDURAL STEROID INJECTION  4/29/2003    IR LUMBAR EPIDURAL STEROID INJECTION  6/27/2007    IR LUNG BIOPSY MEDIASTINUM LEFT  12/21/2012    LAPAROSCOPIC CHOLECYSTECTOMY N/A 12/16/2015    Procedure: CHOLECYSTECTOMY LAPAROSCOPIC;  Surgeon: Eugene Ro MD;  Location: [facility];  Service:     LUMBAR FUSION  2006    & reconstruction    OTHER SURGICAL HISTORY  2013    COLOVESICULAR FISTULA REPAIR    PICC  1/3/2016         RELEASE TRIGGER FINGER  07/2020    TOTAL KNEE ARTHROPLASTY Left      Family History:    No family history on file.    Social History:  Marital Status:   [2]  Social History     Tobacco Use    Smoking status: Every Day     Current packs/day: 1.00     Types: Cigarettes    Smokeless tobacco: Never    Tobacco comments:     1 ppd as of 3/2023   Substance Use Topics    Alcohol use: Not Currently    Drug use: No      Medications:    albuterol (PROVENTIL HFA;VENTOLIN HFA) 90 mcg/actuation inhaler  aspirin (ASA) 81 MG chewable tablet  atorvastatin (LIPITOR) 20 MG tablet  cyclobenzaprine (FLEXERIL) 5 MG tablet  diclofenac (VOLTAREN) 1 % topical gel  HYDROcodone-acetaminophen (NORCO) 7.5-325 mg per tablet  ipratropium (ATROVENT) 0.02 % nebulizer solution  morphine (MS CONTIN) 15 MG 12 hr tablet  omeprazole (PRILOSEC) 20 MG DR capsule  oxybutynin ER (DITROPAN-XL) 10 MG 24 hr tablet  oxyCODONE-acetaminophen (PERCOCET) 5-325 MG tablet  polyethylene glycol-propylene glycol (SYSTANE) 0.4-0.3 % SOLN ophthalmic solution  tamsulosin (FLOMAX) 0.4 mg cap  WIXELA INHUB 250-50 MCG/ACT inhaler      Review of Systems  INTEGUMENTARY/SKIN: NEGATIVE for ecchymosis, lacerations, abrasions, worrisome rashes   MUSCULOSKELETAL: POSITIVE  for left buttock pain and NEGATIVE for other concerning arthralgias or myalgias   NEURO: NEGATIVE for lower extremity numbness, paresthesias   Physical Exam   BP: 130/71  Pulse: 86  Temp: 98.2  F (36.8  C)  SpO2: 94 %  Physical  Exam  Constitutional:       General: He is not in acute distress.     Appearance: He is not ill-appearing or toxic-appearing.   Cardiovascular:      Rate and Rhythm: Normal rate.      Heart sounds:      No friction rub. No gallop.   Pulmonary:      Effort: Pulmonary effort is normal. No respiratory distress.      Breath sounds: Normal breath sounds. No wheezing, rhonchi or rales.   Musculoskeletal:      Left hip: No deformity, lacerations, tenderness or crepitus. Normal range of motion. Normal strength.      Left upper leg: Normal.      Comments: Tenderness to palpation left ischial tuberosity    Skin:     General: Skin is warm and dry.      Findings: No abrasion, ecchymosis, erythema, laceration or rash.   Neurological:      Mental Status: He is alert.      Sensory: No sensory deficit.       ED Course        Procedures       Critical Care time:  none        Results for orders placed or performed during the hospital encounter of 06/15/24 (from the past 24 hour(s))   XR Pelvis 1/2 Views    Narrative    EXAM: XR PELVIS 1/2 VIEWS  LOCATION: [facility]  DATE: 6/15/2024    INDICATION: Pain left posterior ishium after fall earlier in the week  COMPARISON: None.      Impression    IMPRESSION: Diffuse osseous demineralization. Mild degenerative arthrosis both hips. Degenerative changes both SI joints. Postoperative changes of the lumbosacral spine. Lower lumbar facet arthropathy and degenerative interspace narrowing. Radiopaque   suture material overlies the central pelvis. No definite fracture on a single view of the pelvis.     Medications - No data to display    Assessments & Plan (with Medical Decision Making)     I have reviewed the nursing notes.    I have reviewed the findings, diagnosis, plan and need for follow up with the patient.       Discharge Medication List as of 6/15/2024 12:16 PM        Final diagnoses:   Fall, initial encounter   Acute buttock pain     73 year old male presents  to UC with concern over left buttock pain following fall earlier this week.  Physical exam findings significant for pain overlying left ischial tuberosity. Normal ROM of left hip, no focal hip tenderness to palpation.  He had X-ray of pelvis which was negative for acute bony abnormality, mild degenerative changes of both hips and SI joints noted.  He was reassured of non-acute X-ray findings.  He was discharged home stable with instructions for symptomatic treatment as needed.  Follow up with PCP if no improvement in 5-7 days. Worrisome reasons to return to ER/UC sooner discussed.     Disclaimer: This note consists of symbols derived from keyboarding, dictation, and/or voice recognition software. As a result, there may be errors in the script that have gone undetected.  Please consider this when interpreting information found in the chart.    6/15/2024   [facility] EMERGENCY DEPT       Acacia Biswas PA-C  06/15/24 [id number]

## 2024-06-15 NOTE — ED TRIAGE NOTES
Pt present from a fall on Monday landed on concrete and landed on left side of pelvis/hip area.

## 2024-07-08 ENCOUNTER — HOSPITAL ENCOUNTER (OUTPATIENT)
Dept: CT IMAGING | Facility: HOSPITAL | Age: 74
Discharge: HOME | End: 2024-07-08
Attending: RADIOLOGY | Admitting: RADIOLOGY
Payer: MEDICARE

## 2024-07-08 DIAGNOSIS — C34.90 SMALL CELL CARCINOMA OF LUNG (H): ICD-10-CM

## 2024-07-08 PROCEDURE — 71250 CT THORAX DX C-: CPT | Mod: MG

## 2024-07-12 ENCOUNTER — OFFICE VISIT (OUTPATIENT)
Dept: RADIATION ONCOLOGY | Facility: HOSPITAL | Age: 74
End: 2024-07-12
Attending: RADIOLOGY
Payer: MEDICARE

## 2024-07-12 VITALS
HEART RATE: 75 BPM | SYSTOLIC BLOOD PRESSURE: 151 MMHG | WEIGHT: 148 LBS | BODY MASS INDEX: 23.18 KG/M2 | RESPIRATION RATE: 20 BRPM | OXYGEN SATURATION: 93 % | DIASTOLIC BLOOD PRESSURE: 83 MMHG

## 2024-07-12 DIAGNOSIS — C34.90 SMALL CELL CARCINOMA OF LUNG (H): Primary | ICD-10-CM

## 2024-07-12 PROCEDURE — G0463 HOSPITAL OUTPT CLINIC VISIT: HCPCS | Performed by: RADIOLOGY

## 2024-07-12 PROCEDURE — 99213 OFFICE O/P EST LOW 20 MIN: CPT | Performed by: RADIOLOGY

## 2024-07-12 PROCEDURE — G2211 COMPLEX E/M VISIT ADD ON: HCPCS | Performed by: RADIOLOGY

## 2024-07-12 ASSESSMENT — PAIN SCALES - GENERAL: PAINLEVEL: NO PAIN (0)

## 2024-07-12 NOTE — LETTER
7/12/2024      Martin Tovar  [address]  [facility] [address]      Dear Colleague,    Thank you for referring your patient, Martin Tovar, to the [facility] RADIATION ONCOLOGY [place]. Please see a copy of my visit note below.    [facility] Radiation Oncology Follow Up     Patient: Martin Tovar  MRN: [id number]  Date of Service: 07/12/2024       DISEASE TREATED:  History of limited stage small cell left lung cancer diagnosed initially in 2012.  The patient is status post chemoradiation therapy.  He was found to have a new diagnosis of limited stage small cell lung cancer, second primary/recurrence, clinical stage T1 N0 M0.  His case has been discussed at thoracic tumor conference and consensus recommendation is to consider SBRT.      TYPE OF RADIATION THERAPY ADMINISTERED:    1. 4500 cGy in 30 treatments at 150 cGy twice daily at [facility] in 2012.      2.  PCI with a total dose of 2500 cGy in 10 treatments at [facility] in 2012.      3. 5000 cGy in 5 treatments to left lower lobe tumor given from 3/2/120-3/12/2020.     INTERVAL SINCE COMPLETION OF RADIATION THERAPY: 4 years and 4 months.      SUBJECTIVE:  Mr. Tovar is a 73 y.o. male who is a retired  from [employer]  Patient was diagnosed with limited stage small cell left lung cancer initially in 2012, stage T2 N0 M0.  He received concurrent chemoradiation therapy with cisplatin and etoposide and radiation therapy to a total dose of 4500 cGy in 30 treatments at 150 cGy twice daily.  His treatment was given at [facility].  Patient had a complete response and also received PCI with a total dose of 2500 cGy in 10 treatments.  Patient has been followed closely with no evidence of cancer recurrence.  He will presented with recent finding of left lower lobe nodule by routine screening exam for which he was seeking further evaluation.  PET CT scan on 1/14/2020 showed a 1.3 x 1.2 cm nodule with increased SUV max 3.3  consistent with malignancy.  There is no radiographic evidence of anel and systemic metastasis.  CT-guided needle biopsy on 2/5/2020 confirmed diagnosis of small cell carcinoma.  He had a staging MRI brain which also showed no evidence of brain metastasis.  Given the prior history of radiation therapy and location of his current cancer, the patient might require pneumonectomy.  His case has been discussed at thoracic tumor conference and that the consensus recommendation is to consider SBRT. His case has been discussed at thoracic tumor conference and consensus recommendation is to consider SBRT.  He received radiation therapy in our clinic with a total dose of 5000 cGy in 5 treatments given from 3/2/120-3/12/2020.  He tolerated radiation therapy very well with minimal side effect.     Patient has been doing well since completion of the radiation therapy.  He denies any chest pain discomfort at the time of evaluation.  He is here for routine postradiation therapy office follow-up.     Medications were reviewed and are up to date on EPIC.    The following portions of the patient's history were reviewed and updated as appropriate: allergies, current medications, past family history, past medical history, past social history, past surgical history and problem list.    Review of Systems:      General  Constitutional  Constitutional (WDL): Exceptions to WDL  Fatigue: Fatigue relieved by rest  Weight Loss: 5 to less than 10% from baseline OR intervention not indicated  EENT  Eye Disorders  Eye Disorder (WDL): All eye disorder elements are within defined limits  Ear Disorders  Ear Disorder (WDL): Exceptions to WDL  Tinnitus: Mild symptoms OR intervention not indicated (chronic)  Respiratory  Respiratory  Respiratory (WDL): Exceptions to WDL  Cough: Mild symptoms OR nonprescription intervention indicated  Dyspnea: Shortness of breath with moderate exertion  Cardiovascular  Cardiovascular  Cardiovascular (WDL): All  cardiovascular elements are within defined limits  Gastrointestinal  Gastrointestinal  Gastrointestinal (WDL): Exceptions to WDL  Anorexia: Loss of appetite without alteration in eating habits  Musculoskeletal  Musculoskeletal and Connective Tissue Disorders  Musculoskeletal & Connective (WDL): Exceptions to WDL  Arthralgia: Mild pain  Integumentary  Integumentary  Integumentary (WDL): All integumentary elements are within defined limits  Neurological  Neurosensory  Neurosensory (WDL): All neurosensory elements are within defined limits  Genitourinary/Reproductive  Genitourinary  Genitourinary (WDL): Exceptions to WDL  Urinary Frequency: Present  Lymphatic  Lymph System Disorders  Lymph (WDL): All lymph elements are within defined limits  Pain  Pain Score: No Pain (0)  AUA Assessment                                                              Accompanied by  Accompanied By: self only    Objective:      PHYSICAL EXAMINATION:    BP (!) 151/83   Pulse 75   Resp 20   Wt 67.1 kg (148 lb)   SpO2 93%   BMI 23.18 kg/m      Gen: Alert, in NAD  Eyes: PERRL, EOMI, sclera anicteric  Neck: Supple, full ROM, no LAD  Pulm: No wheezing, stridor or respiratory distress  CV: Well-perfused, no cyanosis, no pedal edema  Back: No step-offs or pain to palpation along the thoracolumbar spine  Rectal: Deferred  : Deferred  Musculoskeletal: Normal muscle bulk and tone  Skin: Normal color and turgor  Neurologic: A/Ox3, CN II-XII intact, normal gait and station  Psychiatric: Appropriate mood and affect     Imaging: Imaging results 30 days: CT Chest w/o contrast    Result Date: 7/8/2024  EXAM: CT CHEST W/O CONTRAST LOCATION: [facility] DATE: 7/8/2024 INDICATION:  Small cell carcinoma of lung (H) COMPARISON: 2/27/2024 and prior TECHNIQUE: CT chest without IV contrast. Multiplanar reformats were obtained. Dose reduction techniques were used. CONTRAST: None. FINDINGS: LUNGS AND PLEURA: Unchanged volume loss and  architectural distortion in the paramedian left lung involving the left upper lobe and superior segment of the left lower lobe. Metallic fiducial in the posterior left lower lobe with architectural distortion, pleural thickening, and parenchymal scarring. No new or enlarging suspicious nodules. Nodules that were new on the previous CT have resolved. There are a few new groundglass opacities within the medial right upper lobe and anterior right lower lobe. Hyperinflation and centrilobular emphysema. Benign calcified granuloma in the left lower lobe along the mediastinal pleura. Trachea and central airways are patent. MEDIASTINUM: Normal caliber thoracic aorta. No enlarged mediastinal or hilar lymph nodes. CORONARY ARTERY CALCIFICATION: Mild. UPPER ABDOMEN: Cholecystectomy. Simple cysts in both kidneys, which do not require further follow-up. MUSCULOSKELETAL: Prior ACDF. Multilevel degenerative osteophytes of the thoracolumbar spine. Healed right rib fractures. No vertebral compression deformities. No aggressive or destructive bone lesions.     IMPRESSION: 1.  Posttreatment changes in the paramedian left lung with no evidence of recurrence. 2.  A few groundglass opacities in the right lung are likely infectious or inflammatory. Nodules that were new on previous CT have resolved.     XR Pelvis 1/2 Views    Result Date: 6/15/2024  EXAM: XR PELVIS 1/2 VIEWS LOCATION: [facility] DATE: 6/15/2024 INDICATION: Pain left posterior ishium after fall earlier in the week COMPARISON: None.     IMPRESSION: Diffuse osseous demineralization. Mild degenerative arthrosis both hips. Degenerative changes both SI joints. Postoperative changes of the lumbosacral spine. Lower lumbar facet arthropathy and degenerative interspace narrowing. Radiopaque suture material overlies the central pelvis. No definite fracture on a single view of the pelvis.      Impression     The patient is a 73-year-old gentleman with a  prior history of limited stage small cell lung cancer diagnosed initially in 2012.  He was find to have a new limited stage small cell lung cancer involving the left lower lobe with clinical stage T1 N0 M0.  Patient received definitive SBRT and completed 4 years and 4 months ago.  He has been doing well with no clinical evidence of recurrence.       Assessment & Plan:     1.  I have personally reviewed his restaging MRI and CT scan today and compare with previous MRI and CT chest and radiation therapy record.  There is no radiographic evidence of cancer recurrence. Nodules that were new on previous CT have resolved. Patient is scheduled to return to radiation oncology in 6 months for CT chest.  The patient also has a recent onset of generalized fatigue with no significant reason.  I will get MRI brain for restaging.     2.  Continue follow-up with Dr. Hale, thoracic surgeon and Dr. Brito, pulmonology as planned.     3.  The patient was found to have a new focus of chronic microhemorrhage in the region of the posterior left caudate nucleus with stable chronic focus of microhemorrhage deep white matter left parietal level.  Patient will see Dr. April Franco, his primary physician for follow-up recommendation.     Pain Management Plan: NA    Face to face time  30 minutes with > 75% spent on consultation, education and coordination of care.        Mary Snowden MD  Department of Radiation Oncology   [facility] Radiation Oncology  Tel: [phone]  Page: [phone]    [facility]  [address] [place] [address]     [facility]   [address]   [place] [address]    CC:  Patient Care Team:  April Franco PA as PCP - General  Mary Snowden MD as MD (Hematology & Oncology)  Mary Snowden MD as Assigned Cancer Care Provider  Dasia Cervantes MD as Assigned Heart and Vascular Provider      Oncology Rooming Note    July 12, 2024 10:34 AM   Martin Tovar is a 73 year old male who presents  "for:    Chief Complaint   Patient presents with     Oncology Clinic Visit     Rad Onc follow up     Initial Vitals: BP (!) 151/83   Pulse 75   Resp 20   Wt 67.1 kg (148 lb)   SpO2 93%   BMI 23.18 kg/m   Estimated body mass index is 23.18 kg/m  as calculated from the following:    Height as of 1/10/24: 1.702 m (5' 7\").    Weight as of this encounter: 67.1 kg (148 lb). Body surface area is 1.78 meters squared.  No Pain (0) Comment: Data Unavailable   No LMP for male patient.  Allergies reviewed: Yes  Medications reviewed: Yes    Medications: Medication refills not needed today.  Pharmacy name entered into [employer]: [facility] [id number] [place] [id number]     Frailty Screening:   Is the patient here for a new oncology consult visit in cancer care? 2. No      Clinical concerns: SOB, mild cough which pt relates to an esophageal problem he's having, following with [facility] for that.   Dr. Snowden was notified.      Margo Khan RN                Again, thank you for allowing me to participate in the care of your patient.        Sincerely,        Mary Snowden MD  "

## 2024-07-12 NOTE — PROGRESS NOTES
"Oncology Rooming Note    July 12, 2024 10:34 AM   Martin Tovar is a 73 year old male who presents for:    Chief Complaint   Patient presents with    Oncology Clinic Visit     Rad Onc follow up     Initial Vitals: BP (!) 151/83   Pulse 75   Resp 20   Wt 67.1 kg (148 lb)   SpO2 93%   BMI 23.18 kg/m   Estimated body mass index is 23.18 kg/m  as calculated from the following:    Height as of 1/10/24: 1.702 m (5' 7\").    Weight as of this encounter: 67.1 kg (148 lb). Body surface area is 1.78 meters squared.  No Pain (0) Comment: Data Unavailable   No LMP for male patient.  Allergies reviewed: Yes  Medications reviewed: Yes    Medications: Medication refills not needed today.  Pharmacy name entered into [employer]: [facility] [id number] [place] [id number]     Frailty Screening:   Is the patient here for a new oncology consult visit in cancer care? 2. No      Clinical concerns: SOB, mild cough which pt relates to an esophageal problem he's having, following with [place] for that.   Dr. Snowden was notified.      Margo Khan RN              "

## 2024-07-12 NOTE — PROGRESS NOTES
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  The patient is status post chemoradiation therapy.  He was found to have a new diagnosis of limited stage small cell lung cancer, second primary/recurrence, clinical stage T1 N0 M0.  His case has been discussed at thoracic tumor conference and consensus recommendation is to consider SBRT.      TYPE OF RADIATION THERAPY ADMINISTERED:    1. 4500 cGy in 30 treatments at 150 cGy twice daily at [place] [facility] in 2012.      2.  PCI with a total dose of 2500 cGy in 10 treatments at [facility] in 2012.      3. 5000 cGy in 5 treatments to left lower lobe tumor given from 3/2/120-3/12/2020.     INTERVAL SINCE COMPLETION OF RADIATION THERAPY: 4 years and 4 months.      SUBJECTIVE:  Mr. Tovar is a 73 y.o. male who is a retired  from [employer].  Patient was diagnosed with limited stage small cell left lung cancer initially in 2012, stage T2 N0 M0.  He received concurrent chemoradiation therapy with cisplatin and etoposide and radiation therapy to a total dose of 4500 cGy in 30 treatments at 150 cGy twice daily.  His treatment was given at [facility].  Patient had a complete response and also received PCI with a total dose of 2500 cGy in 10 treatments.  Patient has been followed closely with no evidence of cancer recurrence.  He will presented with recent finding of left lower lobe nodule by routine screening exam for which he was seeking further evaluation.  PET CT scan on 1/14/2020 showed a 1.3 x 1.2 cm nodule with increased SUV max 3.3 consistent with malignancy.  There is no radiographic evidence of anel and systemic metastasis.  CT-guided needle biopsy on 2/5/2020 confirmed diagnosis of small cell carcinoma.  He had a staging MRI brain which also showed no evidence of brain metastasis.   Given the prior history of radiation therapy and location of his current cancer, the patient might require pneumonectomy.  His case has been discussed at thoracic tumor conference and that the consensus recommendation is to consider SBRT. His case has been discussed at thoracic tumor conference and consensus recommendation is to consider SBRT.  He received radiation therapy in our clinic with a total dose of 5000 cGy in 5 treatments given from 3/2/120-3/12/2020.  He tolerated radiation therapy very well with minimal side effect.     Patient has been doing well since completion of the radiation therapy.  He denies any chest pain discomfort at the time of evaluation.  He is here for routine postradiation therapy office follow-up.     Medications were reviewed and are up to date on EPIC.    The following portions of the patient's history were reviewed and updated as appropriate: allergies, current medications, past family history, past medical history, past social history, past surgical history and problem list.    Review of Systems:      General  Constitutional  Constitutional (WDL): Exceptions to WDL  Fatigue: Fatigue relieved by rest  Weight Loss: 5 to less than 10% from baseline OR intervention not indicated  EENT  Eye Disorders  Eye Disorder (WDL): All eye disorder elements are within defined limits  Ear Disorders  Ear Disorder (WDL): Exceptions to WDL  Tinnitus: Mild symptoms OR intervention not indicated (chronic)  Respiratory  Respiratory  Respiratory (WDL): Exceptions to WDL  Cough: Mild symptoms OR nonprescription intervention indicated  Dyspnea: Shortness of breath with moderate exertion  Cardiovascular  Cardiovascular  Cardiovascular (WDL): All cardiovascular elements are within defined limits  Gastrointestinal  Gastrointestinal  Gastrointestinal (WDL): Exceptions to WDL  Anorexia: Loss of appetite without alteration in eating habits  Musculoskeletal  Musculoskeletal and Connective Tissue  Disorders  Musculoskeletal & Connective (WDL): Exceptions to WDL  Arthralgia: Mild pain  Integumentary  Integumentary  Integumentary (WDL): All integumentary elements are within defined limits  Neurological  Neurosensory  Neurosensory (WDL): All neurosensory elements are within defined limits  Genitourinary/Reproductive  Genitourinary  Genitourinary (WDL): Exceptions to WDL  Urinary Frequency: Present  Lymphatic  Lymph System Disorders  Lymph (WDL): All lymph elements are within defined limits  Pain  Pain Score: No Pain (0)  AUA Assessment                                                              Accompanied by  Accompanied By: self only    Objective:      PHYSICAL EXAMINATION:    BP (!) 151/83   Pulse 75   Resp 20   Wt 67.1 kg (148 lb)   SpO2 93%   BMI 23.18 kg/m      Gen: Alert, in NAD  Eyes: PERRL, EOMI, sclera anicteric  Neck: Supple, full ROM, no LAD  Pulm: No wheezing, stridor or respiratory distress  CV: Well-perfused, no cyanosis, no pedal edema  Back: No step-offs or pain to palpation along the thoracolumbar spine  Rectal: Deferred  : Deferred  Musculoskeletal: Normal muscle bulk and tone  Skin: Normal color and turgor  Neurologic: A/Ox3, CN II-XII intact, normal gait and station  Psychiatric: Appropriate mood and affect     Imaging: Imaging results 30 days: CT Chest w/o contrast    Result Date: 7/8/2024  EXAM: CT CHEST W/O CONTRAST LOCATION: [facility] DATE: 7/8/2024 INDICATION:  Small cell carcinoma of lung (H) COMPARISON: 2/27/2024 and prior TECHNIQUE: CT chest without IV contrast. Multiplanar reformats were obtained. Dose reduction techniques were used. CONTRAST: None. FINDINGS: LUNGS AND PLEURA: Unchanged volume loss and architectural distortion in the paramedian left lung involving the left upper lobe and superior segment of the left lower lobe. Metallic fiducial in the posterior left lower lobe with architectural distortion, pleural thickening, and parenchymal  scarring. No new or enlarging suspicious nodules. Nodules that were new on the previous CT have resolved. There are a few new groundglass opacities within the medial right upper lobe and anterior right lower lobe. Hyperinflation and centrilobular emphysema. Benign calcified granuloma in the left lower lobe along the mediastinal pleura. Trachea and central airways are patent. MEDIASTINUM: Normal caliber thoracic aorta. No enlarged mediastinal or hilar lymph nodes. CORONARY ARTERY CALCIFICATION: Mild. UPPER ABDOMEN: Cholecystectomy. Simple cysts in both kidneys, which do not require further follow-up. MUSCULOSKELETAL: Prior ACDF. Multilevel degenerative osteophytes of the thoracolumbar spine. Healed right rib fractures. No vertebral compression deformities. No aggressive or destructive bone lesions.     IMPRESSION: 1.  Posttreatment changes in the paramedian left lung with no evidence of recurrence. 2.  A few groundglass opacities in the right lung are likely infectious or inflammatory. Nodules that were new on previous CT have resolved.     XR Pelvis 1/2 Views    Result Date: 6/15/2024  EXAM: XR PELVIS 1/2 VIEWS LOCATION: [facility] DATE: 6/15/2024 INDICATION: Pain left posterior ishium after fall earlier in the week COMPARISON: None.     IMPRESSION: Diffuse osseous demineralization. Mild degenerative arthrosis both hips. Degenerative changes both SI joints. Postoperative changes of the lumbosacral spine. Lower lumbar facet arthropathy and degenerative interspace narrowing. Radiopaque suture material overlies the central pelvis. No definite fracture on a single view of the pelvis.      Impression     The patient is a 73-year-old gentleman with a prior history of limited stage small cell lung cancer diagnosed initially in 2012.  He was find to have a new limited stage small cell lung cancer involving the left lower lobe with clinical stage T1 N0 M0.  Patient received definitive SBRT and  completed 4 years and 4 months ago.  He has been doing well with no clinical evidence of recurrence.       Assessment & Plan:     1.  I have personally reviewed his restaging MRI and CT scan today and compare with previous MRI and CT chest and radiation therapy record.  There is no radiographic evidence of cancer recurrence. Nodules that were new on previous CT have resolved. Patient is scheduled to return to radiation oncology in 6 months for CT chest.  The patient also has a recent onset of generalized fatigue with no significant reason.  I will get MRI brain for restaging.     2.  Continue follow-up with Dr. Hale, thoracic surgeon and Dr. Brito, pulmonology as planned.     3.  The patient was found to have a new focus of chronic microhemorrhage in the region of the posterior left caudate nucleus with stable chronic focus of microhemorrhage deep white matter left parietal level.  Patient will see Dr. April Franco, his primary physician for follow-up recommendation.     Pain Management Plan: NA    Face to face time  30 minutes with > 75% spent on consultation, education and coordination of care.        Mary Snowden MD  Department of Radiation Oncology   [facility]  Tel: [phone]  Page: [phone]    [facility]  [address] [place] [address]     [address] [place] [address]    CC:  Patient Care Team:  April Franco PA as PCP - General  Mary Snowden MD as MD (Hematology & Oncology)  Mary Snowden MD as Assigned Cancer Care Provider  Dasia Cervantes MD as Assigned Heart and Vascular Provider

## 2024-09-01 ENCOUNTER — HEALTH MAINTENANCE LETTER (OUTPATIENT)
Age: 74
End: 2024-09-01

## 2024-09-08 DIAGNOSIS — E78.5 HYPERLIPIDEMIA: ICD-10-CM

## 2024-09-10 RX ORDER — ATORVASTATIN CALCIUM 20 MG/1
20 TABLET, FILM COATED ORAL DAILY
Qty: 90 TABLET | Refills: 1 | Status: SHIPPED | OUTPATIENT
Start: 2024-09-10

## 2024-09-28 ENCOUNTER — TRANSFERRED RECORDS (OUTPATIENT)
Dept: HEALTH INFORMATION MANAGEMENT | Facility: CLINIC | Age: 74
End: 2024-09-28

## 2024-10-10 ENCOUNTER — TRANSFERRED RECORDS (OUTPATIENT)
Dept: HEALTH INFORMATION MANAGEMENT | Facility: CLINIC | Age: 74
End: 2024-10-10

## 2024-10-10 ENCOUNTER — LAB REQUISITION (OUTPATIENT)
Dept: LAB | Facility: CLINIC | Age: 74
End: 2024-10-10
Payer: MEDICARE

## 2024-10-10 DIAGNOSIS — J18.9 PNEUMONIA, UNSPECIFIED ORGANISM: ICD-10-CM

## 2024-10-10 DIAGNOSIS — Z86.19 PERSONAL HISTORY OF OTHER INFECTIOUS AND PARASITIC DISEASES: ICD-10-CM

## 2024-10-10 LAB
BASOPHILS # BLD AUTO: 0 10E3/UL (ref 0–0.2)
BASOPHILS NFR BLD AUTO: 1 %
EOSINOPHIL # BLD AUTO: 0.2 10E3/UL (ref 0–0.7)
EOSINOPHIL NFR BLD AUTO: 2 %
ERYTHROCYTE [DISTWIDTH] IN BLOOD BY AUTOMATED COUNT: 13.3 % (ref 10–15)
HCT VFR BLD AUTO: 43.1 % (ref 40–53)
HGB BLD-MCNC: 13.3 G/DL (ref 13.3–17.7)
IMM GRANULOCYTES # BLD: 0 10E3/UL
IMM GRANULOCYTES NFR BLD: 0 %
LYMPHOCYTES # BLD AUTO: 1.2 10E3/UL (ref 0.8–5.3)
LYMPHOCYTES NFR BLD AUTO: 14 %
MCH RBC QN AUTO: 31.4 PG (ref 26.5–33)
MCHC RBC AUTO-ENTMCNC: 30.9 G/DL (ref 31.5–36.5)
MCV RBC AUTO: 102 FL (ref 78–100)
MONOCYTES # BLD AUTO: 1 10E3/UL (ref 0–1.3)
MONOCYTES NFR BLD AUTO: 12 %
NEUTROPHILS # BLD AUTO: 5.8 10E3/UL (ref 1.6–8.3)
NEUTROPHILS NFR BLD AUTO: 71 %
NRBC # BLD AUTO: 0 10E3/UL
NRBC BLD AUTO-RTO: 0 /100
PLATELET # BLD AUTO: 221 10E3/UL (ref 150–450)
RBC # BLD AUTO: 4.24 10E6/UL (ref 4.4–5.9)
WBC # BLD AUTO: 8.1 10E3/UL (ref 4–11)

## 2024-10-10 PROCEDURE — 80048 BASIC METABOLIC PNL TOTAL CA: CPT | Mod: ORL | Performed by: PHYSICIAN ASSISTANT

## 2024-10-10 PROCEDURE — 85025 COMPLETE CBC W/AUTO DIFF WBC: CPT | Mod: ORL | Performed by: PHYSICIAN ASSISTANT

## 2024-10-11 LAB
ANION GAP SERPL CALCULATED.3IONS-SCNC: 9 MMOL/L (ref 7–15)
BUN SERPL-MCNC: 10.1 MG/DL (ref 8–23)
CALCIUM SERPL-MCNC: 8.8 MG/DL (ref 8.8–10.4)
CHLORIDE SERPL-SCNC: 102 MMOL/L (ref 98–107)
CREAT SERPL-MCNC: 0.96 MG/DL (ref 0.67–1.17)
EGFRCR SERPLBLD CKD-EPI 2021: 84 ML/MIN/1.73M2
GLUCOSE SERPL-MCNC: 130 MG/DL (ref 70–99)
HCO3 SERPL-SCNC: 31 MMOL/L (ref 22–29)
POTASSIUM SERPL-SCNC: 4.3 MMOL/L (ref 3.4–5.3)
SODIUM SERPL-SCNC: 142 MMOL/L (ref 135–145)

## 2024-10-30 ENCOUNTER — HOSPITAL ENCOUNTER (EMERGENCY)
Facility: HOSPITAL | Age: 74
Discharge: HOME | End: 2024-10-30
Attending: EMERGENCY MEDICINE | Admitting: EMERGENCY MEDICINE
Payer: MEDICARE

## 2024-10-30 ENCOUNTER — APPOINTMENT (OUTPATIENT)
Dept: CT IMAGING | Facility: HOSPITAL | Age: 74
End: 2024-10-30
Attending: EMERGENCY MEDICINE
Payer: MEDICARE

## 2024-10-30 VITALS
OXYGEN SATURATION: 97 % | HEIGHT: 67 IN | WEIGHT: 147 LBS | SYSTOLIC BLOOD PRESSURE: 140 MMHG | HEART RATE: 67 BPM | BODY MASS INDEX: 23.07 KG/M2 | RESPIRATION RATE: 22 BRPM | TEMPERATURE: 98.1 F | DIASTOLIC BLOOD PRESSURE: 81 MMHG
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
## 2024-10-30 DIAGNOSIS — T14.8XXA ABRASION: ICD-10-CM

## 2024-10-30 DIAGNOSIS — S01.81XA LACERATION OF BROW WITHOUT COMPLICATION, INITIAL ENCOUNTER: ICD-10-CM

## 2024-10-30 DIAGNOSIS — S09.90XA INJURY OF HEAD, INITIAL ENCOUNTER: ICD-10-CM

## 2024-10-30 PROCEDURE — 90471 IMMUNIZATION ADMIN: CPT | Performed by: EMERGENCY MEDICINE

## 2024-10-30 PROCEDURE — 99285 EMERGENCY DEPT VISIT HI MDM: CPT | Mod: 25

## 2024-10-30 PROCEDURE — G1010 CDSM STANSON: HCPCS

## 2024-10-30 PROCEDURE — 12011 RPR F/E/E/N/L/M 2.5 CM/<: CPT

## 2024-10-30 PROCEDURE — 250N000009 HC RX 250: Performed by: EMERGENCY MEDICINE

## 2024-10-30 PROCEDURE — 250N000011 HC RX IP 250 OP 636: Performed by: EMERGENCY MEDICINE

## 2024-10-30 PROCEDURE — 999N000157 HC STATISTIC RCP TIME EA 10 MIN

## 2024-10-30 PROCEDURE — 271N000002 HC RX 271: Performed by: EMERGENCY MEDICINE

## 2024-10-30 PROCEDURE — 90714 TD VACC NO PRESV 7 YRS+ IM: CPT | Performed by: EMERGENCY MEDICINE

## 2024-10-30 RX ORDER — BACITRACIN ZINC 500 [USP'U]/G
OINTMENT TOPICAL 2 TIMES DAILY
Qty: 144 G | Refills: 0 | Status: SHIPPED | OUTPATIENT
Start: 2024-10-30

## 2024-10-30 RX ORDER — METHYLCELLULOSE 4000CPS 30 %
POWDER (GRAM) MISCELLANEOUS ONCE
Status: COMPLETED | OUTPATIENT
Start: 2024-10-30 | End: 2024-10-30

## 2024-10-30 RX ORDER — GINSENG 100 MG
CAPSULE ORAL ONCE
Status: COMPLETED | OUTPATIENT
Start: 2024-10-30 | End: 2024-10-30

## 2024-10-30 RX ADMIN — Medication 3 ML: at 11:48

## 2024-10-30 RX ADMIN — BACITRACIN: 500 OINTMENT TOPICAL at 11:45

## 2024-10-30 RX ADMIN — CLOSTRIDIUM TETANI TOXOID ANTIGEN (FORMALDEHYDE INACTIVATED) AND CORYNEBACTERIUM DIPHTHERIAE TOXOID ANTIGEN (FORMALDEHYDE INACTIVATED) 0.5 ML: 5; 2 INJECTION, SUSPENSION INTRAMUSCULAR at 11:44

## 2024-10-30 RX ADMIN — METHYLCELLULOSE: 2 POWDER, FOR SOLUTION ORAL at 11:48

## 2024-10-30 ASSESSMENT — ACTIVITIES OF DAILY LIVING (ADL)
ADLS_ACUITY_SCORE: 0

## 2024-10-30 ASSESSMENT — COLUMBIA-SUICIDE SEVERITY RATING SCALE - C-SSRS
2. HAVE YOU ACTUALLY HAD ANY THOUGHTS OF KILLING YOURSELF IN THE PAST MONTH?: NO
1. IN THE PAST MONTH, HAVE YOU WISHED YOU WERE DEAD OR WISHED YOU COULD GO TO SLEEP AND NOT WAKE UP?: NO
6. HAVE YOU EVER DONE ANYTHING, STARTED TO DO ANYTHING, OR PREPARED TO DO ANYTHING TO END YOUR LIFE?: NO

## 2024-10-30 NOTE — ED PROVIDER NOTES
EMERGENCY DEPARTMENT ENCOUNTER      NAME: Martin Tovar  AGE: 74 year old male  YOB: 1950  MRN: [id number]  EVALUATION DATE & TIME: 10/30/2024 10:09 AM    PCP: April Franco    ED PROVIDER: David Arellano MD      Chief Complaint   Patient presents with    Fall         FINAL IMPRESSION:  No diagnosis found.      ED COURSE & MEDICAL DECISION MAKING:    Pertinent Labs & Imaging studies reviewed. (See chart for details)  74 year old male presents to the Emergency Department for evaluation of injury and left orbit injury after tripped outside on way to cardiology appointment    Rapid response was called outside.  Brought to ER    Patient denies syncope.  Says he tripped on uneven ground.  Says been having problems with balance ever since pneumonia hospitalization last month.    Chronically on oxygen since last hospitalization    Denies nausea, wooziness, blurred vision altered mental status to suggest concussion    Last tetanus 2009.  Td    On exam his orbits laceration to brow.  No hyphema.  No diplopia.  Plan for CT head and neck to look for fracture or intracranial hemorrhage    Abrasions to right hand.  Plan for irrigation and bacitracin    Laceration closed.  Plan for discharge home.  On recheck still has no confusion weakness headache or amnesia.  Patient declining referral to concussion clinic.    ED Course as of 10/30/24 1321   Wed Oct 30, 2024   1045 I reviewed dscharged summary from October 3, 2024 patient is discharged home on Omnicef for pneumonia and oxygen   1215 I independently reviewed CT head did not appreciate any intercranial hemorrhage       Medical Decision Making  Obtained supplemental history:Supplemental history obtained?: Documented in chart  Reviewed external records: External records reviewed?: Documented in chart  Care impacted by chronic illness:Chronic Lung Disease  Did you consider but not order tests?: Work up considered but not performed and documented in chart, if  applicable  Did you interpret images independently?: Independent interpretation of ECG and images noted in documentation, when applicable.  Consultation discussion with other provider:Did you involve another provider (consultant, , pharmacy, etc.)?: No  Discharge. No recommendations on prescription strength medication(s). I considered admission, but ultimately discharged patient negative imaging.    MIPS: Adult Minor Head Trauma:Age 65 years or older            At the conclusion of the encounter I discussed the results of all of the tests and the disposition. The questions were answered. The patient or family acknowledged understanding and was agreeable with the care plan.         MEDICATIONS GIVEN IN THE EMERGENCY:  Medications - No data to display    NEW PRESCRIPTIONS STARTED AT TODAY'S ER VISIT  New Prescriptions    No medications on file          =================================================================    TRIAGE ASSESSMENT:  Patient arrives to ED for evaluation after falling outside the building this morning around 0945. Patient reports mechanical fall due to rough walking surface. Did strike his head and has a laceration with to the left eyebrow with controlled bleeding and abrasions to the right fingers. He denies LOC, no blood thinners.      Triage Assessment (Adult)       Row Name 10/30/24 1004          Triage Assessment    Airway WDL WDL        Respiratory WDL    Respiratory WDL X;rhythm/pattern     Rhythm/Pattern, Respiratory shortness of breath  recovering from pneumonia        Cardiac WDL    Cardiac WDL WDL        Peripheral/Neurovascular WDL    Peripheral Neurovascular WDL WDL        Cognitive/Neuro/Behavioral WDL    Cognitive/Neuro/Behavioral WDL WDL                          HPI    Patient information was obtained from: Patient and patient's partner      Martin Tovar is a 74 year old male with a pertinent history of COPD, asthma, oxygen dependence who presents to this ED for evaluation  of left orbit injury after tripping on sidewalk on way to outpatient cardiac clinic follow    Denies chest pain.  Denies vomiting.  No altered mental status.  Tripped hit head.  No neck pain.  No confusion.  No vomiting.       Reports having problems with balance since pneumonia hospitalization last month    On oxygen ever since    Not on blood thinner      REVIEW OF SYSTEMS   Review of Systems     No fever    PAST MEDICAL HISTORY:  Past Medical History:   Diagnosis Date    Asthma     Avascular necrosis of bones of both hips (H)     Cancer (H)     Lung-radiation, chemo    Cholecystitis     Chronic renal failure     COPD, mild (H)     Diabetes mellitus (H)     states resolved after weight loss    Disc degeneration, lumbar     DJD (degenerative joint disease) of knee     left    Elevated PSA     Hemoptysis 2/16/2020    LBP (low back pain)     Lung cancer (H)     Pneumonia 1/4/2019    Smoking hx        PAST SURGICAL HISTORY:  Past Surgical History:   Procedure Laterality Date    ARTHROSCOPY KNEE      CERVICAL FUSION      CT BIOPSY LUNG  2/5/2020    CV CORONARY ANGIOGRAM N/A 8/5/2021    Procedure: CV CORONARY ANGIOGRAM;  Surgeon: Fabio Espinosa MD;  Location: [facility] CV    CV CORONARY ANGIOGRAM N/A 4/13/2023    Procedure: Coronary Angiogram;  Surgeon: Mikayla Floyd MD;  Location: [facility] CV    CV LEFT VENTRICULOGRAM N/A 8/5/2021    Procedure: Left Ventriculogram;  Surgeon: Fabio Espinosa MD;  Location: [facility] CV    ENDOSCOPIC RETROGRADE CHOLANGIOPANCREATOGRAM N/A 12/22/2015    Procedure: ENDOSCOPIC RETROGRADE CHOLANGIOPANCREATOGRAPHY;  Surgeon: Tang Elliott MD;  Location: [facility] OR;  Service:     HEMORRHOID SURGERY      INGUINAL HERNIA REPAIR      IR LUMBAR EPIDURAL STEROID INJECTION  4/29/2003    IR LUMBAR EPIDURAL STEROID INJECTION  6/27/2007    IR LUNG BIOPSY MEDIASTINUM LEFT  12/21/2012    LAPAROSCOPIC CHOLECYSTECTOMY N/A 12/16/2015    Procedure:  CHOLECYSTECTOMY LAPAROSCOPIC;  Surgeon: Eugene Ro MD;  Location: [facility] OR;  Service:     LUMBAR FUSION  2006    & reconstruction    OTHER SURGICAL HISTORY  2013    COLOVESICULAR FISTULA REPAIR    PICC  1/3/2016         RELEASE TRIGGER FINGER  07/2020    TOTAL KNEE ARTHROPLASTY Left            CURRENT MEDICATIONS:    albuterol (PROVENTIL HFA;VENTOLIN HFA) 90 mcg/actuation inhaler  atorvastatin (LIPITOR) 20 MG tablet  cyclobenzaprine (FLEXERIL) 5 MG tablet  diclofenac (VOLTAREN) 1 % topical gel  HYDROcodone-acetaminophen (NORCO) 7.5-325 mg per tablet  ipratropium (ATROVENT) 0.02 % nebulizer solution  morphine (MS CONTIN) 15 MG 12 hr tablet  omeprazole (PRILOSEC) 20 MG DR capsule  oxybutynin ER (DITROPAN-XL) 10 MG 24 hr tablet  polyethylene glycol-propylene glycol (SYSTANE) 0.4-0.3 % SOLN ophthalmic solution  tamsulosin (FLOMAX) 0.4 mg cap  WIXELA INHUB 250-50 MCG/ACT inhaler        ALLERGIES:  Allergies   Allergen Reactions    Ciprofloxacin Swelling and Rash     Throat swelling    Septra [Sulfamethoxazole-Trimethoprim] Swelling and Rash       FAMILY HISTORY:  No family history on file.    SOCIAL HISTORY:   Social History     Socioeconomic History    Marital status:    Tobacco Use    Smoking status: Every Day     Current packs/day: 1.00     Types: Cigarettes    Smokeless tobacco: Never    Tobacco comments:     1 ppd as of 7/2024   Substance and Sexual Activity    Alcohol use: Not Currently    Drug use: No     Social Drivers of Health     Financial Resource Strain: Low Risk  (10/2/2024)    Received from [employer]    Financial Resource Strain     Difficulty of Paying Living Expenses: 3   Food Insecurity: No Food Insecurity (10/2/2024)    Received from [employer]    Food Insecurity     Do you worry your food will run out before you are able to buy more?: 1   Transportation Needs: No Transportation Needs (10/2/2024)    Received  "from [facility] [employer] [facility]    Transportation Needs     Lack of Transportation (Medical): 1   Social Connections: Socially Integrated (10/2/2024)    Received from [facility] [employer] [facility]    Social Connections     Frequency of Communication with Friends and Family: 0   Housing Stability: Low Risk  (10/2/2024)    Received from [facility] [employer] [facility]    Housing Stability     What is your housing situation today?: 1       VITALS:  /81   Pulse 75   Temp 98.1  F (36.7  C) (Oral)   Resp 22   Ht 1.702 m (5' 7\")   Wt 66.7 kg (147 lb)   SpO2 90%   BMI 23.02 kg/m      PHYSICAL EXAM        /81   Pulse 75   Temp 98.1  F (36.7  C) (Oral)   Resp 22   Ht 1.702 m (5' 7\")   Wt 66.7 kg (147 lb)   SpO2 90%   BMI 23.02 kg/m      General Appearance: Alert, cooperative, normal speech and facial symmetry,  appears stated age,      Primary survey:     Airway patent, 1 L of oxygen  Breath sounds: bilateral breath sounds  Cardiovascular: 2+ radial pulses and DP pulses  Disability GCS 15    Secondary survey    Head: left brow laceration.  Abrasion left lateral inferior orbit.  Abrasion left orbit  Eyes:  PERRL,pupils midsized, conjunctiva/corneas clear, EOM's intact, no orbital injury  ENT:  No obvious facial deformity.  No tenderness to palpation.  No epistaxis.  Extraocular movements are intact.  No evidence of orbital injury.    Neck:  No midline cervical spine tenderness.  No paraspinal tenderness.  Chest:  No tenderness or deformity, no crepitus  Cardio:  Regular rate and rhythm, S1 and S2 normal, no murmur, rub    or gallop, 2+ pulses symmetric in all extremities  Pulm:  Clear to auscultation bilaterally, respirations unlabored,   Back:   no CVA tenderness, no spinal tenderness  Abdomen:  Soft, non-tender, no rebound or guarding, no pelvic pain to compression  Extremities:  No obvious deformity, all joints palpated in place with full range of " motion.  No tenderness or instability.  Patient is able to bear full weight, no tenderness over joints or extremities, no cyanosis or edema, full function and range of motion, pulses equal in all extremities, normal cap refill  Skin: Superficial abrasions fingers right hand  Neuro:  Awake, alert, responsive to voice, follows commands, normal speech, No gross motor weakness or sensory loss, moves all extremities, baseline ambulation, GCS 15    All pertinent labs reviewed and interpreted.       RADIOLOGY:  Reviewed all pertinent imaging. Please see official radiology report.  No orders to display       PROCEDURES:     PROCEDURE: Laceration Repair   INDICATIONS: Laceration   PROCEDURE PROVIDER: Dr Justen Arellano   SITE: Left brow   TYPE/SIZE: simple, clean, and no foreign body visualized  2 cm (total length)   FUNCTIONAL ASSESSMENT: Distal sensation, circulation, and motor intact   MEDICATION: 3 mLs of 1% Lidocaine without epinephrine   PREPARATION: irrigation with Normal saline   DEBRIDEMENT: no debridement   CLOSURE:  Superficial layer closed with 2 stitches of 5-0 Fast Absorbing simple interrupted    Total number of sutures/staples placed: 2               David Arellano MD  [facility] EMERGENCY DEPARTMENT  [facility] [facility] [address] [phone]        David Arellano MD  10/30/24 1201

## 2024-10-30 NOTE — DISCHARGE INSTRUCTIONS
Your stitches will dissolve on their own.  Apply bacitracin.  Return to the ER for worsening symptoms

## 2024-10-30 NOTE — ED TRIAGE NOTES
Patient arrives to ED for evaluation after falling outside the building this morning around 0945. Patient reports mechanical fall due to rough walking surface. Did strike his head and has a laceration with to the left eyebrow with controlled bleeding and abrasions to the right fingers. He denies LOC, no blood thinners.      Triage Assessment (Adult)       Row Name 10/30/24 1004          Triage Assessment    Airway WDL WDL        Respiratory WDL    Respiratory WDL X;rhythm/pattern     Rhythm/Pattern, Respiratory shortness of breath  recovering from pneumonia        Cardiac WDL    Cardiac WDL WDL        Peripheral/Neurovascular WDL    Peripheral Neurovascular WDL WDL        Cognitive/Neuro/Behavioral WDL    Cognitive/Neuro/Behavioral WDL WDL

## 2024-11-07 ENCOUNTER — TRANSFERRED RECORDS (OUTPATIENT)
Dept: HEALTH INFORMATION MANAGEMENT | Facility: CLINIC | Age: 74
End: 2024-11-07
Payer: MEDICARE

## 2024-11-10 ENCOUNTER — HEALTH MAINTENANCE LETTER (OUTPATIENT)
Age: 74
End: 2024-11-10

## 2024-11-12 ENCOUNTER — OFFICE VISIT (OUTPATIENT)
Dept: CARDIOLOGY | Facility: CLINIC | Age: 74
End: 2024-11-12
Payer: MEDICARE

## 2024-11-12 VITALS
WEIGHT: 153 LBS | BODY MASS INDEX: 24.01 KG/M2 | HEIGHT: 67 IN | DIASTOLIC BLOOD PRESSURE: 64 MMHG | HEART RATE: 83 BPM | RESPIRATION RATE: 18 BRPM | SYSTOLIC BLOOD PRESSURE: 124 MMHG

## 2024-11-12 DIAGNOSIS — R26.89 IMBALANCE: ICD-10-CM

## 2024-11-12 DIAGNOSIS — E78.00 PURE HYPERCHOLESTEROLEMIA: ICD-10-CM

## 2024-11-12 DIAGNOSIS — N18.31 CHRONIC RENAL FAILURE, STAGE 3A (H): ICD-10-CM

## 2024-11-12 DIAGNOSIS — N40.0 BENIGN PROSTATIC HYPERPLASIA WITHOUT LOWER URINARY TRACT SYMPTOMS: ICD-10-CM

## 2024-11-12 DIAGNOSIS — I25.10 MILD CORONARY ARTERY DISEASE: Primary | ICD-10-CM

## 2024-11-12 DIAGNOSIS — F17.200 TOBACCO USE DISORDER: ICD-10-CM

## 2024-11-12 DIAGNOSIS — R79.89 ELEVATED TROPONIN: ICD-10-CM

## 2024-11-12 DIAGNOSIS — C34.31 SMALL CELL CARCINOMA OF LOWER LOBE OF RIGHT LUNG (H): ICD-10-CM

## 2024-11-12 DIAGNOSIS — I10 ESSENTIAL HYPERTENSION: ICD-10-CM

## 2024-11-12 PROCEDURE — G2211 COMPLEX E/M VISIT ADD ON: HCPCS | Performed by: INTERNAL MEDICINE

## 2024-11-12 PROCEDURE — 99214 OFFICE O/P EST MOD 30 MIN: CPT | Performed by: INTERNAL MEDICINE

## 2024-11-12 RX ORDER — CEPHALEXIN 500 MG/1
500 CAPSULE ORAL 3 TIMES DAILY
COMMUNITY
Start: 2024-11-07 | End: 2024-11-14

## 2024-11-12 RX ORDER — PANTOPRAZOLE SODIUM 40 MG/1
40 TABLET, DELAYED RELEASE ORAL
COMMUNITY
Start: 2024-10-30

## 2024-11-12 NOTE — PROGRESS NOTES
[facility]  Heart Care Clinic Follow-up Note    Assessment & Plan       (I25.10) Mild coronary artery disease  (primary encounter diagnosis)  Comment: Due to chest discomfort that he was concerned was of cardiac origin he had angiography which showed normal left main, ostial LAD 35% lesion with a mid 35% lesion, proximal circumflex 25% lesion and normal right coronary artery.  Asymptomatic, work on prevention.    (I21.4) NSTEMI (non-ST elevated myocardial infarction) (H)  Comment: Type II with borderline troponin elevation without any significant obstructive disease.     (I10) Essential hypertension  Comment: Under good control of anything a little orthostatic, currently on Flomax with lisinopril being discontinued.  Given his off balance, would suggest going to Flomax every other day, hopefully prostatism does not worsen and his balance issues are better.      (E78.00) Pure hypercholesterolemia  Comment: Total cholesterol of 108 with an LDL of 50 on lower dose atorvastatin.  Continue current med.      (E11.9) Diabetes mellitus without complication (H)  Comment: Given significant weight loss this is resolved and no longer an issue.     (C34.90) Small cell lung cancer (H)  Comment: Status post chemotherapy as well as radiation therapy, echo shows no major abnormalities, defer to primary.     (N18.31) Chronic renal failure, stage 3a (H)  Comment: Resolved, creatinine 0.96 with GFR of 84 which is excellent.    (R26.89) Imbalance  Comment: Most likely secondary to numerous medications and polypharmacy, however cannot exclude an element of orthostasis.  Given this, try to back off on Flomax to every other day and discontinue altogether provided prostatism does not worsen nor urinary retention worsen.    (F17.200) Tobacco use disorder  Comment: Continue to work on discontinuing tobacco use.    (N40.0) Benign prostatic hyperplasia without lower urinary tract symptoms  Comment: As above back off on tamsulosin  hopefully without any untoward effect.    Plan  1.  Decrease Flomax to every other day and if tolerated discontinue, watch for urinary retention.  2.  Follow-up with me in 2 years or sooner if needed.    The longitudinal plan of care for the diagnosis(es)/condition(s) as documented were addressed during this visit. Due to the added complexity in care, I will continue to support Bhaskar in the subsequent management and with ongoing continuity of care.     Subjective  CC: 74-year-old white gentleman being seen in 18-month follow-up.  Still living independently in a house with his wife, they do have a flight of steps however he is living outside of the bedroom in her garage which has been made into her bedroom.  He is not very active at home.  Does admit to being off balance when he is upright and walking around.  For the most part however denies any syncope, presyncope, chest pains, palpitations, shortness of breath, PND, orthopnea or peripheral edema.    Medications  Current Outpatient Medications   Medication Sig Dispense Refill    albuterol (PROVENTIL HFA;VENTOLIN HFA) 90 mcg/actuation inhaler [ALBUTEROL (PROVENTIL HFA;VENTOLIN HFA) 90 MCG/ACTUATION INHALER] Inhale 2 puffs 4 (four) times a day as needed for wheezing.             atorvastatin (LIPITOR) 20 MG tablet TAKE 1 TABLET BY MOUTH EVERY DAY 90 tablet 1    bacitracin 500 UNIT/GM external ointment Apply topically 2 times daily. 144 g 0    cephALEXin (KEFLEX) 500 MG capsule Take 500 mg by mouth 3 times daily.      diclofenac (VOLTAREN) 1 % topical gel Apply topically 4 times daily as needed for moderate pain (hand joints)      HYDROcodone-acetaminophen (NORCO) 7.5-325 mg per tablet [HYDROCODONE-ACETAMINOPHEN (NORCO) 7.5-325 MG PER TABLET] Take 1 tablet by mouth every 6 (six) hours as needed. 30 tablet 0    ipratropium (ATROVENT) 0.02 % nebulizer solution Take 0.5 mg by nebulization 4 times daily      morphine (MS CONTIN) 15 MG 12 hr tablet Take 15 mg by mouth  "every 6 hours       oxybutynin ER (DITROPAN-XL) 10 MG 24 hr tablet Take 10 mg by mouth daily      pantoprazole (PROTONIX) 40 MG EC tablet Take 40 mg by mouth every morning (before breakfast).      polyethylene glycol-propylene glycol (SYSTANE) 0.4-0.3 % SOLN ophthalmic solution Place 1 drop into both eyes 4 times daily      tamsulosin (FLOMAX) 0.4 mg cap [TAMSULOSIN (FLOMAX) 0.4 MG CAP] Take 0.4 mg by mouth daily.         Objective  /64 (BP Location: Left arm, Patient Position: Sitting, Cuff Size: Adult Regular)   Pulse 83   Resp 18   Ht 1.702 m (5' 7\")   Wt 69.4 kg (153 lb)   BMI 23.96 kg/m      General Appearance:    Alert, cooperative, no distress, appears stated age   Head:    Normocephalic, without obvious abnormality, atraumatic   Throat:   Lips, mucosa, and tongue normal; teeth and gums normal   Neck:   Supple, symmetrical, trachea midline, no adenopathy;        thyroid:  No enlargement/tenderness/nodules; no carotid    bruit or JVD   Back:     Symmetric, no curvature, ROM normal, no CVA tenderness   Lungs:     Clear to auscultation bilaterally, respirations unlabored   Chest wall:    No tenderness or deformity   Heart:    Regular rate and rhythm, S1 and S2 normal, no murmur, rub   or gallop   Abdomen:     Soft, non-tender, bowel sounds active all four quadrants,     no masses, no organomegaly   Extremities:   Normal, atraumatic, no cyanosis or edema   Pulses:   2+ and symmetric all extremities   Skin:   Skin color, texture, turgor normal, no rashes or lesions     Results    Lab Results personally reviewed   Lab Results   Component Value Date    CHOL 108 10/05/2023    CHOL 84 04/13/2023     Lab Results   Component Value Date    HDL 45 10/05/2023    HDL 44 04/13/2023     No components found for: \"LDLCALC\"  Lab Results   Component Value Date    TRIG 64 10/05/2023    TRIG 64 04/13/2023     Lab Results   Component Value Date    WBC 8.1 10/10/2024    HGB 13.3 10/10/2024    HCT 43.1 10/10/2024     " 10/10/2024     Lab Results   Component Value Date    BUN 10.1 10/10/2024     10/10/2024    CO2 31 (H) 10/10/2024

## 2024-11-12 NOTE — LETTER
11/12/2024    RAFAEL Funk  [address] [place] [address]    RE: Martin Tovar       Dear Colleague,     I had the pleasure of seeing Martin Tovar in the [facility].      [facility]  Heart Care Clinic Follow-up Note    Assessment & Plan       (I25.10) Mild coronary artery disease  (primary encounter diagnosis)  Comment: Due to chest discomfort that he was concerned was of cardiac origin he had angiography which showed normal left main, ostial LAD 35% lesion with a mid 35% lesion, proximal circumflex 25% lesion and normal right coronary artery.  Asymptomatic, work on prevention.    (I21.4) NSTEMI (non-ST elevated myocardial infarction) (H)  Comment: Type II with borderline troponin elevation without any significant obstructive disease.     (I10) Essential hypertension  Comment: Under good control of anything a little orthostatic, currently on Flomax with lisinopril being discontinued.  Given his off balance, would suggest going to Flomax every other day, hopefully prostatism does not worsen and his balance issues are better.      (E78.00) Pure hypercholesterolemia  Comment: Total cholesterol of 108 with an LDL of 50 on lower dose atorvastatin.  Continue current med.      (E11.9) Diabetes mellitus without complication (H)  Comment: Given significant weight loss this is resolved and no longer an issue.     (C34.90) Small cell lung cancer (H)  Comment: Status post chemotherapy as well as radiation therapy, echo shows no major abnormalities, defer to primary.     (N18.31) Chronic renal failure, stage 3a (H)  Comment: Resolved, creatinine 0.96 with GFR of 84 which is excellent.    (R26.89) Imbalance  Comment: Most likely secondary to numerous medications and polypharmacy, however cannot exclude an element of orthostasis.  Given this, try to back off on Flomax to every other day and discontinue altogether provided prostatism does not worsen nor urinary retention  worsen.    (F17.200) Tobacco use disorder  Comment: Continue to work on discontinuing tobacco use.    (N40.0) Benign prostatic hyperplasia without lower urinary tract symptoms  Comment: As above back off on tamsulosin hopefully without any untoward effect.    Plan  1.  Decrease Flomax to every other day and if tolerated discontinue, watch for urinary retention.  2.  Follow-up with me in 2 years or sooner if needed.    The longitudinal plan of care for the diagnosis(es)/condition(s) as documented were addressed during this visit. Due to the added complexity in care, I will continue to support Bhaskar in the subsequent management and with ongoing continuity of care.     Subjective  CC: 74-year-old white gentleman being seen in 18-month follow-up.  Still living independently in a house with his wife, they do have a flight of steps however he is living outside of the bedroom in her garage which has been made into her bedroom.  He is not very active at home.  Does admit to being off balance when he is upright and walking around.  For the most part however denies any syncope, presyncope, chest pains, palpitations, shortness of breath, PND, orthopnea or peripheral edema.    Medications  Current Outpatient Medications   Medication Sig Dispense Refill     albuterol (PROVENTIL HFA;VENTOLIN HFA) 90 mcg/actuation inhaler [ALBUTEROL (PROVENTIL HFA;VENTOLIN HFA) 90 MCG/ACTUATION INHALER] Inhale 2 puffs 4 (four) times a day as needed for wheezing.              atorvastatin (LIPITOR) 20 MG tablet TAKE 1 TABLET BY MOUTH EVERY DAY 90 tablet 1     bacitracin 500 UNIT/GM external ointment Apply topically 2 times daily. 144 g 0     cephALEXin (KEFLEX) 500 MG capsule Take 500 mg by mouth 3 times daily.       diclofenac (VOLTAREN) 1 % topical gel Apply topically 4 times daily as needed for moderate pain (hand joints)       HYDROcodone-acetaminophen (NORCO) 7.5-325 mg per tablet [HYDROCODONE-ACETAMINOPHEN (NORCO) 7.5-325 MG PER TABLET] Take  "1 tablet by mouth every 6 (six) hours as needed. 30 tablet 0     ipratropium (ATROVENT) 0.02 % nebulizer solution Take 0.5 mg by nebulization 4 times daily       morphine (MS CONTIN) 15 MG 12 hr tablet Take 15 mg by mouth every 6 hours        oxybutynin ER (DITROPAN-XL) 10 MG 24 hr tablet Take 10 mg by mouth daily       pantoprazole (PROTONIX) 40 MG EC tablet Take 40 mg by mouth every morning (before breakfast).       polyethylene glycol-propylene glycol (SYSTANE) 0.4-0.3 % SOLN ophthalmic solution Place 1 drop into both eyes 4 times daily       tamsulosin (FLOMAX) 0.4 mg cap [TAMSULOSIN (FLOMAX) 0.4 MG CAP] Take 0.4 mg by mouth daily.         Objective  /64 (BP Location: Left arm, Patient Position: Sitting, Cuff Size: Adult Regular)   Pulse 83   Resp 18   Ht 1.702 m (5' 7\")   Wt 69.4 kg (153 lb)   BMI 23.96 kg/m      General Appearance:    Alert, cooperative, no distress, appears stated age   Head:    Normocephalic, without obvious abnormality, atraumatic   Throat:   Lips, mucosa, and tongue normal; teeth and gums normal   Neck:   Supple, symmetrical, trachea midline, no adenopathy;        thyroid:  No enlargement/tenderness/nodules; no carotid    bruit or JVD   Back:     Symmetric, no curvature, ROM normal, no CVA tenderness   Lungs:     Clear to auscultation bilaterally, respirations unlabored   Chest wall:    No tenderness or deformity   Heart:    Regular rate and rhythm, S1 and S2 normal, no murmur, rub   or gallop   Abdomen:     Soft, non-tender, bowel sounds active all four quadrants,     no masses, no organomegaly   Extremities:   Normal, atraumatic, no cyanosis or edema   Pulses:   2+ and symmetric all extremities   Skin:   Skin color, texture, turgor normal, no rashes or lesions     Results    Lab Results personally reviewed   Lab Results   Component Value Date    CHOL 108 10/05/2023    CHOL 84 04/13/2023     Lab Results   Component Value Date    HDL 45 10/05/2023    HDL 44 04/13/2023     No " "components found for: \"LDLCALC\"  Lab Results   Component Value Date    TRIG 64 10/05/2023    TRIG 64 04/13/2023     Lab Results   Component Value Date    WBC 8.1 10/10/2024    HGB 13.3 10/10/2024    HCT 43.1 10/10/2024     10/10/2024     Lab Results   Component Value Date    BUN 10.1 10/10/2024     10/10/2024    CO2 31 (H) 10/10/2024             Thank you for allowing me to participate in the care of your patient.      Sincerely,     JOCELYNN CERVANTES MD     [facility]  cc:   Dasia Cervantes MD  [address], [address]  [place] [address]      "

## 2024-11-12 NOTE — PATIENT INSTRUCTIONS
Mr Salazar FUNMILAYO Tovar,  I enjoyed visiting with you and your wife again today.  I am sorry to hear of the falls.  Per our conversation let me know if any heart issues pop up.  Try the TAMSULOSIN every other day to see if helps with balance without worsening peeing.  I will plan on seeing you 1-2 years.  Maicol Cervantes

## 2024-12-10 ENCOUNTER — OFFICE VISIT (OUTPATIENT)
Dept: INFECTIOUS DISEASES | Facility: CLINIC | Age: 74
End: 2024-12-10
Payer: MEDICARE

## 2024-12-10 VITALS
TEMPERATURE: 98.3 F | OXYGEN SATURATION: 91 % | HEART RATE: 72 BPM | SYSTOLIC BLOOD PRESSURE: 136 MMHG | DIASTOLIC BLOOD PRESSURE: 64 MMHG | BODY MASS INDEX: 24.43 KG/M2 | WEIGHT: 156 LBS

## 2024-12-10 DIAGNOSIS — J18.9 RECURRENT PNEUMONIA: Primary | ICD-10-CM

## 2024-12-10 DIAGNOSIS — J41.1 MUCOPURULENT CHRONIC BRONCHITIS (H): ICD-10-CM

## 2024-12-10 DIAGNOSIS — R29.898 SEVERE MUSCLE DECONDITIONING: ICD-10-CM

## 2024-12-10 DIAGNOSIS — Z85.118 HISTORY OF LUNG CANCER: ICD-10-CM

## 2024-12-10 DIAGNOSIS — H93.8X1 EAR FULLNESS, RIGHT: ICD-10-CM

## 2024-12-10 PROCEDURE — 99204 OFFICE O/P NEW MOD 45 MIN: CPT | Performed by: STUDENT IN AN ORGANIZED HEALTH CARE EDUCATION/TRAINING PROGRAM

## 2024-12-10 RX ORDER — FLUCONAZOLE 150 MG/1
1 TABLET ORAL
COMMUNITY
Start: 2024-12-04

## 2024-12-10 RX ORDER — CLOTRIMAZOLE 1 %
CREAM (GRAM) TOPICAL
COMMUNITY
Start: 2024-12-04

## 2024-12-10 RX ORDER — MUPIROCIN 20 MG/G
OINTMENT TOPICAL
COMMUNITY
Start: 2024-12-04

## 2024-12-10 NOTE — PROGRESS NOTES
[facility] new patient note.    Name: Martin Tovar  :   1950  MRN:   [id number]  PCP:    April Franco  DOS:    12/10/24      CC: Recurrent pneumonia    HPI/Interval History:  Martin Tovar is a 74 year old old male with the history of small cell lung cancer (s/p radiation and in remission), COPD, HLD, and tobacco. He was admitted to inpatient at [facility] on 24 for hypoxic respiratory failure and diagnosed with CAP. Treated with azithromycin plus CTX for 3 days and transitioned to Cefdinir at discharge.   He is in ID clinic today because of recurrent pneumonia.  Over the last year he has had 3 episodes of pneumonia with 1 episode requiring hospitalization as above.  He quit smoking in 2024.  He also complains of his balance being off but denies any dizziness.  He complains of feeling of the right ear being full.  A referral to ENT was made but the appointment has not been scheduled yet.  He has gained about 10 pounds since the end of 2024.  He denies any fever, chills, night sweats.  He is currently on 1 L of oxygen with saturation in the mid 90s.  He and his wife expressed gratitude from care from 2016.    ===========================  Past Medical History:  Past Medical History:   Diagnosis Date    Asthma     Avascular necrosis of bones of both hips (H)     Cancer (H)     Lung-radiation, chemo    Cholecystitis     Chronic renal failure     COPD, mild (H)     Diabetes mellitus (H)     states resolved after weight loss    Disc degeneration, lumbar     DJD (degenerative joint disease) of knee     left    Elevated PSA     Hemoptysis 2020    LBP (low back pain)     Lung cancer (H)     Pneumonia 2019    Smoking hx        Past Surgical History:  Past Surgical History:   Procedure Laterality Date    ARTHROSCOPY KNEE      CERVICAL FUSION      CT BIOPSY LUNG  2020    CV CORONARY ANGIOGRAM N/A 2021    Procedure: CV CORONARY ANGIOGRAM;  Surgeon: Fabio Espinosa  MD VIELKA;  Location: [facility] LAB CV    CV CORONARY ANGIOGRAM N/A 4/13/2023    Procedure: Coronary Angiogram;  Surgeon: Mikayla Floyd MD;  Location: [facility] LAB CV    CV LEFT VENTRICULOGRAM N/A 8/5/2021    Procedure: Left Ventriculogram;  Surgeon: Fabio Espinosa MD;  Location: [facility] CV    ENDOSCOPIC RETROGRADE CHOLANGIOPANCREATOGRAM N/A 12/22/2015    Procedure: ENDOSCOPIC RETROGRADE CHOLANGIOPANCREATOGRAPHY;  Surgeon: Tang Elliott MD;  Location: [facility];  Service:     HEMORRHOID SURGERY      INGUINAL HERNIA REPAIR      IR LUMBAR EPIDURAL STEROID INJECTION  4/29/2003    IR LUMBAR EPIDURAL STEROID INJECTION  6/27/2007    IR LUNG BIOPSY MEDIASTINUM LEFT  12/21/2012    LAPAROSCOPIC CHOLECYSTECTOMY N/A 12/16/2015    Procedure: CHOLECYSTECTOMY LAPAROSCOPIC;  Surgeon: Eugene Ro MD;  Location: [facility];  Service:     LUMBAR FUSION  2006    & reconstruction    OTHER SURGICAL HISTORY  2013    COLOVESICULAR FISTULA REPAIR    PICC  1/3/2016         RELEASE TRIGGER FINGER  07/2020    TOTAL KNEE ARTHROPLASTY Left        Social History:  Social History     Socioeconomic History    Marital status:      Spouse name: Not on file    Number of children: Not on file    Years of education: Not on file    Highest education level: Not on file   Occupational History    Not on file   Tobacco Use    Smoking status: Every Day     Current packs/day: 1.00     Types: Cigarettes    Smokeless tobacco: Never    Tobacco comments:     1 ppd as of 7/2024   Substance and Sexual Activity    Alcohol use: Not Currently    Drug use: No    Sexual activity: Not on file   Other Topics Concern    Not on file   Social History Narrative    Not on file     Social Drivers of Health     Financial Resource Strain: Low Risk  (10/2/2024)    Received from [facility]    Financial Resource Strain     Difficulty of Paying Living Expenses: 3     Difficulty of  Paying Living Expenses: Not on file   Food Insecurity: No Food Insecurity (10/2/2024)    Received from [facility]    Food Insecurity     Do you worry your food will run out before you are able to buy more?: 1   Transportation Needs: No Transportation Needs (10/2/2024)    Received from [facility]    Transportation Needs     Does lack of transportation keep you from medical appointments?: 1     Does lack of transportation keep you from work, meetings or getting things that you need?: 1   Physical Activity: Not on file   Stress: Not on file   Social Connections: Socially Integrated (10/2/2024)    Received from [facility]    Social Connections     Do you often feel lonely or isolated from those around you?: 0   Interpersonal Safety: Not on file   Housing Stability: Low Risk  (10/2/2024)    Received from [facility]    Housing Stability     What is your housing situation today?: 1       Family Medical History:  No family history on file.    Allergies:     Allergies   Allergen Reactions    Ciprofloxacin Swelling and Rash     Throat swelling    Septra [Sulfamethoxazole-Trimethoprim] Swelling and Rash       Medications:  Current Outpatient Medications   Medication Sig Dispense Refill    albuterol (PROVENTIL HFA;VENTOLIN HFA) 90 mcg/actuation inhaler [ALBUTEROL (PROVENTIL HFA;VENTOLIN HFA) 90 MCG/ACTUATION INHALER] Inhale 2 puffs 4 (four) times a day as needed for wheezing.             atorvastatin (LIPITOR) 20 MG tablet TAKE 1 TABLET BY MOUTH EVERY DAY 90 tablet 1    bacitracin 500 UNIT/GM external ointment Apply topically 2 times daily. 144 g 0    diclofenac (VOLTAREN) 1 % topical gel Apply topically 4 times daily as needed for moderate pain (hand joints)      HYDROcodone-acetaminophen (NORCO) 7.5-325 mg per tablet [HYDROCODONE-ACETAMINOPHEN (NORCO) 7.5-325 MG PER TABLET] Take 1 tablet by  mouth every 6 (six) hours as needed. 30 tablet 0    ipratropium (ATROVENT) 0.02 % nebulizer solution Take 0.5 mg by nebulization 4 times daily      morphine (MS CONTIN) 15 MG 12 hr tablet Take 15 mg by mouth every 6 hours       oxybutynin ER (DITROPAN-XL) 10 MG 24 hr tablet Take 10 mg by mouth daily      pantoprazole (PROTONIX) 40 MG EC tablet Take 40 mg by mouth every morning (before breakfast).      polyethylene glycol-propylene glycol (SYSTANE) 0.4-0.3 % SOLN ophthalmic solution Place 1 drop into both eyes 4 times daily      tamsulosin (FLOMAX) 0.4 mg cap [TAMSULOSIN (FLOMAX) 0.4 MG CAP] Take 0.4 mg by mouth daily.         Immunizations:  Immunization History   Administered Date(s) Administered    COVID-19 12+ (Pfizer) 02/07/2024    COVID-19 Bivalent 12+ (Pfizer) 12/09/2022    COVID-19 Monovalent 18+ (Moderna) 02/26/2021, 03/26/2021, 12/10/2021    Flu 65+ (Fluad) 09/10/2019, 10/03/2024    Flu, Unspecified 07/02/2004    Influenza (High Dose) Trivalent,PF (Fluzone) 01/07/2019    Influenza (IIV3) PF 11/13/2013    Influenza Vaccine 65+ (FLUAD) 12/10/2021, 12/09/2022, 10/05/2023    Influenza Vaccine 65+ (Fluzone HD) 08/17/2020    Influenza Vaccine >6 months,quad, PF 12/24/2015    Influenza Vaccine, 6+MO IM (QUADRIVALENT W/PRESERVATIVES) 11/06/2015, 09/11/2017    Pneumo Conj 13-V (2010&after) 02/16/2015    Pneumococcal 23 valent 07/30/2006, 09/25/2018    Pneumococcal, Unspecified 07/24/2000    TD,PF 7+ (Tenivac) 01/01/2005, 10/17/2017, 10/30/2024    TDAP (Adacel,Boostrix) 07/17/2009    Zoster recombinant adjuvanted (SHINGRIX) 12/09/2022       ==================    Review of System:  12 points review of system is negative except for findings in the HPI.    Exam  VS: /64 (BP Location: Right arm, Patient Position: Sitting, Cuff Size: Adult Regular)   Pulse 72   Temp 98.3  F (36.8  C) (Oral)   Wt 70.8 kg (156 lb)   SpO2 91%   BMI 24.43 kg/m      Gen: Pleasant in no acute distress.  HEENT: NCAT. EOMI. cerumen in  right ear with normal appearing tympanic membrane bilaterally.  Neck: No LAD.  Lungs: Clear to auscultation bilaterally with no crackles or wheezes.   Card: RRR. No RMG. Peripheral pulses present and symmetrical. No edema.   Abd: Soft NT ND. No hepatomegaly or splenomegaly.  Ext: No edema  Lymph: No cervical or supraclavicular adenopathy.  Skin: No rash  Neuro: Alert and oriented to place time and person. Cranial nerves grossly intact.     Labs:  Reviewed    Imaging:  EXAM: CT CHEST WITH CONTRAST  LOCATION: [facility]  DATE: 09/28/2024    INDICATION: Recurrent pneumonia.  COMPARISON: Same day chest x-ray. Prior CT guided biopsy of the left lung dated 12/21/2012.  TECHNIQUE: CT chest with IV contrast. Multiplanar reformats were obtained. Dose reduction techniques were used.    CONTRAST: Omnipaque 350, 75 mL.    FINDINGS:  LUNGS AND PLEURA: Patchy mixed irregular interstitial and airspace opacities are seen throughout the right lung, most pronounced in the right lower lobe. Consolidative volume loss is seen in the medial aspect of the left lung, centered about the left hilum. Centrilobular and paraseptal pulmonary emphysema is present, predominantly in the upper lobes. Large airways are patent. No pleural effusion. Benign dense calcified pulmonary granulomas in the left lower lobe.    MEDIASTINUM/AXILLAE: Peribronchovascular soft tissue thickening about the gisselle, right greater than left. Possible mildly enlarged and likely reactive right hilar lymph node measuring 12 mm in short axis (2-66). A mildly enlarged and likely reactive precarinal lymph node measuring 13 mm in short axis also noted (2-46). No thoracic aneurysm. Mild thoracic aortic atherosclerosis.    CORONARY ARTERY CALCIFICATION: Mild.    UPPER ABDOMEN: Gallbladder is absent. Partially imaged exophytic left renal cyst. No follow-up is necessary.    MUSCULOSKELETAL: Multilevel hypertrophic and degenerative changes of the spine. Anterior lower cervical  spinal fusion. No acute osseous abnormality.  Procedure Note    Donny Ogden, DO - 09/28/2024  Formatting of this note might be different from the original.  For Patients: As a result of the 21st Century Cures Act, medical imaging exams and procedure reports are released immediately into your electronic medical record. You may view this report before your referring provider. If you have questions, please contact your health care provider.    EXAM: CT CHEST WITH CONTRAST  LOCATION: [facility]  DATE: 09/28/2024    INDICATION: Recurrent pneumonia.  COMPARISON: Same day chest x-ray. Prior CT guided biopsy of the left lung dated 12/21/2012.  TECHNIQUE: CT chest with IV contrast. Multiplanar reformats were obtained. Dose reduction techniques were used.    CONTRAST: Omnipaque 350, 75 mL.    FINDINGS:  LUNGS AND PLEURA: Patchy mixed irregular interstitial and airspace opacities are seen throughout the right lung, most pronounced in the right lower lobe. Consolidative volume loss is seen in the medial aspect of the left lung, centered about the left hilum. Centrilobular and paraseptal pulmonary emphysema is present, predominantly in the upper lobes. Large airways are patent. No pleural effusion. Benign dense calcified pulmonary granulomas in the left lower lobe.    MEDIASTINUM/AXILLAE: Peribronchovascular soft tissue thickening about the gisselle, right greater than left. Possible mildly enlarged and likely reactive right hilar lymph node measuring 12 mm in short axis (2-66). A mildly enlarged and likely reactive precarinal lymph node measuring 13 mm in short axis also noted (2-46). No thoracic aneurysm. Mild thoracic aortic atherosclerosis.    CORONARY ARTERY CALCIFICATION: Mild.    UPPER ABDOMEN: Gallbladder is absent. Partially imaged exophytic left renal cyst. No follow-up is necessary.    MUSCULOSKELETAL: Multilevel hypertrophic and degenerative changes of the spine. Anterior lower cervical spinal fusion. No acute  osseous abnormality.    IMPRESSION:  1.  Multifocal pneumonia on the right.  2.  Treatment-related consolidative volume loss of the medial left lung.  3.  Pulmonary emphysema.    Anatomical Region Laterality Modality   Esophagus -- Computed Radiography   -- -- Other     Impression    CONCLUSION:  1.  Intermittent trace laryngeal penetration to the level of the vocal folds on the thin liquids.  2.  No aspiration with any consistencies.    Assessment:  Martin Tovar is a 74 year old old male with small cell lung cancer (s/p radiation and chemotherapy and in remission), COPD, HLD, and tobacco.  He is in ID clinic for recurrent pneumonias.    Recurrent pneumonia.  Most likely secondary to a combination of known COPD and likely radiation.  CT scan from 9/28/2024 with no evidence of recurrent lung cancer.  He has gained about 10 pounds over the last month which will be opposite to what I would expect if he were to have recurrent lung cancer.  He is scheduled to have repeat CT scan of the chest on 1/13/2025.  Smoking.  He quit in October 2024.  Patient was congratulated.  Severe deconditioning.  Needs home PT assessment.  Primary to take the lead.  Right ear complaints.  Has normal appearing tympanic membrane.  He does have cerumen in the right ear.  Primary to take the lead on cleaning of the ear may be with ear lavage.  To see ENT.    Recommendations:  Pathophysiology of recurrent pneumonia in lung cassette status post radiation and COPD discussed with the patient.  I concur with repeat CT scan as ordered on 1/13/2025.  Right ear lavage per primary.  ENT appointment.  Primary to consider home physical therapy for severe deconditioning.  Follow-up as needed.      Tabitha Jones MD  [facility]  [facility]  Office Telephone [phone].  Fax [phone]  [facility] paging

## 2024-12-19 ENCOUNTER — LAB REQUISITION (OUTPATIENT)
Dept: LAB | Facility: CLINIC | Age: 74
End: 2024-12-19
Payer: MEDICARE

## 2024-12-19 DIAGNOSIS — I10 ESSENTIAL (PRIMARY) HYPERTENSION: ICD-10-CM

## 2024-12-19 DIAGNOSIS — E78.5 HYPERLIPIDEMIA, UNSPECIFIED: ICD-10-CM

## 2024-12-19 LAB
ALBUMIN SERPL BCG-MCNC: 4 G/DL (ref 3.5–5.2)
ALP SERPL-CCNC: 83 U/L (ref 40–150)
ALT SERPL W P-5'-P-CCNC: 7 U/L (ref 0–70)
ANION GAP SERPL CALCULATED.3IONS-SCNC: 13 MMOL/L (ref 7–15)
AST SERPL W P-5'-P-CCNC: 14 U/L (ref 0–45)
BILIRUB SERPL-MCNC: 0.4 MG/DL
BUN SERPL-MCNC: 12.6 MG/DL (ref 8–23)
CALCIUM SERPL-MCNC: 9 MG/DL (ref 8.8–10.4)
CHLORIDE SERPL-SCNC: 102 MMOL/L (ref 98–107)
CHOLEST SERPL-MCNC: 126 MG/DL
CREAT SERPL-MCNC: 1 MG/DL (ref 0.67–1.17)
EGFRCR SERPLBLD CKD-EPI 2021: 80 ML/MIN/1.73M2
FASTING STATUS PATIENT QL REPORTED: ABNORMAL
FASTING STATUS PATIENT QL REPORTED: NORMAL
GLUCOSE SERPL-MCNC: 137 MG/DL (ref 70–99)
HCO3 SERPL-SCNC: 28 MMOL/L (ref 22–29)
HDLC SERPL-MCNC: 51 MG/DL
LDLC SERPL CALC-MCNC: 47 MG/DL
NONHDLC SERPL-MCNC: 75 MG/DL
POTASSIUM SERPL-SCNC: 3.7 MMOL/L (ref 3.4–5.3)
PROT SERPL-MCNC: 7.1 G/DL (ref 6.4–8.3)
SODIUM SERPL-SCNC: 143 MMOL/L (ref 135–145)
TRIGL SERPL-MCNC: 142 MG/DL

## 2024-12-19 PROCEDURE — 80061 LIPID PANEL: CPT | Mod: ORL | Performed by: PHYSICIAN ASSISTANT

## 2024-12-19 PROCEDURE — 80053 COMPREHEN METABOLIC PANEL: CPT | Mod: ORL | Performed by: PHYSICIAN ASSISTANT

## 2025-01-13 ENCOUNTER — HOSPITAL ENCOUNTER (OUTPATIENT)
Dept: CT IMAGING | Facility: CLINIC | Age: 75
Discharge: HOME | End: 2025-01-13
Attending: RADIOLOGY | Admitting: RADIOLOGY
Payer: MEDICARE

## 2025-01-13 DIAGNOSIS — C34.90 SMALL CELL CARCINOMA OF LUNG (H): ICD-10-CM

## 2025-01-13 PROCEDURE — 71250 CT THORAX DX C-: CPT

## 2025-01-23 ENCOUNTER — OFFICE VISIT (OUTPATIENT)
Dept: RADIATION ONCOLOGY | Facility: HOSPITAL | Age: 75
End: 2025-01-23
Attending: RADIOLOGY
Payer: MEDICARE

## 2025-01-23 VITALS
SYSTOLIC BLOOD PRESSURE: 162 MMHG | RESPIRATION RATE: 20 BRPM | DIASTOLIC BLOOD PRESSURE: 85 MMHG | WEIGHT: 158.2 LBS | OXYGEN SATURATION: 93 % | HEART RATE: 82 BPM | BODY MASS INDEX: 24.78 KG/M2

## 2025-01-23 DIAGNOSIS — C34.12 SMALL CELL CARCINOMA OF UPPER LOBE OF LEFT LUNG (H): Primary | ICD-10-CM

## 2025-01-23 PROCEDURE — G0463 HOSPITAL OUTPT CLINIC VISIT: HCPCS | Performed by: RADIOLOGY

## 2025-01-23 RX ORDER — CLINDAMYCIN HYDROCHLORIDE 300 MG/1
CAPSULE ORAL
COMMUNITY
Start: 2025-01-22

## 2025-01-23 RX ORDER — BUDESONIDE AND FORMOTEROL FUMARATE DIHYDRATE 160; 4.5 UG/1; UG/1
AEROSOL RESPIRATORY (INHALATION)
COMMUNITY
Start: 2025-01-16

## 2025-01-23 RX ORDER — TRAZODONE HYDROCHLORIDE 50 MG/1
TABLET, FILM COATED ORAL
COMMUNITY
Start: 2025-01-12

## 2025-01-23 ASSESSMENT — PAIN SCALES - GENERAL: PAINLEVEL_OUTOF10: NO PAIN (0)

## 2025-01-23 NOTE — PROGRESS NOTES
[facility] Radiation Oncology Follow Up     Patient: Martin Tovar  MRN: [id number]  Date of Service: 01/23/2025       DISEASE TREATED:  History of limited stage small cell left lung cancer diagnosed initially in 2012.  The patient is status post chemoradiation therapy.  He was found to have a new diagnosis of limited stage small cell lung cancer, second primary/recurrence, clinical stage T1 N0 M0.  His case has been discussed at thoracic tumor conference and consensus recommendation is to consider SBRT.      TYPE OF RADIATION THERAPY ADMINISTERED:    1. 4500 cGy in 30 treatments at 150 cGy twice daily at [place] [facility] in 2012.      2.  PCI with a total dose of 2500 cGy in 10 treatments at [facility] in 2012.      3. 5000 cGy in 5 treatments to left lower lobe tumor given from 3/2/120-3/12/2020.     INTERVAL SINCE COMPLETION OF RADIATION THERAPY: 4 years and 10 months.      SUBJECTIVE:  Mr. Tovar is a 74 y.o. male who is a retired  from [employer].  Patient was diagnosed with limited stage small cell left lung cancer initially in 2012, stage T2 N0 M0.  He received concurrent chemoradiation therapy with cisplatin and etoposide and radiation therapy to a total dose of 4500 cGy in 30 treatments at 150 cGy twice daily.  His treatment was given at [facility].  Patient had a complete response and also received PCI with a total dose of 2500 cGy in 10 treatments.  Patient has been followed closely with no evidence of cancer recurrence.  He will presented with recent finding of left lower lobe nodule by routine screening exam for which he was seeking further evaluation.  PET CT scan on 1/14/2020 showed a 1.3 x 1.2 cm nodule with increased SUV max 3.3 consistent with malignancy.  There is no radiographic evidence of anel and systemic metastasis.  CT-guided needle biopsy on 2/5/2020 confirmed diagnosis of small cell carcinoma.  He had a staging MRI brain which also showed no evidence of brain metastasis.   Given the prior history of radiation therapy and location of his current cancer, the patient might require pneumonectomy.  His case has been discussed at thoracic tumor conference and that the consensus recommendation is to consider SBRT. His case has been discussed at thoracic tumor conference and consensus recommendation is to consider SBRT.  He received radiation therapy in our clinic with a total dose of 5000 cGy in 5 treatments given from 3/2/120-3/12/2020.  He tolerated radiation therapy very well with minimal side effect.     Patient has been doing well since completion of the radiation therapy.  The patient felt had a home by the end of 2024 with several ribs fracture.  He still have residual left upper chest wall pain. He denies any chest pain discomfort at the time of evaluation.  He is here for routine postradiation therapy office follow-up.     Medications were reviewed and are up to date on EPIC.    The following portions of the patient's history were reviewed and updated as appropriate: allergies, current medications, past family history, past medical history, past social history, past surgical history and problem list.    Review of Systems:      General  Constitutional  Constitutional (WDL): Exceptions to WDL  Fatigue: Fatigue relieved by rest  EENT  Eye Disorders  Eye Disorder (WDL): All eye disorder elements are within defined limits  Ear Disorders  Ear Disorder (WDL): Exceptions to WDL  Tinnitus: Mild symptoms OR intervention not indicated (chronic)  Respiratory  Respiratory  Respiratory (WDL): Exceptions to WDL  Cough: Mild symptoms OR nonprescription intervention indicated  Dyspnea: Shortness of breath with moderate exertion  Hypoxia: Decreased oxygen saturation with exercise (e.g., pulse oximeter less than 88%) OR intermittent supplemental oxygen (wears oxygen prn)  Cardiovascular  Cardiovascular  Cardiovascular (WDL): All cardiovascular elements are within defined  limits  Gastrointestinal  Gastrointestinal  Gastrointestinal (WDL): Exceptions to WDL  Anorexia: Loss of appetite without alteration in eating habits  Musculoskeletal  Musculoskeletal and Connective Tissue Disorders  Musculoskeletal & Connective (WDL): Exceptions to WDL  Arthralgia: Mild pain  Integumentary  Integumentary  Integumentary (WDL): All integumentary elements are within defined limits  Neurological  Neurosensory  Neurosensory (WDL): Exceptions to WDL  Ataxia: Moderate symptoms OR limiting instrumental ADL (using walker)  Genitourinary/Reproductive  Genitourinary  Genitourinary (WDL): Exceptions to WDL  Urinary Frequency: Present  Lymphatic  Lymph System Disorders  Lymph (WDL): All lymph elements are within defined limits  Pain  Pain Score: No Pain (0)  AUA Assessment                                                              Accompanied by  Accompanied By: self only    Objective:     PHYSICAL EXAMINATION:    BP (!) 162/85 (BP Location: Left arm, Patient Position: Sitting)   Pulse 82   Resp 20   Wt 71.8 kg (158 lb 3.2 oz)   SpO2 93%   BMI 24.78 kg/m      Gen: Alert, in NAD  Eyes: PERRL, EOMI, sclera anicteric  Neck: Supple, full ROM, no LAD  Pulm: No wheezing, stridor or respiratory distress  CV: Well-perfused, no cyanosis, no pedal edema  Back: No step-offs or pain to palpation along the thoracolumbar spine  Rectal: Deferred  : Deferred  Musculoskeletal: Normal muscle bulk and tone  Skin: Normal color and turgor  Neurologic: A/Ox3, CN II-XII intact, normal gait and station  Psychiatric: Appropriate mood and affect     Imaging: Imaging results 30 days: CT Chest w/o Contrast    Result Date: 1/13/2025  EXAM: CT CHEST W/O CONTRAST LOCATION: [facility] DATE: 1/13/2025 INDICATION: Small cell carcinoma of lung (H). COMPARISON: CT 9/28/2024. TECHNIQUE: CT chest without IV contrast. Multiplanar reformats were obtained. Dose reduction techniques were used. CONTRAST: None.  FINDINGS: LUNGS AND PLEURA: Left perihilar scarring again noted. There is a focal more nodular area of consolidation at the medial left lower lobe that currently measures 5.2 x 2.6 cm, previously 4.7 x 2.2 cm series 4 image 157. There is a metallic clip in this region. The previously noted acute airspace consolidations on the right shows interval resolution. Stable small nodularity at the left lower lobe involving the left major fissure region that could be a small lymph node, series 4 image 175. Calcified granuloma at the left lower lobe. No areas of new airspace disease identified. MEDIASTINUM/AXILLAE: No lymphadenopathy. No thoracic aortic aneurysm. CORONARY ARTERY CALCIFICATION: Moderate. UPPER ABDOMEN: Cholecystectomy. No significant upper abdominal abnormality. MUSCULOSKELETAL: Spine degenerative changes. No focal bone lesion identified.     IMPRESSION: 1.  Focal nodular opacity at the left lower lobe measures slightly larger compared to 9/28/2024. As such, a neoplastic etiology remains in the differential. Recommend short interval follow-up CT chest in 3 months and/or further workup. Left perihilar fibrosis otherwise appears stable. 2.  Emphysema. 3.  No other new abnormality identified.           Impression     The patient is a 89-year-old gentleman with a prior history of limited stage small cell lung cancer diagnosed initially in 2012.  He was find to have a new limited stage small cell lung cancer involving the left lower lobe with clinical stage T1 N0 M0.  Patient received definitive SBRT and completed 4 years and 10 months ago.  He has been doing well with no clinical evidence of recurrence.      Assessment & Plan:     1.  I have personally reviewed his restaging MRI and CT scan today and compare with previous MRI and CT chest and radiation therapy record.  There is no radiographic evidence of cancer recurrence. Focal nodular opacity at the left lower lobe measures slightly larger compared to  9/28/2024. Patient is scheduled to return to radiation oncology in 3 months for CT chest abdomen pelvics and MRI brain for restaging.         2.  Continue follow-up with Dr. Hale, thoracic surgeon and Dr. Brito, pulmonology as planned.     3.  The patient was found to have a new focus of chronic microhemorrhage in the region of the posterior left caudate nucleus with stable chronic focus of microhemorrhage deep white matter left parietal level.  Patient will see Dr. April Franco, his primary physician for follow-up recommendation.     Pain Management Plan: NA     Face to face time  30 minutes with > 75% spent on consultation, education and coordination of care.      Mary Snowden MD  Department of Radiation Oncology   [facility] Radiation Oncology  Tel: [phone]  Page: [phone]    [facility]  [address] [place] [address]     [address] [place] [address]    CC:  Patient Care Team:  April Franco PA as PCP - General  Mary Snowden MD as MD (Hematology & Oncology)  Mary Snowden MD as Assigned Cancer Care Provider  Dasia Cervantes MD as Assigned Heart and Vascular Provider  Tabitha Jones MD as Assigned Infectious Disease Provider

## 2025-01-23 NOTE — PROGRESS NOTES
"Oncology Rooming Note    January 23, 2025 2:09 PM   Martin Tovar is a 74 year old male who presents for:    Chief Complaint   Patient presents with    Oncology Clinic Visit    Radiation Therapy     Follow up     Initial Vitals: BP (!) 162/85 (BP Location: Left arm, Patient Position: Sitting)   Pulse 82   Resp 20   Wt 71.8 kg (158 lb 3.2 oz)   SpO2 93%   BMI 24.78 kg/m   Estimated body mass index is 24.78 kg/m  as calculated from the following:    Height as of 11/12/24: 1.702 m (5' 7\").    Weight as of this encounter: 71.8 kg (158 lb 3.2 oz). Body surface area is 1.84 meters squared.  No Pain (0) Comment: Data Unavailable   No LMP for male patient.  Allergies reviewed: Yes  Medications reviewed: Yes    Medications: Medication refills not needed today.  Pharmacy name entered into [facility]: [id number] [place] [address] [address] [facility]    Frailty Screening:   Is the patient here for a new oncology consult visit in cancer care? 2. No      Clinical concerns: Follow up, CT done 1/13, feeling weak  Dr. Snowden was notified.      Madison Ly RN              "

## 2025-01-23 NOTE — LETTER
1/23/2025      Martin Tovar  [address]  [facility] [address]      Dear Colleague,    Thank you for referring your patient, Martin Tovar, to the [facility] RADIATION ONCOLOGY [place]. Please see a copy of my visit note below.    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  The patient is status post chemoradiation therapy.  He was found to have a new diagnosis of limited stage small cell lung cancer, second primary/recurrence, clinical stage T1 N0 M0.  His case has been discussed at thoracic tumor conference and consensus recommendation is to consider SBRT.      TYPE OF RADIATION THERAPY ADMINISTERED:    1. 4500 cGy in 30 treatments at 150 cGy twice daily at [place] [facility] in 2012.      2.  PCI with a total dose of 2500 cGy in 10 treatments at [facility] in 2012.      3. 5000 cGy in 5 treatments to left lower lobe tumor given from 3/2/120-3/12/2020.     INTERVAL SINCE COMPLETION OF RADIATION THERAPY: 4 years and 10 months.      SUBJECTIVE:  Mr. Tovar is a 74 y.o. male who is a retired  from [employer].  Patient was diagnosed with limited stage small cell left lung cancer initially in 2012, stage T2 N0 M0.  He received concurrent chemoradiation therapy with cisplatin and etoposide and radiation therapy to a total dose of 4500 cGy in 30 treatments at 150 cGy twice daily.  His treatment was given at [facility].  Patient had a complete response and also received PCI with a total dose of 2500 cGy in 10 treatments.  Patient has been followed closely with no evidence of cancer recurrence.  He will presented with recent finding of left lower lobe nodule by routine screening exam for which he was seeking further evaluation.  PET CT scan on 1/14/2020 showed a 1.3 x 1.2 cm nodule with increased SUV max 3.3  consistent with malignancy.  There is no radiographic evidence of anel and systemic metastasis.  CT-guided needle biopsy on 2/5/2020 confirmed diagnosis of small cell carcinoma.  He had a staging MRI brain which also showed no evidence of brain metastasis.  Given the prior history of radiation therapy and location of his current cancer, the patient might require pneumonectomy.  His case has been discussed at thoracic tumor conference and that the consensus recommendation is to consider SBRT. His case has been discussed at thoracic tumor conference and consensus recommendation is to consider SBRT.  He received radiation therapy in our clinic with a total dose of 5000 cGy in 5 treatments given from 3/2/120-3/12/2020.  He tolerated radiation therapy very well with minimal side effect.     Patient has been doing well since completion of the radiation therapy.  The patient felt had a home by the end of 2024 with several ribs fracture.  He still have residual left upper chest wall pain. He denies any chest pain discomfort at the time of evaluation.  He is here for routine postradiation therapy office follow-up.     Medications were reviewed and are up to date on EPIC.    The following portions of the patient's history were reviewed and updated as appropriate: allergies, current medications, past family history, past medical history, past social history, past surgical history and problem list.    Review of Systems:      General  Constitutional  Constitutional (WDL): Exceptions to WDL  Fatigue: Fatigue relieved by rest  EENT  Eye Disorders  Eye Disorder (WDL): All eye disorder elements are within defined limits  Ear Disorders  Ear Disorder (WDL): Exceptions to WDL  Tinnitus: Mild symptoms OR intervention not indicated (chronic)  Respiratory  Respiratory  Respiratory (WDL): Exceptions to WDL  Cough: Mild symptoms OR nonprescription intervention indicated  Dyspnea: Shortness of breath with moderate exertion  Hypoxia:  Decreased oxygen saturation with exercise (e.g., pulse oximeter less than 88%) OR intermittent supplemental oxygen (wears oxygen prn)  Cardiovascular  Cardiovascular  Cardiovascular (WDL): All cardiovascular elements are within defined limits  Gastrointestinal  Gastrointestinal  Gastrointestinal (WDL): Exceptions to WDL  Anorexia: Loss of appetite without alteration in eating habits  Musculoskeletal  Musculoskeletal and Connective Tissue Disorders  Musculoskeletal & Connective (WDL): Exceptions to WDL  Arthralgia: Mild pain  Integumentary  Integumentary  Integumentary (WDL): All integumentary elements are within defined limits  Neurological  Neurosensory  Neurosensory (WDL): Exceptions to WDL  Ataxia: Moderate symptoms OR limiting instrumental ADL (using walker)  Genitourinary/Reproductive  Genitourinary  Genitourinary (WDL): Exceptions to WDL  Urinary Frequency: Present  Lymphatic  Lymph System Disorders  Lymph (WDL): All lymph elements are within defined limits  Pain  Pain Score: No Pain (0)  AUA Assessment                                                              Accompanied by  Accompanied By: self only    Objective:     PHYSICAL EXAMINATION:    BP (!) 162/85 (BP Location: Left arm, Patient Position: Sitting)   Pulse 82   Resp 20   Wt 71.8 kg (158 lb 3.2 oz)   SpO2 93%   BMI 24.78 kg/m      Gen: Alert, in NAD  Eyes: PERRL, EOMI, sclera anicteric  Neck: Supple, full ROM, no LAD  Pulm: No wheezing, stridor or respiratory distress  CV: Well-perfused, no cyanosis, no pedal edema  Back: No step-offs or pain to palpation along the thoracolumbar spine  Rectal: Deferred  : Deferred  Musculoskeletal: Normal muscle bulk and tone  Skin: Normal color and turgor  Neurologic: A/Ox3, CN II-XII intact, normal gait and station  Psychiatric: Appropriate mood and affect     Imaging: Imaging results 30 days: CT Chest w/o Contrast    Result Date: 1/13/2025  EXAM: CT CHEST W/O CONTRAST LOCATION: [facility]  [facility] DATE: 1/13/2025 INDICATION: Small cell carcinoma of lung (H). COMPARISON: CT 9/28/2024. TECHNIQUE: CT chest without IV contrast. Multiplanar reformats were obtained. Dose reduction techniques were used. CONTRAST: None. FINDINGS: LUNGS AND PLEURA: Left perihilar scarring again noted. There is a focal more nodular area of consolidation at the medial left lower lobe that currently measures 5.2 x 2.6 cm, previously 4.7 x 2.2 cm series 4 image 157. There is a metallic clip in this region. The previously noted acute airspace consolidations on the right shows interval resolution. Stable small nodularity at the left lower lobe involving the left major fissure region that could be a small lymph node, series 4 image 175. Calcified granuloma at the left lower lobe. No areas of new airspace disease identified. MEDIASTINUM/AXILLAE: No lymphadenopathy. No thoracic aortic aneurysm. CORONARY ARTERY CALCIFICATION: Moderate. UPPER ABDOMEN: Cholecystectomy. No significant upper abdominal abnormality. MUSCULOSKELETAL: Spine degenerative changes. No focal bone lesion identified.     IMPRESSION: 1.  Focal nodular opacity at the left lower lobe measures slightly larger compared to 9/28/2024. As such, a neoplastic etiology remains in the differential. Recommend short interval follow-up CT chest in 3 months and/or further workup. Left perihilar fibrosis otherwise appears stable. 2.  Emphysema. 3.  No other new abnormality identified.           Impression     The patient is a 89-year-old gentleman with a prior history of limited stage small cell lung cancer diagnosed initially in 2012.  He was find to have a new limited stage small cell lung cancer involving the left lower lobe with clinical stage T1 N0 M0.  Patient received definitive SBRT and completed 4 years and 10 months ago.  He has been doing well with no clinical evidence of recurrence.      Assessment & Plan:     1.  I have personally reviewed his restaging MRI and  "CT scan today and compare with previous MRI and CT chest and radiation therapy record.  There is no radiographic evidence of cancer recurrence. Focal nodular opacity at the left lower lobe measures slightly larger compared to 9/28/2024. Patient is scheduled to return to radiation oncology in 3 months for CT chest abdomen pelvics and MRI brain for restaging.         2.  Continue follow-up with Dr. Hale, thoracic surgeon and Dr. Brito, pulmonology as planned.     3.  The patient was found to have a new focus of chronic microhemorrhage in the region of the posterior left caudate nucleus with stable chronic focus of microhemorrhage deep white matter left parietal level.  Patient will see Dr. April Franco, his primary physician for follow-up recommendation.     Pain Management Plan: NA     Face to face time  30 minutes with > 75% spent on consultation, education and coordination of care.      Mary Snowden MD  Department of Radiation Oncology   [facility] Radiation Oncology  Tel: [phone]  Page: [phone]    [id number] [place] [address]     [address] [place] [address]    CC:  Patient Care Team:  April Franco PA as PCP - General  Mary Snowden MD as MD (Hematology & Oncology)  Mary Snowden MD as Assigned Cancer Care Provider  Dasia Cervantes MD as Assigned Heart and Vascular Provider  Tabitha Jones MD as Assigned Infectious Disease Provider      Oncology Rooming Note    January 23, 2025 2:09 PM   Martin Tovar is a 74 year old male who presents for:    Chief Complaint   Patient presents with     Oncology Clinic Visit     Radiation Therapy     Follow up     Initial Vitals: BP (!) 162/85 (BP Location: Left arm, Patient Position: Sitting)   Pulse 82   Resp 20   Wt 71.8 kg (158 lb 3.2 oz)   SpO2 93%   BMI 24.78 kg/m   Estimated body mass index is 24.78 kg/m  as calculated from the following:    Height as of 11/12/24: 1.702 m (5' 7\").    Weight " as of this encounter: 71.8 kg (158 lb 3.2 oz). Body surface area is 1.84 meters squared.  No Pain (0) Comment: Data Unavailable   No LMP for male patient.  Allergies reviewed: Yes  Medications reviewed: Yes    Medications: Medication refills not needed today.  Pharmacy name entered into [employer]: [id number] [place], [place] - [facility] [address]     Frailty Screening:   Is the patient here for a new oncology consult visit in cancer care? 2. No      Clinical concerns: Follow up, CT done 1/13, feeling weak  Dr. Snowden was notified.      Madison Ly, RN                Again, thank you for allowing me to participate in the care of your patient.        Sincerely,        Mary Snowden MD    Electronically signed No

## 2025-02-06 ENCOUNTER — LAB REQUISITION (OUTPATIENT)
Dept: LAB | Facility: CLINIC | Age: 75
End: 2025-02-06
Payer: MEDICARE

## 2025-02-06 DIAGNOSIS — M79.674 PAIN IN RIGHT TOE(S): ICD-10-CM

## 2025-02-06 LAB
ERYTHROCYTE [DISTWIDTH] IN BLOOD BY AUTOMATED COUNT: 14.4 % (ref 10–15)
HCT VFR BLD AUTO: 41.8 % (ref 40–53)
HGB BLD-MCNC: 13.5 G/DL (ref 13.3–17.7)
MCH RBC QN AUTO: 31.8 PG (ref 26.5–33)
MCHC RBC AUTO-ENTMCNC: 32.3 G/DL (ref 31.5–36.5)
MCV RBC AUTO: 99 FL (ref 78–100)
PLATELET # BLD AUTO: 198 10E3/UL (ref 150–450)
RBC # BLD AUTO: 4.24 10E6/UL (ref 4.4–5.9)
WBC # BLD AUTO: 8.8 10E3/UL (ref 4–11)

## 2025-02-06 PROCEDURE — 85027 COMPLETE CBC AUTOMATED: CPT | Mod: ORL | Performed by: PHYSICIAN ASSISTANT

## 2025-02-06 PROCEDURE — 84550 ASSAY OF BLOOD/URIC ACID: CPT | Mod: ORL | Performed by: PHYSICIAN ASSISTANT

## 2025-02-07 LAB — URATE SERPL-MCNC: 5.9 MG/DL (ref 3.4–7)

## 2025-03-02 ENCOUNTER — HEALTH MAINTENANCE LETTER (OUTPATIENT)
Age: 75
End: 2025-03-02

## 2025-04-15 ENCOUNTER — HOSPITAL ENCOUNTER (OUTPATIENT)
Dept: MRI IMAGING | Facility: CLINIC | Age: 75
Discharge: HOME | End: 2025-04-15
Attending: RADIOLOGY
Payer: MEDICARE

## 2025-04-15 ENCOUNTER — HOSPITAL ENCOUNTER (OUTPATIENT)
Dept: CT IMAGING | Facility: CLINIC | Age: 75
Discharge: HOME | End: 2025-04-15
Attending: RADIOLOGY
Payer: MEDICARE

## 2025-04-15 DIAGNOSIS — C34.12 SMALL CELL CARCINOMA OF UPPER LOBE OF LEFT LUNG (H): ICD-10-CM

## 2025-04-15 LAB
CREAT BLD-MCNC: 1 MG/DL (ref 0.7–1.2)
EGFRCR SERPLBLD CKD-EPI 2021: >60 ML/MIN/1.73M2

## 2025-04-15 PROCEDURE — 82565 ASSAY OF CREATININE: CPT

## 2025-04-15 PROCEDURE — 71260 CT THORAX DX C+: CPT

## 2025-04-15 PROCEDURE — A9585 GADOBUTROL INJECTION: HCPCS | Performed by: RADIOLOGY

## 2025-04-15 PROCEDURE — 70553 MRI BRAIN STEM W/O & W/DYE: CPT

## 2025-04-15 PROCEDURE — 255N000002 HC RX 255 OP 636: Performed by: RADIOLOGY

## 2025-04-15 PROCEDURE — 250N000009 HC RX 250: Performed by: RADIOLOGY

## 2025-04-15 PROCEDURE — 250N000011 HC RX IP 250 OP 636: Performed by: RADIOLOGY

## 2025-04-15 RX ORDER — IOPAMIDOL 755 MG/ML
77 INJECTION, SOLUTION INTRAVASCULAR ONCE
Status: COMPLETED | OUTPATIENT
Start: 2025-04-15 | End: 2025-04-15

## 2025-04-15 RX ORDER — GADOBUTROL 604.72 MG/ML
7 INJECTION INTRAVENOUS ONCE
Status: COMPLETED | OUTPATIENT
Start: 2025-04-15 | End: 2025-04-15

## 2025-04-15 RX ADMIN — IOPAMIDOL 77 ML: 755 INJECTION, SOLUTION INTRAVENOUS at 14:11

## 2025-04-15 RX ADMIN — SODIUM CHLORIDE 59 ML: 9 INJECTION, SOLUTION INTRAVENOUS at 14:11

## 2025-04-15 RX ADMIN — GADOBUTROL 7 ML: 604.72 INJECTION INTRAVENOUS at 14:20

## 2025-04-17 ENCOUNTER — OFFICE VISIT (OUTPATIENT)
Dept: RADIATION ONCOLOGY | Facility: HOSPITAL | Age: 75
End: 2025-04-17
Attending: RADIOLOGY
Payer: MEDICARE

## 2025-04-17 VITALS
OXYGEN SATURATION: 93 % | SYSTOLIC BLOOD PRESSURE: 160 MMHG | RESPIRATION RATE: 22 BRPM | WEIGHT: 153 LBS | HEART RATE: 100 BPM | DIASTOLIC BLOOD PRESSURE: 83 MMHG | BODY MASS INDEX: 23.96 KG/M2

## 2025-04-17 DIAGNOSIS — C34.12 SMALL CELL CARCINOMA OF UPPER LOBE OF LEFT LUNG (H): Primary | ICD-10-CM

## 2025-04-17 PROCEDURE — G0463 HOSPITAL OUTPT CLINIC VISIT: HCPCS | Performed by: RADIOLOGY

## 2025-04-17 RX ORDER — KETOCONAZOLE 20 MG/ML
SHAMPOO, SUSPENSION TOPICAL
COMMUNITY
Start: 2025-03-23

## 2025-04-17 ASSESSMENT — PAIN SCALES - GENERAL: PAINLEVEL_OUTOF10: MILD PAIN (2)

## 2025-04-17 NOTE — PROGRESS NOTES
"Oncology Rooming Note    April 17, 2025 10:59 AM   Martin Tovar is a 74 year old male who presents for:    Chief Complaint   Patient presents with    Oncology Clinic Visit    Lung Cancer     Rad Onc follow up     Initial Vitals: BP (!) 160/83 (BP Location: Left arm)   Pulse 100   Resp 22   Wt 69.4 kg (153 lb)   SpO2 93%   BMI 23.96 kg/m   Estimated body mass index is 23.96 kg/m  as calculated from the following:    Height as of 11/12/24: 1.702 m (5' 7\").    Weight as of this encounter: 69.4 kg (153 lb). Body surface area is 1.81 meters squared.  Mild Pain (3) Comment: Data Unavailable   No LMP for male patient.  Allergies reviewed: Yes  Medications reviewed: Yes    Medications: Medication refills not needed today.  Pharmacy name entered into [employer]: [id number] [place] [address] [address]     Frailty Screening:   Is the patient here for a new oncology consult visit in cancer care? 2. No    PHQ9:  Did this patient require a PHQ9?: Yes   If the patient required a PHQ9 assessment, did the results require a follow up with the Provider/Nurse?: No      Clinical concerns: SOB. Not currently established with [place] or Pulm.   Dr. Snowden was notified.      Margo Khan RN              "

## 2025-04-17 NOTE — PROGRESS NOTES
[facility] Radiation Oncology Follow Up     Patient: Martin Tovar  MRN: [id number]  Date of Service: 04/17/2025       DISEASE TREATED:  History of limited stage small cell left lung cancer diagnosed initially in 2012.  The patient is status post chemoradiation therapy.  He was found to have a new diagnosis of limited stage small cell lung cancer, second primary/recurrence, clinical stage T1 N0 M0.  His case has been discussed at thoracic tumor conference and consensus recommendation is to consider SBRT.      TYPE OF RADIATION THERAPY ADMINISTERED:    1. 4500 cGy in 30 treatments at 150 cGy twice daily at [place] [facility] in 2012.      2.  PCI with a total dose of 2500 cGy in 10 treatments at [place] [facility] in 2012.      3. 5000 cGy in 5 treatments to left lower lobe tumor given from 3/2/120-3/12/2020.     INTERVAL SINCE COMPLETION OF RADIATION THERAPY: 5 years and 1 months.      SUBJECTIVE:  Mr. Tovar is a 74 y.o. male who is a retired  from [employer].  Patient was diagnosed with limited stage small cell left lung cancer initially in 2012, stage T2 N0 M0.  He received concurrent chemoradiation therapy with cisplatin and etoposide and radiation therapy to a total dose of 4500 cGy in 30 treatments at 150 cGy twice daily.  His treatment was given at [facility].  Patient had a complete response and also received PCI with a total dose of 2500 cGy in 10 treatments.  Patient has been followed closely with no evidence of cancer recurrence.  He will presented with recent finding of left lower lobe nodule by routine screening exam for which he was seeking further evaluation.  PET CT scan on 1/14/2020 showed a 1.3 x 1.2 cm nodule with increased SUV max 3.3 consistent with malignancy.  There is no radiographic evidence of anel and systemic metastasis.  CT-guided needle biopsy on 2/5/2020 confirmed diagnosis of small cell carcinoma.  He had a staging MRI brain which also showed no evidence of brain metastasis.   Given the prior history of radiation therapy and location of his current cancer, the patient might require pneumonectomy.  His case has been discussed at thoracic tumor conference and that the consensus recommendation is to consider SBRT. His case has been discussed at thoracic tumor conference and consensus recommendation is to consider SBRT.  He received radiation therapy in our clinic with a total dose of 5000 cGy in 5 treatments given from 3/2/120-3/12/2020.  He tolerated radiation therapy very well with minimal side effect.     Patient has been doing well since completion of the radiation therapy.  The patient felt had a home by the end of 2024 with several ribs fracture.  He still have residual left upper chest wall pain. He denies any chest pain discomfort at the time of evaluation.  He is here for routine postradiation therapy office follow-up.     Medications were reviewed and are up to date on EPIC.    The following portions of the patient's history were reviewed and updated as appropriate: allergies, current medications, past family history, past medical history, past social history, past surgical history and problem list.    Review of Systems:      General  Constitutional  Constitutional (WDL): Exceptions to WDL  Fatigue: Fatigue relieved by rest  EENT  Eye Disorders  Eye Disorder (WDL): All eye disorder elements are within defined limits  Ear Disorders  Ear Disorder (WDL): Exceptions to WDL  Tinnitus: Mild symptoms OR intervention not indicated (chronic)  Respiratory  Respiratory  Respiratory (WDL): Exceptions to WDL  Cough: Mild symptoms OR nonprescription intervention indicated  Dyspnea: Shortness of breath with minimal exertion OR limiting instrumental ADL  Hypoxia: Decreased oxygen saturation with exercise (e.g., pulse oximeter less than 88%) OR intermittent supplemental oxygen (not using O2 lately, not established with Pulm either)  Cardiovascular  Cardiovascular  Cardiovascular (WDL): All  cardiovascular elements are within defined limits  Gastrointestinal  Gastrointestinal  Gastrointestinal (WDL): Exceptions to WDL  Anorexia: Loss of appetite without alteration in eating habits  Musculoskeletal  Musculoskeletal and Connective Tissue Disorders  Musculoskeletal & Connective (WDL): Exceptions to WDL  Arthralgia: Mild pain  Integumentary  Integumentary  Integumentary (WDL): All integumentary elements are within defined limits  Neurological  Neurosensory  Neurosensory (WDL): Exceptions to WDL  Ataxia: Moderate symptoms OR limiting instrumental ADL (using cane)  Genitourinary/Reproductive  Genitourinary  Genitourinary (WDL): Exceptions to WDL  Urinary Frequency: Present  Lymphatic  Lymph System Disorders  Lymph (WDL): All lymph elements are within defined limits  Pain  Pain Score: Mild Pain (3)  AUA Assessment                                                              Accompanied by  Accompanied By: self only    Objective:      PHYSICAL EXAMINATION:    BP (!) 160/83 (BP Location: Left arm)   Pulse 100   Resp 22   Wt 69.4 kg (153 lb)   SpO2 93%   BMI 23.96 kg/m      Gen: Alert, in NAD  Eyes: PERRL, EOMI, sclera anicteric  Neck: Supple, full ROM, no LAD  Pulm: No wheezing, stridor or respiratory distress  CV: Well-perfused, no cyanosis, no pedal edema  Back: No step-offs or pain to palpation along the thoracolumbar spine  Rectal: Deferred  : Deferred  Musculoskeletal: Normal muscle bulk and tone  Skin: Normal color and turgor  Neurologic: A/Ox3, CN II-XII intact, normal gait and station  Psychiatric: Appropriate mood and affect     Imaging: Imaging results 30 days: CT Chest/Abdomen/Pelvis w Contrast    Result Date: 4/16/2025  EXAM: CT CHEST/ABDOMEN/PELVIS WITH CONTRAST LOCATION: [facility] DATE: 04/15/2025 INDICATION: Small cell carcinoma of upper lobe of left lung (H). COMPARISON: 01/13/2025 TECHNIQUE: CT scan of the chest, abdomen, and pelvis was performed following  injection of IV contrast. Multiplanar reformats were obtained. Dose reduction techniques were used. CONTRAST: 77 mL Isovue 370. FINDINGS: LUNGS AND PLEURA: Posttreatment fibrosis in the left lower lobe and perihilar left upper lobe similar to previous. Nodular opacity surrounding the fiducial marker in the left lower lobe measures 2.4 cm transverse diameter, previously 2.6 cm. Scattered diminutive nodules and granulomas are stable. Moderate emphysema. New focal opacity posterior right upper lobe adjacent to the major fissure on image 139, likely inflammatory. MEDIASTINUM/AXILLAE: No lymphadenopathy. CORONARY ARTERY CALCIFICATION: Moderate. HEPATOBILIARY: Cholecystectomy. PANCREAS: Normal. SPLEEN: Normal. ADRENAL GLANDS: Normal. KIDNEYS/BLADDER: Benign renal cysts. Punctate calculi layering in the bladder posteriorly. BOWEL: Normal. LYMPH NODES: Normal. VASCULATURE: Moderate atherosclerosis. PELVIC ORGANS: Mild prostate gland enlargement. MUSCULOSKELETAL: Lumbosacral fusion. No suspicious bone lesions.     IMPRESSION: 1.  Stable to slight decrease in left lower lobe fibrosis surrounding a fiducial marker. No definite recurrent tumor. 2.  No definite metastatic disease.     MRI Brain w & w/o contrast    Result Date: 4/16/2025  EXAM: MR BRAIN W/O and W CONTRAST LOCATION: [facility] DATE: 4/15/2025 INDICATION:  Small cell carcinoma of upper lobe of left lung (H) COMPARISON: August 28, 2023 CONTRAST: gadavist 7 ml TECHNIQUE: Routine multiplanar multisequence head MRI without and with intravenous contrast. FINDINGS: INTRACRANIAL CONTENTS: No abnormal restricted diffusion to suggest acute infarct. Scattered foci of signal abnormality within the cerebral hemispheric white matter which are nonspecific, though most commonly ascribed to chronic small vessel ischemic disease. The ventricles and sulci are prominent consistent with moderate brain parenchymal volume loss. No evidence of acute  intracranial hemorrhage, mass effect, or extra-axial collection. No abnormal brain parenchymal or leptomeningeal enhancement. Punctate focus of microhemorrhage or microcalcification within the left posterior medial caudate, unchanged. No cerebellar tonsillar ectopia. SELLA: No abnormality accounting for technique. OSSEOUS STRUCTURES/SOFT TISSUES: The visualized skull base and calvarium are unremarkable. Expected signal voids within the distal vertebral, basilar, and bilateral internal carotid arteries. ORBITS: Bilateral cataract surgery. SINUSES/MASTOIDS: The partially imaged paranasal sinuses demonstrate mild mucosal thickening within the right maxillary sinus. The partially imaged paranasal sinuses are otherwise unremarkable. Mastoid air cells and middle ear cavities are unremarkable.     IMPRESSION: 1.  No evidence of acute intracranial hemorrhage, mass effect, or infarction. 2.  No evidence of intracranial metastases. 3.  Moderate nonspecific white matter changes. 4.  Moderate brain parenchymal volume loss.               Impression     The patient is a 74-year-old gentleman with a prior history of limited stage small cell lung cancer diagnosed initially in 2012.  He was find to have a new limited stage small cell lung cancer involving the left lower lobe with clinical stage T1 N0 M0.  Patient received definitive SBRT and completed 5 years and 1 months ago.  He has been doing well with no clinical evidence of recurrence.      Assessment & Plan:   1.  I have personally reviewed his restaging MRI and CT scan today and compare with previous MRI and CT chest and radiation therapy record.  There is no radiographic evidence of cancer recurrence. Patient is scheduled to return to radiation oncology in 12 months for CT chest.       2.  Continue follow-up with Dr. Hale, thoracic surgeon and Dr. Brito, pulmonology as planned.     3.  The patient was found to have a new focus of chronic microhemorrhage in the region of  the posterior left caudate nucleus with stable chronic focus of microhemorrhage deep white matter left parietal level.  Patient will see Dr. April Franco, his primary physician for follow-up recommendation.     Pain Management Plan: NA     Face to face time  30 minutes with > 75% spent on consultation, education and coordination of care.        Mary Snowden MD  Department of Radiation Oncology   [facility] Radiation Oncology  Tel: [phone]  Page: [phone]    [facility]  [address] [place] [address]     [address]   [place] [address]    CC:  Patient Care Team:  April Franco PA as PCP - General  Mary Snowden MD as MD (Hematology & Oncology)  Mray Snowden MD as Assigned Cancer Care Provider  Dasia Cervantes MD as Assigned Heart and Vascular Provider  Tabitha Jones MD as Assigned Infectious Disease Provider

## 2025-04-17 NOTE — LETTER
4/17/2025      Martin Tovar  [address]  [facility] [address]      Dear Colleague,    Thank you for referring your patient, Martin Tovar, to the [facility] RADIATION ONCOLOGY [place]. Please see a copy of my visit note below.    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  The patient is status post chemoradiation therapy.  He was found to have a new diagnosis of limited stage small cell lung cancer, second primary/recurrence, clinical stage T1 N0 M0.  His case has been discussed at thoracic tumor conference and consensus recommendation is to consider SBRT.      TYPE OF RADIATION THERAPY ADMINISTERED:    1. 4500 cGy in 30 treatments at 150 cGy twice daily at [place] [facility] in 2012.      2.  PCI with a total dose of 2500 cGy in 10 treatments at [facility] in 2012.      3. 5000 cGy in 5 treatments to left lower lobe tumor given from 3/2/120-3/12/2020.     INTERVAL SINCE COMPLETION OF RADIATION THERAPY: 5 years and 1 months.      SUBJECTIVE:  Mr. Tovar is a 74 y.o. male who is a retired  from [employer].  Patient was diagnosed with limited stage small cell left lung cancer initially in 2012, stage T2 N0 M0.  He received concurrent chemoradiation therapy with cisplatin and etoposide and radiation therapy to a total dose of 4500 cGy in 30 treatments at 150 cGy twice daily.  His treatment was given at [facility].  Patient had a complete response and also received PCI with a total dose of 2500 cGy in 10 treatments.  Patient has been followed closely with no evidence of cancer recurrence.  He will presented with recent finding of left lower lobe nodule by routine screening exam for which he was seeking further evaluation.  PET CT scan on 1/14/2020 showed a 1.3 x 1.2 cm nodule with increased SUV max 3.3  consistent with malignancy.  There is no radiographic evidence of anel and systemic metastasis.  CT-guided needle biopsy on 2/5/2020 confirmed diagnosis of small cell carcinoma.  He had a staging MRI brain which also showed no evidence of brain metastasis.  Given the prior history of radiation therapy and location of his current cancer, the patient might require pneumonectomy.  His case has been discussed at thoracic tumor conference and that the consensus recommendation is to consider SBRT. His case has been discussed at thoracic tumor conference and consensus recommendation is to consider SBRT.  He received radiation therapy in our clinic with a total dose of 5000 cGy in 5 treatments given from 3/2/120-3/12/2020.  He tolerated radiation therapy very well with minimal side effect.     Patient has been doing well since completion of the radiation therapy.  The patient felt had a home by the end of 2024 with several ribs fracture.  He still have residual left upper chest wall pain. He denies any chest pain discomfort at the time of evaluation.  He is here for routine postradiation therapy office follow-up.     Medications were reviewed and are up to date on EPIC.    The following portions of the patient's history were reviewed and updated as appropriate: allergies, current medications, past family history, past medical history, past social history, past surgical history and problem list.    Review of Systems:      General  Constitutional  Constitutional (WDL): Exceptions to WDL  Fatigue: Fatigue relieved by rest  EENT  Eye Disorders  Eye Disorder (WDL): All eye disorder elements are within defined limits  Ear Disorders  Ear Disorder (WDL): Exceptions to WDL  Tinnitus: Mild symptoms OR intervention not indicated (chronic)  Respiratory  Respiratory  Respiratory (WDL): Exceptions to WDL  Cough: Mild symptoms OR nonprescription intervention indicated  Dyspnea: Shortness of breath with minimal exertion OR limiting  instrumental ADL  Hypoxia: Decreased oxygen saturation with exercise (e.g., pulse oximeter less than 88%) OR intermittent supplemental oxygen (not using O2 lately, not established with Pulm either)  Cardiovascular  Cardiovascular  Cardiovascular (WDL): All cardiovascular elements are within defined limits  Gastrointestinal  Gastrointestinal  Gastrointestinal (WDL): Exceptions to WDL  Anorexia: Loss of appetite without alteration in eating habits  Musculoskeletal  Musculoskeletal and Connective Tissue Disorders  Musculoskeletal & Connective (WDL): Exceptions to WDL  Arthralgia: Mild pain  Integumentary  Integumentary  Integumentary (WDL): All integumentary elements are within defined limits  Neurological  Neurosensory  Neurosensory (WDL): Exceptions to WDL  Ataxia: Moderate symptoms OR limiting instrumental ADL (using cane)  Genitourinary/Reproductive  Genitourinary  Genitourinary (WDL): Exceptions to WDL  Urinary Frequency: Present  Lymphatic  Lymph System Disorders  Lymph (WDL): All lymph elements are within defined limits  Pain  Pain Score: Mild Pain (3)  AUA Assessment                                                              Accompanied by  Accompanied By: self only    Objective:      PHYSICAL EXAMINATION:    BP (!) 160/83 (BP Location: Left arm)   Pulse 100   Resp 22   Wt 69.4 kg (153 lb)   SpO2 93%   BMI 23.96 kg/m      Gen: Alert, in NAD  Eyes: PERRL, EOMI, sclera anicteric  Neck: Supple, full ROM, no LAD  Pulm: No wheezing, stridor or respiratory distress  CV: Well-perfused, no cyanosis, no pedal edema  Back: No step-offs or pain to palpation along the thoracolumbar spine  Rectal: Deferred  : Deferred  Musculoskeletal: Normal muscle bulk and tone  Skin: Normal color and turgor  Neurologic: A/Ox3, CN II-XII intact, normal gait and station  Psychiatric: Appropriate mood and affect     Imaging: Imaging results 30 days: CT Chest/Abdomen/Pelvis w Contrast    Result Date: 4/16/2025  EXAM: CT  CHEST/ABDOMEN/PELVIS WITH CONTRAST LOCATION: [facility] DATE: 04/15/2025 INDICATION: Small cell carcinoma of upper lobe of left lung (H). COMPARISON: 01/13/2025 TECHNIQUE: CT scan of the chest, abdomen, and pelvis was performed following injection of IV contrast. Multiplanar reformats were obtained. Dose reduction techniques were used. CONTRAST: 77 mL Isovue 370. FINDINGS: LUNGS AND PLEURA: Posttreatment fibrosis in the left lower lobe and perihilar left upper lobe similar to previous. Nodular opacity surrounding the fiducial marker in the left lower lobe measures 2.4 cm transverse diameter, previously 2.6 cm. Scattered diminutive nodules and granulomas are stable. Moderate emphysema. New focal opacity posterior right upper lobe adjacent to the major fissure on image 139, likely inflammatory. MEDIASTINUM/AXILLAE: No lymphadenopathy. CORONARY ARTERY CALCIFICATION: Moderate. HEPATOBILIARY: Cholecystectomy. PANCREAS: Normal. SPLEEN: Normal. ADRENAL GLANDS: Normal. KIDNEYS/BLADDER: Benign renal cysts. Punctate calculi layering in the bladder posteriorly. BOWEL: Normal. LYMPH NODES: Normal. VASCULATURE: Moderate atherosclerosis. PELVIC ORGANS: Mild prostate gland enlargement. MUSCULOSKELETAL: Lumbosacral fusion. No suspicious bone lesions.     IMPRESSION: 1.  Stable to slight decrease in left lower lobe fibrosis surrounding a fiducial marker. No definite recurrent tumor. 2.  No definite metastatic disease.     MRI Brain w & w/o contrast    Result Date: 4/16/2025  EXAM: MR BRAIN W/O and W CONTRAST LOCATION: [facility] DATE: 4/15/2025 INDICATION:  Small cell carcinoma of upper lobe of left lung (H) COMPARISON: August 28, 2023 CONTRAST: gadavist 7 ml TECHNIQUE: Routine multiplanar multisequence head MRI without and with intravenous contrast. FINDINGS: INTRACRANIAL CONTENTS: No abnormal restricted diffusion to suggest acute infarct. Scattered foci of signal abnormality  within the cerebral hemispheric white matter which are nonspecific, though most commonly ascribed to chronic small vessel ischemic disease. The ventricles and sulci are prominent consistent with moderate brain parenchymal volume loss. No evidence of acute intracranial hemorrhage, mass effect, or extra-axial collection. No abnormal brain parenchymal or leptomeningeal enhancement. Punctate focus of microhemorrhage or microcalcification within the left posterior medial caudate, unchanged. No cerebellar tonsillar ectopia. SELLA: No abnormality accounting for technique. OSSEOUS STRUCTURES/SOFT TISSUES: The visualized skull base and calvarium are unremarkable. Expected signal voids within the distal vertebral, basilar, and bilateral internal carotid arteries. ORBITS: Bilateral cataract surgery. SINUSES/MASTOIDS: The partially imaged paranasal sinuses demonstrate mild mucosal thickening within the right maxillary sinus. The partially imaged paranasal sinuses are otherwise unremarkable. Mastoid air cells and middle ear cavities are unremarkable.     IMPRESSION: 1.  No evidence of acute intracranial hemorrhage, mass effect, or infarction. 2.  No evidence of intracranial metastases. 3.  Moderate nonspecific white matter changes. 4.  Moderate brain parenchymal volume loss.               Impression     The patient is a 74-year-old gentleman with a prior history of limited stage small cell lung cancer diagnosed initially in 2012.  He was find to have a new limited stage small cell lung cancer involving the left lower lobe with clinical stage T1 N0 M0.  Patient received definitive SBRT and completed 5 years and 1 months ago.  He has been doing well with no clinical evidence of recurrence.      Assessment & Plan:   1.  I have personally reviewed his restaging MRI and CT scan today and compare with previous MRI and CT chest and radiation therapy record.  There is no radiographic evidence of cancer recurrence. Patient is scheduled  "to return to radiation oncology in 12 months for CT chest.       2.  Continue follow-up with Dr. Hale, thoracic surgeon and Dr. Brito, pulmonology as planned.     3.  The patient was found to have a new focus of chronic microhemorrhage in the region of the posterior left caudate nucleus with stable chronic focus of microhemorrhage deep white matter left parietal level.  Patient will see Dr. April Franco, his primary physician for follow-up recommendation.     Pain Management Plan: NA     Face to face time  30 minutes with > 75% spent on consultation, education and coordination of care.        Mary Snowden MD  Department of Radiation Oncology   [facility] Radiation Oncology  Tel: [phone]  Page: [phone]    [facility]  [address] [place] [address]     [address]  [place] [address]    CC:  Patient Care Team:  April Franco PA as PCP - General  Mary Snowden MD as MD (Hematology & Oncology)  Mary Snowden MD as Assigned Cancer Care Provider  Dasia Cervantes MD as Assigned Heart and Vascular Provider  Tabitha Jones MD as Assigned Infectious Disease Provider      Oncology Rooming Note    April 17, 2025 10:59 AM   Martin Tovar is a 74 year old male who presents for:    Chief Complaint   Patient presents with     Oncology Clinic Visit     Lung Cancer     Rad Onc follow up     Initial Vitals: BP (!) 160/83 (BP Location: Left arm)   Pulse 100   Resp 22   Wt 69.4 kg (153 lb)   SpO2 93%   BMI 23.96 kg/m   Estimated body mass index is 23.96 kg/m  as calculated from the following:    Height as of 11/12/24: 1.702 m (5' 7\").    Weight as of this encounter: 69.4 kg (153 lb). Body surface area is 1.81 meters squared.  Mild Pain (3) Comment: Data Unavailable   No LMP for male patient.  Allergies reviewed: Yes  Medications reviewed: Yes    Medications: Medication refills not needed today.  Pharmacy name entered into [employer]: [id number] [place] [facility] - [place], [place] - [address]    Frailty Screening:   Is the patient here for a new oncology consult visit in cancer care? 2. No    PHQ9:  Did this patient require a PHQ9?: Yes   If the patient required a PHQ9 assessment, did the results require a follow up with the Provider/Nurse?: No      Clinical concerns: SOB. Not currently established with [place] or Pulm.   Dr. Snowden was notified.      Margo Khan RN                Again, thank you for allowing me to participate in the care of your patient.        Sincerely,        Mary Snowden MD    Electronically signed

## 2025-04-29 ENCOUNTER — HOSPITAL ENCOUNTER (OUTPATIENT)
Dept: CT IMAGING | Facility: CLINIC | Age: 75
Discharge: HOME | End: 2025-04-29
Attending: PHYSICIAN ASSISTANT | Admitting: PHYSICIAN ASSISTANT
Payer: MEDICARE
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
## 2025-04-29 DIAGNOSIS — M54.6 PAIN IN THORACIC SPINE: ICD-10-CM

## 2025-04-29 DIAGNOSIS — R07.81 RIB PAIN: ICD-10-CM

## 2025-04-29 PROCEDURE — 72128 CT CHEST SPINE W/O DYE: CPT

## 2025-06-05 DIAGNOSIS — E78.5 HYPERLIPIDEMIA: ICD-10-CM

## 2025-06-05 RX ORDER — ATORVASTATIN CALCIUM 20 MG/1
20 TABLET, FILM COATED ORAL DAILY
Qty: 90 TABLET | Refills: 3 | Status: SHIPPED | OUTPATIENT
Start: 2025-06-05

## (undated) DEVICE — INTRO SHEATH 6FRX10CM PINNACLE RSS602

## (undated) DEVICE — CUSTOM PACK CORONARY SAN5BCRHEA

## (undated) DEVICE — CATH ANGIO JUDKINS JL5 6FRX100CM INFINITI 534622T

## (undated) DEVICE — MANIFOLD KIT ANGIO AUTOMATED 014613

## (undated) DEVICE — SYR ANGIOGRAPHY MULTIUSE KIT ACIST 014612

## (undated) DEVICE — CATH DIAG 4FR STR PIG 538450S

## (undated) DEVICE — CLOSURE ANGIOSEAL 6FR 610130

## (undated) DEVICE — KIT HAND CONTROL ACIST 014644 AR-P54

## (undated) DEVICE — CATH ANGIO JUDKINS JL4 6FRX100CM INFINITI 534620T

## (undated) DEVICE — CATH ANGIO INFINITI JR4 4FRX100CM 538421

## (undated) DEVICE — INTRODUCER CATH VASC 5FRX10CM  MPIS-501-NT-U-SST

## (undated) DEVICE — SHEATH ULTIMUM 6FR 12CM 407845

## (undated) DEVICE — CATH ANGIO INFINITI 3DRC 6FRX100CM 534676T

## (undated) DEVICE — ELECTRODE ADULT PACING MULTI P-211-M1

## (undated) DEVICE — CATH ANGIO INFINITI JL4 4FRX100CM 538420

## (undated) RX ORDER — FENTANYL CITRATE 50 UG/ML
INJECTION, SOLUTION INTRAMUSCULAR; INTRAVENOUS
Status: DISPENSED
Start: 2023-04-13

## (undated) RX ORDER — DIAZEPAM 5 MG
TABLET ORAL
Status: DISPENSED
Start: 2023-04-13

## (undated) RX ORDER — FENTANYL CITRATE 50 UG/ML
INJECTION, SOLUTION INTRAMUSCULAR; INTRAVENOUS
Status: DISPENSED
Start: 2021-08-05